# Patient Record
Sex: FEMALE | Race: OTHER | HISPANIC OR LATINO | URBAN - METROPOLITAN AREA
[De-identification: names, ages, dates, MRNs, and addresses within clinical notes are randomized per-mention and may not be internally consistent; named-entity substitution may affect disease eponyms.]

---

## 2018-09-28 ENCOUNTER — INPATIENT (INPATIENT)
Facility: HOSPITAL | Age: 83
LOS: 14 days | Discharge: HOME | DRG: 445 | End: 2018-10-13
Attending: INTERNAL MEDICINE | Admitting: INTERNAL MEDICINE
Payer: MEDICARE

## 2018-09-28 VITALS
DIASTOLIC BLOOD PRESSURE: 78 MMHG | OXYGEN SATURATION: 94 % | HEART RATE: 78 BPM | TEMPERATURE: 98 F | SYSTOLIC BLOOD PRESSURE: 129 MMHG | RESPIRATION RATE: 16 BRPM

## 2018-09-28 DIAGNOSIS — K22.70 BARRETT'S ESOPHAGUS WITHOUT DYSPLASIA: ICD-10-CM

## 2018-09-28 LAB
-  STREPTOCOCCUS SP. (NOT GRP A, B OR S PNEUMONIAE): SIGNIFICANT CHANGE UP
ALBUMIN SERPL ELPH-MCNC: 3.6 G/DL — SIGNIFICANT CHANGE UP (ref 3.3–5)
ALBUMIN SERPL ELPH-MCNC: 3.7 G/DL — SIGNIFICANT CHANGE UP (ref 3.3–5)
ALP SERPL-CCNC: 106 U/L — SIGNIFICANT CHANGE UP (ref 40–120)
ALP SERPL-CCNC: 89 U/L — SIGNIFICANT CHANGE UP (ref 40–120)
ALT FLD-CCNC: 306 U/L — HIGH (ref 10–45)
ALT FLD-CCNC: 84 U/L — HIGH (ref 10–45)
ANION GAP SERPL CALC-SCNC: 11 MMOL/L — SIGNIFICANT CHANGE UP (ref 5–17)
ANION GAP SERPL CALC-SCNC: 12 MMOL/L — SIGNIFICANT CHANGE UP (ref 5–17)
APPEARANCE UR: CLEAR — SIGNIFICANT CHANGE UP
APTT BLD: 31.4 SEC — SIGNIFICANT CHANGE UP (ref 27.5–37.4)
AST SERPL-CCNC: 268 U/L — HIGH (ref 10–40)
AST SERPL-CCNC: 568 U/L — HIGH (ref 10–40)
BASOPHILS # BLD AUTO: 0 K/UL — SIGNIFICANT CHANGE UP (ref 0–0.2)
BASOPHILS NFR BLD AUTO: 0.3 % — SIGNIFICANT CHANGE UP (ref 0–2)
BILIRUB SERPL-MCNC: 0.9 MG/DL — SIGNIFICANT CHANGE UP (ref 0.2–1.2)
BILIRUB SERPL-MCNC: 2.2 MG/DL — HIGH (ref 0.2–1.2)
BILIRUB UR-MCNC: NEGATIVE — SIGNIFICANT CHANGE UP
BUN SERPL-MCNC: 16 MG/DL — SIGNIFICANT CHANGE UP (ref 7–23)
BUN SERPL-MCNC: 18 MG/DL — SIGNIFICANT CHANGE UP (ref 7–23)
CALCIUM SERPL-MCNC: 9.1 MG/DL — SIGNIFICANT CHANGE UP (ref 8.4–10.5)
CALCIUM SERPL-MCNC: 9.2 MG/DL — SIGNIFICANT CHANGE UP (ref 8.4–10.5)
CHLORIDE SERPL-SCNC: 101 MMOL/L — SIGNIFICANT CHANGE UP (ref 96–108)
CHLORIDE SERPL-SCNC: 102 MMOL/L — SIGNIFICANT CHANGE UP (ref 96–108)
CK MB CFR SERPL CALC: 2 NG/ML — SIGNIFICANT CHANGE UP (ref 0–3.8)
CO2 SERPL-SCNC: 22 MMOL/L — SIGNIFICANT CHANGE UP (ref 22–31)
CO2 SERPL-SCNC: 23 MMOL/L — SIGNIFICANT CHANGE UP (ref 22–31)
COLOR SPEC: YELLOW — SIGNIFICANT CHANGE UP
CREAT SERPL-MCNC: 0.85 MG/DL — SIGNIFICANT CHANGE UP (ref 0.5–1.3)
CREAT SERPL-MCNC: 0.89 MG/DL — SIGNIFICANT CHANGE UP (ref 0.5–1.3)
DIFF PNL FLD: ABNORMAL
E COLI DNA BLD POS QL NAA+NON-PROBE: SIGNIFICANT CHANGE UP
EOSINOPHIL # BLD AUTO: 0.1 K/UL — SIGNIFICANT CHANGE UP (ref 0–0.5)
EOSINOPHIL NFR BLD AUTO: 0.5 % — SIGNIFICANT CHANGE UP (ref 0–6)
GAS PNL BLDV: SIGNIFICANT CHANGE UP
GLUCOSE SERPL-MCNC: 131 MG/DL — HIGH (ref 70–99)
GLUCOSE SERPL-MCNC: 178 MG/DL — HIGH (ref 70–99)
GLUCOSE UR QL: NEGATIVE — SIGNIFICANT CHANGE UP
GRAM STN FLD: SIGNIFICANT CHANGE UP
HCT VFR BLD CALC: 35.1 % — SIGNIFICANT CHANGE UP (ref 34.5–45)
HCT VFR BLD CALC: 40.1 % — SIGNIFICANT CHANGE UP (ref 34.5–45)
HGB BLD-MCNC: 11.7 G/DL — SIGNIFICANT CHANGE UP (ref 11.5–15.5)
HGB BLD-MCNC: 12.8 G/DL — SIGNIFICANT CHANGE UP (ref 11.5–15.5)
INR BLD: 1.27 RATIO — HIGH (ref 0.88–1.16)
KETONES UR-MCNC: NEGATIVE — SIGNIFICANT CHANGE UP
LEUKOCYTE ESTERASE UR-ACNC: NEGATIVE — SIGNIFICANT CHANGE UP
LIDOCAIN IGE QN: 21 U/L — SIGNIFICANT CHANGE UP (ref 7–60)
LYMPHOCYTES # BLD AUTO: 0.4 K/UL — LOW (ref 1–3.3)
LYMPHOCYTES # BLD AUTO: 3.7 % — LOW (ref 13–44)
MCHC RBC-ENTMCNC: 31.9 GM/DL — LOW (ref 32–36)
MCHC RBC-ENTMCNC: 32.2 PG — SIGNIFICANT CHANGE UP (ref 27–34)
MCHC RBC-ENTMCNC: 32.9 PG — SIGNIFICANT CHANGE UP (ref 27–34)
MCHC RBC-ENTMCNC: 33.2 GM/DL — SIGNIFICANT CHANGE UP (ref 32–36)
MCV RBC AUTO: 101 FL — HIGH (ref 80–100)
MCV RBC AUTO: 99.3 FL — SIGNIFICANT CHANGE UP (ref 80–100)
METHOD TYPE: SIGNIFICANT CHANGE UP
MONOCYTES # BLD AUTO: 0.5 K/UL — SIGNIFICANT CHANGE UP (ref 0–0.9)
MONOCYTES NFR BLD AUTO: 4.8 % — SIGNIFICANT CHANGE UP (ref 2–14)
NEUTROPHILS # BLD AUTO: 10.4 K/UL — HIGH (ref 1.8–7.4)
NEUTROPHILS NFR BLD AUTO: 90.7 % — HIGH (ref 43–77)
NITRITE UR-MCNC: NEGATIVE — SIGNIFICANT CHANGE UP
PH UR: 6.5 — SIGNIFICANT CHANGE UP (ref 5–8)
PLATELET # BLD AUTO: 170 K/UL — SIGNIFICANT CHANGE UP (ref 150–400)
PLATELET # BLD AUTO: 211 K/UL — SIGNIFICANT CHANGE UP (ref 150–400)
POTASSIUM SERPL-MCNC: 3.7 MMOL/L — SIGNIFICANT CHANGE UP (ref 3.5–5.3)
POTASSIUM SERPL-MCNC: 5.3 MMOL/L — SIGNIFICANT CHANGE UP (ref 3.5–5.3)
POTASSIUM SERPL-SCNC: 3.7 MMOL/L — SIGNIFICANT CHANGE UP (ref 3.5–5.3)
POTASSIUM SERPL-SCNC: 5.3 MMOL/L — SIGNIFICANT CHANGE UP (ref 3.5–5.3)
PROT SERPL-MCNC: 7.6 G/DL — SIGNIFICANT CHANGE UP (ref 6–8.3)
PROT SERPL-MCNC: 8.5 G/DL — HIGH (ref 6–8.3)
PROT UR-MCNC: ABNORMAL
PROTHROM AB SERPL-ACNC: 13.9 SEC — HIGH (ref 9.8–12.7)
RBC # BLD: 3.54 M/UL — LOW (ref 3.8–5.2)
RBC # BLD: 3.98 M/UL — SIGNIFICANT CHANGE UP (ref 3.8–5.2)
RBC # FLD: 13.2 % — SIGNIFICANT CHANGE UP (ref 10.3–14.5)
RBC # FLD: 13.5 % — SIGNIFICANT CHANGE UP (ref 10.3–14.5)
SODIUM SERPL-SCNC: 135 MMOL/L — SIGNIFICANT CHANGE UP (ref 135–145)
SODIUM SERPL-SCNC: 136 MMOL/L — SIGNIFICANT CHANGE UP (ref 135–145)
SP GR SPEC: 1.05 — HIGH (ref 1.01–1.02)
SPECIMEN SOURCE: SIGNIFICANT CHANGE UP
SPECIMEN SOURCE: SIGNIFICANT CHANGE UP
TROPONIN T, HIGH SENSITIVITY RESULT: 10 NG/L — SIGNIFICANT CHANGE UP (ref 0–51)
TROPONIN T, HIGH SENSITIVITY RESULT: 9 NG/L — SIGNIFICANT CHANGE UP (ref 0–51)
UROBILINOGEN FLD QL: NEGATIVE — SIGNIFICANT CHANGE UP
WBC # BLD: 11.4 K/UL — HIGH (ref 3.8–10.5)
WBC # BLD: 8.8 K/UL — SIGNIFICANT CHANGE UP (ref 3.8–10.5)
WBC # FLD AUTO: 11.4 K/UL — HIGH (ref 3.8–10.5)
WBC # FLD AUTO: 8.8 K/UL — SIGNIFICANT CHANGE UP (ref 3.8–10.5)

## 2018-09-28 PROCEDURE — 99233 SBSQ HOSP IP/OBS HIGH 50: CPT

## 2018-09-28 PROCEDURE — 71045 X-RAY EXAM CHEST 1 VIEW: CPT | Mod: 26

## 2018-09-28 PROCEDURE — 71260 CT THORAX DX C+: CPT | Mod: 26

## 2018-09-28 PROCEDURE — 78226 HEPATOBILIARY SYSTEM IMAGING: CPT | Mod: 26

## 2018-09-28 PROCEDURE — 76705 ECHO EXAM OF ABDOMEN: CPT | Mod: 26,RT

## 2018-09-28 PROCEDURE — 93010 ELECTROCARDIOGRAM REPORT: CPT

## 2018-09-28 PROCEDURE — 47490 INCISION OF GALLBLADDER: CPT

## 2018-09-28 PROCEDURE — 99223 1ST HOSP IP/OBS HIGH 75: CPT | Mod: GC

## 2018-09-28 PROCEDURE — 74177 CT ABD & PELVIS W/CONTRAST: CPT | Mod: 26

## 2018-09-28 PROCEDURE — 99285 EMERGENCY DEPT VISIT HI MDM: CPT | Mod: GC,25

## 2018-09-28 RX ORDER — SODIUM CHLORIDE 9 MG/ML
1000 INJECTION, SOLUTION INTRAVENOUS
Qty: 0 | Refills: 0 | Status: DISCONTINUED | OUTPATIENT
Start: 2018-09-28 | End: 2018-10-01

## 2018-09-28 RX ORDER — AZTREONAM 2 G
1000 VIAL (EA) INJECTION ONCE
Qty: 0 | Refills: 0 | Status: COMPLETED | OUTPATIENT
Start: 2018-09-28 | End: 2018-09-28

## 2018-09-28 RX ORDER — FAMOTIDINE 10 MG/ML
20 INJECTION INTRAVENOUS ONCE
Qty: 0 | Refills: 0 | Status: COMPLETED | OUTPATIENT
Start: 2018-09-28 | End: 2018-09-28

## 2018-09-28 RX ORDER — AZTREONAM 2 G
1000 VIAL (EA) INJECTION EVERY 8 HOURS
Qty: 0 | Refills: 0 | Status: DISCONTINUED | OUTPATIENT
Start: 2018-09-28 | End: 2018-10-13

## 2018-09-28 RX ORDER — FAMOTIDINE 10 MG/ML
20 INJECTION INTRAVENOUS DAILY
Qty: 0 | Refills: 0 | Status: DISCONTINUED | OUTPATIENT
Start: 2018-09-28 | End: 2018-10-04

## 2018-09-28 RX ORDER — ACETAMINOPHEN 500 MG
1000 TABLET ORAL ONCE
Qty: 0 | Refills: 0 | Status: COMPLETED | OUTPATIENT
Start: 2018-09-28 | End: 2018-09-28

## 2018-09-28 RX ORDER — SODIUM CHLORIDE 9 MG/ML
250 INJECTION, SOLUTION INTRAVENOUS ONCE
Qty: 0 | Refills: 0 | Status: DISCONTINUED | OUTPATIENT
Start: 2018-09-28 | End: 2018-09-28

## 2018-09-28 RX ORDER — METOPROLOL TARTRATE 50 MG
25 TABLET ORAL DAILY
Qty: 0 | Refills: 0 | Status: DISCONTINUED | OUTPATIENT
Start: 2018-09-28 | End: 2018-09-28

## 2018-09-28 RX ORDER — ACETAMINOPHEN 500 MG
750 TABLET ORAL ONCE
Qty: 0 | Refills: 0 | Status: COMPLETED | OUTPATIENT
Start: 2018-09-28 | End: 2018-09-28

## 2018-09-28 RX ORDER — SENNA PLUS 8.6 MG/1
2 TABLET ORAL AT BEDTIME
Qty: 0 | Refills: 0 | Status: DISCONTINUED | OUTPATIENT
Start: 2018-09-28 | End: 2018-10-13

## 2018-09-28 RX ORDER — ACETAMINOPHEN 500 MG
650 TABLET ORAL ONCE
Qty: 0 | Refills: 0 | Status: COMPLETED | OUTPATIENT
Start: 2018-09-28 | End: 2018-09-28

## 2018-09-28 RX ORDER — ENOXAPARIN SODIUM 100 MG/ML
40 INJECTION SUBCUTANEOUS ONCE
Qty: 0 | Refills: 0 | Status: DISCONTINUED | OUTPATIENT
Start: 2018-09-28 | End: 2018-09-30

## 2018-09-28 RX ORDER — METRONIDAZOLE 500 MG
500 TABLET ORAL EVERY 8 HOURS
Qty: 0 | Refills: 0 | Status: DISCONTINUED | OUTPATIENT
Start: 2018-09-28 | End: 2018-09-30

## 2018-09-28 RX ORDER — SODIUM CHLORIDE 9 MG/ML
500 INJECTION, SOLUTION INTRAVENOUS ONCE
Qty: 0 | Refills: 0 | Status: DISCONTINUED | OUTPATIENT
Start: 2018-09-28 | End: 2018-09-28

## 2018-09-28 RX ORDER — SODIUM CHLORIDE 9 MG/ML
1000 INJECTION, SOLUTION INTRAVENOUS
Qty: 0 | Refills: 0 | Status: DISCONTINUED | OUTPATIENT
Start: 2018-09-28 | End: 2018-09-28

## 2018-09-28 RX ORDER — FAMOTIDINE 10 MG/ML
20 INJECTION INTRAVENOUS ONCE
Qty: 0 | Refills: 0 | Status: DISCONTINUED | OUTPATIENT
Start: 2018-09-28 | End: 2018-09-28

## 2018-09-28 RX ORDER — HYDROMORPHONE HYDROCHLORIDE 2 MG/ML
0.25 INJECTION INTRAMUSCULAR; INTRAVENOUS; SUBCUTANEOUS ONCE
Qty: 0 | Refills: 0 | Status: DISCONTINUED | OUTPATIENT
Start: 2018-09-28 | End: 2018-09-28

## 2018-09-28 RX ORDER — METRONIDAZOLE 500 MG
500 TABLET ORAL ONCE
Qty: 0 | Refills: 0 | Status: COMPLETED | OUTPATIENT
Start: 2018-09-28 | End: 2018-09-28

## 2018-09-28 RX ORDER — SODIUM CHLORIDE 9 MG/ML
500 INJECTION INTRAMUSCULAR; INTRAVENOUS; SUBCUTANEOUS ONCE
Qty: 0 | Refills: 0 | Status: COMPLETED | OUTPATIENT
Start: 2018-09-28 | End: 2018-09-28

## 2018-09-28 RX ORDER — ASPIRIN/CALCIUM CARB/MAGNESIUM 324 MG
81 TABLET ORAL DAILY
Qty: 0 | Refills: 0 | Status: DISCONTINUED | OUTPATIENT
Start: 2018-09-28 | End: 2018-10-13

## 2018-09-28 RX ORDER — FLUTICASONE PROPIONATE 50 MCG
1 SPRAY, SUSPENSION NASAL
Qty: 0 | Refills: 0 | Status: DISCONTINUED | OUTPATIENT
Start: 2018-09-28 | End: 2018-10-13

## 2018-09-28 RX ORDER — BUDESONIDE, MICRONIZED 100 %
0.5 POWDER (GRAM) MISCELLANEOUS
Qty: 0 | Refills: 0 | Status: DISCONTINUED | OUTPATIENT
Start: 2018-09-28 | End: 2018-10-13

## 2018-09-28 RX ADMIN — Medication 50 MILLIGRAM(S): at 16:53

## 2018-09-28 RX ADMIN — Medication 750 MILLIGRAM(S): at 23:30

## 2018-09-28 RX ADMIN — Medication 650 MILLIGRAM(S): at 16:54

## 2018-09-28 RX ADMIN — FAMOTIDINE 20 MILLIGRAM(S): 10 INJECTION INTRAVENOUS at 07:20

## 2018-09-28 RX ADMIN — HYDROMORPHONE HYDROCHLORIDE 0.25 MILLIGRAM(S): 2 INJECTION INTRAMUSCULAR; INTRAVENOUS; SUBCUTANEOUS at 23:00

## 2018-09-28 RX ADMIN — Medication 400 MILLIGRAM(S): at 04:48

## 2018-09-28 RX ADMIN — Medication 50 MILLIGRAM(S): at 22:56

## 2018-09-28 RX ADMIN — Medication 650 MILLIGRAM(S): at 10:49

## 2018-09-28 RX ADMIN — Medication 1000 MILLIGRAM(S): at 07:15

## 2018-09-28 RX ADMIN — HYDROMORPHONE HYDROCHLORIDE 0.25 MILLIGRAM(S): 2 INJECTION INTRAMUSCULAR; INTRAVENOUS; SUBCUTANEOUS at 22:45

## 2018-09-28 RX ADMIN — Medication 100 MILLIGRAM(S): at 13:04

## 2018-09-28 RX ADMIN — SODIUM CHLORIDE 500 MILLILITER(S): 9 INJECTION INTRAMUSCULAR; INTRAVENOUS; SUBCUTANEOUS at 05:39

## 2018-09-28 RX ADMIN — Medication 650 MILLIGRAM(S): at 19:01

## 2018-09-28 RX ADMIN — Medication 650 MILLIGRAM(S): at 07:40

## 2018-09-28 RX ADMIN — Medication 1000 MILLIGRAM(S): at 05:15

## 2018-09-28 RX ADMIN — Medication 100 MILLIGRAM(S): at 23:30

## 2018-09-28 RX ADMIN — Medication 300 MILLIGRAM(S): at 23:00

## 2018-09-28 NOTE — PROGRESS NOTE ADULT - SUBJECTIVE AND OBJECTIVE BOX
Interventional Radiology Brief- Operative Note    Procedure: perc melanie tube placement    Operators: Jarocho Hough M.D.    Anesthesia (type): sedation, local    Contrast:  5 cc    EBL: minimal    Findings/Follow up Plan of Care: 8.5 fr perc melanie tube placement. leave to gravity bag drainage. record output.    Specimens Removed: bile for culture    Implants: 8.5 fr perc melanie    Complications: none    Condition/Disposition: SICU    Please call Interventional Radiology with any questions, concerns, or issues.

## 2018-09-28 NOTE — ED PROVIDER NOTE - ATTENDING CONTRIBUTION TO CARE
Joanna Georges MD - Attending Physician: I have personally seen and examined this patient with the resident/fellow.  I have fully participated in the care of this patient. I have reviewed all pertinent clinical information, including history, physical exam, plan and the Resident/Fellow’s note and agree except as noted. See MDM

## 2018-09-28 NOTE — ED PROVIDER NOTE - PLAN OF CARE
chronic management You came in with upper abdominal pain, which can have many causes. We performed testing, including EKG, labwork, and CT scan which were negative for acute causes such as heart attack and infection of the gall bladder. Your symptoms can also be explained by acid reflex and/or peptic ulcer disease. Your pain improved with Tylenol. If you have similar pain again, you can try to take Tylenol and antacids. Return to the emergency room if you have worsening pain or new symptoms that are concerning to you. Please follow up with your primary care doctor and gastroenterologist within 2-3 weeks to assess whether further testing such as endoscopy is required.

## 2018-09-28 NOTE — ED PROVIDER NOTE - CARE PLAN
Principal Discharge DX:	Li's esophagus  Goal:	chronic management  Assessment and plan of treatment:	You came in with upper abdominal pain, which can have many causes. We performed testing, including EKG, labwork, and CT scan which were negative for acute causes such as heart attack and infection of the gall bladder. Your symptoms can also be explained by acid reflex and/or peptic ulcer disease. Your pain improved with Tylenol. If you have similar pain again, you can try to take Tylenol and antacids. Return to the emergency room if you have worsening pain or new symptoms that are concerning to you. Please follow up with your primary care doctor and gastroenterologist within 2-3 weeks to assess whether further testing such as endoscopy is required. Principal Discharge DX:	Li's esophagus  Goal:	chronic management  Assessment and plan of treatment:	You came in with upper abdominal pain, which can have many causes. We performed testing, including EKG, labwork, and CT scan which were negative for acute causes such as heart attack and infection of the gall bladder. Your symptoms can also be explained by acid reflex and/or peptic ulcer disease. Your pain improved with Tylenol. If you have similar pain again, you can try to take Tylenol and antacids. Return to the emergency room if you have worsening pain or new symptoms that are concerning to you. Please follow up with your primary care doctor and gastroenterologist within 2-3 weeks to assess whether further testing such as endoscopy is required.  Secondary Diagnosis:	Acute cholecystitis

## 2018-09-28 NOTE — ED PROVIDER NOTE - MEDICAL DECISION MAKING DETAILS
89F pmhx polanco's esophagus, CAD s/p stent (5-6 yrs ago) on aspirin, HTN, pulm fibrosis presents with 2h of band-like epigastric pain and nausea. Ddx includes GERD, PUD, cholecystitis, pancreatitis, cardiac (ekg rbbb). Also has 1 wk of cough more productive than usual (chronic cough 2/2 pulm fibrosis)  -CT chest, CT a/p with iv cont. gentle ivf hydration  -iv ofirmev for pain. will reassess  -high sensitivity trop, cbc, cmp, vbg, ua 89F pmhx polanco's esophagus, CAD s/p stent (5-6 yrs ago) on aspirin, HTN, pulm fibrosis presents with 2h of band-like epigastric pain and nausea. Ddx includes GERD, PUD, cholecystitis, pancreatitis, cardiac (ekg rbbb). Also has 1 wk of cough more productive than usual (chronic cough 2/2 pulm fibrosis)  -CT chest, CT a/p with iv cont. gentle ivf hydration, labs    Joanna Georges MD - Attending Physician: Pt here with cough, epigastric/lower chest pain since tonight. ?cardiac vs pna vs abd. Labs, imaging as ordered 89F pmhx polanco's esophagus, CAD s/p stent (5-6 yrs ago) on aspirin, HTN, pulm fibrosis presents with 2h of band-like epigastric pain and nausea. Ddx includes GERD, PUD, cholecystitis, pancreatitis, cardiac (ekg rbbb). Also has 1 wk of cough more productive than usual (chronic cough 2/2 pulm fibrosis)  -CT chest, CT a/p with iv cont. gentle ivf hydration, labs    Joanna Georges MD - Attending Physician: Pt here with cough, epigastric/lower chest pain since tonight. ?cardiac vs pna vs abd. Likely gerd. Labs, imaging as ordered

## 2018-09-28 NOTE — H&P ADULT - NSHPREVIEWOFSYSTEMS_GEN_ALL_CORE
REVIEW OF SYSTEMS:    CONSTITUTIONAL: No weakness, chills; + fever  EYES/ENT: No visual changes;  No vertigo or throat pain   NECK: No pain or stiffness  RESPIRATORY: Chronic cough  CARDIOVASCULAR: No chest pain or palpitations  GASTROINTESTINAL: See HPI  GENITOURINARY: No dysuria, frequency or hematuria  NEUROLOGICAL: No numbness or weakness  SKIN: No itching, burning, rashes, or lesions   All other review of systems is negative unless indicated above.

## 2018-09-28 NOTE — H&P ADULT - NSHPLABSRESULTS_GEN_ALL_CORE
Labs  CBC ( 11:03)                          11.7                     11.4<H>  )--------------(  170        90.7<H>% Neuts, 3.7<L>% Lymphs, ANC: 10.4<H>                          35.1    CBC ( 03:51)                          12.8                     8.8     )--------------(  211        --    % Neuts, --    % Lymphs, ANC: --                              40.1      BMP ( 11:03)       135     |  101     |  16    			Ca++ --      Ca 9.1          ---------------------------------( 131<H>		Mg --           3.7     |  22      |  0.85  			Ph --      BMP ( @ 03:51)       136     |  102     |  18    			Ca++ --      Ca 9.2          ---------------------------------( 178<H>		Mg --           5.3     |  23      |  0.89  			Ph --        LFTs ( @ 11:03)      TPro 7.6 / Alb 3.7 / TBili 2.2<H> / DBili -- / <H> / <H> / AlkPhos 106  LFTs ( @ 03:51)      TPro 8.5<H> / Alb 3.6 / TBili 0.9 / DBili -- / <H> / ALT 84<H> / AlkPhos 89    VBG ( @ 11:03)     7.42 / 40 / 62<H> / 25 / 1.4 / 92<H>%      Lactate: 2.7<H>  VBG ( @ 08:11)     7.38 / 45 / 38 / 26 / 1.6 / 69%      Lactate: 2.7<H>    Urinalysis ( @ 07:22):     Color: Yellow / Appearance: Clear / S.047<H> / pH: 6.5 / Gluc: Negative / Ketones: Negative / Bili: Negative / Urobili: Negative / Protein :100 mg/dL<!> / Nitrites: Negative / Leuk.Est: Negative / RBC: 2 / WBC: 2 / Sq Epi:  / Non Sq Epi: 2 / Bacteria Negative       Imaging    CT chest/abdomen/pelvis  1.  End-stage pulmonary fibrosis with honeycombing, more severe in the   left lung, has progressed since 2011.  No gross CT evidence of   pneumonia.    2.  Fluid-filled esophagus to the level of thoracic inlet, compatible   with gastroesophageal reflux disease.  The patient may be at increased   risk for aspiration.  Esophagus is mildly patulous.  In the setting of   pulmonary fibrosis this may represent scleroderma.    3.  Approximately 2 cm paraesophageal type hiatal hernia.    Abdomen/pelvis:   1.  No evidence of bowel obstruction, active inflammatory process or   intra-abdominal source for infection.    2.  Distended gallbladder.  Small sludge ball versus gallstone in the   gallbladder lumen.  No CT evidence of acute cholecystitis.  Right upper   quadrant ultrasound can be performed for further characterization.    3.  The examination was not performed using mesenteric ischemia protocol.    There is no bowel wall thickening, pneumatosis, mesenteric edema or   obvious vascular occlusion to indicate bowel ischemia.    RUQ ultrasound    Intrahepatic and extrahepatic biliary dilatation. The distal common bile   duct at the level of the pancreatic head was not visualized.    Distended gallbladder containing gallstones and sludge, with borderline   wall thickness.

## 2018-09-28 NOTE — CONSULT NOTE ADULT - ASSESSMENT
89 year old female with HTN, Li's esophagus, CAD s/p stent (5-6 yrs ago on aspirin), end stage pulmonary fibrosis, presented to the emergency room with chief complain of epigastric abdominal pain starting around 1am last night.    IMPRESSION  - Abdominal pain, concerning for choledocholithiasis and cholangitis  Temp 101, WBC 8.8-->11, TBili 0.9-->2.2, Alk phos 106, , , lipase 21, US with CBD dilation to 10mm and intra and extrahepatic biliary dilation    RECOMMENDATION    - trend CBC, CMP, INR  - pending MRCP to evaluate the biliary tree  - patient is very high risk for an ERCP, she has end stage pulmonary fibrosis and there is risk that we maybe unable to extubate her after the procedure, family is aware of the risk and is currently unsure if they want to proceed with an ERCP  - continue antibiotics, currently on aztreonam   - recommend IR consult for possible percutaneous cholecystostomy drain placement   - supportive care as per primary team        Jovi Antony, PGY-5  GI fellow  B- [phone]/ [phone]  Please call GI fellow on call after 5pm and on weekends 89 year old female with HTN, Li's esophagus, CAD s/p stent (5-6 yrs ago on aspirin), end stage pulmonary fibrosis, presented to the emergency room with chief complain of epigastric abdominal pain starting around 1am last night.    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

## 2018-09-28 NOTE — H&P ADULT - ATTENDING COMMENTS
I saw and examined the pt and discussed the tx plan with the House Staff. I agree with the exam and plan as documented in the above H&P.  I discussed with Dr Mathew Lockett (advanced GI), Dr Larios and with 3 family members.   89 yo female with abd pain, fever, elevated LFTs and biliary dilatation. HIDA with no excretion from the liver for 2 hours.   Picture concerning for cholangitis with choledocholithiasis.  Pt would be best treated with ERCP - d/w Dr Lockett who feels ERCP is not the best route for this pt and will document on the chart. The pt is certainly not a candidate for a CBD exploration. A lap melanie would not address her acute issues. As per Dr Lockett, he d/w Dr Thakkar who is willing to perform a perc melanie tube to attempt to decompress the biliary tree.   Will need to closely observe the pt in the SICU after the perc melanie to ensure this tx will be sufficient to treat her disease (likely cholangitis).    Ade Monte MD I saw and examined the pt and discussed the tx plan with the House Staff. I agree with the exam and plan as documented in the above H&P.  I discussed with Dr Mathew Lockett (advanced GI), Dr Larios and with 3 family members.   89 yo female with abd pain, fever, elevated LFTs and biliary dilatation. HIDA with no excretion from the liver for 2 hours.   I saw the pt right after she completed the HIDA - she c/o abd pain. Uncomfortable.   Picture concerning for cholangitis with choledocholithiasis.  Pt would be best treated with ERCP - d/w Dr Lockett who feels ERCP is not the best route for this pt and will document on the chart. The pt is certainly not a candidate for a CBD exploration. A lap melanie would not address her acute issues. As per Dr Lockett, he d/w Dr Thakkar who is willing to perform a perc melanie tube to attempt to decompress the biliary tree.   Will need to closely observe the pt in the SICU after the perc melanie to ensure this tx will be sufficient to treat her disease (likely cholangitis).    Ade Monte MD

## 2018-09-28 NOTE — ED ADULT NURSE NOTE - NSIMPLEMENTINTERV_GEN_ALL_ED
Implemented All Fall with Harm Risk Interventions:  [place] to call system. Call bell, personal items and telephone within reach. Instruct patient to call for assistance. Room bathroom lighting operational. Non-slip footwear when patient is off stretcher. Physically safe environment: no spills, clutter or unnecessary equipment. Stretcher in lowest position, wheels locked, appropriate side rails in place. Provide visual cue, wrist band, yellow gown, etc. Monitor gait and stability. Monitor for mental status changes and reorient to person, place, and time. Review medications for side effects contributing to fall risk. Reinforce activity limits and safety measures with patient and family. Provide visual clues: red socks.

## 2018-09-28 NOTE — ED PROVIDER NOTE - OBJECTIVE STATEMENT
Pt is Malaysian-speaking. Son and daughter are at bedside. Pt and family refusing , history obtained from pt via son and daughter.   89F PMHx Li's esophagus, CAD s/p stent (5-6 yrs ago) on aspirin, HTN, pulmonary fibrosis. Presents with epigastric pain starting around 1am (2 hours ago). Last ate at 7pm. Pt was sitting in bed when pain began, has been continuous and severe. Localized throughout epigastrium bilaterally. Denies dysuria. Associated with mild nausea, no emesis yet. Pt has SOB and cough at baseline 2/2 pulm fibrosis, but for the past week has been more productive. No fever/chills at home, though currently is shivering in ED. Pt is Citizen of [place]-speaking. Son and daughter are at bedside. Pt and family refusing , history obtained from pt via son and daughter.   89F PMHx Li's esophagus, CAD s/p stent (5-6 yrs ago) on aspirin, HTN, pulmonary fibrosis. Presents with epigastric pain starting around 1am (2 hours ago). Last ate at 7pm. Pt was sitting in bed when pain began, has been continuous and severe. Localized throughout epigastrium bilaterally in a line under breast bilateral. Denies dysuria. Associated with mild nausea, no emesis yet. Pt has SOB and cough at baseline 2/2 pulm fibrosis, but for the past week has been more productive. No fever/chills at home, though currently is shivering in ED.

## 2018-09-28 NOTE — CONSULT NOTE ADULT - ATTENDING COMMENTS
As above.  Pt evaluated around 8 PM on 9/28/18 with family present translating British as necessary.    Impression:    #1.  Acute epigastric pain  #2.  Abnormal LFTs  #3.  Mildly dilated common bile duct to 10 mm, minimal intrahepatic ductal dilation.  #4.  Cholelithiasis, mildly prominent and mild wall thickening without definitive signs of cholecystitis.  HIDA scan consistent with cholestasis or high grade biliary obstruction.  #5.  Low grade temperature, leukocytosis to 11, hemodynamically stable.  #6.  Elevated risk for anesthesia due to pulmonary fibrosis / limited activity     Recommendations:    #1.  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  Patient has elevated pulmonary risk (severe pulmonary fibrosis / deconditioning) and elevated cardiac risk (coronary artery disease) for the general anesthesia necessary for ERCP  #3.  Risks of surgical intervention (cholecystectomy or common bile duct exploration) outweigh the benefits.  #4.  Percutaneous biliary drainage by interventional radiology would be technically difficult given minimal dilation of intrahepatic ducts.  Also, pt may need general anesthesia for adequate/safe sedation for percutaneous biliary drainage.  #5.  Percutaneous cholecystostomy tube by interventional radiology is technically feasible, does not require deep sedation/general anesthesia, and would treat cholecystitis if present and has a significant chance of improving cholangitis provided that the cystic duct is open.  #6.  If percutaneous cholecystostomy tube does not improve the patient's condition and patient is felt to have cholangitis, would reconsider ERCP.  If patient improves after cholecystostomy tube and patient has choledocholithasis, would reconsider ERCP for definitive therapy and for eventual removal of tube.    #7.  Anesthesia consult has been requested for evaluation of anesthesia risk, in case of need for ERCP as discussed above.  #8.  Have discussed all of the considerations above with surgical attending Dr. Monte and interventional radiology Dr. Meza.  We agree to proceed with plan for percutaneous cholecystostomy tube.  #9.  Follow clinically and by serial CBC/LFTs.  #10.  IV antibiotics.

## 2018-09-28 NOTE — ED ADULT NURSE NOTE - PMH
Li's Esophagus    Barretts esophagus    CAD (coronary artery disease)    CAD (Coronary Artery Disease)    Essential hypertension    HTN (Hypertension)    Idiopathic pulmonary fibrosis    Pulmonary Fibrosis

## 2018-09-28 NOTE — ED PROVIDER NOTE - PROGRESS NOTE DETAILS
Pt reassessed after CT scan and IV ofirmev. Pain has resolved since last exam. Pt currently feels well. CT negative for pneumonia and acute abdominal processes. High sensitivity trop 9, will rpt at 3hrs. Discussed other possible causes incl PUD, GERD with pt. Will likely discharge home after rpt trop results, recommended pt f/u with GI as outpt. Isadora Pelayo, DO: Patient at 7AM to myself, Dr. Uribe & Dr. Modi pending troponin pending delta troponin & UA. Delta troponin 1. Patient spiked fever 100.5. Reassessed at bedside & reviewed CT results with patient. Patient with 8/10 pain & epigastric TTP with guarding a/w transaminitis - denies ETOH. Will obtain US & repeat VBG. Patient aware of plan. Dr Uribe: US Concerning for Acute cholecystitis so surgery has been consulted.

## 2018-09-28 NOTE — ED ADULT NURSE NOTE - OBJECTIVE STATEMENT
89 yr old female arrived to the ED from home c.o rib/ abdominal pain x1 hour. PMH pulmonary fibrosis, HTN, and 1 stent placement.  per pt she awoke with the pain. upon assessment pt is a&ox3, pt is moving all extremities, following commands. upper quadrants & ribcage tender to palpation. pt denies any trauma or falls. skin WDL.

## 2018-09-28 NOTE — CONSULT NOTE ADULT - ASSESSMENT
89 yo woman with a hx of pulmonary fibrosis, Li's esophagus, CAD s/p stents (approx 5 years ago per daughters, on ASA), presents with a 1-day history of acute-onset epigastric abdominal pain. Imaging consistent with acute cholecystitis, however, with elevated LFTs and fevers, there is concern for cholangitis. In light of her abdominal HIDA scan, persistent abdominal pain, and poor functional status as far as possible future surgical intervention, the patient would benefit from decompression of her biliary tree via percutaneous cholecystostomy tube.    NEURO: alert, oriented.    PULM: hx pulmonary fibrosis.  -cont home pulmicort inhaler, fluticasone nasal spray    CV: hx HTN. Stable, no indication for pressors.  -cont home meds: ASA, metoprolol    GI: Acute cholecystitis, possible cholangitis.  -Interventional Radiology for percutaneous cholecystostomy tube tonight  -NPO, IVF  -cont home protonix, famotidine    : BUN/Cr stable, making adequate urine.  -strict I/O  -cont spontaneous void    HEME: H/H stable, no indication for transfusion.  -lovenox for DVT ppx    ID: febrile to 38.1, leukocytosis of 11.  -cont aztreonam, metronidazole for acute cholecystitis/cholangitis    ENDO: BG wnl, no issues.    DISPO: SICU for close hemodynamic monitoring

## 2018-09-28 NOTE — CONSULT NOTE ADULT - ATTENDING COMMENTS
Patient seen and examined and agree with above on 9/28.   89 year old female with  PMH of hypertension, CAD and pulmonary fibrosis who presents with acute cholecystits. The patient underwent a percutaneous cholecystostomy. LFTs were elevated. Will continue to monitor with serial abdominal exams and monitor LFTs.   I have reviewed PMH, PSH, labs, meds and imaging.  Will monitor closely for pulmonary fibrosis and use supplemental oxygen as tolerated.   Patient is hemodynamically appropriate at this time.   Will continue to monitor patient closely. Patient seen and examined and agree with above on 9/28.   89 year old female with  PMH of hypertension, CAD and pulmonary fibrosis who presents with acute cholecystits. The patient underwent a percutaneous cholecystostomy. LFTs were elevated. Will continue to monitor with serial abdominal exams and monitor LFTs.   GNR bacteremia and will continue zosyn until final culture sensitivities present.   I have reviewed PMH, PSH, labs, meds and imaging.  Will monitor closely for pulmonary fibrosis and use supplemental oxygen as tolerated.   Patient is hemodynamically appropriate at this time.   Will continue to monitor patient closely.

## 2018-09-28 NOTE — H&P ADULT - HISTORY OF PRESENT ILLNESS
89 year old woman with medical history significant for HTN, Li's esophagus, CAD s/p stent (5-6 yrs ago, on aspirin 81mg), pulmonary fibrosis who presented with epigastric abdominal pain that started around 1am this morning.  The pain was mainly located in the epigastrium and radiated to bilateral upper abdomen, moderate in severity.  It had been persistent and was relieved with tylenol in the ED.  She has had a fever in the ED, no chills.  Some nausea, no emesis.  No diarrhea.  She last ate at dinner, 7pm last evening.  She has never experienced pain like this before.

## 2018-09-28 NOTE — H&P ADULT - NSHPPHYSICALEXAM_GEN_ALL_CORE
Vital Signs Last 24 Hrs  T(C): 36.8 (28 Sep 2018 13:05), Max: 38.1 (28 Sep 2018 07:27)  T(F): 98.3 (28 Sep 2018 13:05), Max: 100.5 (28 Sep 2018 07:27)  HR: 74 (28 Sep 2018 13:05) (74 - 97)  BP: 116/65 (28 Sep 2018 13:05) (116/65 - 143/62)  RR: 18 (28 Sep 2018 13:05) (16 - 18)  SpO2: 96% (28 Sep 2018 13:05) (94% - 100%)    General: No acute distress, appears nontoxic  Neurologic: No focal deficits  Psychiatric: Appropriate affect  Cardiovascular: Regular rate and rhythm  Pulmonary: Unlabored breathing  Abdominal: Soft, nondistended, mild epigastric/RUQ tenderness, negative Edwards's sign  Extremities: No edema, moves all without limitations  Skin: Warm, no rashes

## 2018-09-28 NOTE — CONSULT NOTE ADULT - SUBJECTIVE AND OBJECTIVE BOX
BILIARY GI  Chief Complaint:  Patient is a 89y old  Female who presents with a chief complaint of elevated liver enzymes and abdominal pain.     HPI:  89 year old female with HTN, Li's esophagus, CAD s/p stent (5-6 yrs ago on aspirin), pulmonary fibrosis, presented to the emergency room with chief complain of epigastric abdominal pain starting around 1am last night.    As per daughter, patient was sitting in bed when pain began, has been continuous and severe, located in the epigastrium. She also complains of mild nausea, but denies vomiting, fever, chills. She has SOB and cough at baseline 2/2 pulm fibrosis, but for the past week has been more productive.     In the ER: febrile to 101  WBC 8.8-->11, TBili 0.9-->2.2, Alk phos 106, , , lipase 21    Allergies:  penicillin (Angioedema)  penicillins (Swelling)      Home Medications:  * Incomplete Medication History as of 2016 18:44 documented in Structured Notes  · 	famotidine:  orally   · 	Pulmicort Nebuamp:       Hospital Medications:  aztreonam  IVPB 1000 milliGRAM(s) IV Intermittent once      PMHX/PSHX:  Barretts esophagus  Essential hypertension  CAD (coronary artery disease)  Idiopathic pulmonary fibrosis  Li's Esophagus  CAD (Coronary Artery Disease)  HTN (Hypertension)  Pulmonary Fibrosis  S/P Coronary Artery Stent Placement  S/P Shoulder Surgery      Family history:    Denies family history of colon cancer, liver cancer, uterine cancer, ovarian cancer, breast cancer, gastric ulcers, colon polyps, gallstones    Social History:   denies smoking, alcohol, drug use      ROS:     General:  No wt loss, fevers, chills, night sweats, fatigue,   Eyes:  Good vision, no reported pain  ENT:  No sore throat, pain, runny nose, dysphagia  CV:  No pain, palpitations, hypo/hypertension  Resp:  No dyspnea, cough, tachypnea, wheezing  GI:  See HPI  :  No pain, bleeding, incontinence, nocturia  Muscle:  No pain, weakness  Neuro:  No weakness, tingling, memory problems  Psych:  No fatigue, insomnia, mood problems, depression  Endocrine:  No polyuria, polydipsia, cold/heat intolerance  Heme:  No petechiae, ecchymosis, easy bruisability  Skin:  No rash, edema      PHYSICAL EXAM:     GENERAL:  Appears stated age, well-groomed, well-nourished, no distress  HEENT:  NC/AT,  conjunctivae clear and pink,  no JVD  CHEST:  Full & symmetric excursion, no increased effort, breath sounds clear  HEART:  Regular rhythm, S1, S2, no murmur/rub/S3/S4, no abdominal bruit, no edema  ABDOMEN:  Soft, non-tender, non-distended, normoactive bowel sounds,  no masses ,  EXTREMITIES:  no cyanosis,clubbing or edema  SKIN:  No rash/erythema/ecchymoses/petechiae/wounds/abscess/warm/dry  NEURO:  Alert, oriented    Vital Signs:  Vital Signs Last 24 Hrs  T(C): 36.8 (28 Sep 2018 13:05), Max: 38.1 (28 Sep 2018 07:27)  T(F): 98.3 (28 Sep 2018 13:05), Max: 100.5 (28 Sep 2018 07:27)  HR: 74 (28 Sep 2018 13:05) (74 - 97)  BP: 116/65 (28 Sep 2018 13:05) (116/65 - 143/62)  BP(mean): --  RR: 18 (28 Sep 2018 13:05) (16 - 18)  SpO2: 96% (28 Sep 2018 13:05) (94% - 100%)  Daily     Daily     LABS:                        11.7   11.4  )-----------( 170      ( 28 Sep 2018 11:03 )             35.1         135  |  101  |  16  ----------------------------<  131<H>  3.7   |  22  |  0.85    Ca    9.1      28 Sep 2018 11:03    TPro  7.6  /  Alb  3.7  /  TBili  2.2<H>  /  DBili  x   /  AST  568<H>  /  ALT  306<H>  /  AlkPhos  106      LIVER FUNCTIONS - ( 28 Sep 2018 11:03 )  Alb: 3.7 g/dL / Pro: 7.6 g/dL / ALK PHOS: 106 U/L / ALT: 306 U/L / AST: 568 U/L / GGT: x             Urinalysis Basic - ( 28 Sep 2018 07:22 )    Color: Yellow / Appearance: Clear / S.047 / pH: x  Gluc: x / Ketone: Negative  / Bili: Negative / Urobili: Negative   Blood: x / Protein: 100 mg/dL / Nitrite: Negative   Leuk Esterase: Negative / RBC: 2 /hpf / WBC 2 /hpf   Sq Epi: x / Non Sq Epi: 2 /hpf / Bacteria: Negative      Amylase Serum--      Lipase serum21       Ammonia--  Imaging:    < from: US Abdomen Upper Quadrant Right (18 @ 09:40) >  FINDINGS:  Liver: Within normal limits.  Bile ducts: Intra-and extrahepatic biliary dilatation Common bile duct measures 10 mm.   Gallbladder: Distended gallbladder containing sludge and stones. Borderline gallbladder wall thickness. Sonographic Edwards's sign was unreliable as the patient received pain medication.  Pancreas: Limited evaluation.  Right kidney: 9.6 cm. Nohydronephrosis.   Ascites: None.  IVC: Visualized portions are within normal limits.  IMPRESSION:   Intrahepatic and extrahepatic biliary dilatation. The distal common bile duct at the level of the pancreatic head was not visualized.  Distended gallbladder containing gallstones and sludge, with borderline wall thickness. Findings are concerning for acute cholecystitis. Further evaluation with hepatobiliary scan is recommended for confirmation.  < end of copied text >    < from: CT Abdomen and Pelvis w/ IV Cont (18 @ 05:34) >  IMPRESSION:    Chest:   1.  End-stage pulmonary fibrosis with honeycombing, more severe in the left lung, has progressed since 2011.  No gross CT evidence of pneumonia.  2.  Fluid-filled esophagus to the level of thoracic inlet, compatible with gastroesophageal reflux disease.  The patient may be at increased risk for aspiration.  Esophagus is mildly patulous.  In the setting of pulmonary fibrosis this may represent scleroderma.  3.  Approximately 2 cm paraesophageal type hiatal hernia.    Abdomen/pelvis:   1.  No evidence of bowel obstruction, active inflammatory process or intra-abdominal source for infection.  2.  Distended gallbladder.  Small sludge ball versus gallstone in the gallbladder lumen.  No CT evidence of acute cholecystitis.  Right upper quadrant ultrasound can be performed for further characterization.  3.  The examination was not performed using mesenteric ischemia protocol.  There is no bowel wall thickening, pneumatosis, mesenteric edema or obvious vascular occlusion to indicate bowel ischemia.  < end of copied text > BILIARY GI  Chief Complaint:  Patient is a 89y old  Female who presents with a chief complaint of elevated liver enzymes and abdominal pain.     HPI:  89 year old female with HTN, Li's esophagus, CAD s/p stent (5-6 yrs ago on aspirin), pulmonary fibrosis, presented to the emergency room with chief complain of epigastric abdominal pain starting around 1am last night.    As per daughter, patient was sitting in bed when pain began, has been continuous and severe, located in the epigastrium. She also complains of mild nausea, but denies vomiting, fever, chills. She has SOB and cough at baseline 2/2 pulm fibrosis, but for the past week has been more productive.     In the ER: febrile to 101  WBC 8.8-->11, TBili 0.9-->2.2, Alk phos 106, , , lipase 21    Allergies:  penicillin (Angioedema)  penicillins (Swelling)      Home Medications:  * Incomplete Medication History as of 2016 18:44 documented in Structured Notes  · 	famotidine:  orally   · 	Pulmicort Nebuamp:       Hospital Medications:  aztreonam  IVPB 1000 milliGRAM(s) IV Intermittent once      PMHX/PSHX:  Barretts esophagus  Essential hypertension  CAD (coronary artery disease)  Idiopathic pulmonary fibrosis  Li's Esophagus  S/P Coronary Artery Stent Placement  S/P Shoulder Surgery      Family history:    Denies family history of colon cancer, liver cancer, uterine cancer, ovarian cancer, breast cancer, gastric ulcers, colon polyps, gallstones    Social History:   denies smoking, alcohol, drug use      ROS:     General:  No wt loss, fevers, chills, night sweats, fatigue,   Eyes:  Good vision, no reported pain  ENT:  No sore throat, pain, runny nose, dysphagia  CV:  No pain, palpitations, hypo/hypertension  Resp:  No dyspnea, cough, tachypnea, wheezing  GI:  See HPI  :  No pain, bleeding, incontinence, nocturia  Muscle:  No pain, weakness  Neuro:  No weakness, tingling, memory problems  Psych:  No fatigue, insomnia, mood problems, depression  Endocrine:  No polyuria, polydipsia, cold/heat intolerance  Heme:  No petechiae, ecchymosis, easy bruisability  Skin:  No rash, edema      PHYSICAL EXAM:     GENERAL:  Appears stated age, well-groomed, well-nourished, no distress  HEENT:  NC/AT,  conjunctivae clear and pink,  no JVD  CHEST:  Full & symmetric excursion, no increased effort, breath sounds clear  HEART:  Regular rhythm, S1, S2, no murmur/rub/S3/S4, no abdominal bruit, no edema  ABDOMEN:  Soft, non-tender, non-distended, normoactive bowel sounds,  no masses ,  EXTREMITIES:  no cyanosis,clubbing or edema  SKIN:  No rash/erythema/ecchymoses/petechiae/wounds/abscess/warm/dry  NEURO:  Alert, oriented    Vital Signs:  Vital Signs Last 24 Hrs  T(C): 36.8 (28 Sep 2018 13:05), Max: 38.1 (28 Sep 2018 07:27)  T(F): 98.3 (28 Sep 2018 13:05), Max: 100.5 (28 Sep 2018 07:27)  HR: 74 (28 Sep 2018 13:05) (74 - 97)  BP: 116/65 (28 Sep 2018 13:05) (116/65 - 143/62)  BP(mean): --  RR: 18 (28 Sep 2018 13:05) (16 - 18)  SpO2: 96% (28 Sep 2018 13:05) (94% - 100%)  Daily     Daily     LABS:                        11.7   11.4  )-----------( 170      ( 28 Sep 2018 11:03 )             35.1         135  |  101  |  16  ----------------------------<  131<H>  3.7   |  22  |  0.85    Ca    9.1      28 Sep 2018 11:03    TPro  7.6  /  Alb  3.7  /  TBili  2.2<H>  /  DBili  x   /  AST  568<H>  /  ALT  306<H>  /  AlkPhos  106      LIVER FUNCTIONS - ( 28 Sep 2018 11:03 )  Alb: 3.7 g/dL / Pro: 7.6 g/dL / ALK PHOS: 106 U/L / ALT: 306 U/L / AST: 568 U/L / GGT: x             Urinalysis Basic - ( 28 Sep 2018 07:22 )    Color: Yellow / Appearance: Clear / S.047 / pH: x  Gluc: x / Ketone: Negative  / Bili: Negative / Urobili: Negative   Blood: x / Protein: 100 mg/dL / Nitrite: Negative   Leuk Esterase: Negative / RBC: 2 /hpf / WBC 2 /hpf   Sq Epi: x / Non Sq Epi: 2 /hpf / Bacteria: Negative      Amylase Serum--      Lipase serum21       Ammonia--  Imaging:    < from: US Abdomen Upper Quadrant Right (18 @ 09:40) >  FINDINGS:  Liver: Within normal limits.  Bile ducts: Intra-and extrahepatic biliary dilatation Common bile duct measures 10 mm.   Gallbladder: Distended gallbladder containing sludge and stones. Borderline gallbladder wall thickness. Sonographic Edwards's sign was unreliable as the patient received pain medication.  Pancreas: Limited evaluation.  Right kidney: 9.6 cm. Nohydronephrosis.   Ascites: None.  IVC: Visualized portions are within normal limits.  IMPRESSION:   Intrahepatic and extrahepatic biliary dilatation. The distal common bile duct at the level of the pancreatic head was not visualized.  Distended gallbladder containing gallstones and sludge, with borderline wall thickness. Findings are concerning for acute cholecystitis. Further evaluation with hepatobiliary scan is recommended for confirmation.  < end of copied text >    < from: CT Abdomen and Pelvis w/ IV Cont (18 @ 05:34) >  IMPRESSION:    Chest:   1.  End-stage pulmonary fibrosis with honeycombing, more severe in the left lung, has progressed since 2011.  No gross CT evidence of pneumonia.  2.  Fluid-filled esophagus to the level of thoracic inlet, compatible with gastroesophageal reflux disease.  The patient may be at increased risk for aspiration.  Esophagus is mildly patulous.  In the setting of pulmonary fibrosis this may represent scleroderma.  3.  Approximately 2 cm paraesophageal type hiatal hernia.    Abdomen/pelvis:   1.  No evidence of bowel obstruction, active inflammatory process or intra-abdominal source for infection.  2.  Distended gallbladder.  Small sludge ball versus gallstone in the gallbladder lumen.  No CT evidence of acute cholecystitis.  Right upper quadrant ultrasound can be performed for further characterization.  3.  The examination was not performed using mesenteric ischemia protocol.  There is no bowel wall thickening, pneumatosis, mesenteric edema or obvious vascular occlusion to indicate bowel ischemia.  < end of copied text >

## 2018-09-28 NOTE — ED PROVIDER NOTE - PHYSICAL EXAMINATION
General: elderly woman, moaning appears uncomfortable, lying in bed   Neurology: A&Ox3, nonfocal, QUAN x 4  Eyes: PERRLA, EOMI  ENT/Neck: Neck supple, No JVD, Gross hearing intact  Respiratory: +wheeze bilaterally  CV: RRR, S1S2, no murmurs, rubs or gallops  Abdominal: Soft, NT, ND +BS,   Extremities: No edema, + peripheral pulses  Skin: No Rashes, Hematoma, Ecchymosis

## 2018-09-28 NOTE — CONSULT NOTE ADULT - SUBJECTIVE AND OBJECTIVE BOX
CONSULT RESULT - [facility]    Consulting attending: Dr. Larios    HPI:  89 yo woman with a hx of pulmonary fibrosis, Li's esophagus, CAD s/p stents (approx 5 years ago per daughters, on ASA), presents with a 1-day history of acute-onset epigastric abdominal pain with radiation to bilateral upper quadrants. Pain improved after tylenol; she cannot identify aggravating factors. She also complained of chills but denies subjective fever, nausea, vomiting, or changes to bowel habits. Denies jaundice, dark urine, or acholic stools. No history of similar pain in the past.    Patient reports baseline SOB and productive cough due to pulmonary fibrosis.      PAST MEDICAL HISTORY:  Barretts esophagus  Essential hypertension  CAD (coronary artery disease)  Idiopathic pulmonary fibrosis  Li's Esophagus  CAD (Coronary Artery Disease)  HTN (Hypertension)  Pulmonary Fibrosis      PAST SURGICAL HISTORY:  S/P Coronary Artery Stent Placement  S/P Shoulder Surgery      MEDICATIONS:  Metoprolol 25 mg daily  Famotidine 20 mg daily  ASA 81 mg daily  Pulmicort 180 mcg BID  Fluticasone nasal spray 50 mcg BID  Omeprazole (unknown dosage)    ALLERGIES:  penicillin (Angioedema)  penicillins (Swelling)      VITALS & I/Os:  Vital Signs Last 24 Hrs  T(C): 36.8 (28 Sep 2018 13:05), Max: 38.1 (28 Sep 2018 07:27)  T(F): 98.3 (28 Sep 2018 13:05), Max: 100.5 (28 Sep 2018 07:27)  HR: 74 (28 Sep 2018 13:05) (74 - 97)  BP: 116/65 (28 Sep 2018 13:05) (116/65 - 143/62)  BP(mean): --  RR: 18 (28 Sep 2018 13:05) (16 - 18)  SpO2: 96% (28 Sep 2018 13:05) (94% - 100%)    PHYSICAL EXAM:  GEN: NAD, resting quietly  PULM: symmetric chest rise bilaterally, no increased WOB  CV: regular rate, peripheral pulses intact  ABD: soft, ND, TTP in epigastrium and bilateral upper quadrants, voluntary guarding, no peritoneal signs  EXTR: no cyanosis or edema, moving all extremities    LABS:                        11.7   11.4  )-----------( 170      ( 28 Sep 2018 11:03 )             35.1         135  |  101  |  16  ----------------------------<  131<H>  3.7   |  22  |  0.85    Ca    9.1      28 Sep 2018 11:03    TPro  7.6  /  Alb  3.7  /  TBili  2.2<H>  /  DBili  x   /  AST  568<H>  /  ALT  306<H>  /  AlkPhos  106      Lactate:   @ 11:03  2.7   @ 08:11  2.7   @ 03:51  2.6        CARDIAC MARKERS ( 28 Sep 2018 03:51 )  x     / x     / x     / x     / 2.0 ng/mL        Urinalysis Basic - ( 28 Sep 2018 07:22 )    Color: Yellow / Appearance: Clear / S.047 / pH: x  Gluc: x / Ketone: Negative  / Bili: Negative / Urobili: Negative   Blood: x / Protein: 100 mg/dL / Nitrite: Negative   Leuk Esterase: Negative / RBC: 2 /hpf / WBC 2 /hpf   Sq Epi: x / Non Sq Epi: 2 /hpf / Bacteria: Negative    IMAGIN/28 RUQ U/S  IMPRESSION:   Intrahepatic and extrahepatic biliary dilatation. The distal common bile   duct at the level of the pancreatic head was not visualized.  Distended gallbladder containing gallstones and sludge, with borderline   wall thickness. Findings are concerning for acute cholecystitis. Further   evaluation with hepatobiliary scan is recommended for confirmation.     CT A/P:  IMPRESSION:    Chest:   1.  End-stage pulmonary fibrosis with honeycombing, more severe in the   left lung, has progressed since 2011.  No gross CT evidence of   pneumonia.    2.  Fluid-filled esophagus to the level of thoracic inlet, compatible   with gastroesophageal reflux disease.  The patient may be at increased   risk for aspiration.  Esophagus is mildly patulous.  In the setting of   pulmonary fibrosis this may represent scleroderma.    3.  Approximately 2 cm paraesophageal type hiatal hernia.    Abdomen/pelvis:   1.  No evidence of bowel obstruction, active inflammatory process or   intra-abdominal source for infection.    2.  Distended gallbladder.  Small sludge ball versus gallstone in the   gallbladder lumen.  No CT evidence of acute cholecystitis.  Right upper   quadrant ultrasound can be performed for further characterization.    3.  The examination was not performed using mesenteric ischemia protocol.    There is no bowel wall thickening, pneumatosis, mesenteric edema or   obvious vascular occlusion to indicate bowel ischemia.    9/28 HIDA Scan:  IMPRESSION: Abnormal hepatobiliary scan.    Nonvisualization of the bowel and gallbladder and the entire 2 hours of   imaging. Findings may represent high-grade obstruction. Acute   cholecystitis cannot be excluded.

## 2018-09-29 LAB
ALBUMIN SERPL ELPH-MCNC: 3.2 G/DL — LOW (ref 3.3–5)
ALP SERPL-CCNC: 103 U/L — SIGNIFICANT CHANGE UP (ref 40–120)
ALT FLD-CCNC: 204 U/L — HIGH (ref 10–45)
ANION GAP SERPL CALC-SCNC: 13 MMOL/L — SIGNIFICANT CHANGE UP (ref 5–17)
APTT BLD: 38.7 SEC — HIGH (ref 27.5–37.4)
AST SERPL-CCNC: 189 U/L — HIGH (ref 10–40)
BILIRUB SERPL-MCNC: 3.3 MG/DL — HIGH (ref 0.2–1.2)
BUN SERPL-MCNC: 12 MG/DL — SIGNIFICANT CHANGE UP (ref 7–23)
CA-I BLD-SCNC: 1.15 MMOL/L — SIGNIFICANT CHANGE UP (ref 1.12–1.3)
CALCIUM SERPL-MCNC: 8.8 MG/DL — SIGNIFICANT CHANGE UP (ref 8.4–10.5)
CHLORIDE SERPL-SCNC: 103 MMOL/L — SIGNIFICANT CHANGE UP (ref 96–108)
CO2 SERPL-SCNC: 22 MMOL/L — SIGNIFICANT CHANGE UP (ref 22–31)
CREAT SERPL-MCNC: 0.76 MG/DL — SIGNIFICANT CHANGE UP (ref 0.5–1.3)
GAS PNL BLDA: SIGNIFICANT CHANGE UP
GLUCOSE SERPL-MCNC: 140 MG/DL — HIGH (ref 70–99)
GRAM STN FLD: SIGNIFICANT CHANGE UP
GRAM STN FLD: SIGNIFICANT CHANGE UP
HCT VFR BLD CALC: 32.7 % — LOW (ref 34.5–45)
HGB BLD-MCNC: 11.5 G/DL — SIGNIFICANT CHANGE UP (ref 11.5–15.5)
INR BLD: 1.55 RATIO — HIGH (ref 0.88–1.16)
MAGNESIUM SERPL-MCNC: 1.9 MG/DL — SIGNIFICANT CHANGE UP (ref 1.6–2.6)
MCHC RBC-ENTMCNC: 34.2 PG — HIGH (ref 27–34)
MCHC RBC-ENTMCNC: 35.1 GM/DL — SIGNIFICANT CHANGE UP (ref 32–36)
MCV RBC AUTO: 97.5 FL — SIGNIFICANT CHANGE UP (ref 80–100)
PHOSPHATE SERPL-MCNC: 2.9 MG/DL — SIGNIFICANT CHANGE UP (ref 2.5–4.5)
PLATELET # BLD AUTO: 197 K/UL — SIGNIFICANT CHANGE UP (ref 150–400)
POTASSIUM SERPL-MCNC: 3.7 MMOL/L — SIGNIFICANT CHANGE UP (ref 3.5–5.3)
POTASSIUM SERPL-SCNC: 3.7 MMOL/L — SIGNIFICANT CHANGE UP (ref 3.5–5.3)
PROT SERPL-MCNC: 7.2 G/DL — SIGNIFICANT CHANGE UP (ref 6–8.3)
PROTHROM AB SERPL-ACNC: 17 SEC — HIGH (ref 9.8–12.7)
RBC # BLD: 3.35 M/UL — LOW (ref 3.8–5.2)
RBC # FLD: 34.9 % — HIGH (ref 10.3–14.5)
SODIUM SERPL-SCNC: 138 MMOL/L — SIGNIFICANT CHANGE UP (ref 135–145)
SPECIMEN SOURCE: SIGNIFICANT CHANGE UP
WBC # BLD: 9.1 K/UL — SIGNIFICANT CHANGE UP (ref 3.8–10.5)
WBC # FLD AUTO: 9.1 K/UL — SIGNIFICANT CHANGE UP (ref 3.8–10.5)

## 2018-09-29 PROCEDURE — 99291 CRITICAL CARE FIRST HOUR: CPT

## 2018-09-29 RX ORDER — OXYCODONE HYDROCHLORIDE 5 MG/1
2.5 TABLET ORAL EVERY 4 HOURS
Qty: 0 | Refills: 0 | Status: DISCONTINUED | OUTPATIENT
Start: 2018-09-29 | End: 2018-09-29

## 2018-09-29 RX ORDER — VANCOMYCIN HCL 1 G
VIAL (EA) INTRAVENOUS
Qty: 0 | Refills: 0 | Status: DISCONTINUED | OUTPATIENT
Start: 2018-09-29 | End: 2018-10-01

## 2018-09-29 RX ORDER — OXYCODONE HYDROCHLORIDE 5 MG/1
2.5 TABLET ORAL ONCE
Qty: 0 | Refills: 0 | Status: DISCONTINUED | OUTPATIENT
Start: 2018-09-29 | End: 2018-09-29

## 2018-09-29 RX ORDER — ONDANSETRON 8 MG/1
4 TABLET, FILM COATED ORAL ONCE
Qty: 0 | Refills: 0 | Status: DISCONTINUED | OUTPATIENT
Start: 2018-09-29 | End: 2018-09-30

## 2018-09-29 RX ORDER — PANTOPRAZOLE SODIUM 20 MG/1
40 TABLET, DELAYED RELEASE ORAL
Qty: 0 | Refills: 0 | Status: DISCONTINUED | OUTPATIENT
Start: 2018-09-29 | End: 2018-10-13

## 2018-09-29 RX ORDER — HYDROMORPHONE HYDROCHLORIDE 2 MG/ML
0.25 INJECTION INTRAMUSCULAR; INTRAVENOUS; SUBCUTANEOUS ONCE
Qty: 0 | Refills: 0 | Status: DISCONTINUED | OUTPATIENT
Start: 2018-09-29 | End: 2018-09-29

## 2018-09-29 RX ORDER — ACETAMINOPHEN 500 MG
650 TABLET ORAL EVERY 6 HOURS
Qty: 0 | Refills: 0 | Status: DISCONTINUED | OUTPATIENT
Start: 2018-09-29 | End: 2018-10-13

## 2018-09-29 RX ORDER — INFLUENZA VIRUS VACCINE 15; 15; 15; 15 UG/.5ML; UG/.5ML; UG/.5ML; UG/.5ML
0.5 SUSPENSION INTRAMUSCULAR ONCE
Qty: 0 | Refills: 0 | Status: DISCONTINUED | OUTPATIENT
Start: 2018-09-29 | End: 2018-10-13

## 2018-09-29 RX ORDER — OXYCODONE HYDROCHLORIDE 5 MG/1
2.5 TABLET ORAL EVERY 4 HOURS
Qty: 0 | Refills: 0 | Status: DISCONTINUED | OUTPATIENT
Start: 2018-09-29 | End: 2018-10-01

## 2018-09-29 RX ORDER — VANCOMYCIN HCL 1 G
1000 VIAL (EA) INTRAVENOUS EVERY 24 HOURS
Qty: 0 | Refills: 0 | Status: DISCONTINUED | OUTPATIENT
Start: 2018-09-30 | End: 2018-10-01

## 2018-09-29 RX ORDER — VANCOMYCIN HCL 1 G
1000 VIAL (EA) INTRAVENOUS ONCE
Qty: 0 | Refills: 0 | Status: COMPLETED | OUTPATIENT
Start: 2018-09-29 | End: 2018-09-29

## 2018-09-29 RX ADMIN — Medication 100 MILLIGRAM(S): at 05:48

## 2018-09-29 RX ADMIN — Medication 50 MILLIGRAM(S): at 13:01

## 2018-09-29 RX ADMIN — OXYCODONE HYDROCHLORIDE 2.5 MILLIGRAM(S): 5 TABLET ORAL at 08:40

## 2018-09-29 RX ADMIN — Medication 100 MILLIGRAM(S): at 21:48

## 2018-09-29 RX ADMIN — OXYCODONE HYDROCHLORIDE 2.5 MILLIGRAM(S): 5 TABLET ORAL at 23:00

## 2018-09-29 RX ADMIN — Medication 650 MILLIGRAM(S): at 07:43

## 2018-09-29 RX ADMIN — OXYCODONE HYDROCHLORIDE 2.5 MILLIGRAM(S): 5 TABLET ORAL at 23:30

## 2018-09-29 RX ADMIN — SODIUM CHLORIDE 50 MILLILITER(S): 9 INJECTION, SOLUTION INTRAVENOUS at 04:44

## 2018-09-29 RX ADMIN — FAMOTIDINE 20 MILLIGRAM(S): 10 INJECTION INTRAVENOUS at 11:35

## 2018-09-29 RX ADMIN — Medication 81 MILLIGRAM(S): at 11:35

## 2018-09-29 RX ADMIN — Medication 650 MILLIGRAM(S): at 18:56

## 2018-09-29 RX ADMIN — OXYCODONE HYDROCHLORIDE 2.5 MILLIGRAM(S): 5 TABLET ORAL at 13:32

## 2018-09-29 RX ADMIN — Medication 0.5 MILLIGRAM(S): at 05:15

## 2018-09-29 RX ADMIN — Medication 0.5 MILLIGRAM(S): at 17:24

## 2018-09-29 RX ADMIN — OXYCODONE HYDROCHLORIDE 2.5 MILLIGRAM(S): 5 TABLET ORAL at 17:07

## 2018-09-29 RX ADMIN — OXYCODONE HYDROCHLORIDE 2.5 MILLIGRAM(S): 5 TABLET ORAL at 09:10

## 2018-09-29 RX ADMIN — OXYCODONE HYDROCHLORIDE 2.5 MILLIGRAM(S): 5 TABLET ORAL at 13:02

## 2018-09-29 RX ADMIN — Medication 50 MILLIGRAM(S): at 05:49

## 2018-09-29 RX ADMIN — OXYCODONE HYDROCHLORIDE 2.5 MILLIGRAM(S): 5 TABLET ORAL at 17:37

## 2018-09-29 RX ADMIN — Medication 1 SPRAY(S): at 17:08

## 2018-09-29 RX ADMIN — Medication 650 MILLIGRAM(S): at 08:13

## 2018-09-29 RX ADMIN — Medication 250 MILLIGRAM(S): at 15:11

## 2018-09-29 RX ADMIN — HYDROMORPHONE HYDROCHLORIDE 0.25 MILLIGRAM(S): 2 INJECTION INTRAMUSCULAR; INTRAVENOUS; SUBCUTANEOUS at 04:00

## 2018-09-29 RX ADMIN — Medication 100 MILLIGRAM(S): at 13:01

## 2018-09-29 RX ADMIN — Medication 50 MILLIGRAM(S): at 21:48

## 2018-09-29 RX ADMIN — Medication 650 MILLIGRAM(S): at 18:26

## 2018-09-29 RX ADMIN — HYDROMORPHONE HYDROCHLORIDE 0.25 MILLIGRAM(S): 2 INJECTION INTRAMUSCULAR; INTRAVENOUS; SUBCUTANEOUS at 04:15

## 2018-09-29 NOTE — PROGRESS NOTE ADULT - ASSESSMENT
A: 89 yo woman with a hx of pulmonary fibrosis, Li's esophagus, CAD s/p stents (approx 5 years ago per daughters, on ASA), presents with a 1-day history of acute-onset epigastric abdominal pain. Imaging consistent with acute cholecystitis, however, with elevated LFTs and fevers, there is concern for cholangitis. Patient underwent PCT placement which she tolerated well.    P:  - pain control with IV tylenol  - c/w home pulmicort and fluticasone  - cont home meds, ASA, metoprolol, protonix, famotidine  - f/u IR post PTC  - NPO/IVF    Joanna Ramirez, PGY-1  General Surgery Green Team [phone] A: 89 yo woman with a hx of pulmonary fibrosis, Li's esophagus, CAD s/p stents (approx 5 years ago per daughters, on ASA), presents with a 1-day history of acute-onset epigastric abdominal pain. Imaging consistent with acute cholecystitis, however, with elevated LFTs and fevers, there is concern for cholangitis. Patient underwent PCT placement which she tolerated well.    P:  - Bctx pos for strep and e coli  - pain control with IV tylenol  - c/w home pulmicort and fluticasone  - cont home meds, ASA, metoprolol, protonix, famotidine  - f/u IR post PTC  - NPO/IVF    Joanna Ramirez, PGY-1  General Surgery Green Team [phone]

## 2018-09-29 NOTE — PROGRESS NOTE ADULT - SUBJECTIVE AND OBJECTIVE BOX
SICU PROGRESS NOTE  ================================================  Overnight events: PCT placed. Patient tolerated well. Blood cultures positive for strep and E. coli.    HPI:  89 yo woman with a hx of pulmonary fibrosis, Li's esophagus, CAD s/p stents (approx 5 years ago per daughters, on ASA), presents with a 1-day history of acute-onset epigastric abdominal pain with radiation to bilateral upper quadrants. Pain improved after tylenol; she cannot identify aggravating factors. She also complained of chills but denies subjective fever, nausea, vomiting, or changes to bowel habits. Denies jaundice, dark urine, or acholic stools. No history of similar pain in the past. Patient does report baseline SOB and productive cough due to pulmonary fibrosis.    Objective:  Vital Signs  ICU Vital Signs Last 24 Hrs  T(C): 36.7 (28 Sep 2018 23:00), Max: 38.1 (28 Sep 2018 07:27)  T(F): 98 (28 Sep 2018 23:00), Max: 100.5 (28 Sep 2018 07:27)  HR: 82 (28 Sep 2018 23:00) (74 - 97)  BP: 159/68 (28 Sep 2018 23:00) (116/65 - 159/68)  BP(mean): 98 (28 Sep 2018 23:00) (97 - 101)  ABP: --  ABP(mean): --  RR: 43 (28 Sep 2018 23:00) (16 - 43)  SpO2: 95% (28 Sep 2018 23:00) (94% - 100%)    I&O's Detail      Diet: Diet, NPO:   Except Medications (09-28-18 @ 19:00)      MEDICATIONS  (STANDING):  acetaminophen  IVPB .. 750 milliGRAM(s) IV Intermittent once  aspirin  chewable 81 milliGRAM(s) Oral daily  aztreonam  IVPB 1000 milliGRAM(s) IV Intermittent every 8 hours  buDESOnide   0.5 milliGRAM(s) Respule 0.5 milliGRAM(s) Inhalation two times a day  dextrose 5% + sodium chloride 0.9%. 1000 milliLiter(s) (50 mL/Hr) IV Continuous <Continuous>  enoxaparin Injectable 40 milliGRAM(s) SubCutaneous Once  famotidine   Suspension 20 milliGRAM(s) Oral daily  fluticasone propionate 50 MICROgram(s)/spray Nasal Spray 1 Spray(s) Both Nostrils two times a day  metroNIDAZOLE  IVPB 500 milliGRAM(s) IV Intermittent every 8 hours    MEDICATIONS  (PRN):  bisacodyl 5 milliGRAM(s) Oral daily PRN Constipation  senna 2 Tablet(s) Oral at bedtime PRN Constipation      PHYSICAL EXAM:  GEN: NAD, resting quietly, intubated, sedated  NEURO: CN II-XII grossly intact, no focal deficits  PULM: symmetric chest rise bilaterally, no increased WOB  CV: regular rate, peripheral pulses intact  ABD: soft, ND, R cholecystostomy tube with bilious drainage, appropriately TTP  EXTR: no cyanosis or edema, moving all extremities    LABS  CBC (09-28 @ 11:03)                              11.7                           11.4<H>  )----------------(  170        90.7<H>% Neutrophils, 3.7<L>% Lymphocytes, ANC: 10.4<H>                              35.1                CBC (09-28 @ 03:51)                              12.8                           8.8     )----------------(  211        --    % Neutrophils, --    % Lymphocytes, ANC: --                                  40.1                  BMP (09-28 @ 11:03)             135     |  101     |  16    		Ca++ --      Ca 9.1                ---------------------------------( 131<H>		Mg --                 3.7     |  22      |  0.85  			Ph --      BMP (09-28 @ 03:51)             136     |  102     |  18    		Ca++ --      Ca 9.2                ---------------------------------( 178<H>		Mg --                 5.3     |  23      |  0.89  			Ph --        LFTs (09-28 @ 11:03)      TPro 7.6 / Alb 3.7 / TBili 2.2<H> / DBili -- / <H> / <H> / AlkPhos 106  LFTs (09-28 @ 03:51)      TPro 8.5<H> / Alb 3.6 / TBili 0.9 / DBili -- / <H> / ALT 84<H> / AlkPhos 89    Coags (09-28 @ 18:59)  aPTT 31.4 / INR 1.27<H> / PT 13.9<H>    ABG (09-28 @ 11:03)      /  /  /  /  / %     Lactate:   2.7<H>  ABG (09-28 @ 08:11)      /  /  /  /  / %     Lactate:   2.7<H>    VBG (09-28 @ 11:03)     7.42 / 40 / 62<H> / 25 / 1.4 / 92<H>%  VBG (09-28 @ 08:11)     7.38 / 45 / 38 / 26 / 1.6 / 69%    -> .Blood Blood Culture (09-28 @ 08:25)       Growth in aerobic bottle: Gram Negative Rods  Growth in anaerobic bottle: Gram Negative Rods and Gram Positive Cocci in  Pairs and Chains    Blood Culture PCR    Growth in aerobic bottle: Gram Negative Rods  Growth in anaerobic bottle: Gram Negative Rods and Gram Positive Cocci in  Pairs and Chains  "Due to technical problems, Proteus sp. will Not be reported as part of  the BCID panel until further notice"  ***Blood Panel PCR results on this specimen are available  approximately 3 hours after the Gram stain result.***  Gram stain, PCR, and/or culture results may not always  correspond due to difference in methodologies.  ************************************************************  This PCR assay was performed using [employer].  The following targets are tested for: Enterococcus,  vancomycin resistant enterococci, Listeria monocytogenes,  coagulase negative staphylococci, S. aureus,  methicillin resistant S. aureus, Streptococcus agalactiae  (Group B), S. pneumoniae, S. pyogenes (Group A),  Acinetobacter baumannii, Enterobacter cloacae, E. coli,  Klebsiella oxytoca, K. pneumoniae, Proteus sp.,  Serratia marcescens, Haemophilus influenzae,  Neisseria meningitidis, Pseudomonas aeruginosa, Candida  albicans, C. glabrata, C krusei, C parapsilosis,  C. tropicalis and the KPC resistance gene.        CULTURES  .Blood Blood  09-28 @ 08:25   Growth in aerobic bottle: Gram Negative Rods  Growth in anaerobic bottle: Gram Negative Rods and Gram Positive Cocci in  Pairs and Chains  "Due to technical problems, Proteus sp. will Not be reported as part of  the BCID panel until further notice"  ***Blood Panel PCR results on this specimen are available  approximately 3 hours after the Gram stain result.***  Gram stain, PCR, and/or culture results may not always  correspond due to difference in methodologies.  ************************************************************  This PCR assay was performed using [employer].  The following targets are tested for: Enterococcus,  vancomycin resistant enterococci, Listeria monocytogenes,  coagulase negative staphylococci, S. aureus,  methicillin resistant S. aureus, Streptococcus agalactiae  (Group B), S. pneumoniae, S. pyogenes (Group A),  Acinetobacter baumannii, Enterobacter cloacae, E. coli,  Klebsiella oxytoca, K. pneumoniae, Proteus sp.,  Serratia marcescens, Haemophilus influenzae,  Neisseria meningitidis, Pseudomonas aeruginosa, Candida  albicans, C. glabrata, C krusei, C parapsilosis,  C. tropicalis and the KPC resistance gene.  --  Blood Culture PCR      IMAGING:   no new imaging

## 2018-09-29 NOTE — PROGRESS NOTE ADULT - SUBJECTIVE AND OBJECTIVE BOX
General Surgery Progress Note    SUBJECTIVE:  The patient was seen and examined. No acute events overnight. Transferred to SICU yesterday evening. IR PTC placed, pt tolerated procedure well. Pain improved. Denies N/V.     OBJECTIVE:     ** VITAL SIGNS / I&O's **    Vital Signs Last 24 Hrs  T(C): 36.2 (29 Sep 2018 03:00), Max: 38.1 (28 Sep 2018 07:27)  T(F): 97.2 (29 Sep 2018 03:00), Max: 100.5 (28 Sep 2018 07:27)  HR: 84 (29 Sep 2018 05:16) (70 - 97)  BP: 140/64 (29 Sep 2018 05:00) (116/65 - 159/68)  BP(mean): 92 (29 Sep 2018 05:00) (88 - 101)  RR: 19 (29 Sep 2018 05:00) (8 - 43)  SpO2: 94% (29 Sep 2018 05:16) (94% - 100%)      28 Sep 2018 07:01  -  29 Sep 2018 06:49  --------------------------------------------------------  IN:    dextrose 5% + sodium chloride 0.9%.: 300 mL    IV PiggyBack: 150 mL  Total IN: 450 mL    OUT:  Total OUT: 0 mL    Total NET: 450 mL          ** PHYSICAL EXAM **    -- CONSTITUTIONAL: Alert, NAD  -- PULMONARY: non-labored respirations  -- ABDOMEN: soft, non-distended, ATTP, R cholecystostomy tube with bilious drainage  -- NEURO: A&Ox3    ** LABS **                          11.7   11.4  )-----------( 170      ( 28 Sep 2018 11:03 )             35.1     28 Sep 2018 11:03    135    |  101    |  16     ----------------------------<  131    3.7     |  22     |  0.85     Ca    9.1        28 Sep 2018 11:03    TPro  7.6    /  Alb  3.7    /  TBili  2.2    /  DBili  x      /  AST  568    /  ALT  306    /  AlkPhos  106    28 Sep 2018 11:03    PT/INR - ( 29 Sep 2018 06:18 )   PT: 17.0 sec;   INR: 1.55 ratio         PTT - ( 29 Sep 2018 06:18 )  PTT:38.7 sec  CAPILLARY BLOOD GLUCOSE      POCT Blood Glucose.: 103 mg/dL (28 Sep 2018 17:31)    CARDIAC MARKERS ( 28 Sep 2018 03:51 )  x     / x     / x     / x     / 2.0 ng/mL      LIVER FUNCTIONS - ( 28 Sep 2018 11:03 )  Alb: 3.7 g/dL / Pro: 7.6 g/dL / ALK PHOS: 106 U/L / ALT: 306 U/L / AST: 568 U/L / GGT: x             Culture - Body Fluid with Gram Stain (collected 29 Sep 2018 00:36)  Source: .Body Fluid Bile Fluid  Gram Stain (29 Sep 2018 03:09):    No polymorphonuclear leukocytes seen per low power field    Gram positive cocci in pairs seen per oil power field    Gram Negative Rods seen per oil power field    by cytocentrifuge    Culture - Blood (collected 28 Sep 2018 08:25)  Source: .Blood Blood  Gram Stain (29 Sep 2018 01:20):    Growth in aerobic bottle: Gram Positive Cocci in Pairs and Chains    Growth in aerobic bottle: Gram Negative Rods    Growth in anaerobic bottle: Gram Positive Cocci in Pairs and Chains and    Gram Negative Rods  Preliminary Report (29 Sep 2018 01:21):    Growth in aerobic bottle: Gram Negative Rods    Growth in aerobic bottle: Gram Positive Cocci in Pairs and Chains    Growth in anaerobic bottle: Gram Positive Cocci in Pairs and Chains and    Gram Negative Rods    Culture - Blood (collected 28 Sep 2018 08:25)  Source: .Blood Blood  Gram Stain (28 Sep 2018 21:50):    Growth in aerobic bottle: Gram Negative Rods    Growth in anaerobic bottle: Gram Negative Rods and Gram Positive Cocci in    Pairs and Chains  Preliminary Report (28 Sep 2018 21:51):    Growth in aerobic bottle: Gram Negative Rods    Growth in anaerobic bottle: Gram Negative Rods and Gram Positive Cocci in    Pairs and Chains    "Due to technical problems, Proteus sp. will Not be reported as part of    the BCID panel until further notice"    ***Blood Panel PCR results on this specimen are available    approximately 3 hours after the Gram stain result.***    Gram stain, PCR, and/or culture results may not always    correspond due to difference in methodologies.    ************************************************************    This PCR assay was performed using [employer].    The following targets are tested for: Enterococcus,    vancomycin resistant enterococci, Listeria monocytogenes,    coagulase negative staphylococci, S. aureus,    methicillin resistant S. aureus, Streptococcus agalactiae    (Group B), S. pneumoniae, S. pyogenes (Group A),    Acinetobacter baumannii, Enterobacter cloacae, E. coli,    Klebsiella oxytoca, K. pneumoniae, Proteus sp.,    Serratia marcescens, Haemophilus influenzae,    Neisseria meningitidis, Pseudomonas aeruginosa, Candida    albicans, C. glabrata, C krusei, C parapsilosis,    C. tropicalis and the KPC resistance gene.  Organism: Blood Culture PCR (28 Sep 2018 23:25)  Organism: Blood Culture PCR (28 Sep 2018 23:25)      Urinalysis Basic - ( 28 Sep 2018 07:22 )    Color: Yellow / Appearance: Clear / S.047 / pH: x  Gluc: x / Ketone: Negative  / Bili: Negative / Urobili: Negative   Blood: x / Protein: 100 mg/dL / Nitrite: Negative   Leuk Esterase: Negative / RBC: 2 /hpf / WBC 2 /hpf   Sq Epi: x / Non Sq Epi: 2 /hpf / Bacteria: Negative        MEDICATIONS  (STANDING):  aspirin  chewable 81 milliGRAM(s) Oral daily  aztreonam  IVPB 1000 milliGRAM(s) IV Intermittent every 8 hours  buDESOnide   0.5 milliGRAM(s) Respule 0.5 milliGRAM(s) Inhalation two times a day  dextrose 5% + sodium chloride 0.9%. 1000 milliLiter(s) (50 mL/Hr) IV Continuous <Continuous>  enoxaparin Injectable 40 milliGRAM(s) SubCutaneous Once  famotidine   Suspension 20 milliGRAM(s) Oral daily  fluticasone propionate 50 MICROgram(s)/spray Nasal Spray 1 Spray(s) Both Nostrils two times a day  influenza   Vaccine 0.5 milliLiter(s) IntraMuscular once  metroNIDAZOLE  IVPB 500 milliGRAM(s) IV Intermittent every 8 hours    MEDICATIONS  (PRN):  acetaminophen   Tablet .. 650 milliGRAM(s) Oral every 6 hours PRN Mild Pain (1 - 3)  bisacodyl 5 milliGRAM(s) Oral daily PRN Constipation  senna 2 Tablet(s) Oral at bedtime PRN Constipation

## 2018-09-29 NOTE — PROGRESS NOTE ADULT - ASSESSMENT
89 yo woman with a hx of pulmonary fibrosis, Li's esophagus, CAD s/p stents (approx 5 years ago per daughters, on ASA), presents with a 1-day history of acute-onset epigastric abdominal pain. Imaging consistent with acute cholecystitis, however, with elevated LFTs and fevers, there is concern for cholangitis. Patient underwent PCT placement which she tolerated well.    NEURO: alert, oriented. Post-procedure pain well controlled.  -IV tylenol    PULM: hx pulmonary fibrosis.  -cont home pulmicort inhaler, fluticasone nasal spray    CV: hx HTN. Stable, no indication for pressors.  -cont home meds: ASA, metoprolol    GI: Acute cholecystitis, possible cholangitis.  -Interventional Radiology for percutaneous cholecystostomy tube tonight  -NPO, IVF  -cont home protonix, famotidine    : BUN/Cr stable, making adequate urine.  -strict I/O  -cont spontaneous void    HEME: H/H stable, no indication for transfusion.  -lovenox for DVT ppx    ID: febrile to 38.1, leukocytosis of 11. Blood cultures 9/28 positive for strep and E. coli.  -cont aztreonam, metronidazole    ENDO: BG wnl, no issues.    DISPO: SICU for close hemodynamic monitoring 89 yo woman with a hx of pulmonary fibrosis, Li's esophagus, CAD s/p stents (approx 5 years ago per daughters, on ASA), presents with a 1-day history of acute-onset epigastric abdominal pain. Imaging consistent with acute cholecystitis, however, with elevated LFTs and fevers, there is concern for cholangitis. Patient underwent PCT placement which she tolerated well.    NEURO: alert, oriented. Post-procedure pain well controlled.  -IV tylenol    PULM: hx pulmonary fibrosis.  -cont home pulmicort inhaler, fluticasone nasal spray    CV: hx HTN, CAD w/ stents. Stable, no indication for pressors.  -cont home meds: ASA, metoprolol    GI: Acute cholecystitis, possible cholangitis.  -Interventional Radiology for percutaneous cholecystostomy tube tonight  -NPO, IVF  -cont home protonix, famotidine    : BUN/Cr stable, making adequate urine.  -strict I/O  -cont spontaneous void    HEME: H/H stable, no indication for transfusion.  -lovenox for DVT ppx    ID: febrile to 38.1, leukocytosis of 11. Blood cultures 9/28 positive for strep and E. coli.  -cont aztreonam, metronidazole    ENDO: BG wnl, no issues.    DISPO: SICU for close hemodynamic monitoring

## 2018-09-29 NOTE — PROGRESS NOTE ADULT - ATTENDING COMMENTS
Pt seen and examined.  Chart reviewed.  Resident note confirmed.  Pt is a 89 year old female with a medical history significant for CAD/HTN/pulmonary fibrosis/Li's esophagus, who presented to [facility] with a 1-day history of epigastric abdominal pain. Work up revealed acute cholecystitis, with a concern for cholangitis. The patient underwent cholecystostomy tube placement. LFT's are trending down.  No acute events overnight.    PMH/PSH/MEDS/ALL/SH/FH/ROS:  Unchanged from H&P  Vitals/PE/Labs/Radiographs:  Reviewed    A/p  Neuro:	Abdominal pain, improved.  	Continue pain control    CVS:	CAD/HTN  	ASA/plavix on hold  	Continue Cardiac monitoring    Pulm:	Pulmonary fibrosis  	Atelectasis  	ISP  	Continue Home meds    GI:	Acute melanie  	R/o cholangitis  	Continue melanie tube  	Adv diet  	Continue home meds    :	CKD 3  	Monitor I's and O's    Heme:	No active issues  	Monitor H/h    ID:	E. coli/Strep bacteremia  	Acute melanie  	R/o cholangitis  	Cont IV abx    Endo:	Continue glycemic control

## 2018-09-30 LAB
-  AMIKACIN: SIGNIFICANT CHANGE UP
-  AMIKACIN: SIGNIFICANT CHANGE UP
-  AMOXICILLIN/CLAVULANIC ACID: SIGNIFICANT CHANGE UP
-  AMPICILLIN/SULBACTAM: SIGNIFICANT CHANGE UP
-  AMPICILLIN/SULBACTAM: SIGNIFICANT CHANGE UP
-  AMPICILLIN: SIGNIFICANT CHANGE UP
-  AMPICILLIN: SIGNIFICANT CHANGE UP
-  AZTREONAM: SIGNIFICANT CHANGE UP
-  AZTREONAM: SIGNIFICANT CHANGE UP
-  CEFAZOLIN: SIGNIFICANT CHANGE UP
-  CEFAZOLIN: SIGNIFICANT CHANGE UP
-  CEFEPIME: SIGNIFICANT CHANGE UP
-  CEFEPIME: SIGNIFICANT CHANGE UP
-  CEFOXITIN: SIGNIFICANT CHANGE UP
-  CEFOXITIN: SIGNIFICANT CHANGE UP
-  CEFTRIAXONE: SIGNIFICANT CHANGE UP
-  CIPROFLOXACIN: SIGNIFICANT CHANGE UP
-  CIPROFLOXACIN: SIGNIFICANT CHANGE UP
-  CLINDAMYCIN: SIGNIFICANT CHANGE UP
-  ERTAPENEM: SIGNIFICANT CHANGE UP
-  ERTAPENEM: SIGNIFICANT CHANGE UP
-  ERYTHROMYCIN: SIGNIFICANT CHANGE UP
-  GENTAMICIN: SIGNIFICANT CHANGE UP
-  GENTAMICIN: SIGNIFICANT CHANGE UP
-  IMIPENEM: SIGNIFICANT CHANGE UP
-  IMIPENEM: SIGNIFICANT CHANGE UP
-  LEVOFLOXACIN: SIGNIFICANT CHANGE UP
-  MEROPENEM: SIGNIFICANT CHANGE UP
-  MEROPENEM: SIGNIFICANT CHANGE UP
-  PENICILLIN: SIGNIFICANT CHANGE UP
-  PIPERACILLIN/TAZOBACTAM: SIGNIFICANT CHANGE UP
-  PIPERACILLIN/TAZOBACTAM: SIGNIFICANT CHANGE UP
-  TOBRAMYCIN: SIGNIFICANT CHANGE UP
-  TOBRAMYCIN: SIGNIFICANT CHANGE UP
-  TRIMETHOPRIM/SULFAMETHOXAZOLE: SIGNIFICANT CHANGE UP
-  TRIMETHOPRIM/SULFAMETHOXAZOLE: SIGNIFICANT CHANGE UP
-  VANCOMYCIN: SIGNIFICANT CHANGE UP
ALBUMIN SERPL ELPH-MCNC: 3.3 G/DL — SIGNIFICANT CHANGE UP (ref 3.3–5)
ALP SERPL-CCNC: 99 U/L — SIGNIFICANT CHANGE UP (ref 40–120)
ALT FLD-CCNC: 126 U/L — HIGH (ref 10–45)
ANION GAP SERPL CALC-SCNC: 7 MMOL/L — SIGNIFICANT CHANGE UP (ref 5–17)
APTT BLD: 35.9 SEC — SIGNIFICANT CHANGE UP (ref 27.5–37.4)
AST SERPL-CCNC: 68 U/L — HIGH (ref 10–40)
BILIRUB DIRECT SERPL-MCNC: 1.5 MG/DL — HIGH (ref 0–0.2)
BILIRUB INDIRECT FLD-MCNC: 0.5 MG/DL — SIGNIFICANT CHANGE UP (ref 0.2–1)
BILIRUB SERPL-MCNC: 2 MG/DL — HIGH (ref 0.2–1.2)
BUN SERPL-MCNC: 9 MG/DL — SIGNIFICANT CHANGE UP (ref 7–23)
CA-I BLD-SCNC: 1.26 MMOL/L — SIGNIFICANT CHANGE UP (ref 1.12–1.3)
CALCIUM SERPL-MCNC: 9.2 MG/DL — SIGNIFICANT CHANGE UP (ref 8.4–10.5)
CHLORIDE SERPL-SCNC: 104 MMOL/L — SIGNIFICANT CHANGE UP (ref 96–108)
CO2 SERPL-SCNC: 27 MMOL/L — SIGNIFICANT CHANGE UP (ref 22–31)
CREAT SERPL-MCNC: 0.83 MG/DL — SIGNIFICANT CHANGE UP (ref 0.5–1.3)
CULTURE RESULTS: SIGNIFICANT CHANGE UP
GLUCOSE SERPL-MCNC: 119 MG/DL — HIGH (ref 70–99)
GRAM STN FLD: SIGNIFICANT CHANGE UP
HCT VFR BLD CALC: 37.2 % — SIGNIFICANT CHANGE UP (ref 34.5–45)
HGB BLD-MCNC: 11.9 G/DL — SIGNIFICANT CHANGE UP (ref 11.5–15.5)
INR BLD: 1.42 RATIO — HIGH (ref 0.88–1.16)
MAGNESIUM SERPL-MCNC: 1.9 MG/DL — SIGNIFICANT CHANGE UP (ref 1.6–2.6)
MCHC RBC-ENTMCNC: 32 GM/DL — SIGNIFICANT CHANGE UP (ref 32–36)
MCHC RBC-ENTMCNC: 32.6 PG — SIGNIFICANT CHANGE UP (ref 27–34)
MCV RBC AUTO: 102 FL — HIGH (ref 80–100)
METHOD TYPE: SIGNIFICANT CHANGE UP
ORGANISM # SPEC MICROSCOPIC CNT: SIGNIFICANT CHANGE UP
PHOSPHATE SERPL-MCNC: 2.5 MG/DL — SIGNIFICANT CHANGE UP (ref 2.5–4.5)
PLATELET # BLD AUTO: 155 K/UL — SIGNIFICANT CHANGE UP (ref 150–400)
POTASSIUM SERPL-MCNC: 4.2 MMOL/L — SIGNIFICANT CHANGE UP (ref 3.5–5.3)
POTASSIUM SERPL-SCNC: 4.2 MMOL/L — SIGNIFICANT CHANGE UP (ref 3.5–5.3)
PROT SERPL-MCNC: 7.5 G/DL — SIGNIFICANT CHANGE UP (ref 6–8.3)
PROTHROM AB SERPL-ACNC: 15.6 SEC — HIGH (ref 9.8–12.7)
RBC # BLD: 3.64 M/UL — LOW (ref 3.8–5.2)
RBC # FLD: 13.5 % — SIGNIFICANT CHANGE UP (ref 10.3–14.5)
SODIUM SERPL-SCNC: 138 MMOL/L — SIGNIFICANT CHANGE UP (ref 135–145)
SPECIMEN SOURCE: SIGNIFICANT CHANGE UP
WBC # BLD: 5.8 K/UL — SIGNIFICANT CHANGE UP (ref 3.8–10.5)
WBC # FLD AUTO: 5.8 K/UL — SIGNIFICANT CHANGE UP (ref 3.8–10.5)

## 2018-09-30 PROCEDURE — 99222 1ST HOSP IP/OBS MODERATE 55: CPT | Mod: GC

## 2018-09-30 PROCEDURE — 71045 X-RAY EXAM CHEST 1 VIEW: CPT | Mod: 26

## 2018-09-30 PROCEDURE — 99232 SBSQ HOSP IP/OBS MODERATE 35: CPT | Mod: GC

## 2018-09-30 PROCEDURE — 99232 SBSQ HOSP IP/OBS MODERATE 35: CPT

## 2018-09-30 RX ORDER — ENOXAPARIN SODIUM 100 MG/ML
40 INJECTION SUBCUTANEOUS DAILY
Qty: 0 | Refills: 0 | Status: DISCONTINUED | OUTPATIENT
Start: 2018-09-30 | End: 2018-10-05

## 2018-09-30 RX ORDER — CHLORHEXIDINE GLUCONATE 213 G/1000ML
1 SOLUTION TOPICAL
Qty: 0 | Refills: 0 | Status: DISCONTINUED | OUTPATIENT
Start: 2018-09-30 | End: 2018-10-13

## 2018-09-30 RX ORDER — MAGNESIUM SULFATE 500 MG/ML
2 VIAL (ML) INJECTION ONCE
Qty: 0 | Refills: 0 | Status: COMPLETED | OUTPATIENT
Start: 2018-09-30 | End: 2018-09-30

## 2018-09-30 RX ADMIN — Medication 0.5 MILLIGRAM(S): at 17:22

## 2018-09-30 RX ADMIN — Medication 650 MILLIGRAM(S): at 09:43

## 2018-09-30 RX ADMIN — Medication 650 MILLIGRAM(S): at 22:30

## 2018-09-30 RX ADMIN — Medication 50 MILLIGRAM(S): at 14:02

## 2018-09-30 RX ADMIN — Medication 50 MILLIGRAM(S): at 22:02

## 2018-09-30 RX ADMIN — Medication 50 GRAM(S): at 09:45

## 2018-09-30 RX ADMIN — Medication 650 MILLIGRAM(S): at 15:40

## 2018-09-30 RX ADMIN — Medication 650 MILLIGRAM(S): at 03:30

## 2018-09-30 RX ADMIN — Medication 100 MILLIGRAM(S): at 07:08

## 2018-09-30 RX ADMIN — Medication 1 SPRAY(S): at 07:07

## 2018-09-30 RX ADMIN — Medication 0.5 MILLIGRAM(S): at 06:00

## 2018-09-30 RX ADMIN — PANTOPRAZOLE SODIUM 40 MILLIGRAM(S): 20 TABLET, DELAYED RELEASE ORAL at 09:13

## 2018-09-30 RX ADMIN — Medication 50 MILLIGRAM(S): at 07:07

## 2018-09-30 RX ADMIN — Medication 650 MILLIGRAM(S): at 15:10

## 2018-09-30 RX ADMIN — Medication 650 MILLIGRAM(S): at 09:13

## 2018-09-30 RX ADMIN — Medication 81 MILLIGRAM(S): at 11:15

## 2018-09-30 RX ADMIN — Medication 650 MILLIGRAM(S): at 03:00

## 2018-09-30 RX ADMIN — ENOXAPARIN SODIUM 40 MILLIGRAM(S): 100 INJECTION SUBCUTANEOUS at 11:15

## 2018-09-30 RX ADMIN — FAMOTIDINE 20 MILLIGRAM(S): 10 INJECTION INTRAVENOUS at 11:15

## 2018-09-30 RX ADMIN — CHLORHEXIDINE GLUCONATE 1 APPLICATION(S): 213 SOLUTION TOPICAL at 17:07

## 2018-09-30 RX ADMIN — OXYCODONE HYDROCHLORIDE 2.5 MILLIGRAM(S): 5 TABLET ORAL at 11:15

## 2018-09-30 RX ADMIN — Medication 1 SPRAY(S): at 17:04

## 2018-09-30 RX ADMIN — OXYCODONE HYDROCHLORIDE 2.5 MILLIGRAM(S): 5 TABLET ORAL at 07:35

## 2018-09-30 RX ADMIN — OXYCODONE HYDROCHLORIDE 2.5 MILLIGRAM(S): 5 TABLET ORAL at 17:07

## 2018-09-30 RX ADMIN — OXYCODONE HYDROCHLORIDE 2.5 MILLIGRAM(S): 5 TABLET ORAL at 16:37

## 2018-09-30 RX ADMIN — OXYCODONE HYDROCHLORIDE 2.5 MILLIGRAM(S): 5 TABLET ORAL at 07:05

## 2018-09-30 RX ADMIN — Medication 250 MILLIGRAM(S): at 15:09

## 2018-09-30 RX ADMIN — Medication 100 MILLIGRAM(S): at 13:10

## 2018-09-30 RX ADMIN — Medication 650 MILLIGRAM(S): at 22:00

## 2018-09-30 RX ADMIN — OXYCODONE HYDROCHLORIDE 2.5 MILLIGRAM(S): 5 TABLET ORAL at 11:45

## 2018-09-30 NOTE — PROGRESS NOTE ADULT - SUBJECTIVE AND OBJECTIVE BOX
General Surgery Progress Note    SUBJECTIVE:  The patient was seen and examined. No acute events overnight. The patient was seen and examined. No acute events overnight. Advanced to CLD. Vanc added for gram pos cocci in Bctx. Oxycodone 2.5mg PRN added.  Pain improved. Per family, pt has not c/o N/V, -flatus.     OBJECTIVE:     ** VITAL SIGNS / I&O's **    Vital Signs Last 24 Hrs  T(C): 36.1 (30 Sep 2018 03:00), Max: 37 (29 Sep 2018 07:00)  T(F): 97 (30 Sep 2018 03:00), Max: 98.6 (29 Sep 2018 07:00)  HR: 79 (30 Sep 2018 04:00) (63 - 102)  BP: 143/66 (30 Sep 2018 04:00) (89/48 - 154/67)  BP(mean): 95 (30 Sep 2018 04:00) (67 - 96)  RR: 15 (30 Sep 2018 04:) (14 - 48)  SpO2: 95% (30 Sep 2018 04:00) (93% - 100%)      28 Sep 2018 07:01  -  29 Sep 2018 07:00  --------------------------------------------------------  IN:    dextrose 5% + sodium chloride 0.9%.: 450 mL    IV PiggyBack: 300 mL  Total IN: 750 mL    OUT:    T-Tube: 50 mL  Total OUT: 50 mL    Total NET: 700 mL      29 Sep 2018 07:01  -  30 Sep 2018 06:56  --------------------------------------------------------  IN:    dextrose 5% + sodium chloride 0.9%.: 1050 mL    IV PiggyBack: 550 mL    Oral Fluid: 390 mL  Total IN: 1990 mL    OUT:    T-Tube: 40 mL    Voided: 1550 mL  Total OUT: 1590 mL    Total NET: 400 mL          ** PHYSICAL EXAM **    GEN: NAD, resting quietly  NEURO: CN II-XII grossly intact, no focal deficits  PULM: symmetric chest rise bilaterally, no increased WOB  CV: regular rate, peripheral pulses intact  ABD: soft, ND, R cholecystostomy tube with bilious drainage, appropriately TTP  EXTR: no cyanosis or edema, moving all extremities    ** LABS **                          11.5   9.1   )-----------( 197      ( 29 Sep 2018 06:18 )             32.7     29 Sep 2018 06:18    138    |  103    |  12     ----------------------------<  140    3.7     |  22     |  0.76     Ca    8.8        29 Sep 2018 06:18  Phos  2.9       29 Sep 2018 06:18  Mg     1.9       29 Sep 2018 06:18    TPro  7.2    /  Alb  3.2    /  TBili  3.3    /  DBili  x      /  AST  189    /  ALT  204    /  AlkPhos  103    29 Sep 2018 06:18    PT/INR - ( 29 Sep 2018 06:18 )   PT: 17.0 sec;   INR: 1.55 ratio         PTT - ( 29 Sep 2018 06:18 )  PTT:38.7 sec  CAPILLARY BLOOD GLUCOSE            LIVER FUNCTIONS - ( 29 Sep 2018 06:18 )  Alb: 3.2 g/dL / Pro: 7.2 g/dL / ALK PHOS: 103 U/L / ALT: 204 U/L / AST: 189 U/L / GGT: x             Culture - Blood (collected 29 Sep 2018 12:36)  Source: .Blood Blood-Venous  Gram Stain (30 Sep 2018 05:36):    Growth in aerobic bottle: Gram Positive Cocci in Pairs and Chains  Preliminary Report (30 Sep 2018 05:36):    Growth in aerobic bottle: Gram Positive Cocci in Pairs and Chains    Culture - Body Fluid with Gram Stain (collected 29 Sep 2018 00:36)  Source: .Body Fluid Bile Fluid  Gram Stain (29 Sep 2018 03:09):    No polymorphonuclear leukocytes seen per low power field    Gram positive cocci in pairs seen per oil power field    Gram Negative Rods seen per oil power field    by cytocentrifuge  Preliminary Report (29 Sep 2018 23:20):    Numerous Escherichia coli    Moderate Alpha hemolytic strep "Susceptibilities not performed"    Culture - Blood (collected 28 Sep 2018 08:25)  Source: .Blood Blood  Gram Stain (29 Sep 2018 01:20):    Growth in aerobic bottle: Gram Positive Cocci in Pairs and Chains    Growth in aerobic bottle: Gram Negative Rods    Growth in anaerobic bottle: Gram Positive Cocci in Pairs and Chains and    Gram Negative Rods  Preliminary Report (29 Sep 2018 23:38):    Growth in aerobic and anaerobic bottles: Escherichia coli    Growth in aerobic and anaerobic bottles: Streptococcus gallolyticus    See previous culture [id number]    Culture - Blood (collected 28 Sep 2018 08:25)  Source: .Blood Blood  Gram Stain (28 Sep 2018 21:50):    Growth in aerobic bottle: Gram Negative Rods    Growth in anaerobic bottle: Gram Negative Rods and Gram Positive Cocci in    Pairs and Chains  Preliminary Report (29 Sep 2018 23:23):    Growth in aerobic and anaerobic bottles: Escherichia coli    Growth in anaerobic bottle: Streptococcus gallolyticus    "Due to technical problems, Proteus sp. will Not be reported as part of    the BCID panel until further notice"    ***Blood Panel PCR results on this specimen are available    approximately 3 hours after the Gram stain result.***    Gram stain, PCR, and/or culture results may not always    correspond due to difference in methodologies.    ************************************************************    This PCR assay was performed using [employer].    The following targets are tested for: Enterococcus,    vancomycin resistant enterococci, Listeria monocytogenes,    coagulase negative staphylococci, S. aureus,    methicillin resistant S. aureus, Streptococcus agalactiae    (Group B), S. pneumoniae, S. pyogenes (Group A),    Acinetobacter baumannii, Enterobacter cloacae, E. coli,    Klebsiella oxytoca, K. pneumoniae, Proteus sp.,    Serratia marcescens, Haemophilus influenzae,    Neisseria meningitidis, Pseudomonas aeruginosa, Candida    albicans, C. glabrata, C krusei, C parapsilosis,    C. tropicalis and the KPC resistance gene.  Organism: Blood Culture PCR (28 Sep 2018 23:25)  Organism: Blood Culture PCR (28 Sep 2018 23:25)      Urinalysis Basic - ( 28 Sep 2018 07:22 )    Color: Yellow / Appearance: Clear / S.047 / pH: x  Gluc: x / Ketone: Negative  / Bili: Negative / Urobili: Negative   Blood: x / Protein: 100 mg/dL / Nitrite: Negative   Leuk Esterase: Negative / RBC: 2 /hpf / WBC 2 /hpf   Sq Epi: x / Non Sq Epi: 2 /hpf / Bacteria: Negative        MEDICATIONS  (STANDING):  aspirin  chewable 81 milliGRAM(s) Oral daily  aztreonam  IVPB 1000 milliGRAM(s) IV Intermittent every 8 hours  buDESOnide   0.5 milliGRAM(s) Respule 0.5 milliGRAM(s) Inhalation two times a day  dextrose 5% + sodium chloride 0.9%. 1000 milliLiter(s) (50 mL/Hr) IV Continuous <Continuous>  enoxaparin Injectable 40 milliGRAM(s) SubCutaneous daily  famotidine   Suspension 20 milliGRAM(s) Oral daily  fluticasone propionate 50 MICROgram(s)/spray Nasal Spray 1 Spray(s) Both Nostrils two times a day  influenza   Vaccine 0.5 milliLiter(s) IntraMuscular once  metroNIDAZOLE  IVPB 500 milliGRAM(s) IV Intermittent every 8 hours  pantoprazole    Tablet 40 milliGRAM(s) Oral before breakfast  vancomycin  IVPB      vancomycin  IVPB 1000 milliGRAM(s) IV Intermittent every 24 hours    MEDICATIONS  (PRN):  acetaminophen   Tablet .. 650 milliGRAM(s) Oral every 6 hours PRN Mild Pain (1 - 3)  bisacodyl 5 milliGRAM(s) Oral daily PRN Constipation  ondansetron Injectable 4 milliGRAM(s) IV Push once PRN Nausea and/or Vomiting  oxyCODONE    IR 2.5 milliGRAM(s) Oral every 4 hours PRN Moderate Pain (4 - 6)  senna 2 Tablet(s) Oral at bedtime PRN Constipation General Surgery Progress Note    SUBJECTIVE:  The patient was seen and examined. No acute events overnight. The patient was seen and examined. No acute events overnight. Advanced to CLD. Vanc added for gram pos cocci in Bctx. Oxycodone 2.5mg PRN added.  Pain improved. Per family, pt has not c/o N/V, -flatus.     OBJECTIVE:     ** VITAL SIGNS / I&O's **    Vital Signs Last 24 Hrs  T(C): 36.1 (30 Sep 2018 03:00), Max: 37 (29 Sep 2018 07:00)  T(F): 97 (30 Sep 2018 03:00), Max: 98.6 (29 Sep 2018 07:00)  HR: 79 (30 Sep 2018 04:00) (63 - 102)  BP: 143/66 (30 Sep 2018 04:00) (89/48 - 154/67)  BP(mean): 95 (30 Sep 2018 04:00) (67 - 96)  RR: 15 (30 Sep 2018 04:) (14 - 48)  SpO2: 95% (30 Sep 2018 04:00) (93% - 100%)      28 Sep 2018 07:01  -  29 Sep 2018 07:00  --------------------------------------------------------  IN:    dextrose 5% + sodium chloride 0.9%.: 450 mL    IV PiggyBack: 300 mL  Total IN: 750 mL    OUT:    T-Tube: 50 mL  Total OUT: 50 mL    Total NET: 700 mL      29 Sep 2018 07:01  -  30 Sep 2018 06:56  --------------------------------------------------------  IN:    dextrose 5% + sodium chloride 0.9%.: 1050 mL    IV PiggyBack: 550 mL    Oral Fluid: 390 mL  Total IN: 1990 mL    OUT:    T-Tube: 40 mL    Voided: 1550 mL  Total OUT: 1590 mL    Total NET: 400 mL          ** PHYSICAL EXAM **    GEN: NAD, resting quietly  NEURO: CN II-XII grossly intact, no focal deficits  PULM: symmetric chest rise bilaterally, no increased WOB  CV: regular rate, peripheral pulses intact  ABD: soft, ND, R cholecystostomy tube with bilious drainage, TTP over RUQ and LUQ  EXTR: no cyanosis or edema, moving all extremities    ** LABS **                          11.5   9.1   )-----------( 197      ( 29 Sep 2018 06:18 )             32.7     29 Sep 2018 06:18    138    |  103    |  12     ----------------------------<  140    3.7     |  22     |  0.76     Ca    8.8        29 Sep 2018 06:18  Phos  2.9       29 Sep 2018 06:18  Mg     1.9       29 Sep 2018 06:18    TPro  7.2    /  Alb  3.2    /  TBili  3.3    /  DBili  x      /  AST  189    /  ALT  204    /  AlkPhos  103    29 Sep 2018 06:18    PT/INR - ( 29 Sep 2018 06:18 )   PT: 17.0 sec;   INR: 1.55 ratio         PTT - ( 29 Sep 2018 06:18 )  PTT:38.7 sec  CAPILLARY BLOOD GLUCOSE            LIVER FUNCTIONS - ( 29 Sep 2018 06:18 )  Alb: 3.2 g/dL / Pro: 7.2 g/dL / ALK PHOS: 103 U/L / ALT: 204 U/L / AST: 189 U/L / GGT: x             Culture - Blood (collected 29 Sep 2018 12:36)  Source: .Blood Blood-Venous  Gram Stain (30 Sep 2018 05:36):    Growth in aerobic bottle: Gram Positive Cocci in Pairs and Chains  Preliminary Report (30 Sep 2018 05:36):    Growth in aerobic bottle: Gram Positive Cocci in Pairs and Chains    Culture - Body Fluid with Gram Stain (collected 29 Sep 2018 00:36)  Source: .Body Fluid Bile Fluid  Gram Stain (29 Sep 2018 03:09):    No polymorphonuclear leukocytes seen per low power field    Gram positive cocci in pairs seen per oil power field    Gram Negative Rods seen per oil power field    by cytocentrifuge  Preliminary Report (29 Sep 2018 23:20):    Numerous Escherichia coli    Moderate Alpha hemolytic strep "Susceptibilities not performed"    Culture - Blood (collected 28 Sep 2018 08:25)  Source: .Blood Blood  Gram Stain (29 Sep 2018 01:20):    Growth in aerobic bottle: Gram Positive Cocci in Pairs and Chains    Growth in aerobic bottle: Gram Negative Rods    Growth in anaerobic bottle: Gram Positive Cocci in Pairs and Chains and    Gram Negative Rods  Preliminary Report (29 Sep 2018 23:38):    Growth in aerobic and anaerobic bottles: Escherichia coli    Growth in aerobic and anaerobic bottles: Streptococcus gallolyticus    See previous culture [id number]    Culture - Blood (collected 28 Sep 2018 08:25)  Source: .Blood Blood  Gram Stain (28 Sep 2018 21:50):    Growth in aerobic bottle: Gram Negative Rods    Growth in anaerobic bottle: Gram Negative Rods and Gram Positive Cocci in    Pairs and Chains  Preliminary Report (29 Sep 2018 23:23):    Growth in aerobic and anaerobic bottles: Escherichia coli    Growth in anaerobic bottle: Streptococcus gallolyticus    "Due to technical problems, Proteus sp. will Not be reported as part of    the BCID panel until further notice"    ***Blood Panel PCR results on this specimen are available    approximately 3 hours after the Gram stain result.***    Gram stain, PCR, and/or culture results may not always    correspond due to difference in methodologies.    ************************************************************    This PCR assay was performed using [employer].    The following targets are tested for: Enterococcus,    vancomycin resistant enterococci, Listeria monocytogenes,    coagulase negative staphylococci, S. aureus,    methicillin resistant S. aureus, Streptococcus agalactiae    (Group B), S. pneumoniae, S. pyogenes (Group A),    Acinetobacter baumannii, Enterobacter cloacae, E. coli,    Klebsiella oxytoca, K. pneumoniae, Proteus sp.,    Serratia marcescens, Haemophilus influenzae,    Neisseria meningitidis, Pseudomonas aeruginosa, Candida    albicans, C. glabrata, C krusei, C parapsilosis,    C. tropicalis and the KPC resistance gene.  Organism: Blood Culture PCR (28 Sep 2018 23:25)  Organism: Blood Culture PCR (28 Sep 2018 23:25)      Urinalysis Basic - ( 28 Sep 2018 07:22 )    Color: Yellow / Appearance: Clear / S.047 / pH: x  Gluc: x / Ketone: Negative  / Bili: Negative / Urobili: Negative   Blood: x / Protein: 100 mg/dL / Nitrite: Negative   Leuk Esterase: Negative / RBC: 2 /hpf / WBC 2 /hpf   Sq Epi: x / Non Sq Epi: 2 /hpf / Bacteria: Negative        MEDICATIONS  (STANDING):  aspirin  chewable 81 milliGRAM(s) Oral daily  aztreonam  IVPB 1000 milliGRAM(s) IV Intermittent every 8 hours  buDESOnide   0.5 milliGRAM(s) Respule 0.5 milliGRAM(s) Inhalation two times a day  dextrose 5% + sodium chloride 0.9%. 1000 milliLiter(s) (50 mL/Hr) IV Continuous <Continuous>  enoxaparin Injectable 40 milliGRAM(s) SubCutaneous daily  famotidine   Suspension 20 milliGRAM(s) Oral daily  fluticasone propionate 50 MICROgram(s)/spray Nasal Spray 1 Spray(s) Both Nostrils two times a day  influenza   Vaccine 0.5 milliLiter(s) IntraMuscular once  metroNIDAZOLE  IVPB 500 milliGRAM(s) IV Intermittent every 8 hours  pantoprazole    Tablet 40 milliGRAM(s) Oral before breakfast  vancomycin  IVPB      vancomycin  IVPB 1000 milliGRAM(s) IV Intermittent every 24 hours    MEDICATIONS  (PRN):  acetaminophen   Tablet .. 650 milliGRAM(s) Oral every 6 hours PRN Mild Pain (1 - 3)  bisacodyl 5 milliGRAM(s) Oral daily PRN Constipation  ondansetron Injectable 4 milliGRAM(s) IV Push once PRN Nausea and/or Vomiting  oxyCODONE    IR 2.5 milliGRAM(s) Oral every 4 hours PRN Moderate Pain (4 - 6)  senna 2 Tablet(s) Oral at bedtime PRN Constipation

## 2018-09-30 NOTE — PROGRESS NOTE ADULT - SUBJECTIVE AND OBJECTIVE BOX
24 HOUR EVENTS: Advanced to clear liquid diet. Vancomycin added for gram positive cocci in blood cultures. Oxycodone 2.5 mg PRN added for pain control.    HPI:  89 yo woman with a hx of pulmonary fibrosis, Li's esophagus, CAD s/p stents (approx 5 years ago per daughters, on ASA), presents with a 1-day history of acute-onset epigastric abdominal pain with radiation to bilateral upper quadrants. Pain improved after tylenol; she cannot identify aggravating factors. She also complained of chills but denies subjective fever, nausea, vomiting, or changes to bowel habits. Denies jaundice, dark urine, or acholic stools. No history of similar pain in the past. Patient does report baseline SOB and productive cough due to pulmonary fibrosis. Patient found to have acute cholecystitis on US with dilated CBD, elevated bilirubin, and nonvisualization of the gallbladder and bowel on HIDA. Underwent percutaneous cholecystostomy on 9/28/17.      NEURO  RASS:     GCS:     CAM ICU:  Exam:   Meds: acetaminophen   Tablet .. 650 milliGRAM(s) Oral every 6 hours PRN Mild Pain (1 - 3)  ondansetron Injectable 4 milliGRAM(s) IV Push once PRN Nausea and/or Vomiting  oxyCODONE    IR 2.5 milliGRAM(s) Oral every 4 hours PRN Moderate Pain (4 - 6)    [x] Adequacy of sedation and pain control has been assessed and adjusted      RESPIRATORY  RR: 40 (09-29-18 @ 23:00) (14 - 48)  SpO2: 99% (09-29-18 @ 23:00) (93% - 100%)  Wt(kg): --  Exam: unlabored, clear to auscultation bilaterally  Mechanical Ventilation:   ABG - ( 29 Sep 2018 06:32 )  pH: 7.37  /  pCO2: 46    /  pO2: 173   / HCO3: 26    / Base Excess: .9    /  SaO2: 100     Lactate: x                [ ] Extubation Readiness Assessed  Meds: buDESOnide   0.5 milliGRAM(s) Respule 0.5 milliGRAM(s) Inhalation two times a day        CARDIOVASCULAR  HR: 102 (09-29-18 @ 23:00) (65 - 102)  BP: 148/66 (09-29-18 @ 23:00) (89/48 - 154/67)  BP(mean): 95 (09-29-18 @ 23:00) (67 - 98)  ABP: --  ABP(mean): --  Wt(kg): --  CVP(cm H2O): --  VBG - ( 28 Sep 2018 11:03 )  pH: 7.42  /  pCO2: 40    /  pO2: 62    / HCO3: 25    / Base Excess: 1.4   /  SaO2: 92     Lactate: 2.7                Exam:  Cardiac Rhythm:  Perfusion     [ ]Adequate   [ ]Inadequate  Mentation   [ ]Normal       [ ]Reduced  Extremities  [ ]Warm         [ ]Cool  Volume Status [ ]Hypervolemic [ ]Euvolemic [ ]Hypovolemic  Meds:       GI/NUTRITION  Exam:  Diet:  Meds: bisacodyl 5 milliGRAM(s) Oral daily PRN Constipation  famotidine   Suspension 20 milliGRAM(s) Oral daily  pantoprazole    Tablet 40 milliGRAM(s) Oral before breakfast  senna 2 Tablet(s) Oral at bedtime PRN Constipation      GENITOURINARY  I&O's Detail    09-28 @ 07:01  -  09-29 @ 07:00  --------------------------------------------------------  IN:    dextrose 5% + sodium chloride 0.9%.: 450 mL    IV PiggyBack: 300 mL  Total IN: 750 mL    OUT:    T-Tube: 50 mL  Total OUT: 50 mL    Total NET: 700 mL      09-29 @ 07:01  -  09-30 @ 00:04  --------------------------------------------------------  IN:    dextrose 5% + sodium chloride 0.9%.: 750 mL    IV PiggyBack: 550 mL    Oral Fluid: 390 mL  Total IN: 1690 mL    OUT:    T-Tube: 40 mL    Voided: 1150 mL  Total OUT: 1190 mL    Total NET: 500 mL          09-29    138  |  103  |  12  ----------------------------<  140<H>  3.7   |  22  |  0.76    Ca    8.8      29 Sep 2018 06:18  Phos  2.9     09-29  Mg     1.9     09-29    TPro  7.2  /  Alb  3.2<L>  /  TBili  3.3<H>  /  DBili  x   /  AST  189<H>  /  ALT  204<H>  /  AlkPhos  103  09-29    [ ] Jimenez catheter, indication: N/A  Meds: dextrose 5% + sodium chloride 0.9%. 1000 milliLiter(s) IV Continuous <Continuous>        HEMATOLOGIC  Meds: aspirin  chewable 81 milliGRAM(s) Oral daily  enoxaparin Injectable 40 milliGRAM(s) SubCutaneous Once    [x] VTE Prophylaxis                        11.5   9.1   )-----------( 197      ( 29 Sep 2018 06:18 )             32.7     PT/INR - ( 29 Sep 2018 06:18 )   PT: 17.0 sec;   INR: 1.55 ratio         PTT - ( 29 Sep 2018 06:18 )  PTT:38.7 sec  Transfusion     [ ] PRBC   [ ] Platelets   [ ] FFP   [ ] Cryoprecipitate      INFECTIOUS DISEASES  T(C): 37 (09-29-18 @ 15:00), Max: 37 (09-29-18 @ 07:00)  Wt(kg): --  WBC Count: 9.1 K/uL (09-29 @ 06:18)    Recent Cultures:  Specimen Source: .Body Fluid Bile Fluid, 09-29 @ 00:36; Results   Numerous Escherichia coli  Moderate Alpha hemolytic strep "Susceptibilities not performed"; Gram Stain:   No polymorphonuclear leukocytes seen per low power field  Gram positive cocci in pairs seen per oil power field  Gram Negative Rods seen per oil power field  by cytocentrifuge; Organism: --  Specimen Source: .Blood Blood, 09-28 @ 08:25; Results   Growth in aerobic and anaerobic bottles: Escherichia coli  Growth in anaerobic bottle: Streptococcus gallolyticus  "Due to technical problems, Proteus sp. will Not be reported as part of  the BCID panel until further notice"  ***Blood Panel PCR results on this specimen are available  approximately 3 hours after the Gram stain result.***  Gram stain, PCR, and/or culture results may not always  correspond due to difference in methodologies.  ************************************************************  This PCR assay was performed using [employer].  The following targets are tested for: Enterococcus,  vancomycin resistant enterococci, Listeria monocytogenes,  coagulase negative staphylococci, S. aureus,  methicillin resistant S. aureus, Streptococcus agalactiae  (Group B), S. pneumoniae, S. pyogenes (Group A),  Acinetobacter baumannii, Enterobacter cloacae, E. coli,  Klebsiella oxytoca, K. pneumoniae, Proteus sp.,  Serratia marcescens, Haemophilus influenzae,  Neisseria meningitidis, Pseudomonas aeruginosa, Candida  albicans, C. glabrata, C krusei, C parapsilosis,  C. tropicalis and the KPC resistance gene.; Gram Stain:   Growth in aerobic bottle: Gram Negative Rods  Growth in anaerobic bottle: Gram Negative Rods and Gram Positive Cocci in  Pairs and Chains; Organism: Blood Culture PCR    Meds: aztreonam  IVPB 1000 milliGRAM(s) IV Intermittent every 8 hours  influenza   Vaccine 0.5 milliLiter(s) IntraMuscular once  metroNIDAZOLE  IVPB 500 milliGRAM(s) IV Intermittent every 8 hours  vancomycin  IVPB            ENDOCRINE  Capillary Blood Glucose    Meds:       ACCESS DEVICES:  [ ] Peripheral IV  [ ] Central Venous Line	[ ] R	[ ] L	[ ] IJ	[ ] Fem	[ ] SC	Placed:   [ ] Arterial Line		[ ] R	[ ] L	[ ] Fem	[ ] Rad	[ ] Ax	Placed:   [ ] PICC:					[ ] Mediport  [ ] Urinary Catheter, Date Placed:   [ ] Necessity of urinary, arterial, and venous catheters discussed    OTHER MEDICATIONS:  fluticasone propionate 50 MICROgram(s)/spray Nasal Spray 1 Spray(s) Both Nostrils two times a day      CODE STATUS:     IMAGING: 24 HOUR EVENTS: Advanced to clear liquid diet. Vancomycin added for gram positive cocci in blood cultures. Oxycodone 2.5 mg PRN added for pain control.    HPI:  89 yo woman with a hx of pulmonary fibrosis, Li's esophagus, CAD s/p stents (approx 5 years ago per daughters, on ASA), presents with a 1-day history of acute-onset epigastric abdominal pain with radiation to bilateral upper quadrants. Pain improved after tylenol; she cannot identify aggravating factors. She also complained of chills but denies subjective fever, nausea, vomiting, or changes to bowel habits. Denies jaundice, dark urine, or acholic stools. No history of similar pain in the past. Patient does report baseline SOB and productive cough due to pulmonary fibrosis. Patient found to have acute cholecystitis on US with dilated CBD, elevated bilirubin, and nonvisualization of the gallbladder and bowel on HIDA. Underwent percutaneous cholecystostomy on 9/28/17.    T(C): 37 (09-29-18 @ 15:00), Max: 37 (09-29-18 @ 07:00)  HR: 102 (09-29-18 @ 23:00) (65 - 102)  BP: 148/66 (09-29-18 @ 23:00) (89/48 - 154/67)  RR: 40 (09-29-18 @ 23:00) (14 - 48)  SpO2: 99% (09-29-18 @ 23:00) (93% - 100%)  Wt(kg): --  09-28 @ 07:01  -  09-29 @ 07:00  --------------------------------------------------------  IN:    dextrose 5% + sodium chloride 0.9%.: 450 mL    IV PiggyBack: 300 mL  Total IN: 750 mL    OUT:    T-Tube: 50 mL  Total OUT: 50 mL    Total NET: 700 mL      09-29 @ 07:01  -  09-30 @ 00:12  --------------------------------------------------------  IN:    dextrose 5% + sodium chloride 0.9%.: 750 mL    IV PiggyBack: 550 mL    Oral Fluid: 390 mL  Total IN: 1690 mL    OUT:    T-Tube: 40 mL    Voided: 1150 mL  Total OUT: 1190 mL    Total NET: 500 mL      LABS:                        11.5   9.1   )-----------( 197      ( 29 Sep 2018 06:18 )             32.7     29 Sep 2018 06:18    138    |  103    |  12     ----------------------------<  140    3.7     |  22     |  0.76     Ca    8.8        29 Sep 2018 06:18  Phos  2.9       29 Sep 2018 06:18  Mg     1.9       29 Sep 2018 06:18    TPro  7.2    /  Alb  3.2    /  TBili  3.3    /  DBili  x      /  AST  189    /  ALT  204    /  AlkPhos  103    29 Sep 2018 06:18    PT/INR - ( 29 Sep 2018 06:18 )   PT: 17.0 sec;   INR: 1.55 ratio         PTT - ( 29 Sep 2018 06:18 )  PTT:38.7 sec      PHYSICAL EXAM:  GEN: NAD, resting quietly, intubated, sedated  NEURO: CN II-XII grossly intact, no focal deficits  PULM: symmetric chest rise bilaterally, no increased WOB  CV: regular rate, peripheral pulses intact  ABD: soft, ND, R cholecystostomy tube with bilious drainage, appropriately TTP  EXTR: no cyanosis or edema, moving all extremities        Meds: aztreonam  IVPB 1000 milliGRAM(s) IV Intermittent every 8 hours  influenza   Vaccine 0.5 milliLiter(s) IntraMuscular once  metroNIDAZOLE  IVPB 500 milliGRAM(s) IV Intermittent every 8 hours  vancomycin  IVPB            CODE STATUS: Full code

## 2018-09-30 NOTE — PROGRESS NOTE ADULT - ASSESSMENT
A: 89 yo woman with a hx of pulmonary fibrosis, Li's esophagus, CAD s/p stents (approx 5 years ago per daughters, on ASA), presents with a 1-day history of acute-onset epigastric abdominal pain. Imaging consistent with acute cholecystitis, however, with elevated LFTs and fevers, there is concern for cholangitis. Patient underwent PCT placement which she tolerated well.    P:  - Recommend ID consult   - Bctx pos for strep and e coli  - Vanc added for gram +cocci in bctx  - pain control with IV tylenol and oxycodone  - c/w home pulmicort and fluticasone  - cont home meds, ASA, metoprolol, protonix, famotidine  - f/u IR post PTC  - NPO/IVF    Joanna Ramirez, PGY-1  General Surgery Green Team [phone] A: 89 yo woman with a hx of pulmonary fibrosis, Li's esophagus, CAD s/p stents (approx 5 years ago per daughters, on ASA), presents with a 1-day history of acute-onset epigastric abdominal pain. Imaging consistent with acute cholecystitis, however, with elevated LFTs and fevers, there is concern for cholangitis. Patient underwent PCT placement which she tolerated well.    P:  - Recommend ID consult   - Bctx pos for strep gallolyticus and e coli  - Vanc added for gram +cocci in bctx  - pain control with IV tylenol and oxycodone  - c/w home pulmicort and fluticasone  - cont home meds, ASA, metoprolol, protonix, famotidine  - f/u IR post PTC  - NPO/IVF    Joanna Ramirez, PGY-1  General Surgery Green Team [phone]

## 2018-09-30 NOTE — PROGRESS NOTE ADULT - ASSESSMENT
Recommendations:  - trend CBC, CMP, INR  - patient is very high risk from an anesthesia standpoint for an ERCP, given she has end stage pulmonary fibrosis, also low technical success given presence of a duodenal diverticulum, thus after discussion with IR and surgery, we will hold off on ERCP for now, family is aware of the risk and is currently unsure if they want to proceed with an ERCP  - continue antibiotics  - supportive care as per primary team  - please call GI with any worsening of patient clinical status.

## 2018-09-30 NOTE — PROGRESS NOTE ADULT - ATTENDING COMMENTS
Surgery Attending    Pt seen and examined; agree with above assessment and plan.    Remains afebrile  Pain controlled w analgesics  LFTs sl improved    Continue antibiotics; ID consult  Continue melanie drainage. Should have tube study in next several days to assess cystic duct patency.

## 2018-09-30 NOTE — PROGRESS NOTE ADULT - SUBJECTIVE AND OBJECTIVE BOX
Chief Complaint:  Patient is a 89y old  Female who presents with a chief complaint of Abdominal pain (30 Sep 2018 06:52)      Interval Events:   Yesterday patient remained afebrile, diet was advanced, leukocytosis was downtrending and patient was started on vancomycin dur to gram + result in blood culture.  Still awaiting today's labs for review.      Allergies:  penicillin (Angioedema)  penicillins (Swelling)        Hospital Medications:  acetaminophen   Tablet .. 650 milliGRAM(s) Oral every 6 hours PRN  aspirin  chewable 81 milliGRAM(s) Oral daily  aztreonam  IVPB 1000 milliGRAM(s) IV Intermittent every 8 hours  bisacodyl 5 milliGRAM(s) Oral daily PRN  buDESOnide   0.5 milliGRAM(s) Respule 0.5 milliGRAM(s) Inhalation two times a day  chlorhexidine 4% Liquid 1 Application(s) Topical <User Schedule>  dextrose 5% + sodium chloride 0.9%. 1000 milliLiter(s) IV Continuous <Continuous>  enoxaparin Injectable 40 milliGRAM(s) SubCutaneous daily  famotidine   Suspension 20 milliGRAM(s) Oral daily  fluticasone propionate 50 MICROgram(s)/spray Nasal Spray 1 Spray(s) Both Nostrils two times a day  influenza   Vaccine 0.5 milliLiter(s) IntraMuscular once  metroNIDAZOLE  IVPB 500 milliGRAM(s) IV Intermittent every 8 hours  oxyCODONE    IR 2.5 milliGRAM(s) Oral every 4 hours PRN  pantoprazole    Tablet 40 milliGRAM(s) Oral before breakfast  senna 2 Tablet(s) Oral at bedtime PRN  vancomycin  IVPB      vancomycin  IVPB 1000 milliGRAM(s) IV Intermittent every 24 hours      PMHX/PSHX:  Barretts esophagus  Essential hypertension  CAD (coronary artery disease)  Idiopathic pulmonary fibrosis  Li's Esophagus  CAD (Coronary Artery Disease)  HTN (Hypertension)  Pulmonary Fibrosis  S/P Coronary Artery Stent Placement  S/P Shoulder Surgery      Family history:  No pertinent family history in first degree relatives      PHYSICAL EXAM:   Vital Signs:  Vital Signs Last 24 Hrs  T(C): 36.7 (30 Sep 2018 07:00), Max: 37 (29 Sep 2018 15:00)  T(F): 98 (30 Sep 2018 07:00), Max: 98.6 (29 Sep 2018 15:00)  HR: 90 (30 Sep 2018 07:00) (63 - 102)  BP: 123/59 (30 Sep 2018 07:00) (89/48 - 154/67)  BP(mean): 85 (30 Sep 2018 07:00) (67 - 98)  RR: 22 (30 Sep 2018 07:00) (4 - 48)  SpO2: 99% (30 Sep 2018 07:00) (93% - 100%)  Daily     Daily Weight in k.4 (30 Sep 2018 00:31)    GENERAL:  Appears stated age, well-groomed, well-nourished, no distress  CHEST:  no increased effort  ABDOMEN:  Soft, non-tender, non-distended, normoactive bowel sounds,  no masses , no hepato-splenomegaly, no signs of chronic liver disease  EXTEREMITIES:  no cyanosis, clubbing or edema  NEURO:  Alert, oriented, no tremor, no asterixis    LABS:                        11.5   9.1   )-----------( 197      ( 29 Sep 2018 06:18 )             32.7       29    138  |  103  |  12  ----------------------------<  140<H>  3.7   |  22  |  0.76    Ca    8.8      29 Sep 2018 06:18  Phos  2.9       Mg     1.9         TPro  7.2  /  Alb  3.2<L>  /  TBili  3.3<H>  /  DBili  x   /  AST  189<H>  /  ALT  204<H>  /  AlkPhos  103  29    LIVER FUNCTIONS - ( 29 Sep 2018 06:18 )  Alb: 3.2 g/dL / Pro: 7.2 g/dL / ALK PHOS: 103 U/L / ALT: 204 U/L / AST: 189 U/L / GGT: x           PT/INR - ( 29 Sep 2018 06:18 )   PT: 17.0 sec;   INR: 1.55 ratio         PTT - ( 29 Sep 2018 06:18 )  PTT:38.7 sec                            11.5   9.1   )-----------( 197      ( 29 Sep 2018 06:18 )             32.7                         11.7   11.4  )-----------( 170      ( 28 Sep 2018 11:03 )             35.1                         12.8   8.8   )-----------( 211      ( 28 Sep 2018 03:51 )             40.1     Imaging:

## 2018-09-30 NOTE — CONSULT NOTE ADULT - SUBJECTIVE AND OBJECTIVE BOX
HPI:    89 year old female with PMH Li's esophagus, CAD s/p stent (5-6 yrs ago, on aspirin 81mg), pulmonary fibrosis who presented to [facility] on 9/28 with epigastric abdominal pain. Pain was located in the epigastric area and radiated to bilateral upper quadrants. Patient notes severe severity of pain on admission. Patient notes no chills or fevers prior to admission. She denies preceding N/V/D. She denied dysuria, urinary frequency or hesitancy.     Febrile to 100.5 on presentation, tachycardic to > 90 but normotensive. WBC on admission of 8.8. Repeat CMP with transaminitis with , , Alk Phos 106, T Bili 2.2. Lactic acid of 2.6. U/A with 2 WBC. CT Chest with End-stage pulmonary fibrosis with honeycombing and Fluid-filled esophagus to the level of thoracic inlet. CT Abdomen/Pelvis with Distended gallbladder.  Small sludge ball versus gallstone in the gallbladder lumen. NM Biliary scan with high-grade obstruction. Started on Vancomycin/Aztreonam/Flagyl. Underwent perc melanie drain on 9/28/18. Blood cultures from 9/28 with 4/4 bottles with Strep gallolyticus and E. coli. Blood culture from 9/29 with 1/4 bottles with GPC in pairs and chains.     PAST MEDICAL & SURGICAL HISTORY:  Barretts esophagus  Essential hypertension  CAD (coronary artery disease)  Idiopathic pulmonary fibrosis  Li's Esophagus  CAD (Coronary Artery Disease)  HTN (Hypertension)  Pulmonary Fibrosis  S/P Coronary Artery Stent Placement  S/P Shoulder Surgery      Allergies  penicillin (Angioedema)  penicillins (Swelling)        ANTIMICROBIALS:  aztreonam  IVPB 1000 every 8 hours  metroNIDAZOLE  IVPB 500 every 8 hours  vancomycin  IVPB    vancomycin  IVPB 1000 every 24 hours      OTHER MEDS: MEDICATIONS  (STANDING):  acetaminophen   Tablet .. 650 every 6 hours PRN  aspirin  chewable 81 daily  bisacodyl 5 daily PRN  buDESOnide   0.5 milliGRAM(s) Respule 0.5 two times a day  enoxaparin Injectable 40 daily  famotidine   Suspension 20 daily  influenza   Vaccine 0.5 once  oxyCODONE    IR 2.5 every 4 hours PRN  pantoprazole    Tablet 40 before breakfast  senna 2 at bedtime PRN      SOCIAL HISTORY:  [ ] etoh [ ] tobacco [ ] former smoker [ ] IVDU    FAMILY HISTORY:  No pertinent family history in first degree relatives      REVIEW OF SYSTEMS  [  ] ROS unobtainable because:    [  ] All other systems negative except as noted below:	    Constitutional:  [ ] fever [ ] chills  [ ] weight loss  [ ] weakness  Skin:  [ ] rash [ ] phlebitis	  Eyes: [ ] icterus [ ] pain  [ ] discharge	  ENMT: [ ] sore throat  [ ] thrush [ ] ulcers [ ] exudates  Respiratory: [ ] dyspnea [ ] hemoptysis [ ] cough [ ] sputum	  Cardiovascular:  [ ] chest pain [ ] palpitations [ ] edema	  Gastrointestinal:  [ ] nausea [ ] vomiting [ ] diarrhea [ ] constipation [ ] pain	  Genitourinary:  [ ] dysuria [ ] frequency [ ] hematuria [ ] discharge [ ] flank pain  [ ] incontinence  Musculoskeletal:  [ ] myalgias [ ] arthralgias [ ] arthritis  [ ] back pain  Neurological:  [ ] headache [ ] seizures  [ ] confusion/altered mental status  Psychiatric:  [ ] anxiety [ ] depression	  Hematology/Lymphatics:  [ ] lymphadenopathy  Endocrine:  [ ] adrenal [ ] thyroid  Allergic/Immunologic:	 [ ] transplant [ ] seasonal    Vital Signs Last 24 Hrs  T(F): 98.1 (09-30-18 @ 11:00), Max: 100.5 (09-28-18 @ 07:27)    Vital Signs Last 24 Hrs  HR: 77 (09-30-18 @ 14:00) (63 - 102)  BP: 114/54 (09-30-18 @ 14:00) (103/51 - 154/67)  RR: 27 (09-30-18 @ 14:00)  SpO2: 99% (09-30-18 @ 14:00) (93% - 100%)  Wt(kg): --    PHYSICAL EXAM:  General: non-toxic  HEAD/EYES: anicteric, PERRL  ENT:  supple  Cardiovascular:   S1, S2  Respiratory:  clear bilaterally  GI:  soft, non-tender, normal bowel sounds  :  no CVA tenderness   Musculoskeletal:  no synovitis  Neurologic:  grossly non-focal  Skin:  no rash  Lymph: no lymphadenopathy  Psychiatric:  appropriate affect  Vascular:  no phlebitis                                11.9   5.8   )-----------( 155      ( 30 Sep 2018 08:30 )             37.2       09-30    138  |  104  |  9   ----------------------------<  119<H>  4.2   |  27  |  0.83    Ca    9.2      30 Sep 2018 08:29  Phos  2.5     09-30  Mg     1.9     09-30    TPro  7.5  /  Alb  3.3  /  TBili  2.0<H>  /  DBili  1.5<H>  /  AST  68<H>  /  ALT  126<H>  /  AlkPhos  99  09-30          MICROBIOLOGY:  .Blood Blood-Venous  09-29-18   Growth in aerobic bottle: Gram Positive Cocci in Pairs and Chains  --    Growth in aerobic bottle: Gram Positive Cocci in Pairs and Chains      .Body Fluid Bile Fluid  09-29-18   Numerous Escherichia coli  Moderate Alpha hemolytic strep "Susceptibilities not performed"  --    No polymorphonuclear leukocytes seen per low power field  Gram positive cocci in pairs seen per oil power field  Gram Negative Rods seen per oil power field  by cytocentrifuge      .Blood Blood  09-28-18   Growth in aerobic and anaerobic bottles: Escherichia coli  Growth in anaerobic bottle: Streptococcus gallolyticus  "Due to technical problems, Proteus sp. will Not be reported as part of  the BCID panel until further notice"  ***Blood Panel PCR results on this specimen are available  approximately 3 hours after the Gram stain result.***  Gram stain, PCR, and/or culture results may not always  correspond due to difference in methodologies.  ************************************************************  This PCR assay was performed using [employer].  The following targets are tested for: Enterococcus,  vancomycin resistant enterococci, Listeria monocytogenes,  coagulase negative staphylococci, S. aureus,  methicillin resistant S. aureus, Streptococcus agalactiae  (Group B), S. pneumoniae, S. pyogenes (Group A),  Acinetobacter baumannii, Enterobacter cloacae, E. coli,  Klebsiella oxytoca, K. pneumoniae, Proteus sp.,  Serratia marcescens, Haemophilus influenzae,  Neisseria meningitidis, Pseudomonas aeruginosa, Candida  albicans, C. glabrata, C krusei, C parapsilosis,  C. tropicalis and the KPC resistance gene.  --  Blood Culture PCR    RADIOLOGY:    EXAM:  CT ABDOMEN AND PELVIS IC                        EXAM:  CT CHEST IC                        PROCEDURE DATE:  09/28/2018    IMPRESSION:    Chest:   1.  End-stage pulmonary fibrosis with honeycombing, more severe in the left lung, has progressed since 03/07/2011.  No gross CT evidence of pneumonia.  2.  Fluid-filled esophagus to the level of thoracic inlet, compatible with gastroesophageal reflux disease.  The patient may be at increased risk for aspiration.  Esophagus is mildly patulous.  In the setting of pulmonary fibrosis this may represent scleroderma.  3.  Approximately 2 cm paraesophageal type hiatal hernia.    Abdomen/pelvis:   1.  No evidence of bowel obstruction, active inflammatory process or intra-abdominal source for infection.  2.  Distended gallbladder.  Small sludge ball versus gallstone in the gallbladder lumen.  No CT evidence of acute cholecystitis.  Right upper quadrant ultrasound can be performed for further characterization.  3.  The examination was not performed using mesenteric ischemia protocol.  There is no bowel wall thickening, pneumatosis, mesenteric edema or obvious vascular occlusion to indicate bowel ischemia.    EXAM:  NM HEPATOBILIARY IMG                        PROCEDURE DATE:  09/28/2018    Abnormal hepatobiliary scan.  Nonvisualization of the bowel and gallbladder and the entire 2 hours of imaging. Findings may represent high-grade obstruction. Acute cholecystitis cannot be excluded. HPI:    89 year old female with PMH Li's esophagus, CAD s/p stent (5-6 yrs ago, on aspirin 81mg), pulmonary fibrosis who presented to [facility] on 9/28 with epigastric abdominal pain. Pain was located in the epigastric area and radiated to bilateral upper quadrants. Patient notes severe severity of pain on admission. Patient notes no chills or fevers prior to admission. She denies preceding N/V/D. She denied dysuria, urinary frequency or hesitancy.     Febrile to 100.5 on presentation, tachycardic to > 90 but normotensive. WBC on admission of 8.8. Repeat CMP with transaminitis with , , Alk Phos 106, T Bili 2.2. Lactic acid of 2.6. U/A with 2 WBC. CT Chest with End-stage pulmonary fibrosis with honeycombing and Fluid-filled esophagus to the level of thoracic inlet. CT Abdomen/Pelvis with Distended gallbladder.  Small sludge ball versus gallstone in the gallbladder lumen. NM Biliary scan with high-grade obstruction. Started on Vancomycin/Aztreonam/Flagyl. Underwent perc melanie drain on 9/28/18. Blood cultures from 9/28 with 4/4 bottles with Strep gallolyticus and E. coli. Blood culture from 9/29 with 1/4 bottles with GPC in pairs and chains.     PAST MEDICAL & SURGICAL HISTORY:  Barretts esophagus  Essential hypertension  CAD (coronary artery disease)  Idiopathic pulmonary fibrosis  Li's Esophagus  CAD (Coronary Artery Disease)  HTN (Hypertension)  Pulmonary Fibrosis  S/P Coronary Artery Stent Placement  S/P Shoulder Surgery      Allergies  penicillin (Angioedema)  penicillins (Swelling)        ANTIMICROBIALS:  aztreonam  IVPB 1000 every 8 hours  metroNIDAZOLE  IVPB 500 every 8 hours  vancomycin  IVPB    vancomycin  IVPB 1000 every 24 hours      OTHER MEDS: MEDICATIONS  (STANDING):  acetaminophen   Tablet .. 650 every 6 hours PRN  aspirin  chewable 81 daily  bisacodyl 5 daily PRN  buDESOnide   0.5 milliGRAM(s) Respule 0.5 two times a day  enoxaparin Injectable 40 daily  famotidine   Suspension 20 daily  influenza   Vaccine 0.5 once  oxyCODONE    IR 2.5 every 4 hours PRN  pantoprazole    Tablet 40 before breakfast  senna 2 at bedtime PRN      SOCIAL HISTORY:  no smoking or etoh use. no recent travel    FAMILY HISTORY:  No pertinent family history in first degree relatives      REVIEW OF SYSTEMS  [  ] ROS unobtainable because:    [  x] All other systems negative except as noted below:	    Constitutional:  [x ] fever [ ] chills  [ ] weight loss  [ ] weakness  Skin:  [ ] rash [ ] phlebitis	  Eyes: [ ] icterus [ ] pain  [ ] discharge	  ENMT: [ ] sore throat  [ ] thrush [ ] ulcers [ ] exudates  Respiratory: [ ] dyspnea [ ] hemoptysis [ ] cough [ ] sputum	  Cardiovascular:  [ ] chest pain [ ] palpitations [ ] edema	  Gastrointestinal:  [x ] nausea [ ] vomiting [ ] diarrhea [ ] constipation [ x] pain	  Genitourinary:  [ ] dysuria [ ] frequency [ ] hematuria [ ] discharge [ ] flank pain  [ ] incontinence  Musculoskeletal:  [ ] myalgias [ ] arthralgias [ ] arthritis  [ ] back pain  Neurological:  [ ] headache [ ] seizures  [ ] confusion/altered mental status  Psychiatric:  [ ] anxiety [ ] depression	  Hematology/Lymphatics:  [ ] lymphadenopathy  Endocrine:  [ ] adrenal [ ] thyroid  Allergic/Immunologic:	 [ ] transplant [ ] seasonal    Vital Signs Last 24 Hrs  T(F): 98.1 (09-30-18 @ 11:00), Max: 100.5 (09-28-18 @ 07:27)    Vital Signs Last 24 Hrs  HR: 77 (09-30-18 @ 14:00) (63 - 102)  BP: 114/54 (09-30-18 @ 14:00) (103/51 - 154/67)  RR: 27 (09-30-18 @ 14:00)  SpO2: 99% (09-30-18 @ 14:00) (93% - 100%)  Wt(kg): --    PHYSICAL EXAMINATION:  General: Alert and Awake, NAD  HEENT: PERRL, EOMI  Cardiac: RRR, No M/R/G  Resp: CTAB, No Wh/Rh/Ra  Abdomen: +Perc Melanie drain at RUQ draining billous fluid. NBS, ND. Tender to palpation in RUQ and epigastric area, No HSM, No rigidity or guarding  MSK: No LE edema. No stigmata of IE. No evidence of phlebitis. No evidence of synovitis.  : No box  Skin: No rashes or lesions. Skin is warm and dry to the touch.   Neuro: Alert and Awake. CN 2-12 Grossly intact. Moves all four extremities spontaneously.  Psych: Calm, Pleasant, Cooperative                        11.9   5.8   )-----------( 155      ( 30 Sep 2018 08:30 )             37.2       09-30    138  |  104  |  9   ----------------------------<  119<H>  4.2   |  27  |  0.83    Ca    9.2      30 Sep 2018 08:29  Phos  2.5     09-30  Mg     1.9     09-30    TPro  7.5  /  Alb  3.3  /  TBili  2.0<H>  /  DBili  1.5<H>  /  AST  68<H>  /  ALT  126<H>  /  AlkPhos  99  09-30          MICROBIOLOGY:  .Blood Blood-Venous  09-29-18   Growth in aerobic bottle: Gram Positive Cocci in Pairs and Chains  --    Growth in aerobic bottle: Gram Positive Cocci in Pairs and Chains      .Body Fluid Bile Fluid  09-29-18   Numerous Escherichia coli  Moderate Alpha hemolytic strep "Susceptibilities not performed"  --    No polymorphonuclear leukocytes seen per low power field  Gram positive cocci in pairs seen per oil power field  Gram Negative Rods seen per oil power field  by cytocentrifuge      .Blood Blood  09-28-18   Growth in aerobic and anaerobic bottles: Escherichia coli  Growth in anaerobic bottle: Streptococcus gallolyticus  "Due to technical problems, Proteus sp. will Not be reported as part of  the BCID panel until further notice"  ***Blood Panel PCR results on this specimen are available  approximately 3 hours after the Gram stain result.***  Gram stain, PCR, and/or culture results may not always  correspond due to difference in methodologies.  ************************************************************  This PCR assay was performed using [employer].  The following targets are tested for: Enterococcus,  vancomycin resistant enterococci, Listeria monocytogenes,  coagulase negative staphylococci, S. aureus,  methicillin resistant S. aureus, Streptococcus agalactiae  (Group B), S. pneumoniae, S. pyogenes (Group A),  Acinetobacter baumannii, Enterobacter cloacae, E. coli,  Klebsiella oxytoca, K. pneumoniae, Proteus sp.,  Serratia marcescens, Haemophilus influenzae,  Neisseria meningitidis, Pseudomonas aeruginosa, Candida  albicans, C. glabrata, C krusei, C parapsilosis,  C. tropicalis and the KPC resistance gene.  --  Blood Culture PCR    RADIOLOGY:    EXAM:  CT ABDOMEN AND PELVIS IC                        EXAM:  CT CHEST IC                        PROCEDURE DATE:  09/28/2018    IMPRESSION:    Chest:   1.  End-stage pulmonary fibrosis with honeycombing, more severe in the left lung, has progressed since 03/07/2011.  No gross CT evidence of pneumonia.  2.  Fluid-filled esophagus to the level of thoracic inlet, compatible with gastroesophageal reflux disease.  The patient may be at increased risk for aspiration.  Esophagus is mildly patulous.  In the setting of pulmonary fibrosis this may represent scleroderma.  3.  Approximately 2 cm paraesophageal type hiatal hernia.    Abdomen/pelvis:   1.  No evidence of bowel obstruction, active inflammatory process or intra-abdominal source for infection.  2.  Distended gallbladder.  Small sludge ball versus gallstone in the gallbladder lumen.  No CT evidence of acute cholecystitis.  Right upper quadrant ultrasound can be performed for further characterization.  3.  The examination was not performed using mesenteric ischemia protocol.  There is no bowel wall thickening, pneumatosis, mesenteric edema or obvious vascular occlusion to indicate bowel ischemia.    EXAM:  NM HEPATOBILIARY IMG                        PROCEDURE DATE:  09/28/2018    Abnormal hepatobiliary scan.  Nonvisualization of the bowel and gallbladder and the entire 2 hours of imaging. Findings may represent high-grade obstruction. Acute cholecystitis cannot be excluded.

## 2018-09-30 NOTE — CONSULT NOTE ADULT - ASSESSMENT
89 year old female with PMH Li's esophagus, CAD s/p stent (5-6 yrs ago, on aspirin 81mg), pulmonary fibrosis who presented to [facility] on 9/28 with epigastric abdominal pain. Febrile to 100.5 on presentation, tachycardic to > 90 but normotensive. NM Biliary scan with high-grade obstruction. Underwent perc melanie drain on 9/28/18. Blood cultures from 9/28 with 4/4 bottles with Strep gallolyticus and E. coli. Blood culture from 9/29 with 1/4 bottles with GPC in pairs and chains.     Overall, cholecystitis with associated Strep gallolyticus and E. coli bacteremia. Now s/p perc melanie drain. Improving on Vanc / Aztreonam / Flagyl. Can discontinue Flagyl given absence of emphysematous cholecystitis and no prior history of biliary instrumentation. Plan for at least 2 weeks of antibiotics from date of perc cholecystotomy.     --Continue Vancomycin 1g IV Q24H. Trough prior to third dose (Goal trough of 15-20)  --Continue Aztreonam 1g IV Q8H  --Stop Flagyl  --F/U Prelim Blood Cultures  --F/U ID of organism from 9/29 (likely Strep gallolyticus as well)

## 2018-09-30 NOTE — PROGRESS NOTE ADULT - ATTENDING COMMENTS
Patient with acute cholecystitis s/p percutaneous cholecystostomy.   The patient does not have acute events.  LFTs improving.  Hemodynamically appropriate.  On clear liquid liquid diet and doing well.

## 2018-09-30 NOTE — CONSULT NOTE ADULT - ATTENDING COMMENTS
89 year old female with PMH Li's esophagus, CAD s/p stent (5-6 yrs ago, on aspirin 81mg), pulmonary fibrosis who presented to [facility] on 9/28 with epigastric abdominal pain that awoke her from sleep. Pain was located in the epigastric area and radiated to bilateral upper quadrants. Febrile to 100.5 on presentation, tachycardic to > 90 but normotensive. WBC on admission of 8.8. Repeat CMP with transaminitis with , , Alk Phos 106, T Bili 2.2. Lactic acid of 2.6. U/A with 2 WBC. CT Chest with End-stage pulmonary fibrosis with honeycombing and Fluid-filled esophagus to the level of thoracic inlet. CT Abdomen/Pelvis with Distended gallbladder.  Small sludge ball versus gallstone in the gallbladder lumen. NM Biliary scan with high-grade obstruction. Started on Vancomycin/Aztreonam/Flagyl. Underwent perc melanie drain on 9/28/18. Blood cultures from 9/28 with 4/4 bottles with Strep gallolyticus and E. coli. Blood culture from 9/29 with 1/4 bottles with GPC in pairs and chains.   Patient treated with IV vancomycin, aztreonam, ad flagyl  She has persistent abdominal pain, mostly in RUQ, but is tender to palpation throughout the abdomen. Percutaneous cholecystomy tube draining bilious material.  Monitor LFTs and trend bilirubin, monitor temperature and WBC with drainage and antibiotics  F/U final susceptibilities of blood culture isolates, adjust antibiotics as indicated  Repeat BCs until bacteremia cleared  Will need 2 weeks of IV antibiotics from date of drainage  Due to confirmed history of angioedema to penicillin would try to avoid cephalosporins and carbepenems  Can DC flagyl as anaerobes not major cause of cholecystitis, non emphysematous  Advance diet as tolerates.  Monitor renal function, follow vancomycin trough, keep trough in 15-20 range.    ID is available at [phone] for questions  Aaron Mistry MD  pager [phone]  office [phone]

## 2018-09-30 NOTE — PROGRESS NOTE ADULT - ASSESSMENT
89 yo woman with a hx of pulmonary fibrosis, Li's esophagus, CAD s/p stents (approx 5 years ago per daughters, on ASA), presented with a 1-day history of acute-onset epigastric abdominal pain. Imaging consistent with acute cholecystitis, however, with elevated bilirubin, fevers, and dilated CBD there was concern for cholangitis. Patient underwent percutaneous cholecystostomy placement on 9/28/18.    NEURO: alert, oriented. Post-procedure pain well controlled.  -IV tylenol  -oxycodone 2.5 mg PRN    PULM: hx pulmonary fibrosis.  -cont home pulmicort inhaler, fluticasone nasal spray  -AM chest x-ray    CV: hx HTN, CAD w/ stents. Stable, no indication for pressors.  -continue ASA  -BP stable off home metoprolol    GI: Acute cholecystitis, possible cholangitis.  -s/p percutaneous cholecystostomy  -Clear liquid diet  -D5 + NS @ 50 ml/hr  -Continue home protonix, famotidine    : BUN/Cr stable, making adequate urine.  -strict I/O  -voiding without box catheter    HEME: H/H stable  -lovenox for DVT ppx    ID: febrile to 38.1 in ED - no fevers since admission, leukocytosis of 11. Blood cultures 9/28 positive cocci in pairs and chains and gram negative rods. Bile culture from 9/29 growing gram negative rods and gram positive cocci.  -cont aztreonam, metronidazole, vancomycin  -repeat daily blood cultures    ENDO: no active issues  -blood glucose within normal limits    DISPO: SICU

## 2018-10-01 LAB
ALBUMIN SERPL ELPH-MCNC: 3.1 G/DL — LOW (ref 3.3–5)
ALP SERPL-CCNC: 99 U/L — SIGNIFICANT CHANGE UP (ref 40–120)
ALT FLD-CCNC: 98 U/L — HIGH (ref 10–45)
ANION GAP SERPL CALC-SCNC: 9 MMOL/L — SIGNIFICANT CHANGE UP (ref 5–17)
APTT BLD: 38.1 SEC — HIGH (ref 27.5–37.4)
AST SERPL-CCNC: 67 U/L — HIGH (ref 10–40)
BILIRUB DIRECT SERPL-MCNC: 0.4 MG/DL — HIGH (ref 0–0.2)
BILIRUB INDIRECT FLD-MCNC: 1.2 MG/DL — HIGH (ref 0.2–1)
BILIRUB SERPL-MCNC: 1.6 MG/DL — HIGH (ref 0.2–1.2)
BUN SERPL-MCNC: 7 MG/DL — SIGNIFICANT CHANGE UP (ref 7–23)
CALCIUM SERPL-MCNC: 8.7 MG/DL — SIGNIFICANT CHANGE UP (ref 8.4–10.5)
CHLORIDE SERPL-SCNC: 103 MMOL/L — SIGNIFICANT CHANGE UP (ref 96–108)
CO2 SERPL-SCNC: 24 MMOL/L — SIGNIFICANT CHANGE UP (ref 22–31)
CREAT SERPL-MCNC: 0.71 MG/DL — SIGNIFICANT CHANGE UP (ref 0.5–1.3)
GLUCOSE SERPL-MCNC: 127 MG/DL — HIGH (ref 70–99)
HCT VFR BLD CALC: 37.2 % — SIGNIFICANT CHANGE UP (ref 34.5–45)
HGB BLD-MCNC: 12 G/DL — SIGNIFICANT CHANGE UP (ref 11.5–15.5)
INR BLD: 1.3 RATIO — HIGH (ref 0.88–1.16)
MAGNESIUM SERPL-MCNC: 2.3 MG/DL — SIGNIFICANT CHANGE UP (ref 1.6–2.6)
MCHC RBC-ENTMCNC: 32.3 GM/DL — SIGNIFICANT CHANGE UP (ref 32–36)
MCHC RBC-ENTMCNC: 32.9 PG — SIGNIFICANT CHANGE UP (ref 27–34)
MCV RBC AUTO: 102 FL — HIGH (ref 80–100)
PHOSPHATE SERPL-MCNC: 2.6 MG/DL — SIGNIFICANT CHANGE UP (ref 2.5–4.5)
PLATELET # BLD AUTO: 157 K/UL — SIGNIFICANT CHANGE UP (ref 150–400)
POTASSIUM SERPL-MCNC: 5.1 MMOL/L — SIGNIFICANT CHANGE UP (ref 3.5–5.3)
POTASSIUM SERPL-SCNC: 5.1 MMOL/L — SIGNIFICANT CHANGE UP (ref 3.5–5.3)
PROCALCITONIN SERPL-MCNC: 1.78 NG/ML — HIGH (ref 0.02–0.1)
PROT SERPL-MCNC: 7.6 G/DL — SIGNIFICANT CHANGE UP (ref 6–8.3)
PROTHROM AB SERPL-ACNC: 14.3 SEC — HIGH (ref 9.8–12.7)
RBC # BLD: 3.65 M/UL — LOW (ref 3.8–5.2)
RBC # FLD: 13.6 % — SIGNIFICANT CHANGE UP (ref 10.3–14.5)
SODIUM SERPL-SCNC: 136 MMOL/L — SIGNIFICANT CHANGE UP (ref 135–145)
VANCOMYCIN TROUGH SERPL-MCNC: 6.8 UG/ML — LOW (ref 10–20)
WBC # BLD: 5.7 K/UL — SIGNIFICANT CHANGE UP (ref 3.8–10.5)
WBC # FLD AUTO: 5.7 K/UL — SIGNIFICANT CHANGE UP (ref 3.8–10.5)

## 2018-10-01 PROCEDURE — 99233 SBSQ HOSP IP/OBS HIGH 50: CPT

## 2018-10-01 PROCEDURE — 99232 SBSQ HOSP IP/OBS MODERATE 35: CPT | Mod: GC

## 2018-10-01 PROCEDURE — 71045 X-RAY EXAM CHEST 1 VIEW: CPT | Mod: 26

## 2018-10-01 PROCEDURE — 99232 SBSQ HOSP IP/OBS MODERATE 35: CPT

## 2018-10-01 PROCEDURE — 93306 TTE W/DOPPLER COMPLETE: CPT | Mod: 26

## 2018-10-01 PROCEDURE — 99231 SBSQ HOSP IP/OBS SF/LOW 25: CPT

## 2018-10-01 RX ORDER — OXYCODONE HYDROCHLORIDE 5 MG/1
2.5 TABLET ORAL ONCE
Qty: 0 | Refills: 0 | Status: DISCONTINUED | OUTPATIENT
Start: 2018-10-01 | End: 2018-10-01

## 2018-10-01 RX ORDER — ALBUTEROL 90 UG/1
2 AEROSOL, METERED ORAL EVERY 6 HOURS
Qty: 0 | Refills: 0 | Status: DISCONTINUED | OUTPATIENT
Start: 2018-10-01 | End: 2018-10-13

## 2018-10-01 RX ORDER — METOPROLOL TARTRATE 50 MG
25 TABLET ORAL DAILY
Qty: 0 | Refills: 0 | Status: DISCONTINUED | OUTPATIENT
Start: 2018-10-01 | End: 2018-10-13

## 2018-10-01 RX ORDER — OXYCODONE HYDROCHLORIDE 5 MG/1
5 TABLET ORAL EVERY 4 HOURS
Qty: 0 | Refills: 0 | Status: DISCONTINUED | OUTPATIENT
Start: 2018-10-01 | End: 2018-10-06

## 2018-10-01 RX ORDER — VANCOMYCIN HCL 1 G
750 VIAL (EA) INTRAVENOUS EVERY 12 HOURS
Qty: 0 | Refills: 0 | Status: DISCONTINUED | OUTPATIENT
Start: 2018-10-02 | End: 2018-10-07

## 2018-10-01 RX ADMIN — OXYCODONE HYDROCHLORIDE 5 MILLIGRAM(S): 5 TABLET ORAL at 18:00

## 2018-10-01 RX ADMIN — OXYCODONE HYDROCHLORIDE 2.5 MILLIGRAM(S): 5 TABLET ORAL at 13:00

## 2018-10-01 RX ADMIN — Medication 62.5 MILLIMOLE(S): at 04:09

## 2018-10-01 RX ADMIN — OXYCODONE HYDROCHLORIDE 2.5 MILLIGRAM(S): 5 TABLET ORAL at 07:47

## 2018-10-01 RX ADMIN — Medication 81 MILLIGRAM(S): at 12:40

## 2018-10-01 RX ADMIN — OXYCODONE HYDROCHLORIDE 2.5 MILLIGRAM(S): 5 TABLET ORAL at 12:30

## 2018-10-01 RX ADMIN — Medication 650 MILLIGRAM(S): at 09:30

## 2018-10-01 RX ADMIN — Medication 650 MILLIGRAM(S): at 10:00

## 2018-10-01 RX ADMIN — OXYCODONE HYDROCHLORIDE 2.5 MILLIGRAM(S): 5 TABLET ORAL at 14:03

## 2018-10-01 RX ADMIN — Medication 50 MILLIGRAM(S): at 06:12

## 2018-10-01 RX ADMIN — OXYCODONE HYDROCHLORIDE 5 MILLIGRAM(S): 5 TABLET ORAL at 18:30

## 2018-10-01 RX ADMIN — SODIUM CHLORIDE 50 MILLILITER(S): 9 INJECTION, SOLUTION INTRAVENOUS at 06:41

## 2018-10-01 RX ADMIN — PANTOPRAZOLE SODIUM 40 MILLIGRAM(S): 20 TABLET, DELAYED RELEASE ORAL at 06:31

## 2018-10-01 RX ADMIN — Medication 1 SPRAY(S): at 06:08

## 2018-10-01 RX ADMIN — Medication 250 MILLIGRAM(S): at 15:46

## 2018-10-01 RX ADMIN — Medication 0.5 MILLIGRAM(S): at 18:06

## 2018-10-01 RX ADMIN — OXYCODONE HYDROCHLORIDE 2.5 MILLIGRAM(S): 5 TABLET ORAL at 08:15

## 2018-10-01 RX ADMIN — Medication 50 MILLIGRAM(S): at 13:35

## 2018-10-01 RX ADMIN — CHLORHEXIDINE GLUCONATE 1 APPLICATION(S): 213 SOLUTION TOPICAL at 06:13

## 2018-10-01 RX ADMIN — Medication 1 SPRAY(S): at 12:38

## 2018-10-01 RX ADMIN — ENOXAPARIN SODIUM 40 MILLIGRAM(S): 100 INJECTION SUBCUTANEOUS at 12:37

## 2018-10-01 RX ADMIN — Medication 650 MILLIGRAM(S): at 17:27

## 2018-10-01 RX ADMIN — OXYCODONE HYDROCHLORIDE 2.5 MILLIGRAM(S): 5 TABLET ORAL at 13:33

## 2018-10-01 RX ADMIN — Medication 650 MILLIGRAM(S): at 18:07

## 2018-10-01 RX ADMIN — Medication 50 MILLIGRAM(S): at 21:44

## 2018-10-01 RX ADMIN — FAMOTIDINE 20 MILLIGRAM(S): 10 INJECTION INTRAVENOUS at 12:38

## 2018-10-01 RX ADMIN — Medication 0.5 MILLIGRAM(S): at 05:58

## 2018-10-01 NOTE — DIETITIAN INITIAL EVALUATION ADULT. - NS AS NUTRI INTERV MEALS SNACK
Continue Regular diet; encourage intake of high protein, nutrient dense foods/General/healthful diet

## 2018-10-01 NOTE — PROGRESS NOTE ADULT - SUBJECTIVE AND OBJECTIVE BOX
Interventional Radiology    S/P Percutaneous cholecystostomy catheter placement, POD # 3.  pt seen with daughter present at bedside.    Vital Signs Last 24 Hrs  T(C): 36.6 (01 Oct 2018 07:00), Max: 36.8 (01 Oct 2018 03:00)  T(F): 97.9 (01 Oct 2018 07:00), Max: 98.3 (01 Oct 2018 03:00)  HR: 66 (01 Oct 2018 09:00) (63 - 99)  BP: 120/57 (01 Oct 2018 09:00) (95/50 - 151/68)  BP(mean): 82 (01 Oct 2018 09:00) (66 - 98)  RR: 17 (01 Oct 2018 09:00) (15 - 39)  SpO2: 100% (01 Oct 2018 09:00) (92% - 100%)    General Appearance: NAD    Procedure Site (RUQ abdomen): Dressing c/d/i, catheter connected to Leg bag and draining bilious output    LABS:                        12.0   5.7   )-----------( 157      ( 01 Oct 2018 03:04 )             37.2     10-01    136  |  103  |  7   ----------------------------<  127<H>  5.1   |  24  |  0.71    Ca    8.7      01 Oct 2018 03:04  Phos  2.6     10-01  Mg     2.3     10-01    TPro  7.6  /  Alb  3.1<L>  /  TBili  1.6<H>  /  DBili  0.4<H>  /  AST  67<H>  /  ALT  98<H>  /  AlkPhos  99  10-01    Bilirubin Total, Serum: 2.0 mg/dL (09.30.18 @ 08:29)    Comprehensive Metabolic Panel (09.29.18 @ 06:18)    Bilirubin Total, Serum: 3.3 mg/dL    Comprehensive Metabolic Panel (09.28.18 @ 11:03)    Bilirubin Total, Serum: 2.2 mg/dL      A/P   89y Female with cholangitis possibly cholecystitis, per GI high risk for anesthesia for ERCP and not a candidtaed for surgery referred to IR for percutaneous cholecystostomy now S/P perc melanie, POD # 3    Continue care as per primary team  Maintain dressing, monitor output from cholecystostomy catheter, forward flush drainage catheter once daily with 5 ml sterile NS.    Robert St RPA-ALEXANDRA  [facility] [id number]  Ext [phone] Interventional Radiology    S/P Percutaneous cholecystostomy catheter placement, POD # 3.  pt seen with daughter present at bedside.    Vital Signs Last 24 Hrs  T(C): 36.6 (01 Oct 2018 07:00), Max: 36.8 (01 Oct 2018 03:00)  T(F): 97.9 (01 Oct 2018 07:00), Max: 98.3 (01 Oct 2018 03:00)  HR: 66 (01 Oct 2018 09:00) (63 - 99)  BP: 120/57 (01 Oct 2018 09:00) (95/50 - 151/68)  BP(mean): 82 (01 Oct 2018 09:00) (66 - 98)  RR: 17 (01 Oct 2018 09:00) (15 - 39)  SpO2: 100% (01 Oct 2018 09:00) (92% - 100%)    General Appearance: NAD    Procedure Site (RUQ abdomen): Dressing c/d/i, catheter connected to Leg bag and draining bilious output, catheter flushed by SICU RN while present without difficulty     LABS:                        12.0   5.7   )-----------( 157      ( 01 Oct 2018 03:04 )             37.2     10-01    136  |  103  |  7   ----------------------------<  127<H>  5.1   |  24  |  0.71    Ca    8.7      01 Oct 2018 03:04  Phos  2.6     10-01  Mg     2.3     10-01    TPro  7.6  /  Alb  3.1<L>  /  TBili  1.6<H>  /  DBili  0.4<H>  /  AST  67<H>  /  ALT  98<H>  /  AlkPhos  99  10-01    Bilirubin Total, Serum: 2.0 mg/dL (09.30.18 @ 08:29)    Comprehensive Metabolic Panel (09.29.18 @ 06:18)    Bilirubin Total, Serum: 3.3 mg/dL    Comprehensive Metabolic Panel (09.28.18 @ 11:03)    Bilirubin Total, Serum: 2.2 mg/dL      A/P   89y Female with cholangitis possibly cholecystitis, per GI high risk for anesthesia for ERCP and not a candidtaed for surgery referred to IR for percutaneous cholecystostomy now S/P perc melanie, POD # 3    Continue care as per primary team  Maintain dressing, monitor output from cholecystostomy catheter, forward flush drainage catheter once daily with 5 ml sterile NS.    Robert St RPA-C  [facility] [id number]  Ext [phone]

## 2018-10-01 NOTE — DIETITIAN INITIAL EVALUATION ADULT. - DIET TYPE
3 servings Ensure Enlive (provides 350cal, 20Gm protein per 8oz serving)/supplement (specify)/regular

## 2018-10-01 NOTE — PROGRESS NOTE ADULT - ASSESSMENT
Impression:  1. Cholangitis s/p percutenous cholecystostomy tube: clinically improved. Will need definitive therapy to decompress biliary tree    2. Pulm fibrosis    Recommendation:  -obtain pulm preop eval for eventual ERCP  -obtain an MRCP if no contraindication    Stevan Abdi M.D.   [facility]  Contact: [phone]

## 2018-10-01 NOTE — PROGRESS NOTE ADULT - SUBJECTIVE AND OBJECTIVE BOX
SICU PROGRESS NOTE  ================================================  Overnight events: None. Infectious disease saw the patient and recommended discontinuing flagyl, as well as a two-week course of vancomycin and aztreonam.    HPI:  89 yo woman with a hx of pulmonary fibrosis, Li's esophagus, CAD s/p stents (approx 5 years ago per daughters, on ASA), presents with a 1-day history of acute-onset epigastric abdominal pain with radiation to bilateral upper quadrants. Pain improved after tylenol; she cannot identify aggravating factors. She also complained of chills but denies subjective fever, nausea, vomiting, or changes to bowel habits. Denies jaundice, dark urine, or acholic stools. No history of similar pain in the past. Patient does report baseline SOB and productive cough due to pulmonary fibrosis. Patient found to have acute cholecystitis on US with dilated CBD, elevated bilirubin, and nonvisualization of the gallbladder and bowel on HIDA. Underwent percutaneous cholecystostomy on 9/28/17.    Objective:  Vital Signs  ICU Vital Signs Last 24 Hrs  T(C): 36.7 (30 Sep 2018 23:00), Max: 36.7 (30 Sep 2018 07:00)  T(F): 98 (30 Sep 2018 23:00), Max: 98.1 (30 Sep 2018 11:00)  HR: 69 (30 Sep 2018 23:00) (63 - 99)  BP: 140/60 (30 Sep 2018 23:00) (95/50 - 147/68)  BP(mean): 87 (30 Sep 2018 23:00) (66 - 98)  ABP: --  ABP(mean): --  RR: 16 (30 Sep 2018 23:00) (4 - 39)  SpO2: 100% (30 Sep 2018 23:00) (92% - 100%)    I&O's Detail    29 Sep 2018 07:01  -  30 Sep 2018 07:00  --------------------------------------------------------  IN:    dextrose 5% + sodium chloride 0.9%.: 1150 mL    IV PiggyBack: 550 mL    Oral Fluid: 390 mL  Total IN: 2090 mL    OUT:    T-Tube: 90 mL    Voided: 1550 mL  Total OUT: 1640 mL    Total NET: 450 mL      30 Sep 2018 07:01  -  01 Oct 2018 00:08  --------------------------------------------------------  IN:    dextrose 5% + sodium chloride 0.9%.: 800 mL    IV PiggyBack: 500 mL    Oral Fluid: 240 mL  Total IN: 1540 mL    OUT:    T-Tube: 105 mL    Voided: 1100 mL  Total OUT: 1205 mL    Total NET: 335 mL          Diet: Diet, Clear Liquid:   Supplement Feeding Modality:  Oral  Ensure Clear Cans or Servings Per Day:  1       Frequency:  Three Times a day (09-30-18 @ 10:07)      MEDICATIONS  (STANDING):  aspirin  chewable 81 milliGRAM(s) Oral daily  aztreonam  IVPB 1000 milliGRAM(s) IV Intermittent every 8 hours  buDESOnide   0.5 milliGRAM(s) Respule 0.5 milliGRAM(s) Inhalation two times a day  chlorhexidine 4% Liquid 1 Application(s) Topical <User Schedule>  dextrose 5% + sodium chloride 0.9%. 1000 milliLiter(s) (50 mL/Hr) IV Continuous <Continuous>  enoxaparin Injectable 40 milliGRAM(s) SubCutaneous daily  famotidine   Suspension 20 milliGRAM(s) Oral daily  fluticasone propionate 50 MICROgram(s)/spray Nasal Spray 1 Spray(s) Both Nostrils two times a day  influenza   Vaccine 0.5 milliLiter(s) IntraMuscular once  pantoprazole    Tablet 40 milliGRAM(s) Oral before breakfast  vancomycin  IVPB      vancomycin  IVPB 1000 milliGRAM(s) IV Intermittent every 24 hours    MEDICATIONS  (PRN):  acetaminophen   Tablet .. 650 milliGRAM(s) Oral every 6 hours PRN Mild Pain (1 - 3)  bisacodyl 5 milliGRAM(s) Oral daily PRN Constipation  oxyCODONE    IR 2.5 milliGRAM(s) Oral every 4 hours PRN Moderate Pain (4 - 6)  senna 2 Tablet(s) Oral at bedtime PRN Constipation      PHYSICAL EXAM:  GEN: NAD, resting quietly, intubated, sedated  NEURO: CN II-XII grossly intact, no focal deficits  PULM: symmetric chest rise bilaterally, no increased WOB  CV: regular rate, peripheral pulses intact  ABD: soft, ND, R cholecystostomy tube with bilious drainage, appropriately TTP  EXTR: no cyanosis or edema, moving all extremities    LABS  CBC (09-30 @ 08:30)                              11.9                           5.8     )----------------(  155        --    % Neutrophils, --    % Lymphocytes, ANC: --                                  37.2                  BMP (09-30 @ 08:36)             --      |  --      |  --    		Ca++ 1.26    Ca --                 ---------------------------------( --    		Mg --                 --      |  --      |  --    			Ph --      BMP (09-30 @ 08:29)             138     |  104     |  9     		Ca++ --      Ca 9.2                ---------------------------------( 119<H>		Mg 1.9                4.2     |  27      |  0.83  			Ph 2.5       LFTs (09-30 @ 08:29)      TPro 7.5 / Alb 3.3 / TBili 2.0<H> / DBili 1.5<H> / AST 68<H> / <H> / AlkPhos 99    Coags (09-30 @ 08:30)  aPTT 35.9 / INR 1.42<H> / PT 15.6<H>        -> .Blood Blood-Venous Culture (09-29 @ 12:36)       Growth in aerobic bottle: Gram Positive Cocci in Pairs and Chains    NG    Growth in aerobic bottle: Gram Positive Cocci in Pairs and Chains    -> .Body Fluid Bile Fluid Culture (09-29 @ 00:36)       No polymorphonuclear leukocytes seen per low power field  Gram positive cocci in pairs seen per oil power field  Gram Negative Rods seen per oil power field  by cytocentrifuge    Escherichia coli    Numerous Escherichia coli  Moderate Alpha hemolytic strep "Susceptibilities not performed"    -> .Blood Blood Culture (09-28 @ 08:25)       Growth in aerobic bottle: Gram Negative Rods  Growth in anaerobic bottle: Gram Negative Rods and Gram Positive Cocci in  Pairs and Chains    Blood Culture PCR  Streptococcus gallolyticus  Escherichia coli    Growth in aerobic and anaerobic bottles: Escherichia coli Multiple  Morphological Strains  Growth in anaerobic bottle: Streptococcus gallolyticus  "Due to technical problems, Proteus sp. will Not be reported as part of  the BCID panel until furthernotice"  ***Blood Panel PCR results on this specimen are available  approximately 3 hours after the Gram stain result.***  Gram stain, PCR, and/or culture results may not always  correspond due to difference in methodologies.  ************************************************************  This PCR assay was performed using [employer].  The following targets are tested for: Enterococcus,  vancomycin resistant enterococci, Listeria monocytogenes,  coagulase negative staphylococci, S. aureus,  methicillin resistant S. aureus, Streptococcus agalactiae  (Group B), S. pneumoniae, S. pyogenes (Group A),  Acinetobacter baumannii, Enterobacter cloacae, E. coli,  Klebsiella oxytoca, K. pneumoniae, Proteus sp.,  Serratia marcescens, Haemophilus influenzae,  Neisseria meningitidis, Pseudomonas aeruginosa, Candida  albicans, C. glabrata, C krusei, C parapsilosis,  C. tropicalis and the KPC resistance gene.        CULTURES  .Blood Blood-Venous  09-29 @ 12:36   Growth in aerobic bottle: Gram Positive Cocci in Pairs and Chains  --    Growth in aerobic bottle: Gram Positive Cocci in Pairs and Chains      .Body Fluid Bile Fluid  09-29 @ 00:36   Numerous Escherichia coli  Moderate Alpha hemolytic strep "Susceptibilities not performed"  --  Escherichia coli      .Blood Blood  09-28 @ 08:25   Growth in aerobic and anaerobic bottles: Escherichia coli Multiple  Morphological Strains  Growth in anaerobic bottle: Streptococcus gallolyticus  "Due to technical problems, Proteus sp. will Not be reported as part of  the BCID panel until furthernotice"  ***Blood Panel PCR results on this specimen are available  approximately 3 hours after the Gram stain result.***  Gram stain, PCR, and/or culture results may not always  correspond due to difference in methodologies.  ************************************************************  This PCR assay was performed using [employer].  The following targets are tested for: Enterococcus,  vancomycin resistant enterococci, Listeria monocytogenes,  coagulase negative staphylococci, S. aureus,  methicillin resistant S. aureus, Streptococcus agalactiae  (Group B), S. pneumoniae, S. pyogenes (Group A),  Acinetobacter baumannii, Enterobacter cloacae, E. coli,  Klebsiella oxytoca, K. pneumoniae, Proteus sp.,  Serratia marcescens, Haemophilus influenzae,  Neisseria meningitidis, Pseudomonas aeruginosa, Candida  albicans, C. glabrata, C krusei, C parapsilosis,  C. tropicalis and the KPC resistance gene.  --  Blood Culture PCR  Streptococcus gallolyticus  Escherichia coli      IMAGING:   CXR pending

## 2018-10-01 NOTE — DIETITIAN INITIAL EVALUATION ADULT. - FACTORS AFF FOOD INTAKE
Pt noted with lower dentures at bedside. Pt observed finishing 1 Ensure and 1 egg at breakfast. Family denies chewing/swallowing difficulty, encorses pt with decreased po intake in past 4 months, with early satiety. Food preferences taken, chicken and carbonated beverages removed from menu per pt request; Magic Cup dyspahgia supplement (290 calories, 9 Gm protein) added to meal trays. Pt noted with lower dentures at bedside. Pt observed finishing 1 Ensure and 1 egg at breakfast. Family denies chewing/swallowing difficulty. Food preferences taken, chicken and carbonated beverages removed from menu per pt request; Magic Cup dyspahgia supplement (290 calories, 9 Gm protein) added to meal trays.

## 2018-10-01 NOTE — PROGRESS NOTE ADULT - ATTENDING COMMENTS
Patient seen and examined on SICU AM rounds  Awake, alert, following commands  Hemodynamically stable   Oxygenating well  bilirubin improving    - Acute cholecystitis versus ascending cholangitis ?  - Now improving after decompression of biliary tree  - Continue antibiotic for bacteriemia  - TTE to r/o endocarditis and preoperative risk categorization

## 2018-10-01 NOTE — PROGRESS NOTE ADULT - ASSESSMENT
89 year old female with PMH Li's esophagus, CAD s/p stent (5-6 yrs ago, on aspirin 81mg), pulmonary fibrosis who presented to [facility] on 9/28 with epigastric abdominal pain that awoke her from sleep. Pain was located in the epigastric area and radiated to bilateral upper quadrants. Febrile to 100.5 on presentation, tachycardic to > 90 but normotensive. WBC on admission of 8.8. Repeat CMP with transaminitis with , , Alk Phos 106, T Bili 2.2. Lactic acid of 2.6. U/A with 2 WBC. CT Chest with End-stage pulmonary fibrosis with honeycombing and Fluid-filled esophagus to the level of thoracic inlet. CT Abdomen/Pelvis with Distended gallbladder.  Small sludge ball versus gallstone in the gallbladder lumen. NM Biliary scan with high-grade obstruction. Started on Vancomycin/Aztreonam/Flagyl. Underwent perc melanie drain on 9/28/18. Blood cultures from 9/28 with 4/4 bottles with Strep gallolyticus and E. coli. Blood culture from 9/29 with 1/4 bottles with GPC in pairs and chains.   Patient treated with IV vancomycin, aztreonam, ad flagyl  She has persistent abdominal pain, mostly in RUQ, but is tender to palpation throughout the abdomen. Percutaneous cholecystomy tube draining bilious material.  Monitor LFTs and trend bilirubin, monitor temperature and WBC with drainage and antibiotics  F/U final susceptibilities of blood culture isolates, adjust antibiotics as indicated  Repeat BCs until bacteremia cleared  Will need 2 weeks of IV antibiotics from date of drainage  Due to confirmed history of angioedema to penicillin would try to avoid cephalosporins and carbepenems  Can DC flagyl as anaerobes not major cause of cholecystitis, non emphysematous  Advance diet as tolerates.    Recommend:  - Repeat BCs until cleared  - Strep Gallolyticus (known also as Strep Bovis) often associated with endovascular infection and GI malignancy  - Obtain TTE as Strep Gallolyticus highly associated with endocarditis and would require proloned course of antibiotics  - Based on Goals of Care consider W/U for GI malignancy in face of S bovis bacteremia  - Monitor renal function, follow vancomycin trough, keep trough in 15-20 range.    ID is available at [phone] for questions  Aaron Mistry MD  pager [phone]  office [phone] 89 year old female with PMH Li's esophagus, CAD s/p stent (5-6 yrs ago, on aspirin 81mg), pulmonary fibrosis who presented to [facility] on 9/28 with epigastric abdominal pain that awoke her from sleep. Pain was located in the epigastric area and radiated to bilateral upper quadrants. Febrile to 100.5 on presentation, tachycardic to > 90 but normotensive. WBC on admission of 8.8. Repeat CMP with transaminitis with , , Alk Phos 106, T Bili 2.2. Lactic acid of 2.6. U/A with 2 WBC. CT Chest with End-stage pulmonary fibrosis with honeycombing and Fluid-filled esophagus to the level of thoracic inlet. CT Abdomen/Pelvis with Distended gallbladder.  Small sludge ball versus gallstone in the gallbladder lumen. NM Biliary scan with high-grade obstruction. Started on Vancomycin/Aztreonam/Flagyl. Underwent perc melanie drain on 9/28/18. Blood cultures from 9/28 with 4/4 bottles with Strep gallolyticus and E. coli. Blood culture from 9/29 with 1/4 bottles with GPC in pairs and chains.   Patient treated with IV vancomycin, aztreonam, ad flagyl  She has persistent abdominal pain, mostly in RUQ, but is tender to palpation throughout the abdomen. Percutaneous cholecystomy tube draining bilious material.  Monitor LFTs and trend bilirubin, monitor temperature and WBC with drainage and antibiotics  blood culture and bile E coli isolates are susceptible to aztreonam, Strep is susceptible to vancomycin  Repeat BCs until bacteremia cleared  Will need 2 weeks of IV antibiotics from date of drainage, this may need to be extendedbased on echo findings and time to clearance of strep gallolyticus bacteremia  Due to confirmed history of angioedema to penicillin would try to avoid cephalosporins and carbepenems  Can DC flagyl as anaerobes not major cause of cholecystitis, non emphysematous  Advance diet as tolerates.    Recommend:  - Repeat BCs until cleared  - Strep Gallolyticus (known also as Strep Bovis) often associated with endovascular infection and GI malignancy  - Obtain TTE as Strep Gallolyticus highly associated with endocarditis and would require proloned course of antibiotics  - Based on Goals of Care consider W/U for GI malignancy in face of S bovis bacteremia (discussed with patient's daughter)  - Monitor renal function, follow vancomycin trough, keep trough in 15-20 range.    Above discussed with covering team.    ID is available at [phone] for questions  Aaron Mistry MD  pager [phone]  office [phone]

## 2018-10-01 NOTE — DIETITIAN INITIAL EVALUATION ADULT. - NS FNS WEIGHT CHANGE REASON
Family endorses likely age-related weight loss of ~20 pounds in the past 4 months, due to changes in sleeping and eating habits./unintentional

## 2018-10-01 NOTE — DIETITIAN INITIAL EVALUATION ADULT. - ORAL INTAKE PTA
fair/Per family, pt sleep from 3am - 2pm, typically eating breakfast in late afternoon and thus skipping a meal. Pt drinks 2 Ensure daily with family's encouragement. Pt enjoys sweets and red meat. Per chart review, pt takes no nutrition supplements, reports NKFA. fair/Per family, pt sleep from 3am - 2pm, typically eating breakfast in late afternoon and thus skipping a meal. Pt drinks 2 Ensure daily with family's encouragement. Pt enjoys sweets and red meat. Family endorses pt with decreased po intake in past 4 months, with early satiety. Per chart review, pt takes no nutrition supplements, reports NKFA.

## 2018-10-01 NOTE — PROGRESS NOTE ADULT - SUBJECTIVE AND OBJECTIVE BOX
Patient is a 89y old  Female who presents with a chief complaint of Abdominal pain (01 Oct 2018 03:44)    Being followed by ID for Cholecystitis, bacteremia    Interval history:  BC remain + for stretococcus  Afebrile  No acute events      ROS:  Abdominal pain in RUQ  No cough, SOB, CP  No N/V/D.  No other complaints      Antimicrobials:    aztreonam  IVPB 1000 milliGRAM(s) IV Intermittent every 8 hours  vancomycin  IVPB      vancomycin  IVPB 1000 milliGRAM(s) IV Intermittent every 24 hours      Vital Signs Last 24 Hrs  T(C): 36.8 (10-01-18 @ 03:00), Max: 36.8 (10-01-18 @ 03:00)  T(F): 98.3 (10-01-18 @ 03:00), Max: 98.3 (10-01-18 @ 03:00)  HR: 64 (10-01-18 @ 06:00) (64 - 99)  BP: 126/61 (10-01-18 @ 06:00) (95/50 - 146/67)  BP(mean): 88 (10-01-18 @ 06:00) (66 - 97)  RR: 16 (10-01-18 @ 06:00) (15 - 39)  SpO2: 100% (10-01-18 @ 06:00) (92% - 100%)    Physical Exam:    Constitutional well developed, NAD    HEENT EOMI, No pallor    Chest Good AE, CTA    CVS S1 S2 WNl     Abd soft, cholecystostomy tube in RUQ, bilious drainage    Ext No cyanosis clubbing or edema    IV site no erythema tenderness or discharge    Joints no swelling or LOM    CNS AAO  non focal    Lab Data:                          12.0   5.7   )-----------( 157      ( 01 Oct 2018 03:04 )             37.2       10-01    136  |  103  |  7   ----------------------------<  127<H>  5.1   |  24  |  0.71    Ca    8.7      01 Oct 2018 03:04  Phos  2.6     10-01  Mg     2.3     10-01    TPro  7.6  /  Alb  3.1<L>  /  TBili  1.6<H>  /  DBili  0.4<H>  /  AST  67<H>  /  ALT  98<H>  /  AlkPhos  99  10-01        .Blood Blood-Venous  09-29-18   Growth in aerobic bottle: Gram Positive Cocci in Pairs and Chains  --    Growth in aerobic bottle: Gram Positive Cocci in Pairs and Chains      .Body Fluid Bile Fluid  09-29-18   Numerous Escherichia coli  Moderate Alpha hemolytic strep "Susceptibilities not performed"  --  Escherichia coli      .Blood Blood  09-28-18   Growth in aerobic and anaerobic bottles: Escherichia coli Multiple  Morphological Strains  Growth in anaerobic bottle: Streptococcus gallolyticus  "Due to technical problems, Proteus sp. will Not be reported as part of  the BCID panel until furthernotice"  ***Blood Panel PCR results on this specimen are available  approximately 3 hours after the Gram stain result.***  Gram stain, PCR, and/or culture results may not always  correspond due to difference in methodologies.  ************************************************************  This PCR assay was performed using [employer].  The following targets are tested for: Enterococcus,  vancomycin resistant enterococci, Listeria monocytogenes,  coagulase negative staphylococci, S. aureus,  methicillin resistant S. aureus, Streptococcus agalactiae  (Group B), S. pneumoniae, S. pyogenes (Group A),  Acinetobacter baumannii, Enterobacter cloacae, E. coli,  Klebsiella oxytoca, K. pneumoniae, Proteus sp.,  Serratia marcescens, Haemophilus influenzae,  Neisseria meningitidis, Pseudomonas aeruginosa, Candida  albicans, C. glabrata, C krusei, C parapsilosis,  C. tropicalis and the KPC resistance gene.  --  Blood Culture PCR  Streptococcus gallolyticus  Escherichia coli    < from: Xray Chest 1 View- PORTABLE-Routine (09.30.18 @ 07:00) >  EXAM:  XR CHEST PORTABLE ROUTINE 1V                            PROCEDURE DATE:  09/30/2018            INTERPRETATION:  CLINICAL INFORMATION: Evaluate atelectasis.    EXAM: Frontal radiograph of the chest.    COMPARISON: Chest radiograph from 9/20/2018    FINDINGS:    Bilateral reticular opacities. While increased in severity, these changes   can be seen from 3/6/2011. No pleural effusion or pneumothorax.     The cardiac silhouette size cannot be accurately assessed on this   radiograph.     No acute osseous findings.    IMPRESSION:     No interval change.        < end of copied text >

## 2018-10-01 NOTE — PROGRESS NOTE ADULT - ASSESSMENT
89 yo woman with a hx of pulmonary fibrosis, Li's esophagus, CAD s/p stents (approx 5 years ago per daughters, on ASA), presented with a 1-day history of acute-onset epigastric abdominal pain. Imaging consistent with acute cholecystitis, however, with elevated bilirubin, fevers, and dilated CBD there was concern for cholangitis. Patient underwent percutaneous cholecystostomy placement on 9/28/18.    NEURO: alert, oriented. Still complaining of post-procedure pain.  -IV tylenol  -oxycodone 2.5 mg PRN    PULM: hx pulmonary fibrosis.  -cont home pulmicort inhaler, fluticasone nasal spray  -AM chest x-ray    CV: hx HTN, CAD w/ stents. Stable, no indication for pressors.  -continue ASA 81 mg daily  -BP stable off home metoprolol    GI: Acute cholecystitis, possible cholangitis s/p percutaneous cholecystectomy. Abdomen persistently tender. LFTs down-trending.  -cont clear liquid diet. Will advance when abdominal pain improves  -daily LFTs  -D5 + NS @ 50 ml/hr  -Continue home protonix, famotidine    : BUN/Cr stable, making adequate urine.  -strict I/O  -voiding without box catheter    HEME: H/H stable, no indication for transfusion.  -lovenox for DVT ppx    ID: afebrile, resolved leukocytosis. Blood cultures 9/28 positive cocci in pairs and chains and gram negative rods. Bile culture from 9/29 growing E. coli.  -appreciate ID recs: d/c flagyl. Cont vanc, aztreonam x 2 weeks  -cont aztreonam, vancomycin  -repeat daily blood cultures    ENDO: no active issues.  -blood glucose within normal limits    LINES:  PIV  PCT    DISPO: SICU. Full code.

## 2018-10-01 NOTE — PROGRESS NOTE ADULT - SUBJECTIVE AND OBJECTIVE BOX
Patient is a 89y old  Female who presents with a chief complaint of Abdominal pain (01 Oct 2018 09:25)      SUBJECTIVE / OVERNIGHT EVENTS:  no events  MEDICATIONS  (STANDING):  aspirin  chewable 81 milliGRAM(s) Oral daily  aztreonam  IVPB 1000 milliGRAM(s) IV Intermittent every 8 hours  buDESOnide   0.5 milliGRAM(s) Respule 0.5 milliGRAM(s) Inhalation two times a day  chlorhexidine 4% Liquid 1 Application(s) Topical <User Schedule>  enoxaparin Injectable 40 milliGRAM(s) SubCutaneous daily  famotidine   Suspension 20 milliGRAM(s) Oral daily  fluticasone propionate 50 MICROgram(s)/spray Nasal Spray 1 Spray(s) Both Nostrils two times a day  influenza   Vaccine 0.5 milliLiter(s) IntraMuscular once  pantoprazole    Tablet 40 milliGRAM(s) Oral before breakfast  vancomycin  IVPB      vancomycin  IVPB 1000 milliGRAM(s) IV Intermittent every 24 hours    MEDICATIONS  (PRN):  acetaminophen   Tablet .. 650 milliGRAM(s) Oral every 6 hours PRN Mild Pain (1 - 3)  ALBUTerol    90 MICROgram(s) HFA Inhaler 2 Puff(s) Inhalation every 6 hours PRN Shortness of Breath and/or Wheezing  bisacodyl 5 milliGRAM(s) Oral daily PRN Constipation  oxyCODONE    IR 2.5 milliGRAM(s) Oral every 4 hours PRN Moderate Pain (4 - 6)  senna 2 Tablet(s) Oral at bedtime PRN Constipation              PHYSICAL EXAM:  GENERAL: NAD, well-developed  HEAD:  Atraumatic, Normocephalic  EYES: EOMI, PERRLA, conjunctiva and sclera anicteric  NECK: Supple, No JVD  CHEST/LUNG: Clear to auscultation bilaterally; No wheeze  HEART: Regular rate and rhythm; No murmurs, rubs, or gallops  ABDOMEN: Soft, Nontender, Nondistended; Bowel sounds present, no hepatosplenomegaly, no rebound or guarding  EXTREMITIES:  2+ Peripheral Pulses, No clubbing, cyanosis, or edema  PSYCH: AAOx3  NEUROLOGY: non-focal, no asterixis  SKIN: No rashes or lesion    LABS:                        12.0   5.7   )-----------( 157      ( 01 Oct 2018 03:04 )             37.2     10-01    136  |  103  |  7   ----------------------------<  127<H>  5.1   |  24  |  0.71    Ca    8.7      01 Oct 2018 03:04  Phos  2.6     10-01  Mg     2.3     10-01    TPro  7.6  /  Alb  3.1<L>  /  TBili  1.6<H>  /  DBili  0.4<H>  /  AST  67<H>  /  ALT  98<H>  /  AlkPhos  99  10-01    LIVER FUNCTIONS - ( 01 Oct 2018 03:04 )  Alb: 3.1 g/dL / Pro: 7.6 g/dL / ALK PHOS: 99 U/L / ALT: 98 U/L / AST: 67 U/L / GGT: x           PT/INR - ( 01 Oct 2018 03:04 )   PT: 14.3 sec;   INR: 1.30 ratio         PTT - ( 01 Oct 2018 03:04 )  PTT:38.1 sec          RADIOLOGY & ADDITIONAL TESTS:

## 2018-10-01 NOTE — PROGRESS NOTE ADULT - SUBJECTIVE AND OBJECTIVE BOX
General Surgery Progress Note    SUBJECTIVE:  The patient was seen and examined. No acute events overnight. C/o abd pain and RUE pain 2/2 h/o arthritis.     OBJECTIVE:     ** VITAL SIGNS / I&O's **    Vital Signs Last 24 Hrs  T(C): 36.8 (01 Oct 2018 03:00), Max: 36.8 (01 Oct 2018 03:00)  T(F): 98.3 (01 Oct 2018 03:00), Max: 98.3 (01 Oct 2018 03:00)  HR: 90 (01 Oct 2018 03:00) (65 - 99)  BP: 145/61 (01 Oct 2018 03:00) (95/50 - 147/68)  BP(mean): 88 (01 Oct 2018 03:00) (66 - 98)  RR: 34 (01 Oct 2018 03:00) (4 - 39)  SpO2: 97% (01 Oct 2018 03:00) (92% - 100%)      29 Sep 2018 07:01  -  30 Sep 2018 07:00  --------------------------------------------------------  IN:    dextrose 5% + sodium chloride 0.9%.: 1150 mL    IV PiggyBack: 550 mL    Oral Fluid: 390 mL  Total IN: 2090 mL    OUT:    T-Tube: 90 mL    Voided: 1550 mL  Total OUT: 1640 mL    Total NET: 450 mL      30 Sep 2018 07:01  -  01 Oct 2018 03:44  --------------------------------------------------------  IN:    dextrose 5% + sodium chloride 0.9%.: 850 mL    IV PiggyBack: 500 mL    Oral Fluid: 240 mL  Total IN: 1590 mL    OUT:    T-Tube: 105 mL    Voided: 1100 mL  Total OUT: 1205 mL    Total NET: 385 mL          ** PHYSICAL EXAM **    -- CONSTITUTIONAL: Alert, NAD  -- PULMONARY: non-labored respirations  -- ABDOMEN: soft, non-distended, RUQ and LUQ TTP, R cholecystostomy tube with bilious drainage  -- NEURO: A&Ox3    ** LABS **                          11.9   5.8   )-----------( 155      ( 30 Sep 2018 08:30 )             37.2     01 Oct 2018 03:04    136    |  103    |  7      ----------------------------<  127    5.1     |  24     |  0.71     Ca    8.7        01 Oct 2018 03:04  Phos  2.6       01 Oct 2018 03:04  Mg     2.3       01 Oct 2018 03:04    TPro  7.6    /  Alb  3.1    /  TBili  1.6    /  DBili  0.4    /  AST  67     /  ALT  98     /  AlkPhos  99     01 Oct 2018 03:04    PT/INR - ( 01 Oct 2018 03:04 )   PT: 14.3 sec;   INR: 1.30 ratio         PTT - ( 01 Oct 2018 03:04 )  PTT:38.1 sec  CAPILLARY BLOOD GLUCOSE            LIVER FUNCTIONS - ( 01 Oct 2018 03:04 )  Alb: 3.1 g/dL / Pro: 7.6 g/dL / ALK PHOS: 99 U/L / ALT: 98 U/L / AST: 67 U/L / GGT: x             Culture - Blood (collected 29 Sep 2018 12:36)  Source: .Blood Blood-Venous  Preliminary Report (30 Sep 2018 13:01):    No growth to date.    Culture - Blood (collected 29 Sep 2018 12:36)  Source: .Blood Blood-Venous  Gram Stain (30 Sep 2018 05:36):    Growth in aerobic bottle: Gram Positive Cocci in Pairs and Chains  Preliminary Report (30 Sep 2018 05:36):    Growth in aerobic bottle: Gram Positive Cocci in Pairs and Chains    Culture - Body Fluid with Gram Stain (collected 29 Sep 2018 00:36)  Source: .Body Fluid Bile Fluid  Gram Stain (29 Sep 2018 03:09):    No polymorphonuclear leukocytes seen per low power field    Gram positive cocci in pairs seen per oil power field    Gram Negative Rods seen per oil power field    by cytocentrifuge  Preliminary Report (29 Sep 2018 23:20):    Numerous Escherichia coli    Moderate Alpha hemolytic strep "Susceptibilities not performed"  Organism: Escherichia coli (30 Sep 2018 19:03)  Organism: Escherichia coli (30 Sep 2018 19:03)    Culture - Blood (collected 28 Sep 2018 08:25)  Source: .Blood Blood  Gram Stain (29 Sep 2018 01:20):    Growth in aerobic bottle: Gram Positive Cocci in Pairs and Chains    Growth in aerobic bottle: Gram Negative Rods    Growth in anaerobic bottle: Gram Positive Cocci in Pairs and Chains and    Gram Negative Rods  Final Report (30 Sep 2018 19:46):    Growth in aerobic and anaerobic bottles: Escherichia coli    Growth in aerobic and anaerobic bottles: Streptococcus gallolyticus    See previous culture [id number]    Culture - Blood (collected 28 Sep 2018 08:25)  Source: .Blood Blood  Gram Stain (28 Sep 2018 21:50):    Growth in aerobic bottle: Gram Negative Rods    Growth in anaerobic bottle: Gram Negative Rods and Gram Positive Cocci in    Pairs and Chains  Final Report (30 Sep 2018 19:53):    Growth in aerobic and anaerobic bottles: Escherichia coli Multiple    Morphological Strains    Growth in anaerobic bottle: Streptococcus gallolyticus    "Due to technical problems, Proteus sp. will Not be reported as part of    the BCID panel until furthernotice"    ***Blood Panel PCR results on this specimen are available    approximately 3 hours after the Gram stain result.***    Gram stain, PCR, and/or culture results may not always    correspond due to difference in methodologies.    ************************************************************    This PCR assay was performed using [employer].    The following targets are tested for: Enterococcus,    vancomycin resistant enterococci, Listeria monocytogenes,    coagulase negative staphylococci, S. aureus,    methicillin resistant S. aureus, Streptococcus agalactiae    (Group B), S. pneumoniae, S. pyogenes (Group A),    Acinetobacter baumannii, Enterobacter cloacae, E. coli,    Klebsiella oxytoca, K. pneumoniae, Proteus sp.,    Serratia marcescens, Haemophilus influenzae,    Neisseria meningitidis, Pseudomonas aeruginosa, Candida    albicans, C. glabrata, C krusei, C parapsilosis,    C. tropicalis and the KPC resistance gene.  Organism: Escherichia coli (30 Sep 2018 19:52)  Organism: Streptococcus gallolyticus (30 Sep 2018 19:52)  Organism: Streptococcus gallolyticus (30 Sep 2018 19:52)  Organism: Blood Culture PCR (30 Sep 2018 19:52)  Organism: Blood Culture PCR  Streptococcus gallolyticus  Escherichia coli (30 Sep 2018 19:34)          MEDICATIONS  (STANDING):  aspirin  chewable 81 milliGRAM(s) Oral daily  aztreonam  IVPB 1000 milliGRAM(s) IV Intermittent every 8 hours  buDESOnide   0.5 milliGRAM(s) Respule 0.5 milliGRAM(s) Inhalation two times a day  chlorhexidine 4% Liquid 1 Application(s) Topical <User Schedule>  dextrose 5% + sodium chloride 0.9%. 1000 milliLiter(s) (50 mL/Hr) IV Continuous <Continuous>  enoxaparin Injectable 40 milliGRAM(s) SubCutaneous daily  famotidine   Suspension 20 milliGRAM(s) Oral daily  fluticasone propionate 50 MICROgram(s)/spray Nasal Spray 1 Spray(s) Both Nostrils two times a day  influenza   Vaccine 0.5 milliLiter(s) IntraMuscular once  pantoprazole    Tablet 40 milliGRAM(s) Oral before breakfast  vancomycin  IVPB      vancomycin  IVPB 1000 milliGRAM(s) IV Intermittent every 24 hours    MEDICATIONS  (PRN):  acetaminophen   Tablet .. 650 milliGRAM(s) Oral every 6 hours PRN Mild Pain (1 - 3)  bisacodyl 5 milliGRAM(s) Oral daily PRN Constipation  oxyCODONE    IR 2.5 milliGRAM(s) Oral every 4 hours PRN Moderate Pain (4 - 6)  senna 2 Tablet(s) Oral at bedtime PRN Constipation General Surgery Progress Note    SUBJECTIVE:  The patient was seen and examined. No acute events overnight. C/o abd pain and RUE pain.     OBJECTIVE:     ** VITAL SIGNS / I&O's **    Vital Signs Last 24 Hrs  T(C): 36.8 (01 Oct 2018 03:00), Max: 36.8 (01 Oct 2018 03:00)  T(F): 98.3 (01 Oct 2018 03:00), Max: 98.3 (01 Oct 2018 03:00)  HR: 90 (01 Oct 2018 03:00) (65 - 99)  BP: 145/61 (01 Oct 2018 03:00) (95/50 - 147/68)  BP(mean): 88 (01 Oct 2018 03:00) (66 - 98)  RR: 34 (01 Oct 2018 03:00) (4 - 39)  SpO2: 97% (01 Oct 2018 03:00) (92% - 100%)      29 Sep 2018 07:01  -  30 Sep 2018 07:00  --------------------------------------------------------  IN:    dextrose 5% + sodium chloride 0.9%.: 1150 mL    IV PiggyBack: 550 mL    Oral Fluid: 390 mL  Total IN: 2090 mL    OUT:    T-Tube: 90 mL    Voided: 1550 mL  Total OUT: 1640 mL    Total NET: 450 mL      30 Sep 2018 07:01  -  01 Oct 2018 03:44  --------------------------------------------------------  IN:    dextrose 5% + sodium chloride 0.9%.: 850 mL    IV PiggyBack: 500 mL    Oral Fluid: 240 mL  Total IN: 1590 mL    OUT:    T-Tube: 105 mL    Voided: 1100 mL  Total OUT: 1205 mL    Total NET: 385 mL          ** PHYSICAL EXAM **    -- CONSTITUTIONAL: Alert, NAD  -- PULMONARY: non-labored respirations  -- ABDOMEN: soft, non-distended, RUQ and LUQ TTP, R cholecystostomy tube with bilious drainage  -- NEURO: A&Ox3    ** LABS **                          11.9   5.8   )-----------( 155      ( 30 Sep 2018 08:30 )             37.2     01 Oct 2018 03:04    136    |  103    |  7      ----------------------------<  127    5.1     |  24     |  0.71     Ca    8.7        01 Oct 2018 03:04  Phos  2.6       01 Oct 2018 03:04  Mg     2.3       01 Oct 2018 03:04    TPro  7.6    /  Alb  3.1    /  TBili  1.6    /  DBili  0.4    /  AST  67     /  ALT  98     /  AlkPhos  99     01 Oct 2018 03:04    PT/INR - ( 01 Oct 2018 03:04 )   PT: 14.3 sec;   INR: 1.30 ratio         PTT - ( 01 Oct 2018 03:04 )  PTT:38.1 sec  CAPILLARY BLOOD GLUCOSE            LIVER FUNCTIONS - ( 01 Oct 2018 03:04 )  Alb: 3.1 g/dL / Pro: 7.6 g/dL / ALK PHOS: 99 U/L / ALT: 98 U/L / AST: 67 U/L / GGT: x             Culture - Blood (collected 29 Sep 2018 12:36)  Source: .Blood Blood-Venous  Preliminary Report (30 Sep 2018 13:01):    No growth to date.    Culture - Blood (collected 29 Sep 2018 12:36)  Source: .Blood Blood-Venous  Gram Stain (30 Sep 2018 05:36):    Growth in aerobic bottle: Gram Positive Cocci in Pairs and Chains  Preliminary Report (30 Sep 2018 05:36):    Growth in aerobic bottle: Gram Positive Cocci in Pairs and Chains    Culture - Body Fluid with Gram Stain (collected 29 Sep 2018 00:36)  Source: .Body Fluid Bile Fluid  Gram Stain (29 Sep 2018 03:09):    No polymorphonuclear leukocytes seen per low power field    Gram positive cocci in pairs seen per oil power field    Gram Negative Rods seen per oil power field    by cytocentrifuge  Preliminary Report (29 Sep 2018 23:20):    Numerous Escherichia coli    Moderate Alpha hemolytic strep "Susceptibilities not performed"  Organism: Escherichia coli (30 Sep 2018 19:03)  Organism: Escherichia coli (30 Sep 2018 19:03)    Culture - Blood (collected 28 Sep 2018 08:25)  Source: .Blood Blood  Gram Stain (29 Sep 2018 01:20):    Growth in aerobic bottle: Gram Positive Cocci in Pairs and Chains    Growth in aerobic bottle: Gram Negative Rods    Growth in anaerobic bottle: Gram Positive Cocci in Pairs and Chains and    Gram Negative Rods  Final Report (30 Sep 2018 19:46):    Growth in aerobic and anaerobic bottles: Escherichia coli    Growth in aerobic and anaerobic bottles: Streptococcus gallolyticus    See previous culture [id number]    Culture - Blood (collected 28 Sep 2018 08:25)  Source: .Blood Blood  Gram Stain (28 Sep 2018 21:50):    Growth in aerobic bottle: Gram Negative Rods    Growth in anaerobic bottle: Gram Negative Rods and Gram Positive Cocci in    Pairs and Chains  Final Report (30 Sep 2018 19:53):    Growth in aerobic and anaerobic bottles: Escherichia coli Multiple    Morphological Strains    Growth in anaerobic bottle: Streptococcus gallolyticus    "Due to technical problems, Proteus sp. will Not be reported as part of    the BCID panel until furthernotice"    ***Blood Panel PCR results on this specimen are available    approximately 3 hours after the Gram stain result.***    Gram stain, PCR, and/or culture results may not always    correspond due to difference in methodologies.    ************************************************************    This PCR assay was performed using [employer].    The following targets are tested for: Enterococcus,    vancomycin resistant enterococci, Listeria monocytogenes,    coagulase negative staphylococci, S. aureus,    methicillin resistant S. aureus, Streptococcus agalactiae    (Group B), S. pneumoniae, S. pyogenes (Group A),    Acinetobacter baumannii, Enterobacter cloacae, E. coli,    Klebsiella oxytoca, K. pneumoniae, Proteus sp.,    Serratia marcescens, Haemophilus influenzae,    Neisseria meningitidis, Pseudomonas aeruginosa, Candida    albicans, C. glabrata, C krusei, C parapsilosis,    C. tropicalis and the KPC resistance gene.  Organism: Escherichia coli (30 Sep 2018 19:52)  Organism: Streptococcus gallolyticus (30 Sep 2018 19:52)  Organism: Streptococcus gallolyticus (30 Sep 2018 19:52)  Organism: Blood Culture PCR (30 Sep 2018 19:52)  Organism: Blood Culture PCR  Streptococcus gallolyticus  Escherichia coli (30 Sep 2018 19:34)          MEDICATIONS  (STANDING):  aspirin  chewable 81 milliGRAM(s) Oral daily  aztreonam  IVPB 1000 milliGRAM(s) IV Intermittent every 8 hours  buDESOnide   0.5 milliGRAM(s) Respule 0.5 milliGRAM(s) Inhalation two times a day  chlorhexidine 4% Liquid 1 Application(s) Topical <User Schedule>  dextrose 5% + sodium chloride 0.9%. 1000 milliLiter(s) (50 mL/Hr) IV Continuous <Continuous>  enoxaparin Injectable 40 milliGRAM(s) SubCutaneous daily  famotidine   Suspension 20 milliGRAM(s) Oral daily  fluticasone propionate 50 MICROgram(s)/spray Nasal Spray 1 Spray(s) Both Nostrils two times a day  influenza   Vaccine 0.5 milliLiter(s) IntraMuscular once  pantoprazole    Tablet 40 milliGRAM(s) Oral before breakfast  vancomycin  IVPB      vancomycin  IVPB 1000 milliGRAM(s) IV Intermittent every 24 hours    MEDICATIONS  (PRN):  acetaminophen   Tablet .. 650 milliGRAM(s) Oral every 6 hours PRN Mild Pain (1 - 3)  bisacodyl 5 milliGRAM(s) Oral daily PRN Constipation  oxyCODONE    IR 2.5 milliGRAM(s) Oral every 4 hours PRN Moderate Pain (4 - 6)  senna 2 Tablet(s) Oral at bedtime PRN Constipation

## 2018-10-01 NOTE — DIETITIAN INITIAL EVALUATION ADULT. - NS AS NUTRI INTERV MEDICAL AND FOOD SUPPLEMENTS
Commercial beverage/Modified food/Continue 3 servings Ensure Enlive (provides 350cal, 20Gm protein per 8oz serving). Magic Cup dyspahgia supplement (290 calories, 9 Gm protein) added to meal trays to encourage protein-energy intake.

## 2018-10-01 NOTE — DIETITIAN INITIAL EVALUATION ADULT. - OTHER INFO
Nutrition Assessment warranted for length of stay in SICU. Per chart, pt with history of "pulmonary fibrosis, Li's esophagus, CAD s/p stents (approx 5 years ago per daughters, on ASA), presented with a 1-day history of acute-onset epigastric abdominal pain. Imaging consistent with acute cholecystitis, however, with elevated bilirubin, fevers, and dilated CBD there was concern for cholangitis. Patient underwent percutaneous cholecystostomy placement on 9/28/18."

## 2018-10-01 NOTE — DIETITIAN INITIAL EVALUATION ADULT. - ENERGY NEEDS
ht: 5 feet 0 inches, wt: 131 pounds, BMI: 25.7 Kg/m2, IBW: 100x pounds (+/- 10%), 131% IBW. Edema: 1+ dependent; Skin: no pressure injuries noted per nursing flowsheet.

## 2018-10-02 LAB
ALBUMIN SERPL ELPH-MCNC: 2.6 G/DL — LOW (ref 3.3–5)
ALP SERPL-CCNC: 101 U/L — SIGNIFICANT CHANGE UP (ref 40–120)
ALT FLD-CCNC: 74 U/L — HIGH (ref 10–45)
ANION GAP SERPL CALC-SCNC: 5 MMOL/L — SIGNIFICANT CHANGE UP (ref 5–17)
APTT BLD: 36.8 SEC — SIGNIFICANT CHANGE UP (ref 27.5–37.4)
AST SERPL-CCNC: 70 U/L — HIGH (ref 10–40)
BILIRUB DIRECT SERPL-MCNC: 0.5 MG/DL — HIGH (ref 0–0.2)
BILIRUB INDIRECT FLD-MCNC: 0.3 MG/DL — SIGNIFICANT CHANGE UP (ref 0.2–1)
BILIRUB SERPL-MCNC: 0.8 MG/DL — SIGNIFICANT CHANGE UP (ref 0.2–1.2)
BUN SERPL-MCNC: 11 MG/DL — SIGNIFICANT CHANGE UP (ref 7–23)
CALCIUM SERPL-MCNC: 8.5 MG/DL — SIGNIFICANT CHANGE UP (ref 8.4–10.5)
CHLORIDE SERPL-SCNC: 104 MMOL/L — SIGNIFICANT CHANGE UP (ref 96–108)
CO2 SERPL-SCNC: 28 MMOL/L — SIGNIFICANT CHANGE UP (ref 22–31)
CREAT SERPL-MCNC: 0.78 MG/DL — SIGNIFICANT CHANGE UP (ref 0.5–1.3)
CULTURE RESULTS: SIGNIFICANT CHANGE UP
GLUCOSE SERPL-MCNC: 162 MG/DL — HIGH (ref 70–99)
HCT VFR BLD CALC: 32.2 % — LOW (ref 34.5–45)
HGB BLD-MCNC: 10.2 G/DL — LOW (ref 11.5–15.5)
INR BLD: 1.35 RATIO — HIGH (ref 0.88–1.16)
MAGNESIUM SERPL-MCNC: 2.1 MG/DL — SIGNIFICANT CHANGE UP (ref 1.6–2.6)
MCHC RBC-ENTMCNC: 31.9 GM/DL — LOW (ref 32–36)
MCHC RBC-ENTMCNC: 32.2 PG — SIGNIFICANT CHANGE UP (ref 27–34)
MCV RBC AUTO: 101 FL — HIGH (ref 80–100)
PHOSPHATE SERPL-MCNC: 2.9 MG/DL — SIGNIFICANT CHANGE UP (ref 2.5–4.5)
PLATELET # BLD AUTO: 155 K/UL — SIGNIFICANT CHANGE UP (ref 150–400)
POTASSIUM SERPL-MCNC: 3.5 MMOL/L — SIGNIFICANT CHANGE UP (ref 3.5–5.3)
POTASSIUM SERPL-SCNC: 3.5 MMOL/L — SIGNIFICANT CHANGE UP (ref 3.5–5.3)
PROCALCITONIN SERPL-MCNC: 1.25 NG/ML — HIGH (ref 0.02–0.1)
PROT SERPL-MCNC: 6.4 G/DL — SIGNIFICANT CHANGE UP (ref 6–8.3)
PROTHROM AB SERPL-ACNC: 14.7 SEC — HIGH (ref 9.8–12.7)
RBC # BLD: 3.19 M/UL — LOW (ref 3.8–5.2)
RBC # FLD: 13 % — SIGNIFICANT CHANGE UP (ref 10.3–14.5)
SODIUM SERPL-SCNC: 137 MMOL/L — SIGNIFICANT CHANGE UP (ref 135–145)
SPECIMEN SOURCE: SIGNIFICANT CHANGE UP
WBC # BLD: 4.3 K/UL — SIGNIFICANT CHANGE UP (ref 3.8–10.5)
WBC # FLD AUTO: 4.3 K/UL — SIGNIFICANT CHANGE UP (ref 3.8–10.5)

## 2018-10-02 PROCEDURE — 71045 X-RAY EXAM CHEST 1 VIEW: CPT | Mod: 26

## 2018-10-02 PROCEDURE — 99232 SBSQ HOSP IP/OBS MODERATE 35: CPT

## 2018-10-02 PROCEDURE — 99231 SBSQ HOSP IP/OBS SF/LOW 25: CPT

## 2018-10-02 RX ORDER — POTASSIUM PHOSPHATE, MONOBASIC POTASSIUM PHOSPHATE, DIBASIC 236; 224 MG/ML; MG/ML
15 INJECTION, SOLUTION INTRAVENOUS ONCE
Qty: 0 | Refills: 0 | Status: COMPLETED | OUTPATIENT
Start: 2018-10-02 | End: 2018-10-02

## 2018-10-02 RX ORDER — POTASSIUM CHLORIDE 20 MEQ
20 PACKET (EA) ORAL
Qty: 0 | Refills: 0 | Status: COMPLETED | OUTPATIENT
Start: 2018-10-02 | End: 2018-10-02

## 2018-10-02 RX ADMIN — Medication 650 MILLIGRAM(S): at 21:34

## 2018-10-02 RX ADMIN — CHLORHEXIDINE GLUCONATE 1 APPLICATION(S): 213 SOLUTION TOPICAL at 05:26

## 2018-10-02 RX ADMIN — Medication 650 MILLIGRAM(S): at 08:00

## 2018-10-02 RX ADMIN — Medication 25 MILLIGRAM(S): at 05:24

## 2018-10-02 RX ADMIN — Medication 20 MILLIEQUIVALENT(S): at 04:30

## 2018-10-02 RX ADMIN — OXYCODONE HYDROCHLORIDE 5 MILLIGRAM(S): 5 TABLET ORAL at 08:46

## 2018-10-02 RX ADMIN — Medication 1 SPRAY(S): at 18:15

## 2018-10-02 RX ADMIN — PANTOPRAZOLE SODIUM 40 MILLIGRAM(S): 20 TABLET, DELAYED RELEASE ORAL at 06:07

## 2018-10-02 RX ADMIN — Medication 0.5 MILLIGRAM(S): at 18:15

## 2018-10-02 RX ADMIN — OXYCODONE HYDROCHLORIDE 5 MILLIGRAM(S): 5 TABLET ORAL at 03:37

## 2018-10-02 RX ADMIN — Medication 1 SPRAY(S): at 05:26

## 2018-10-02 RX ADMIN — OXYCODONE HYDROCHLORIDE 5 MILLIGRAM(S): 5 TABLET ORAL at 08:16

## 2018-10-02 RX ADMIN — POTASSIUM PHOSPHATE, MONOBASIC POTASSIUM PHOSPHATE, DIBASIC 62.5 MILLIMOLE(S): 236; 224 INJECTION, SOLUTION INTRAVENOUS at 04:20

## 2018-10-02 RX ADMIN — FAMOTIDINE 20 MILLIGRAM(S): 10 INJECTION INTRAVENOUS at 18:15

## 2018-10-02 RX ADMIN — Medication 650 MILLIGRAM(S): at 22:00

## 2018-10-02 RX ADMIN — OXYCODONE HYDROCHLORIDE 5 MILLIGRAM(S): 5 TABLET ORAL at 03:07

## 2018-10-02 RX ADMIN — Medication 81 MILLIGRAM(S): at 13:43

## 2018-10-02 RX ADMIN — Medication 250 MILLIGRAM(S): at 15:55

## 2018-10-02 RX ADMIN — Medication 250 MILLIGRAM(S): at 01:45

## 2018-10-02 RX ADMIN — Medication 650 MILLIGRAM(S): at 14:30

## 2018-10-02 RX ADMIN — Medication 650 MILLIGRAM(S): at 13:50

## 2018-10-02 RX ADMIN — Medication 20 MILLIEQUIVALENT(S): at 07:34

## 2018-10-02 RX ADMIN — Medication 50 MILLIGRAM(S): at 05:25

## 2018-10-02 RX ADMIN — Medication 50 MILLIGRAM(S): at 13:44

## 2018-10-02 RX ADMIN — Medication 650 MILLIGRAM(S): at 07:30

## 2018-10-02 RX ADMIN — Medication 50 MILLIGRAM(S): at 21:34

## 2018-10-02 RX ADMIN — ENOXAPARIN SODIUM 40 MILLIGRAM(S): 100 INJECTION SUBCUTANEOUS at 13:43

## 2018-10-02 RX ADMIN — Medication 20 MILLIEQUIVALENT(S): at 06:08

## 2018-10-02 NOTE — PHYSICAL THERAPY INITIAL EVALUATION ADULT - PERTINENT HX OF CURRENT PROBLEM, REHAB EVAL
89 yo woman with a hx of pulmonary fibrosis, Li's esophagus, CAD s/p stents (approx 5 years ago per daughters, on ASA), presented with a 1-day history of acute-onset epigastric abdominal pain. Imaging consistent with acute cholecystitis, however, with elevated bilirubin, fevers, and dilated CBD there was concern for cholangitis. Patient underwent percutaneous cholecystostomy placement on 9/28/18.

## 2018-10-02 NOTE — PROGRESS NOTE ADULT - ASSESSMENT
A: 89 yo woman with a hx of pulmonary fibrosis, Li's esophagus, CAD s/p stents (approx 5 years ago per daughters, on ASA), presents with a 1-day history of acute-onset epigastric abdominal pain, elevated LFTs and fevers. Picture c/w cholangitis - not a candidate for ERCP as per GI. Patient underwent PCT placement which she tolerated well.    P:  - GI rec MRCP  - pulm consult for ERCP  - ID recommended d/c flagyl and two week course of vanc and aztreonam.   - 9/29 Bctx pos gram + cocci:   - 9/29 body fluid ctx showed e coli and alpha hemolytic strep  - d/c flagyl, cont vanc, AZT x2 wks  - pain control with IV tylenol and oxycodone  - c/w home pulmicort and fluticasone  - cont home meds, ASA, protonix, famotidine  - Transfer to floor    Joanna Ramirez, PGY-1  General Surgery Green Team [phone]

## 2018-10-02 NOTE — PHYSICAL THERAPY INITIAL EVALUATION ADULT - ADDITIONAL COMMENTS
9/28 US ABDOMEN: Intrahepatic and extrahepatic biliary dilatation. The distal common bile duct at the level of the pancreatic head was not visualized. Distended gallbladder containing gallstones and sludge, with borderline wall thickness. Findings are concerning for acute cholecystitis. Further evaluation with hepatobiliary scan is recommended for confirmation. 9/28 NM Hepatobiliary Scan: Abnormal hepatobiliary scan. Nonvisualization of the bowel and gallbladder and the entire 2 hours of imaging. Findings may represent high-grade obstruction. Acute cholecystitis cannot be excluded.

## 2018-10-02 NOTE — PROGRESS NOTE ADULT - SUBJECTIVE AND OBJECTIVE BOX
General Surgery Progress Note    SUBJECTIVE:  The patient was seen and examined. No acute events overnight. Diet advanced from CLD to reg.     OBJECTIVE:     ** VITAL SIGNS / I&O's **    Vital Signs Last 24 Hrs  T(C): 36.5 (02 Oct 2018 03:00), Max: 37.1 (01 Oct 2018 11:00)  T(F): 97.7 (02 Oct 2018 03:00), Max: 98.7 (01 Oct 2018 11:00)  HR: 68 (02 Oct 2018 04:00) (63 - 107)  BP: 122/57 (02 Oct 2018 04:00) (97/53 - 158/68)  BP(mean): 82 (02 Oct 2018 04:00) (71 - 99)  RR: 13 (02 Oct 2018 04:00) (12 - 37)  SpO2: 100% (02 Oct 2018 04:00) (88% - 100%)      30 Sep 2018 07:01  -  01 Oct 2018 07:00  --------------------------------------------------------  IN:    dextrose 5% + sodium chloride 0.9%: 1000 mL    Oral Fluid: 240 mL    Solution: 187.5 mL    Solution: 550 mL  Total IN: 1977.5 mL    OUT:    T-Tube: 105 mL    Voided: 1500 mL  Total OUT: 1605 mL    Total NET: 372.5 mL      01 Oct 2018 07:01  -  02 Oct 2018 04:59  --------------------------------------------------------  IN:    dextrose 5% + sodium chloride 0.9%: 150 mL    Oral Fluid: 350 mL    Solution: 100 mL    Solution: 562.5 mL  Total IN: 1162.5 mL    OUT:    T-Tube: 150 mL    Voided: 700 mL  Total OUT: 850 mL    Total NET: 312.5 mL          ** PHYSICAL EXAM **    GEN: NAD, resting quietly  NEURO: CN II-XII grossly intact, no focal deficits  PULM: symmetric chest rise bilaterally, no increased WOB  CV: regular rate, peripheral pulses intact  ABD: soft, ND, R cholecystostomy tube with bilious drainage, TTP over RUQ and LUQ  EXTR: no cyanosis or edema, moving all extremities    ** LABS **                          10.2   4.3   )-----------( 155      ( 02 Oct 2018 02:56 )             32.2     02 Oct 2018 02:56    137    |  104    |  11     ----------------------------<  162    3.5     |  28     |  0.78     Ca    8.5        02 Oct 2018 02:56  Phos  2.9       02 Oct 2018 02:56  Mg     2.1       02 Oct 2018 02:56    TPro  6.4    /  Alb  2.6    /  TBili  0.8    /  DBili  0.5    /  AST  70     /  ALT  74     /  AlkPhos  101    02 Oct 2018 02:56    PT/INR - ( 02 Oct 2018 02:56 )   PT: 14.7 sec;   INR: 1.35 ratio         PTT - ( 02 Oct 2018 02:56 )  PTT:36.8 sec  CAPILLARY BLOOD GLUCOSE            LIVER FUNCTIONS - ( 02 Oct 2018 02:56 )  Alb: 2.6 g/dL / Pro: 6.4 g/dL / ALK PHOS: 101 U/L / ALT: 74 U/L / AST: 70 U/L / GGT: x             Culture - Blood (collected 30 Sep 2018 12:13)  Source: .Blood Blood  Preliminary Report (01 Oct 2018 13:01):    No growth to date.    Culture - Blood (collected 30 Sep 2018 11:04)  Source: .Blood Blood  Preliminary Report (01 Oct 2018 12:01):    No growth to date.    Culture - Blood (collected 29 Sep 2018 12:36)  Source: .Blood Blood-Venous  Preliminary Report (30 Sep 2018 13:01):    No growth to date.    Culture - Blood (collected 29 Sep 2018 12:36)  Source: .Blood Blood-Venous  Gram Stain (30 Sep 2018 05:36):    Growth in aerobic bottle: Gram Positive Cocci in Pairs and Chains  Preliminary Report (01 Oct 2018 11:42):    Growth in aerobic bottle: Streptococcus gallolyticus    See previous culture [id number]          MEDICATIONS  (STANDING):  aspirin  chewable 81 milliGRAM(s) Oral daily  aztreonam  IVPB 1000 milliGRAM(s) IV Intermittent every 8 hours  buDESOnide   0.5 milliGRAM(s) Respule 0.5 milliGRAM(s) Inhalation two times a day  chlorhexidine 4% Liquid 1 Application(s) Topical <User Schedule>  enoxaparin Injectable 40 milliGRAM(s) SubCutaneous daily  famotidine   Suspension 20 milliGRAM(s) Oral daily  fluticasone propionate 50 MICROgram(s)/spray Nasal Spray 1 Spray(s) Both Nostrils two times a day  influenza   Vaccine 0.5 milliLiter(s) IntraMuscular once  metoprolol succinate ER 25 milliGRAM(s) Oral daily  pantoprazole    Tablet 40 milliGRAM(s) Oral before breakfast  potassium chloride    Tablet ER 20 milliEquivalent(s) Oral every 2 hours  vancomycin  IVPB 750 milliGRAM(s) IV Intermittent every 12 hours    MEDICATIONS  (PRN):  acetaminophen   Tablet .. 650 milliGRAM(s) Oral every 6 hours PRN Mild Pain (1 - 3)  ALBUTerol    90 MICROgram(s) HFA Inhaler 2 Puff(s) Inhalation every 6 hours PRN Shortness of Breath and/or Wheezing  bisacodyl 5 milliGRAM(s) Oral daily PRN Constipation  oxyCODONE    IR 5 milliGRAM(s) Oral every 4 hours PRN Moderate to severe pain  senna 2 Tablet(s) Oral at bedtime PRN Constipation

## 2018-10-02 NOTE — PROGRESS NOTE ADULT - SUBJECTIVE AND OBJECTIVE BOX
Patient is a 89y old  Female who presents with a chief complaint of Abdominal pain (02 Oct 2018 04:59)    Being followed by ID for Acute cholecystitis, bacteremia    Interval history:  Afebrile  Decrease in bilirubin, LFTs  No acute events      ROS:  Has RUQ pain  No cough, SOB, CP  No N/V/D  No other complaints      Antimicrobials:    aztreonam  IVPB 1000 milliGRAM(s) IV Intermittent every 8 hours  vancomycin  IVPB 750 milliGRAM(s) IV Intermittent every 12 hours      Vital Signs Last 24 Hrs  T(C): 36.7 (10-02-18 @ 07:00), Max: 37.1 (10-01-18 @ 11:00)  T(F): 98 (10-02-18 @ 07:00), Max: 98.7 (10-01-18 @ 11:00)  HR: 77 (10-02-18 @ 08:00) (64 - 107)  BP: 147/66 (10-02-18 @ 08:00) (97/53 - 158/68)  BP(mean): 95 (10-02-18 @ 08:00) (71 - 99)  RR: 29 (10-02-18 @ 07:00) (12 - 37)  SpO2: 79% (10-02-18 @ 08:00) (79% - 100%)    Physical Exam:    Constitutional well nourished, NAD    HEENT EOMI, No icterus    Chest Clear anteriorly    CVS S1 S2    Abd RUQ cholecystostomy drain with green bile, tender over RUQ    Ext No cyanosis clubbing or edema    IV site no erythema tenderness or discharge    Joints no swelling or LOM    CNS non focal    Lab Data:                          10.2   4.3   )-----------( 155      ( 02 Oct 2018 02:56 )             32.2       10-02    137  |  104  |  11  ----------------------------<  162<H>  3.5   |  28  |  0.78    Ca    8.5      02 Oct 2018 02:56  Phos  2.9     10-02  Mg     2.1     10-02    TPro  6.4  /  Alb  2.6<L>  /  TBili  0.8  /  DBili  0.5<H>  /  AST  70<H>  /  ALT  74<H>  /  AlkPhos  101  10-02        .Blood Blood  09-30-18   No growth to date.  --  --      .Blood Blood  09-30-18   No growth to date.  --  --      .Blood Blood-Venous  09-29-18   Growth in aerobic bottle: Streptococcus gallolyticus  See previous culture [id number]  --    Growth in aerobic bottle: Gram Positive Cocci in Pairs and Chains      .Body Fluid Bile Fluid  09-29-18   Numerous Escherichia coli  Moderate Alpha hemolytic strep "Susceptibilities not performed"  --  Escherichia coli      .Blood Blood  09-28-18   Growth in aerobic and anaerobic bottles: Escherichia coli Multiple  Morphological Strains  Growth in anaerobic bottle: Streptococcus gallolyticus  "Due to technical problems, Proteus sp. will Not be reported as part of  the BCID panel until furthernotice"  ***Blood Panel PCR results on this specimen are available  approximately 3 hours after the Gram stain result.***  Gram stain, PCR, and/or culture results may not always  correspond due to difference in methodologies.  ************************************************************  This PCR assay was performed using [employer].  The following targets are tested for: Enterococcus,  vancomycin resistant enterococci, Listeria monocytogenes,  coagulase negative staphylococci, S. aureus,  methicillin resistant S. aureus, Streptococcus agalactiae  (Group B), S. pneumoniae, S. pyogenes (Group A),  Acinetobacter baumannii, Enterobacter cloacae, E. coli,  Klebsiella oxytoca, K. pneumoniae, Proteus sp.,  Serratia marcescens, Haemophilus influenzae,  Neisseria meningitidis, Pseudomonas aeruginosa, Candida  albicans, C. glabrata, C krusei, C parapsilosis,  C. tropicalis and the KPC resistance gene.  --  Blood Culture PCR  Streptococcus gallolyticus  Escherichia coli    Vancomycin Level, Trough: 6.8 ug/mL (10-01-18 @ 14:34)    < from: Xray Chest 1 View- PORTABLE-Routine (10.01.18 @ 07:07) >  XAM:  XR CHEST PORTABLE ROUTINE 1V                            PROCEDURE DATE:  10/01/2018            INTERPRETATION:  A single chest x-ray was obtained on October 1, 2018.    Indication: Follow-up increased interstitial marking.    Impression:    The heart is normal in size. Increased interstitial marking which was   seen in previous studies and unchanged. No acute consolidation could be   identified. Changes compatible with chronic lung changes.      < end of copied text > Patient is a 89y old  Female who presents with a chief complaint of Abdominal pain (02 Oct 2018 04:59)    Being followed by ID for Acute cholecystitis, bacteremia    Interval history:  Afebrile  Decrease in bilirubin, LFTs  Had TTE with no visible vegetations  No acute events      ROS:  Has RUQ pain  No cough, SOB, CP  No N/V/D  No other complaints      Antimicrobials:    aztreonam  IVPB 1000 milliGRAM(s) IV Intermittent every 8 hours  vancomycin  IVPB 750 milliGRAM(s) IV Intermittent every 12 hours      Vital Signs Last 24 Hrs  T(C): 36.7 (10-02-18 @ 07:00), Max: 37.1 (10-01-18 @ 11:00)  T(F): 98 (10-02-18 @ 07:00), Max: 98.7 (10-01-18 @ 11:00)  HR: 77 (10-02-18 @ 08:00) (64 - 107)  BP: 147/66 (10-02-18 @ 08:00) (97/53 - 158/68)  BP(mean): 95 (10-02-18 @ 08:00) (71 - 99)  RR: 29 (10-02-18 @ 07:00) (12 - 37)  SpO2: 79% (10-02-18 @ 08:00) (79% - 100%)    Physical Exam:    Constitutional well nourished, NAD    HEENT EOMI, No icterus    Chest Clear anteriorly    CVS S1 S2    Abd RUQ cholecystostomy drain with green bile, tender over RUQ    Ext No cyanosis clubbing or edema    IV site no erythema tenderness or discharge    Joints no swelling or LOM    CNS non focal    Lab Data:                          10.2   4.3   )-----------( 155      ( 02 Oct 2018 02:56 )             32.2       10-02    137  |  104  |  11  ----------------------------<  162<H>  3.5   |  28  |  0.78    Ca    8.5      02 Oct 2018 02:56  Phos  2.9     10-02  Mg     2.1     10-02    TPro  6.4  /  Alb  2.6<L>  /  TBili  0.8  /  DBili  0.5<H>  /  AST  70<H>  /  ALT  74<H>  /  AlkPhos  101  10-02        .Blood Blood  09-30-18   No growth to date.  --  --      .Blood Blood  09-30-18   No growth to date.  --  --      .Blood Blood-Venous  09-29-18   Growth in aerobic bottle: Streptococcus gallolyticus  See previous culture [id number]  --    Growth in aerobic bottle: Gram Positive Cocci in Pairs and Chains      .Body Fluid Bile Fluid  09-29-18   Numerous Escherichia coli  Moderate Alpha hemolytic strep "Susceptibilities not performed"  --  Escherichia coli      .Blood Blood  09-28-18   Growth in aerobic and anaerobic bottles: Escherichia coli Multiple  Morphological Strains  Growth in anaerobic bottle: Streptococcus gallolyticus  "Due to technical problems, Proteus sp. will Not be reported as part of  the BCID panel until furthernotice"  ***Blood Panel PCR results on this specimen are available  approximately 3 hours after the Gram stain result.***  Gram stain, PCR, and/or culture results may not always  correspond due to difference in methodologies.  ************************************************************  This PCR assay was performed using [employer].  The following targets are tested for: Enterococcus,  vancomycin resistant enterococci, Listeria monocytogenes,  coagulase negative staphylococci, S. aureus,  methicillin resistant S. aureus, Streptococcus agalactiae  (Group B), S. pneumoniae, S. pyogenes (Group A),  Acinetobacter baumannii, Enterobacter cloacae, E. coli,  Klebsiella oxytoca, K. pneumoniae, Proteus sp.,  Serratia marcescens, Haemophilus influenzae,  Neisseria meningitidis, Pseudomonas aeruginosa, Candida  albicans, C. glabrata, C krusei, C parapsilosis,  C. tropicalis and the KPC resistance gene.  --  Blood Culture PCR  Streptococcus gallolyticus  Escherichia coli    Vancomycin Level, Trough: 6.8 ug/mL (10-01-18 @ 14:34)    < from: Xray Chest 1 View- PORTABLE-Routine (10.01.18 @ 07:07) >  XAM:  XR CHEST PORTABLE ROUTINE 1V                            PROCEDURE DATE:  10/01/2018            INTERPRETATION:  A single chest x-ray was obtained on October 1, 2018.    Indication: Follow-up increased interstitial marking.    Impression:    The heart is normal in size. Increased interstitial marking which was   seen in previous studies and unchanged. No acute consolidation could be   identified. Changes compatible with chronic lung changes.      < end of copied text >

## 2018-10-02 NOTE — PROGRESS NOTE ADULT - SUBJECTIVE AND OBJECTIVE BOX
SICU PROGRESS NOTE  ================================================  Overnight events: None. GI recommended MRCP and Pulm consultation for ERCP. Will discuss with primary team attending of possibility of MRCP.    HPI:  89 yo woman with a hx of pulmonary fibrosis, Li's esophagus, CAD s/p stents (approx 5 years ago per daughters, on ASA), presents with a 1-day history of acute-onset epigastric abdominal pain with radiation to bilateral upper quadrants. Pain improved after tylenol; she cannot identify aggravating factors. She also complained of chills but denies subjective fever, nausea, vomiting, or changes to bowel habits. Denies jaundice, dark urine, or acholic stools. No history of similar pain in the past. Patient does report baseline SOB and productive cough due to pulmonary fibrosis. Patient found to have acute cholecystitis on US with dilated CBD, elevated bilirubin, and nonvisualization of the gallbladder and bowel on HIDA. Underwent percutaneous cholecystostomy on 9/28/17.    Vital Signs Last 24 Hrs  T(C): 36.9 (01 Oct 2018 23:00), Max: 37.1 (01 Oct 2018 11:00)  T(F): 98.4 (01 Oct 2018 23:00), Max: 98.7 (01 Oct 2018 11:00)  HR: 65 (02 Oct 2018 01:00) (63 - 107)  BP: 133/57 (02 Oct 2018 01:00) (97/53 - 156/67)  BP(mean): 82 (02 Oct 2018 01:00) (71 - 99)  RR: 13 (02 Oct 2018 01:00) (13 - 37)  SpO2: 100% (02 Oct 2018 01:00) (93% - 100%)    Physical Exam:  General Appearance: Appears well, NAD  Respiratory: No labored breathing  CV: Pulse regularly present  ABD: soft, ND, R cholecystostomy tube with bilious drainage, appropriately TTP      LABS:                        12.0   5.7   )-----------( 157      ( 01 Oct 2018 03:04 )             37.2     10-01    136  |  103  |  7   ----------------------------<  127<H>  5.1   |  24  |  0.71    Ca    8.7      01 Oct 2018 03:04  Phos  2.6     10-01  Mg     2.3     10-01    TPro  7.6  /  Alb  3.1<L>  /  TBili  1.6<H>  /  DBili  0.4<H>  /  AST  67<H>  /  ALT  98<H>  /  AlkPhos  99  10-01    PT/INR - ( 01 Oct 2018 03:04 )   PT: 14.3 sec;   INR: 1.30 ratio         PTT - ( 01 Oct 2018 03:04 )  PTT:38.1 sec      INs and OUTs:    09-30-18 @ 07:01  -  10-01-18 @ 07:00  --------------------------------------------------------  IN: 1977.5 mL / OUT: 1605 mL / NET: 372.5 mL    10-01-18 @ 07:01  -  10-02-18 @ 01:45  --------------------------------------------------------  IN: 862.5 mL / OUT: 800 mL / NET: 62.5 mL SICU PROGRESS NOTE  ================================================  Overnight events: Patient's diet advanced. GI recommended MRCP and Pulm consultation for ERCP. Will discuss with primary team attending of possibility of MRCP.    HPI:  89 yo woman with a hx of pulmonary fibrosis, Li's esophagus, CAD s/p stents (approx 5 years ago per daughters, on ASA), presents with a 1-day history of acute-onset epigastric abdominal pain with radiation to bilateral upper quadrants. Pain improved after tylenol; she cannot identify aggravating factors. She also complained of chills but denies subjective fever, nausea, vomiting, or changes to bowel habits. Denies jaundice, dark urine, or acholic stools. No history of similar pain in the past. Patient does report baseline SOB and productive cough due to pulmonary fibrosis. Patient found to have acute cholecystitis on US with dilated CBD, elevated bilirubin, and nonvisualization of the gallbladder and bowel on HIDA. Underwent percutaneous cholecystostomy on 9/28/17.    Vital Signs Last 24 Hrs  T(C): 36.9 (01 Oct 2018 23:00), Max: 37.1 (01 Oct 2018 11:00)  T(F): 98.4 (01 Oct 2018 23:00), Max: 98.7 (01 Oct 2018 11:00)  HR: 65 (02 Oct 2018 01:00) (63 - 107)  BP: 133/57 (02 Oct 2018 01:00) (97/53 - 156/67)  BP(mean): 82 (02 Oct 2018 01:00) (71 - 99)  RR: 13 (02 Oct 2018 01:00) (13 - 37)  SpO2: 100% (02 Oct 2018 01:00) (93% - 100%)    Physical Exam:  General Appearance: Appears well, NAD  Respiratory: No labored breathing  CV: Pulse regularly present  ABD: soft, ND, R cholecystostomy tube with bilious drainage, appropriately TTP      LABS:                        12.0   5.7   )-----------( 157      ( 01 Oct 2018 03:04 )             37.2     10-01    136  |  103  |  7   ----------------------------<  127<H>  5.1   |  24  |  0.71    Ca    8.7      01 Oct 2018 03:04  Phos  2.6     10-01  Mg     2.3     10-01    TPro  7.6  /  Alb  3.1<L>  /  TBili  1.6<H>  /  DBili  0.4<H>  /  AST  67<H>  /  ALT  98<H>  /  AlkPhos  99  10-01    PT/INR - ( 01 Oct 2018 03:04 )   PT: 14.3 sec;   INR: 1.30 ratio         PTT - ( 01 Oct 2018 03:04 )  PTT:38.1 sec      INs and OUTs:    09-30-18 @ 07:01  -  10-01-18 @ 07:00  --------------------------------------------------------  IN: 1977.5 mL / OUT: 1605 mL / NET: 372.5 mL    10-01-18 @ 07:01  -  10-02-18 @ 01:45  --------------------------------------------------------  IN: 862.5 mL / OUT: 800 mL / NET: 62.5 mL

## 2018-10-02 NOTE — PROGRESS NOTE ADULT - ASSESSMENT
89 year old female with PMH Li's esophagus, CAD s/p stent (5-6 yrs ago, on aspirin 81mg), pulmonary fibrosis who presented to [facility] on 9/28 with epigastric abdominal pain that awoke her from sleep. Pain was located in the epigastric area and radiated to bilateral upper quadrants. Febrile to 100.5 on presentation, tachycardic to > 90 but normotensive. WBC on admission of 8.8. Repeat CMP with transaminitis with , , Alk Phos 106, T Bili 2.2. Lactic acid of 2.6. U/A with 2 WBC. CT Chest with End-stage pulmonary fibrosis with honeycombing and Fluid-filled esophagus to the level of thoracic inlet. CT Abdomen/Pelvis with Distended gallbladder.  Small sludge ball versus gallstone in the gallbladder lumen. NM Biliary scan with high-grade obstruction. Started on Vancomycin/Aztreonam/Flagyl. Underwent perc melanie drain on 9/28/18. Blood cultures from 9/28 with 4/4 bottles with Strep gallolyticus and E. coli. Blood culture from 9/29 with 1/4 bottles with GPC in pairs and chains.   Patient treated with IV vancomycin, aztreonam, ad flagyl  She has persistent abdominal pain, mostly in RUQ, but is tender to palpation throughout the abdomen. Percutaneous cholecystomy tube draining bilious material.  Monitor LFTs and trend bilirubin, monitor temperature and WBC with drainage and antibiotics  blood culture and bile E coli isolates are susceptible to aztreonam, Strep is susceptible to vancomycin  Repeat BCs until bacteremia cleared  Will need 2 weeks of IV antibiotics from date of drainage, this may need to be extendedbased on echo findings and time to clearance of strep gallolyticus bacteremia  Due to confirmed history of angioedema to penicillin would try to avoid cephalosporins and carbepenems  S/P flagyl as anaerobes not major cause of cholecystitis, non emphysematous  Advance diet as tolerates.    Recommend:  - Repeat BCs until cleared, BC from 9/28 and 9/29 with S gallolyticus, 9/30- NGTD  - Strep Gallolyticus (known also as Strep Bovis) often associated with endovascular infection and GI malignancy  - Obtain TTE as Strep Gallolyticus highly associated with endocarditis and would require prolonged course of antibiotics  - Based on Goals of Care consider W/U for GI malignancy in face of S bovis bacteremia (discussed with patient's daughter)  - Monitor renal function, follow vancomycin trough, keep trough in 15-20 range. Dose was adjusted today to 750 mg every 12 hours.    I will be away on 10/3, my colleagues are available at [phone] for questions  Aaron Mistry MD  pager [phone]  office [phone] 89 year old female with PMH Li's esophagus, CAD s/p stent (5-6 yrs ago, on aspirin 81mg), pulmonary fibrosis who presented to [facility] on 9/28 with epigastric abdominal pain that awoke her from sleep. Pain was located in the epigastric area and radiated to bilateral upper quadrants. Febrile to 100.5 on presentation, tachycardic to > 90 but normotensive. WBC on admission of 8.8. Repeat CMP with transaminitis with , , Alk Phos 106, T Bili 2.2. Lactic acid of 2.6. U/A with 2 WBC. CT Chest with End-stage pulmonary fibrosis with honeycombing and Fluid-filled esophagus to the level of thoracic inlet. CT Abdomen/Pelvis with Distended gallbladder.  Small sludge ball versus gallstone in the gallbladder lumen. NM Biliary scan with high-grade obstruction. Started on Vancomycin/Aztreonam/Flagyl. Underwent perc melanie drain on 9/28/18. Blood cultures from 9/28 with 4/4 bottles with Strep gallolyticus and E. coli. Blood culture from 9/29 with 1/4 bottles with GPC in pairs and chains.   Patient treated with IV vancomycin, aztreonam, ad flagyl  She has persistent abdominal pain, mostly in RUQ, but is tender to palpation throughout the abdomen. Percutaneous cholecystomy tube draining bilious material.  Monitor LFTs and trend bilirubin, monitor temperature and WBC with drainage and antibiotics  blood culture and bile E coli isolates are susceptible to aztreonam, Strep is susceptible to vancomycin  Repeat BCs until bacteremia cleared  Will need 2 weeks of IV antibiotics from date of drainage, this may need to be extendedbased on echo findings and time to clearance of strep gallolyticus bacteremia  Due to confirmed history of angioedema to penicillin would try to avoid cephalosporins and carbepenems  S/P flagyl as anaerobes not major cause of cholecystitis, non emphysematous  Advance diet as tolerates.    Recommend:  - Repeat BCs until cleared, BC from 9/28 and 9/29 with S gallolyticus, 9/30- NGTD  - Strep Gallolyticus (known also as Strep Bovis) often associated with endovascular infection and GI malignancy  - TTE done as Strep Gallolyticus highly associated with endocarditis - demonstrated no vegetations  - Based on Goals of Care consider W/U for GI malignancy in face of S bovis bacteremia (discussed with patient's daughter)  - Monitor renal function, follow vancomycin trough, keep trough in 15-20 range. Dose was adjusted today to 750 mg every 12 hours.    I will be away on 10/3, my colleagues are available at [phone] for questions  Aaron Mistry MD  pager [phone]  office [phone]

## 2018-10-02 NOTE — PROGRESS NOTE ADULT - ASSESSMENT
Impression:  1. Cholangitis s/p percutenous cholecystostomy tube: clinically improved. Will need definitive therapy to decompress biliary tree    2. Pulm fibrosis    Recommendation:  -obtain pulm preop eval for eventual ERCP  -obtain an MRCP if no contraindication    Stevan Abdi M.D.   [facility]  Contact: [phone] Impression:  1. Cholangitis s/p percutenous cholecystostomy tube: clinically improved. Will need definitive therapy to decompress biliary tree    2. Pulm fibrosis    3. Drop in hgb without overt GIB    Recommendation:  -obtain pulm preop eval for eventual ERCP  -obtain an MRCP if no contraindication  -repeat CBC today    Stevan Abdi M.D.   [facility]  Contact: [phone]

## 2018-10-02 NOTE — PROGRESS NOTE ADULT - SUBJECTIVE AND OBJECTIVE BOX
Patient is a 89y old  Female who presents with a chief complaint of Abdominal pain (02 Oct 2018 08:24)      SUBJECTIVE / OVERNIGHT EVENTS:  no events  MEDICATIONS  (STANDING):  aspirin  chewable 81 milliGRAM(s) Oral daily  aztreonam  IVPB 1000 milliGRAM(s) IV Intermittent every 8 hours  buDESOnide   0.5 milliGRAM(s) Respule 0.5 milliGRAM(s) Inhalation two times a day  chlorhexidine 4% Liquid 1 Application(s) Topical <User Schedule>  enoxaparin Injectable 40 milliGRAM(s) SubCutaneous daily  famotidine   Suspension 20 milliGRAM(s) Oral daily  fluticasone propionate 50 MICROgram(s)/spray Nasal Spray 1 Spray(s) Both Nostrils two times a day  influenza   Vaccine 0.5 milliLiter(s) IntraMuscular once  metoprolol succinate ER 25 milliGRAM(s) Oral daily  pantoprazole    Tablet 40 milliGRAM(s) Oral before breakfast  vancomycin  IVPB 750 milliGRAM(s) IV Intermittent every 12 hours    MEDICATIONS  (PRN):  acetaminophen   Tablet .. 650 milliGRAM(s) Oral every 6 hours PRN Mild Pain (1 - 3)  ALBUTerol    90 MICROgram(s) HFA Inhaler 2 Puff(s) Inhalation every 6 hours PRN Shortness of Breath and/or Wheezing  bisacodyl 5 milliGRAM(s) Oral daily PRN Constipation  oxyCODONE    IR 5 milliGRAM(s) Oral every 4 hours PRN Moderate to severe pain  senna 2 Tablet(s) Oral at bedtime PRN Constipation              PHYSICAL EXAM:  GENERAL: NAD, well-developed  HEAD:  Atraumatic, Normocephalic  EYES: EOMI, PERRLA, conjunctiva and sclera anicteric  NECK: Supple, No JVD  CHEST/LUNG: Clear to auscultation bilaterally; No wheeze  HEART: Regular rate and rhythm; No murmurs, rubs, or gallops  ABDOMEN: Soft, Nontender, Nondistended; Bowel sounds present, no hepatosplenomegaly, no rebound or guarding, biliary drain w normal bile  EXTREMITIES:  2+ Peripheral Pulses, No clubbing, cyanosis, or edema  PSYCH: AAOx3  NEUROLOGY: non-focal, no asterixis  SKIN: No rashes or lesion    LABS:                        10.2   4.3   )-----------( 155      ( 02 Oct 2018 02:56 )             32.2     10-02    137  |  104  |  11  ----------------------------<  162<H>  3.5   |  28  |  0.78    Ca    8.5      02 Oct 2018 02:56  Phos  2.9     10-02  Mg     2.1     10-02    TPro  6.4  /  Alb  2.6<L>  /  TBili  0.8  /  DBili  0.5<H>  /  AST  70<H>  /  ALT  74<H>  /  AlkPhos  101  10-02    LIVER FUNCTIONS - ( 02 Oct 2018 02:56 )  Alb: 2.6 g/dL / Pro: 6.4 g/dL / ALK PHOS: 101 U/L / ALT: 74 U/L / AST: 70 U/L / GGT: x           PT/INR - ( 02 Oct 2018 02:56 )   PT: 14.7 sec;   INR: 1.35 ratio         PTT - ( 02 Oct 2018 02:56 )  PTT:36.8 sec          RADIOLOGY & ADDITIONAL TESTS:

## 2018-10-02 NOTE — PROGRESS NOTE ADULT - ASSESSMENT
89 yo woman with a hx of pulmonary fibrosis, Li's esophagus, CAD s/p stents (approx 5 years ago per daughters, on ASA), presented with a 1-day history of acute-onset epigastric abdominal pain. Imaging consistent with acute cholecystitis, however, with elevated bilirubin, fevers, and dilated CBD there was concern for cholangitis. Patient underwent percutaneous cholecystostomy placement on 9/28/18.    NEURO: alert, oriented. Still complaining of post-procedure pain.  -IV tylenol  -oxycodone 5 mg PRN    PULM: hx pulmonary fibrosis.  -cont home pulmicort inhaler, fluticasone nasal spray  -AM chest x-ray    CV: hx HTN, CAD w/ stents. Stable, no indication for pressors.  -continue ASA 81 mg daily  -BP stable off home metoprolol    GI: Acute cholecystitis, possible cholangitis s/p percutaneous cholecystectomy. Abdomen persistently tender. LFTs down-trending.  -On regular diet  -daily LFTs  -Continue home protonix, famotidine    : BUN/Cr stable, making adequate urine.  -strict I/O  -voiding without box catheter    HEME: H/H stable, no indication for transfusion.  -lovenox for DVT ppx    ID: afebrile, resolved leukocytosis. Blood cultures 9/28 positive cocci in pairs and chains and gram negative rods. Bile culture from 9/29 growing E. coli.  -appreciate ID recs: d/c flagyl. Cont vanc, aztreonam x 2 weeks  -cont aztreonam, vancomycin  -repeat daily blood cultures    ENDO: no active issues.  -blood glucose within normal limits    LINES:  PIV  PCT    DISPO: SICU. Full code.

## 2018-10-02 NOTE — PHYSICAL THERAPY INITIAL EVALUATION ADULT - LIVES WITH, PROFILE
Pt resides with son on main level of a 2 family home, with 2 steps to enter.  Pt's daughter Kathia resides upstairs on second floor. PTA, pt independent with ADLs and ambulation, occasionally uses RW./children

## 2018-10-02 NOTE — PROGRESS NOTE ADULT - ATTENDING COMMENTS
I saw and examined the pt and discussed the tx plan with the House Staff. I agree with the exam and plan as documented in the surgery resident's note from today.  Asked IR to do tube check in the am to obtain a cholangiogram. Will hold off on MRCP.   Ade Monte MD

## 2018-10-02 NOTE — PROGRESS NOTE ADULT - SUBJECTIVE AND OBJECTIVE BOX
Interventional Radiology Follow- Up Note      This is a 89y Female with PMH of HTN, Li's esophagus, CAD s/p stent (5-6 yrs ago, on aspirin 81mg), pulmonary fibrosis who presented with epigastric pain found to have cholecystitis/ cholangitis s/p percutaneous cholecystostomy on 9/28/18 with Dr. Hough. Pt currently being evaluated for possible ERCP/ MRCP.     Pt seen and examined with family at bedside. She reports discomfort at tube site. Other wise offers no complaints at this time.     PAST MEDICAL & SURGICAL HISTORY:  Barretts esophagus  Essential hypertension  CAD (coronary artery disease)  Idiopathic pulmonary fibrosis  Li's Esophagus  CAD (Coronary Artery Disease)  HTN (Hypertension)  Pulmonary Fibrosis  S/P Coronary Artery Stent Placement  S/P Shoulder Surgery      Allergies: penicillin (Angioedema)  penicillins (Swelling)      LABS:                        10.2   4.3   )-----------( 155      ( 02 Oct 2018 02:56 )             32.2     10-02    137  |  104  |  11  ----------------------------<  162<H>  3.5   |  28  |  0.78    Ca    8.5      02 Oct 2018 02:56  Phos  2.9     10-02  Mg     2.1     10-02    TPro  6.4  /  Alb  2.6<L>  /  TBili  0.8  /  DBili  0.5<H>  /  AST  70<H>  /  ALT  74<H>  /  AlkPhos  101  10-02    PT/INR - ( 02 Oct 2018 02:56 )   PT: 14.7 sec;   INR: 1.35 ratio         PTT - ( 02 Oct 2018 02:56 )  PTT:36.8 sec  I&O's Detail    01 Oct 2018 07:01  -  02 Oct 2018 07:00  --------------------------------------------------------  IN:    dextrose 5% + sodium chloride 0.9%: 150 mL    Oral Fluid: 350 mL    Solution: 750 mL    Solution: 150 mL  Total IN: 1400 mL    OUT:    T-Tube: 150 mL    Voided: 1000 mL  Total OUT: 1150 mL    Total NET: 250 mL      02 Oct 2018 07:01  -  02 Oct 2018 12:52  --------------------------------------------------------  IN:    Solution: 62.5 mL  Total IN: 62.5 mL    OUT:    T-Tube: 80 mL  Total OUT: 80 mL    Total NET: -17.5 mL    Bile Cultures 9/28: E.coli    Vitals: T(F): 97.6 (10-02-18 @ 11:13), Max: 98.7 (10-01-18 @ 15:00)  HR: 76 (10-02-18 @ 11:13) (64 - 107)  BP: 109/70 (10-02-18 @ 11:13) (97/53 - 158/68)  RR: 17 (10-02-18 @ 11:13) (10 - 37)  SpO2: 99% (10-02-18 @ 11:13) (79% - 100%)    PHYSICAL EXAM:  General: Nontoxic, in NAD, A&O x3  Abd: ND, soft, mild TTP over drain site. Drain intact to drainage bag with dark green output, 130cc/24hr per chart record.      A/P: 89y Female with cholangitis/ cholecystitis s/p perc melanie on 9/28 with Dr. Hough.   -Pt clinically improving now transferred off SICU to [address] [place] [address]  -Pt being evaluated for ERCP/ MRCP, spoke with Dr. Monte would like melanie tube check to evaluate cystic duct and CBD.   -Case d/w Dr. Rivera will plan for tube check tomorrow.    -Continue abx therapy as per ID recs  -continue to monitor output    -flush drain per doctor orders  -trend vitals, labs  -Please call IR at extension [phone] with any questions, concerns, or issues regarding above.

## 2018-10-02 NOTE — PHYSICAL THERAPY INITIAL EVALUATION ADULT - PLANNED THERAPY INTERVENTIONS, PT EVAL
stair traing; GOAL: Pt will negotiate up & down 2 stairs independently with unilateral HR & least restrictive AD, in 2 weeks./transfer training/balance training/bed mobility training/gait training

## 2018-10-02 NOTE — PHYSICAL THERAPY INITIAL EVALUATION ADULT - CRITERIA FOR SKILLED THERAPEUTIC INTERVENTIONS
risk reduction/prevention/impairments found/predicted duration of therapy intervention/anticipated equipment needs at discharge/functional limitations in following categories/rehab potential/therapy frequency/anticipated discharge recommendation

## 2018-10-03 LAB
ALBUMIN SERPL ELPH-MCNC: 3.3 G/DL — SIGNIFICANT CHANGE UP (ref 3.3–5)
ALP SERPL-CCNC: 167 U/L — HIGH (ref 40–120)
ALT FLD-CCNC: 87 U/L — HIGH (ref 10–45)
ANION GAP SERPL CALC-SCNC: 8 MMOL/L — SIGNIFICANT CHANGE UP (ref 5–17)
APTT BLD: 39.4 SEC — HIGH (ref 27.5–37.4)
AST SERPL-CCNC: 91 U/L — HIGH (ref 10–40)
BILIRUB DIRECT SERPL-MCNC: 0.3 MG/DL — HIGH (ref 0–0.2)
BILIRUB INDIRECT FLD-MCNC: 0.4 MG/DL — SIGNIFICANT CHANGE UP (ref 0.2–1)
BILIRUB SERPL-MCNC: 0.7 MG/DL — SIGNIFICANT CHANGE UP (ref 0.2–1.2)
BUN SERPL-MCNC: 12 MG/DL — SIGNIFICANT CHANGE UP (ref 7–23)
CALCIUM SERPL-MCNC: 9 MG/DL — SIGNIFICANT CHANGE UP (ref 8.4–10.5)
CHLORIDE SERPL-SCNC: 101 MMOL/L — SIGNIFICANT CHANGE UP (ref 96–108)
CO2 SERPL-SCNC: 28 MMOL/L — SIGNIFICANT CHANGE UP (ref 22–31)
CREAT SERPL-MCNC: 0.92 MG/DL — SIGNIFICANT CHANGE UP (ref 0.5–1.3)
CULTURE RESULTS: SIGNIFICANT CHANGE UP
GLUCOSE SERPL-MCNC: 79 MG/DL — SIGNIFICANT CHANGE UP (ref 70–99)
HCT VFR BLD CALC: 35.5 % — SIGNIFICANT CHANGE UP (ref 34.5–45)
HGB BLD-MCNC: 11.4 G/DL — LOW (ref 11.5–15.5)
INR BLD: 1.04 RATIO — SIGNIFICANT CHANGE UP (ref 0.88–1.16)
MAGNESIUM SERPL-MCNC: 2 MG/DL — SIGNIFICANT CHANGE UP (ref 1.6–2.6)
MCHC RBC-ENTMCNC: 32.1 GM/DL — SIGNIFICANT CHANGE UP (ref 32–36)
MCHC RBC-ENTMCNC: 32.6 PG — SIGNIFICANT CHANGE UP (ref 27–34)
MCV RBC AUTO: 101.4 FL — HIGH (ref 80–100)
ORGANISM # SPEC MICROSCOPIC CNT: SIGNIFICANT CHANGE UP
ORGANISM # SPEC MICROSCOPIC CNT: SIGNIFICANT CHANGE UP
PHOSPHATE SERPL-MCNC: 2.7 MG/DL — SIGNIFICANT CHANGE UP (ref 2.5–4.5)
PLATELET # BLD AUTO: 188 K/UL — SIGNIFICANT CHANGE UP (ref 150–400)
POTASSIUM SERPL-MCNC: 4.1 MMOL/L — SIGNIFICANT CHANGE UP (ref 3.5–5.3)
POTASSIUM SERPL-SCNC: 4.1 MMOL/L — SIGNIFICANT CHANGE UP (ref 3.5–5.3)
PROT SERPL-MCNC: 8 G/DL — SIGNIFICANT CHANGE UP (ref 6–8.3)
PROTHROM AB SERPL-ACNC: 11.8 SEC — SIGNIFICANT CHANGE UP (ref 10–13.1)
RBC # BLD: 3.5 M/UL — LOW (ref 3.8–5.2)
RBC # FLD: 14.6 % — HIGH (ref 10.3–14.5)
SODIUM SERPL-SCNC: 137 MMOL/L — SIGNIFICANT CHANGE UP (ref 135–145)
SPECIMEN SOURCE: SIGNIFICANT CHANGE UP
VANCOMYCIN TROUGH SERPL-MCNC: 17 UG/ML — SIGNIFICANT CHANGE UP (ref 10–20)
WBC # BLD: 6.08 K/UL — SIGNIFICANT CHANGE UP (ref 3.8–10.5)
WBC # FLD AUTO: 6.08 K/UL — SIGNIFICANT CHANGE UP (ref 3.8–10.5)

## 2018-10-03 PROCEDURE — 71045 X-RAY EXAM CHEST 1 VIEW: CPT | Mod: 26

## 2018-10-03 PROCEDURE — 47531 INJECTION FOR CHOLANGIOGRAM: CPT | Mod: 58

## 2018-10-03 RX ORDER — LIDOCAINE 4 G/100G
1 CREAM TOPICAL DAILY
Qty: 0 | Refills: 0 | Status: DISCONTINUED | OUTPATIENT
Start: 2018-10-03 | End: 2018-10-13

## 2018-10-03 RX ADMIN — LIDOCAINE 1 PATCH: 4 CREAM TOPICAL at 23:41

## 2018-10-03 RX ADMIN — Medication 25 MILLIGRAM(S): at 06:37

## 2018-10-03 RX ADMIN — Medication 650 MILLIGRAM(S): at 07:00

## 2018-10-03 RX ADMIN — Medication 650 MILLIGRAM(S): at 06:37

## 2018-10-03 RX ADMIN — Medication 250 MILLIGRAM(S): at 16:25

## 2018-10-03 RX ADMIN — Medication 50 MILLIGRAM(S): at 05:56

## 2018-10-03 RX ADMIN — OXYCODONE HYDROCHLORIDE 5 MILLIGRAM(S): 5 TABLET ORAL at 02:45

## 2018-10-03 RX ADMIN — Medication 0.5 MILLIGRAM(S): at 05:57

## 2018-10-03 RX ADMIN — OXYCODONE HYDROCHLORIDE 5 MILLIGRAM(S): 5 TABLET ORAL at 02:08

## 2018-10-03 RX ADMIN — LIDOCAINE 1 PATCH: 4 CREAM TOPICAL at 12:32

## 2018-10-03 RX ADMIN — PANTOPRAZOLE SODIUM 40 MILLIGRAM(S): 20 TABLET, DELAYED RELEASE ORAL at 05:57

## 2018-10-03 RX ADMIN — ENOXAPARIN SODIUM 40 MILLIGRAM(S): 100 INJECTION SUBCUTANEOUS at 12:33

## 2018-10-03 RX ADMIN — OXYCODONE HYDROCHLORIDE 5 MILLIGRAM(S): 5 TABLET ORAL at 15:15

## 2018-10-03 RX ADMIN — FAMOTIDINE 20 MILLIGRAM(S): 10 INJECTION INTRAVENOUS at 15:16

## 2018-10-03 RX ADMIN — OXYCODONE HYDROCHLORIDE 5 MILLIGRAM(S): 5 TABLET ORAL at 15:45

## 2018-10-03 RX ADMIN — Medication 250 MILLIGRAM(S): at 02:08

## 2018-10-03 RX ADMIN — Medication 1 SPRAY(S): at 05:55

## 2018-10-03 RX ADMIN — Medication 81 MILLIGRAM(S): at 12:32

## 2018-10-03 RX ADMIN — Medication 650 MILLIGRAM(S): at 13:06

## 2018-10-03 RX ADMIN — Medication 1 SPRAY(S): at 17:32

## 2018-10-03 RX ADMIN — Medication 650 MILLIGRAM(S): at 12:36

## 2018-10-03 RX ADMIN — Medication 50 MILLIGRAM(S): at 21:45

## 2018-10-03 RX ADMIN — Medication 50 MILLIGRAM(S): at 15:16

## 2018-10-03 NOTE — CHART NOTE - NSCHARTNOTEFT_GEN_A_CORE
POST-OPERATIVE NOTE    Subjective:  Patient is s/p cholecystostomy tue check. Patient denies any n/v, chest pain, or SOB. Patient denies pain. Recovering appropriately.    Objective:  Vital Signs Last 24 Hrs  T(C): 36.7 (03 Oct 2018 21:26), Max: 36.7 (03 Oct 2018 06:35)  T(F): 98 (03 Oct 2018 21:26), Max: 98.1 (03 Oct 2018 06:35)  HR: 60 (03 Oct 2018 21:26) (60 - 70)  BP: 117/76 (03 Oct 2018 21:26) (117/76 - 136/72)  BP(mean): --  RR: 18 (03 Oct 2018 21:26) (18 - 18)  SpO2: 93% (03 Oct 2018 21:26) (93% - 100%)  I&O's Detail    02 Oct 2018 07:01  -  03 Oct 2018 07:00  --------------------------------------------------------  IN:    Oral Fluid: 520 mL    Solution: 150 mL    Solution: 562.5 mL  Total IN: 1232.5 mL    OUT:    T-Tube: 255 mL    Voided: 1750 mL  Total OUT: 2005 mL    Total NET: -772.5 mL      03 Oct 2018 07:01  -  03 Oct 2018 23:03  --------------------------------------------------------  IN:    Oral Fluid: 1200 mL    Solution: 100 mL  Total IN: 1300 mL    OUT:    T-Tube: 145 mL    Voided: 700 mL  Total OUT: 845 mL    Total NET: 455 mL        aztreonam  IVPB 1000  vancomycin  IVPB 750  aspirin  chewable 81  aztreonam  IVPB 1000  enoxaparin Injectable 40  metoprolol succinate ER 25  vancomycin  IVPB 750    PAST MEDICAL & SURGICAL HISTORY:  Barretts esophagus  Essential hypertension  CAD (coronary artery disease)  Idiopathic pulmonary fibrosis  Li's Esophagus  CAD (Coronary Artery Disease)  HTN (Hypertension)  Pulmonary Fibrosis  S/P Coronary Artery Stent Placement  S/P Shoulder Surgery        Physical Exam:  General: NAD, resting comfortably in bed  Pulmonary: Nonlabored breathing, no respiratory distress  Cardiovascular: RRR  Abdominal: soft, NTND, RUQ drain with bilious output  Extremities: WWP      LABS:                        11.4   6.08  )-----------( 188      ( 03 Oct 2018 08:43 )             35.5     10-03    137  |  101  |  12  ----------------------------<  79  4.1   |  28  |  0.92    Ca    9.0      03 Oct 2018 07:08  Phos  2.7     10-03  Mg     2.0     10-03    TPro  8.0  /  Alb  3.3  /  TBili  0.7  /  DBili  0.3<H>  /  AST  91<H>  /  ALT  87<H>  /  AlkPhos  167<H>  10-03    PT/INR - ( 03 Oct 2018 08:47 )   PT: 11.8 sec;   INR: 1.04 ratio         PTT - ( 03 Oct 2018 08:47 )  PTT:39.4 sec  CAPILLARY BLOOD GLUCOSE      Assessment:  The patient is a 89y Female who is now several hours post-op from a cholecystostomy tube check.     Plan:  - Pain control as needed  - lovenox for DVT ppx  - OOB and ambulating as tolerated  - c/w abx  - F/u AM labs

## 2018-10-03 NOTE — PROGRESS NOTE ADULT - SUBJECTIVE AND OBJECTIVE BOX
Interventional Radiology Pre-Procedure Note    This is a 89y Female with a hx of acute cholecystitis s/p cholecystostomy tube drain placement who presents for evaluation of the drain.    PAST MEDICAL & SURGICAL HISTORY:  Barretts esophagus  Essential hypertension  CAD (coronary artery disease)  Idiopathic pulmonary fibrosis  Li's Esophagus  CAD (Coronary Artery Disease)  HTN (Hypertension)  Pulmonary Fibrosis  S/P Coronary Artery Stent Placement  S/P Shoulder Surgery       Allergies: penicillin (Angioedema)  penicillins (Swelling)      LABS:                        11.4   6.08  )-----------( 188      ( 03 Oct 2018 08:43 )             35.5     10-03    137  |  101  |  12  ----------------------------<  79  4.1   |  28  |  0.92    Ca    9.0      03 Oct 2018 07:08  Phos  2.7     10-03  Mg     2.0     10-03    TPro  8.0  /  Alb  3.3  /  TBili  0.7  /  DBili  0.3<H>  /  AST  91<H>  /  ALT  87<H>  /  AlkPhos  167<H>  10-03    PT/INR - ( 03 Oct 2018 08:47 )   PT: 11.8 sec;   INR: 1.04 ratio         PTT - ( 03 Oct 2018 08:47 )  PTT:39.4 sec    Procedure/ risks/ benefits were explained, informed consent obtained from patient, verbalizes understanding.

## 2018-10-03 NOTE — PROCEDURE NOTE - GENERAL PROCEDURE DETAILS
cholecystosotmy tube evaluation demonstrated patent cystic and common bile ducts. non obstructing stone in the distal cbd. small bowel opacified with contrast.

## 2018-10-03 NOTE — PROGRESS NOTE ADULT - ASSESSMENT
Impression:  1. Cholangitis s/p percutenous cholecystostomy tube: clinically improved. Will need definitive therapy to decompress biliary tree. persistent pain at tube site, mild increase in AST/ALT today but bili/ALP remain normal.    2. Pulm fibrosis    3. Drop in hgb without overt GIB    4. E. coli bacteremia now clearing apparently.    Recommendation:  -obtain pulm preop eval for eventual ERCP  -obtain an MRCP if no contraindication  -repeat CBC today  -abx per ID  -planned for tube check today.    Stevan Abdi M.D.   [facility]  Contact: [phone]

## 2018-10-03 NOTE — PROGRESS NOTE ADULT - ATTENDING COMMENTS
I saw and examined the pt and discussed the tx plan with the House Staff. I agree with the exam and plan as documented in the surgery resident's note from today which I edited as indicated.  For tube study today to evaluate the biliary tree.   Ade Monte MD

## 2018-10-03 NOTE — PROGRESS NOTE ADULT - ASSESSMENT
A: 89 yo woman with a hx of pulmonary fibrosis, Li's esophagus, CAD s/p stents (approx 5 years ago per daughters, on ASA), presents with a 1-day history of acute-onset epigastric abdominal pain, elevated LFTs and fevers. Picture c/w cholangitis - not a candidate for ERCP as per GI. Patient underwent PCT placement which she tolerated well. transitioned from sicu to floor.    P:  - regular diet  - c/w abx  - pain control  - cont home meds  - oob, ambulate A: 89 yo woman with a hx of pulmonary fibrosis, Li's esophagus, CAD s/p stents (approx 5 years ago per daughters, on ASA), presents with a 1-day history of acute-onset epigastric abdominal pain, elevated LFTs and fevers. Picture c/w cholangitis - not a candidate for ERCP at the time as per GI. Patient underwent PCT placement which she tolerated well. transitioned from sicu to floor.    P:  - regular diet  - c/w abx  - pain control  - cont home meds  - oob, ambulate

## 2018-10-03 NOTE — PROGRESS NOTE ADULT - SUBJECTIVE AND OBJECTIVE BOX
Patient is a 89y old  Female who presents with a chief complaint of Abdominal pain (03 Oct 2018 03:09)      SUBJECTIVE / OVERNIGHT EVENTS:  pain at tube site  MEDICATIONS  (STANDING):  aspirin  chewable 81 milliGRAM(s) Oral daily  aztreonam  IVPB 1000 milliGRAM(s) IV Intermittent every 8 hours  buDESOnide   0.5 milliGRAM(s) Respule 0.5 milliGRAM(s) Inhalation two times a day  chlorhexidine 4% Liquid 1 Application(s) Topical <User Schedule>  enoxaparin Injectable 40 milliGRAM(s) SubCutaneous daily  famotidine   Suspension 20 milliGRAM(s) Oral daily  fluticasone propionate 50 MICROgram(s)/spray Nasal Spray 1 Spray(s) Both Nostrils two times a day  influenza   Vaccine 0.5 milliLiter(s) IntraMuscular once  metoprolol succinate ER 25 milliGRAM(s) Oral daily  pantoprazole    Tablet 40 milliGRAM(s) Oral before breakfast  vancomycin  IVPB 750 milliGRAM(s) IV Intermittent every 12 hours    MEDICATIONS  (PRN):  acetaminophen   Tablet .. 650 milliGRAM(s) Oral every 6 hours PRN Mild Pain (1 - 3)  ALBUTerol    90 MICROgram(s) HFA Inhaler 2 Puff(s) Inhalation every 6 hours PRN Shortness of Breath and/or Wheezing  bisacodyl 5 milliGRAM(s) Oral daily PRN Constipation  oxyCODONE    IR 5 milliGRAM(s) Oral every 4 hours PRN Moderate to severe pain  senna 2 Tablet(s) Oral at bedtime PRN Constipation              PHYSICAL EXAM:  GENERAL: NAD, well-developed  HEAD:  Atraumatic, Normocephalic  EYES: EOMI, PERRLA, conjunctiva and sclera anicteric  NECK: Supple, No JVD  CHEST/LUNG: Clear to auscultation bilaterally; No wheeze  HEART: Regular rate and rhythm; No murmurs, rubs, or gallops  ABDOMEN: Soft, Nontender, Nondistended; Bowel sounds present, no hepatosplenomegaly, no rebound or guarding, melanie tube w bile  EXTREMITIES:  2+ Peripheral Pulses, No clubbing, cyanosis, or edema  PSYCH: AAOx3  NEUROLOGY: non-focal, no asterixis  SKIN: No rashes or lesion    LABS:                        10.2   4.3   )-----------( 155      ( 02 Oct 2018 02:56 )             32.2     10-03    137  |  101  |  12  ----------------------------<  79  4.1   |  28  |  0.92    Ca    9.0      03 Oct 2018 07:08  Phos  2.7     10-03  Mg     2.0     10-03    TPro  8.0  /  Alb  3.3  /  TBili  0.7  /  DBili  0.3<H>  /  AST  91<H>  /  ALT  87<H>  /  AlkPhos  167<H>  10-03    LIVER FUNCTIONS - ( 03 Oct 2018 07:08 )  Alb: 3.3 g/dL / Pro: 8.0 g/dL / ALK PHOS: 167 U/L / ALT: 87 U/L / AST: 91 U/L / GGT: x           PT/INR - ( 02 Oct 2018 02:56 )   PT: 14.7 sec;   INR: 1.35 ratio         PTT - ( 02 Oct 2018 02:56 )  PTT:36.8 sec          RADIOLOGY & ADDITIONAL TESTS:

## 2018-10-03 NOTE — PROGRESS NOTE ADULT - SUBJECTIVE AND OBJECTIVE BOX
General Surgery Progress Note    SUBJECTIVE:  The patient was seen and examined. No acute events overnight.   transitioned to floor from sicu    OBJECTIVE:     ** VITAL SIGNS / I&O's **    Vital Signs Last 24 Hrs  T(C): 36.4 (03 Oct 2018 01:19), Max: 36.9 (02 Oct 2018 22:27)  T(F): 97.6 (03 Oct 2018 01:19), Max: 98.4 (02 Oct 2018 22:27)  HR: 69 (03 Oct 2018 01:19) (64 - 82)  BP: 120/75 (03 Oct 2018 01:19) (98/64 - 147/66)  BP(mean): 77 (02 Oct 2018 10:00) (74 - 95)  RR: 18 (03 Oct 2018 01:19) (10 - 29)  SpO2: 100% (03 Oct 2018 01:19) (79% - 100%)      01 Oct 2018 07:01  -  02 Oct 2018 07:00  --------------------------------------------------------  IN:    dextrose 5% + sodium chloride 0.9%: 150 mL    Oral Fluid: 350 mL    Solution: 150 mL    Solution: 750 mL  Total IN: 1400 mL    OUT:    T-Tube: 150 mL    Voided: 1000 mL  Total OUT: 1150 mL    Total NET: 250 mL      02 Oct 2018 07:01  -  03 Oct 2018 03:09  --------------------------------------------------------  IN:    Oral Fluid: 520 mL    Solution: 312.5 mL    Solution: 50 mL  Total IN: 882.5 mL    OUT:    T-Tube: 205 mL    Voided: 1350 mL  Total OUT: 1555 mL    Total NET: -672.5 mL        ** PHYSICAL EXAM **    GEN: NAD, resting quietly  NEURO: CN II-XII grossly intact, no focal deficits  PULM: symmetric chest rise bilaterally, no increased WOB  CV: regular rate, peripheral pulses intact  ABD: soft, ND, R cholecystostomy tube with bilious drainage, TTP over RUQ and LUQ  EXTR: no cyanosis or edema, moving all extremities    ** LABS **                          10.2   4.3   )-----------( 155      ( 02 Oct 2018 02:56 )             32.2     02 Oct 2018 02:56    137    |  104    |  11     ----------------------------<  162    3.5     |  28     |  0.78     Ca    8.5        02 Oct 2018 02:56  Phos  2.9       02 Oct 2018 02:56  Mg     2.1       02 Oct 2018 02:56    TPro  6.4    /  Alb  2.6    /  TBili  0.8    /  DBili  0.5    /  AST  70     /  ALT  74     /  AlkPhos  101    02 Oct 2018 02:56    PT/INR - ( 02 Oct 2018 02:56 )   PT: 14.7 sec;   INR: 1.35 ratio         PTT - ( 02 Oct 2018 02:56 )  PTT:36.8 sec  CAPILLARY BLOOD GLUCOSE            LIVER FUNCTIONS - ( 02 Oct 2018 02:56 )  Alb: 2.6 g/dL / Pro: 6.4 g/dL / ALK PHOS: 101 U/L / ALT: 74 U/L / AST: 70 U/L / GGT: x             Culture - Blood (collected 01 Oct 2018 13:03)  Source: .Blood Blood  Preliminary Report (02 Oct 2018 14:01):    No growth to date.    Culture - Blood (collected 01 Oct 2018 13:03)  Source: .Blood Blood-Peripheral  Preliminary Report (02 Oct 2018 14:01):    No growth to date.    Culture - Blood (collected 30 Sep 2018 12:13)  Source: .Blood Blood  Preliminary Report (01 Oct 2018 13:01):    No growth to date.    Culture - Blood (collected 30 Sep 2018 11:04)  Source: .Blood Blood  Preliminary Report (01 Oct 2018 12:01):    No growth to date.          MEDICATIONS  (STANDING):  aspirin  chewable 81 milliGRAM(s) Oral daily  aztreonam  IVPB 1000 milliGRAM(s) IV Intermittent every 8 hours  buDESOnide   0.5 milliGRAM(s) Respule 0.5 milliGRAM(s) Inhalation two times a day  chlorhexidine 4% Liquid 1 Application(s) Topical <User Schedule>  enoxaparin Injectable 40 milliGRAM(s) SubCutaneous daily  famotidine   Suspension 20 milliGRAM(s) Oral daily  fluticasone propionate 50 MICROgram(s)/spray Nasal Spray 1 Spray(s) Both Nostrils two times a day  influenza   Vaccine 0.5 milliLiter(s) IntraMuscular once  metoprolol succinate ER 25 milliGRAM(s) Oral daily  pantoprazole    Tablet 40 milliGRAM(s) Oral before breakfast  vancomycin  IVPB 750 milliGRAM(s) IV Intermittent every 12 hours    MEDICATIONS  (PRN):  acetaminophen   Tablet .. 650 milliGRAM(s) Oral every 6 hours PRN Mild Pain (1 - 3)  ALBUTerol    90 MICROgram(s) HFA Inhaler 2 Puff(s) Inhalation every 6 hours PRN Shortness of Breath and/or Wheezing  bisacodyl 5 milliGRAM(s) Oral daily PRN Constipation  oxyCODONE    IR 5 milliGRAM(s) Oral every 4 hours PRN Moderate to severe pain  senna 2 Tablet(s) Oral at bedtime PRN Constipation General Surgery Progress Note    SUBJECTIVE:  The patient was seen and examined. No acute events overnight.   transitioned to floor from sicu  bruce diet    OBJECTIVE:     ** VITAL SIGNS / I&O's **    Vital Signs Last 24 Hrs  T(C): 36.4 (03 Oct 2018 01:19), Max: 36.9 (02 Oct 2018 22:27)  T(F): 97.6 (03 Oct 2018 01:19), Max: 98.4 (02 Oct 2018 22:27)  HR: 69 (03 Oct 2018 01:19) (64 - 82)  BP: 120/75 (03 Oct 2018 01:19) (98/64 - 147/66)  BP(mean): 77 (02 Oct 2018 10:00) (74 - 95)  RR: 18 (03 Oct 2018 01:19) (10 - 29)  SpO2: 100% (03 Oct 2018 01:19) (79% - 100%)      01 Oct 2018 07:01  -  02 Oct 2018 07:00  --------------------------------------------------------  IN:    dextrose 5% + sodium chloride 0.9%: 150 mL    Oral Fluid: 350 mL    Solution: 150 mL    Solution: 750 mL  Total IN: 1400 mL    OUT:    T-Tube: 150 mL    Voided: 1000 mL  Total OUT: 1150 mL    Total NET: 250 mL      02 Oct 2018 07:01  -  03 Oct 2018 03:09  --------------------------------------------------------  IN:    Oral Fluid: 520 mL    Solution: 312.5 mL    Solution: 50 mL  Total IN: 882.5 mL    OUT:    T-Tube: 205 mL    Voided: 1350 mL  Total OUT: 1555 mL    Total NET: -672.5 mL        ** PHYSICAL EXAM **    GEN: NAD, resting quietly  NEURO: CN II-XII grossly intact, no focal deficits  PULM: symmetric chest rise bilaterally, no increased WOB  CV: regular rate, peripheral pulses intact  ABD: soft, ND, R cholecystostomy tube with bilious drainage, TTP over RUQ and LUQ  EXTR: no cyanosis or edema, moving all extremities    ** LABS **                          10.2   4.3   )-----------( 155      ( 02 Oct 2018 02:56 )             32.2     02 Oct 2018 02:56    137    |  104    |  11     ----------------------------<  162    3.5     |  28     |  0.78     Ca    8.5        02 Oct 2018 02:56  Phos  2.9       02 Oct 2018 02:56  Mg     2.1       02 Oct 2018 02:56    TPro  6.4    /  Alb  2.6    /  TBili  0.8    /  DBili  0.5    /  AST  70     /  ALT  74     /  AlkPhos  101    02 Oct 2018 02:56    PT/INR - ( 02 Oct 2018 02:56 )   PT: 14.7 sec;   INR: 1.35 ratio         PTT - ( 02 Oct 2018 02:56 )  PTT:36.8 sec  CAPILLARY BLOOD GLUCOSE            LIVER FUNCTIONS - ( 02 Oct 2018 02:56 )  Alb: 2.6 g/dL / Pro: 6.4 g/dL / ALK PHOS: 101 U/L / ALT: 74 U/L / AST: 70 U/L / GGT: x             Culture - Blood (collected 01 Oct 2018 13:03)  Source: .Blood Blood  Preliminary Report (02 Oct 2018 14:01):    No growth to date.    Culture - Blood (collected 01 Oct 2018 13:03)  Source: .Blood Blood-Peripheral  Preliminary Report (02 Oct 2018 14:01):    No growth to date.    Culture - Blood (collected 30 Sep 2018 12:13)  Source: .Blood Blood  Preliminary Report (01 Oct 2018 13:01):    No growth to date.    Culture - Blood (collected 30 Sep 2018 11:04)  Source: .Blood Blood  Preliminary Report (01 Oct 2018 12:01):    No growth to date.          MEDICATIONS  (STANDING):  aspirin  chewable 81 milliGRAM(s) Oral daily  aztreonam  IVPB 1000 milliGRAM(s) IV Intermittent every 8 hours  buDESOnide   0.5 milliGRAM(s) Respule 0.5 milliGRAM(s) Inhalation two times a day  chlorhexidine 4% Liquid 1 Application(s) Topical <User Schedule>  enoxaparin Injectable 40 milliGRAM(s) SubCutaneous daily  famotidine   Suspension 20 milliGRAM(s) Oral daily  fluticasone propionate 50 MICROgram(s)/spray Nasal Spray 1 Spray(s) Both Nostrils two times a day  influenza   Vaccine 0.5 milliLiter(s) IntraMuscular once  metoprolol succinate ER 25 milliGRAM(s) Oral daily  pantoprazole    Tablet 40 milliGRAM(s) Oral before breakfast  vancomycin  IVPB 750 milliGRAM(s) IV Intermittent every 12 hours    MEDICATIONS  (PRN):  acetaminophen   Tablet .. 650 milliGRAM(s) Oral every 6 hours PRN Mild Pain (1 - 3)  ALBUTerol    90 MICROgram(s) HFA Inhaler 2 Puff(s) Inhalation every 6 hours PRN Shortness of Breath and/or Wheezing  bisacodyl 5 milliGRAM(s) Oral daily PRN Constipation  oxyCODONE    IR 5 milliGRAM(s) Oral every 4 hours PRN Moderate to severe pain  senna 2 Tablet(s) Oral at bedtime PRN Constipation

## 2018-10-04 LAB
ALBUMIN SERPL ELPH-MCNC: 3.2 G/DL — LOW (ref 3.3–5)
ALP SERPL-CCNC: 156 U/L — HIGH (ref 40–120)
ALT FLD-CCNC: 58 U/L — HIGH (ref 10–45)
ANION GAP SERPL CALC-SCNC: 7 MMOL/L — SIGNIFICANT CHANGE UP (ref 5–17)
AST SERPL-CCNC: 42 U/L — HIGH (ref 10–40)
BILIRUB SERPL-MCNC: 0.5 MG/DL — SIGNIFICANT CHANGE UP (ref 0.2–1.2)
BUN SERPL-MCNC: 15 MG/DL — SIGNIFICANT CHANGE UP (ref 7–23)
CALCIUM SERPL-MCNC: 8.9 MG/DL — SIGNIFICANT CHANGE UP (ref 8.4–10.5)
CHLORIDE SERPL-SCNC: 97 MMOL/L — SIGNIFICANT CHANGE UP (ref 96–108)
CO2 SERPL-SCNC: 30 MMOL/L — SIGNIFICANT CHANGE UP (ref 22–31)
CREAT SERPL-MCNC: 0.88 MG/DL — SIGNIFICANT CHANGE UP (ref 0.5–1.3)
CULTURE RESULTS: SIGNIFICANT CHANGE UP
GLUCOSE SERPL-MCNC: 145 MG/DL — HIGH (ref 70–99)
HCT VFR BLD CALC: 32.7 % — LOW (ref 34.5–45)
HGB BLD-MCNC: 10.7 G/DL — LOW (ref 11.5–15.5)
MAGNESIUM SERPL-MCNC: 1.9 MG/DL — SIGNIFICANT CHANGE UP (ref 1.6–2.6)
MCHC RBC-ENTMCNC: 32.4 PG — SIGNIFICANT CHANGE UP (ref 27–34)
MCHC RBC-ENTMCNC: 32.5 GM/DL — SIGNIFICANT CHANGE UP (ref 32–36)
MCV RBC AUTO: 99.6 FL — SIGNIFICANT CHANGE UP (ref 80–100)
PHOSPHATE SERPL-MCNC: 3.1 MG/DL — SIGNIFICANT CHANGE UP (ref 2.5–4.5)
PLATELET # BLD AUTO: 215 K/UL — SIGNIFICANT CHANGE UP (ref 150–400)
POTASSIUM SERPL-MCNC: 4.2 MMOL/L — SIGNIFICANT CHANGE UP (ref 3.5–5.3)
POTASSIUM SERPL-SCNC: 4.2 MMOL/L — SIGNIFICANT CHANGE UP (ref 3.5–5.3)
PROT SERPL-MCNC: 7.4 G/DL — SIGNIFICANT CHANGE UP (ref 6–8.3)
RBC # BLD: 3.29 M/UL — LOW (ref 3.8–5.2)
RBC # FLD: 12.9 % — SIGNIFICANT CHANGE UP (ref 10.3–14.5)
SODIUM SERPL-SCNC: 134 MMOL/L — LOW (ref 135–145)
SPECIMEN SOURCE: SIGNIFICANT CHANGE UP
WBC # BLD: 8 K/UL — SIGNIFICANT CHANGE UP (ref 3.8–10.5)
WBC # FLD AUTO: 8 K/UL — SIGNIFICANT CHANGE UP (ref 3.8–10.5)

## 2018-10-04 PROCEDURE — 99223 1ST HOSP IP/OBS HIGH 75: CPT

## 2018-10-04 PROCEDURE — 99232 SBSQ HOSP IP/OBS MODERATE 35: CPT

## 2018-10-04 PROCEDURE — 74181 MRI ABDOMEN W/O CONTRAST: CPT | Mod: 26

## 2018-10-04 RX ORDER — FAMOTIDINE 10 MG/ML
20 INJECTION INTRAVENOUS DAILY
Qty: 0 | Refills: 0 | Status: DISCONTINUED | OUTPATIENT
Start: 2018-10-04 | End: 2018-10-13

## 2018-10-04 RX ORDER — HYDROMORPHONE HYDROCHLORIDE 2 MG/ML
0.5 INJECTION INTRAMUSCULAR; INTRAVENOUS; SUBCUTANEOUS ONCE
Qty: 0 | Refills: 0 | Status: DISCONTINUED | OUTPATIENT
Start: 2018-10-04 | End: 2018-10-04

## 2018-10-04 RX ADMIN — LIDOCAINE 1 PATCH: 4 CREAM TOPICAL at 11:42

## 2018-10-04 RX ADMIN — Medication 250 MILLIGRAM(S): at 17:09

## 2018-10-04 RX ADMIN — OXYCODONE HYDROCHLORIDE 5 MILLIGRAM(S): 5 TABLET ORAL at 14:23

## 2018-10-04 RX ADMIN — OXYCODONE HYDROCHLORIDE 5 MILLIGRAM(S): 5 TABLET ORAL at 05:07

## 2018-10-04 RX ADMIN — Medication 250 MILLIGRAM(S): at 01:53

## 2018-10-04 RX ADMIN — Medication 0.5 MILLIGRAM(S): at 05:09

## 2018-10-04 RX ADMIN — OXYCODONE HYDROCHLORIDE 5 MILLIGRAM(S): 5 TABLET ORAL at 05:37

## 2018-10-04 RX ADMIN — Medication 1 SPRAY(S): at 05:07

## 2018-10-04 RX ADMIN — HYDROMORPHONE HYDROCHLORIDE 0.5 MILLIGRAM(S): 2 INJECTION INTRAMUSCULAR; INTRAVENOUS; SUBCUTANEOUS at 22:00

## 2018-10-04 RX ADMIN — PANTOPRAZOLE SODIUM 40 MILLIGRAM(S): 20 TABLET, DELAYED RELEASE ORAL at 05:07

## 2018-10-04 RX ADMIN — Medication 0.5 MILLIGRAM(S): at 17:09

## 2018-10-04 RX ADMIN — Medication 25 MILLIGRAM(S): at 05:07

## 2018-10-04 RX ADMIN — ENOXAPARIN SODIUM 40 MILLIGRAM(S): 100 INJECTION SUBCUTANEOUS at 11:43

## 2018-10-04 RX ADMIN — Medication 1 SPRAY(S): at 17:09

## 2018-10-04 RX ADMIN — OXYCODONE HYDROCHLORIDE 5 MILLIGRAM(S): 5 TABLET ORAL at 14:53

## 2018-10-04 RX ADMIN — Medication 50 MILLIGRAM(S): at 14:23

## 2018-10-04 RX ADMIN — Medication 50 MILLIGRAM(S): at 05:07

## 2018-10-04 RX ADMIN — Medication 81 MILLIGRAM(S): at 11:43

## 2018-10-04 RX ADMIN — LIDOCAINE 1 PATCH: 4 CREAM TOPICAL at 23:53

## 2018-10-04 RX ADMIN — HYDROMORPHONE HYDROCHLORIDE 0.5 MILLIGRAM(S): 2 INJECTION INTRAMUSCULAR; INTRAVENOUS; SUBCUTANEOUS at 20:32

## 2018-10-04 RX ADMIN — Medication 50 MILLIGRAM(S): at 22:36

## 2018-10-04 NOTE — PROGRESS NOTE ADULT - SUBJECTIVE AND OBJECTIVE BOX
General Surgery Progress Note    SUBJECTIVE:  The patient was seen and examined. No acute events overnight. S/p cholecystotomy tube check o/n. Demonstrated patent cystic and CBD, non-obstructing stone in distal CBD. Small bowel opacified with contrast. Doing well overall, denies N/V. +flatus/+BM. Pain well controlled.     OBJECTIVE:     ** VITAL SIGNS / I&O's **    Vital Signs Last 24 Hrs  T(C): 36.9 (04 Oct 2018 00:48), Max: 36.9 (04 Oct 2018 00:48)  T(F): 98.4 (04 Oct 2018 00:48), Max: 98.4 (04 Oct 2018 00:48)  HR: 75 (04 Oct 2018 00:48) (60 - 75)  BP: 127/74 (04 Oct 2018 00:48) (117/76 - 136/72)  BP(mean): --  RR: 18 (04 Oct 2018 00:48) (18 - 18)  SpO2: 93% (04 Oct 2018 00:48) (93% - 100%)      02 Oct 2018 07:01  -  03 Oct 2018 07:00  --------------------------------------------------------  IN:    Oral Fluid: 520 mL    Solution: 150 mL    Solution: 562.5 mL  Total IN: 1232.5 mL    OUT:    T-Tube: 255 mL    Voided: 1750 mL  Total OUT: 2005 mL    Total NET: -772.5 mL      03 Oct 2018 07:01  -  04 Oct 2018 05:04  --------------------------------------------------------  IN:    Oral Fluid: 1200 mL    Solution: 100 mL  Total IN: 1300 mL    OUT:    T-Tube: 220 mL    Voided: 1400 mL  Total OUT: 1620 mL    Total NET: -320 mL          ** PHYSICAL EXAM **    -- CONSTITUTIONAL: Alert, NAD  -- PULMONARY: non-labored respirations  -- ABDOMEN: soft, non-distended, non-tender, IR drain in place with bilious output  -- NEURO: A&Ox3    ** LABS **                          11.4   6.08  )-----------( 188      ( 03 Oct 2018 08:43 )             35.5     03 Oct 2018 07:08    137    |  101    |  12     ----------------------------<  79     4.1     |  28     |  0.92     Ca    9.0        03 Oct 2018 07:08  Phos  2.7       03 Oct 2018 07:08  Mg     2.0       03 Oct 2018 07:08    TPro  8.0    /  Alb  3.3    /  TBili  0.7    /  DBili  0.3    /  AST  91     /  ALT  87     /  AlkPhos  167    03 Oct 2018 07:08    PT/INR - ( 03 Oct 2018 08:47 )   PT: 11.8 sec;   INR: 1.04 ratio         PTT - ( 03 Oct 2018 08:47 )  PTT:39.4 sec  CAPILLARY BLOOD GLUCOSE            LIVER FUNCTIONS - ( 03 Oct 2018 07:08 )  Alb: 3.3 g/dL / Pro: 8.0 g/dL / ALK PHOS: 167 U/L / ALT: 87 U/L / AST: 91 U/L / GGT: x             Culture - Blood (collected 01 Oct 2018 13:03)  Source: .Blood Blood  Preliminary Report (02 Oct 2018 14:01):    No growth to date.    Culture - Blood (collected 01 Oct 2018 13:03)  Source: .Blood Blood-Peripheral  Preliminary Report (02 Oct 2018 14:01):    No growth to date.          MEDICATIONS  (STANDING):  aspirin  chewable 81 milliGRAM(s) Oral daily  aztreonam  IVPB 1000 milliGRAM(s) IV Intermittent every 8 hours  buDESOnide   0.5 milliGRAM(s) Respule 0.5 milliGRAM(s) Inhalation two times a day  chlorhexidine 4% Liquid 1 Application(s) Topical <User Schedule>  enoxaparin Injectable 40 milliGRAM(s) SubCutaneous daily  famotidine   Suspension 20 milliGRAM(s) Oral daily  fluticasone propionate 50 MICROgram(s)/spray Nasal Spray 1 Spray(s) Both Nostrils two times a day  influenza   Vaccine 0.5 milliLiter(s) IntraMuscular once  lidocaine   Patch 1 Patch Transdermal daily  metoprolol succinate ER 25 milliGRAM(s) Oral daily  pantoprazole    Tablet 40 milliGRAM(s) Oral before breakfast  vancomycin  IVPB 750 milliGRAM(s) IV Intermittent every 12 hours    MEDICATIONS  (PRN):  acetaminophen   Tablet .. 650 milliGRAM(s) Oral every 6 hours PRN Mild Pain (1 - 3)  ALBUTerol    90 MICROgram(s) HFA Inhaler 2 Puff(s) Inhalation every 6 hours PRN Shortness of Breath and/or Wheezing  bisacodyl 5 milliGRAM(s) Oral daily PRN Constipation  oxyCODONE    IR 5 milliGRAM(s) Oral every 4 hours PRN Moderate to severe pain  senna 2 Tablet(s) Oral at bedtime PRN Constipation

## 2018-10-04 NOTE — CONSULT NOTE ADULT - ASSESSMENT
89 year old female with PMH Li's esophagus, CAD s/p stent (5-6 yrs ago, on aspirin 81mg), pulmonary fibrosis, p/w cholecystitis and biliary obstruction, now s/p perc cholecystostomy:  1. Cholecystitis, biliary obstruction s/p perc melanie, care and diet per Surg, abx per ID, pain control PRN, incentive spirometry, OOB, eventual ERCP per GI  2. Pulmonary fibrosis - c/w inhalers per Pulm  3. CAD - stable, c/w ASA, BB  4. DVT prophylaxis

## 2018-10-04 NOTE — PROGRESS NOTE ADULT - SUBJECTIVE AND OBJECTIVE BOX
PULMONARY PROGRESS NOTE    FELISA YANG  MRN-[id number]    Patient is a 89y old  Female who presents with a chief complaint of Abdominal pain (04 Oct 2018 08:33)      HPI:  -patient with a history of pulmonary fibrosis, not on home O2.  Reports breathing is comfortable, denies cp/dyspnea/cough.  on nasal canula.    ROS:   -pain controlled    ACTIVE MEDICATION LIST:  MEDICATIONS  (STANDING):  aspirin  chewable 81 milliGRAM(s) Oral daily  aztreonam  IVPB 1000 milliGRAM(s) IV Intermittent every 8 hours  buDESOnide   0.5 milliGRAM(s) Respule 0.5 milliGRAM(s) Inhalation two times a day  chlorhexidine 4% Liquid 1 Application(s) Topical <User Schedule>  enoxaparin Injectable 40 milliGRAM(s) SubCutaneous daily  famotidine    Tablet 20 milliGRAM(s) Oral daily  fluticasone propionate 50 MICROgram(s)/spray Nasal Spray 1 Spray(s) Both Nostrils two times a day  influenza   Vaccine 0.5 milliLiter(s) IntraMuscular once  lidocaine   Patch 1 Patch Transdermal daily  metoprolol succinate ER 25 milliGRAM(s) Oral daily  pantoprazole    Tablet 40 milliGRAM(s) Oral before breakfast  vancomycin  IVPB 750 milliGRAM(s) IV Intermittent every 12 hours    MEDICATIONS  (PRN):  acetaminophen   Tablet .. 650 milliGRAM(s) Oral every 6 hours PRN Mild Pain (1 - 3)  ALBUTerol    90 MICROgram(s) HFA Inhaler 2 Puff(s) Inhalation every 6 hours PRN Shortness of Breath and/or Wheezing  bisacodyl 5 milliGRAM(s) Oral daily PRN Constipation  oxyCODONE    IR 5 milliGRAM(s) Oral every 4 hours PRN Moderate to severe pain  senna 2 Tablet(s) Oral at bedtime PRN Constipation      EXAM:  Vital Signs Last 24 Hrs  T(C): 36.3 (04 Oct 2018 12:47), Max: 37 (04 Oct 2018 05:09)  T(F): 97.4 (04 Oct 2018 12:47), Max: 98.6 (04 Oct 2018 05:09)  HR: 98 (04 Oct 2018 12:47) (60 - 98)  BP: 114/72 (04 Oct 2018 12:47) (114/72 - 134/75)  BP(mean): --  RR: 18 (04 Oct 2018 12:47) (18 - 18)  SpO2: 96% (04 Oct 2018 12:47) (93% - 99%)    GENERAL: The patient is awake and alert in no apparent distress.     LUNGS: diffuse dry crackles    HEART: S1/S2    LABS/IMAGING: reviewed                          10.7   8.0   )-----------( 215      ( 04 Oct 2018 08:50 )             32.7   10-04    134<L>  |  97  |  15  ----------------------------<  145<H>  4.2   |  30  |  0.88    Ca    8.9      04 Oct 2018 08:45  Phos  3.1     10-04  Mg     1.9     10-04    TPro  7.4  /  Alb  3.2<L>  /  TBili  0.5  /  DBili  x   /  AST  42<H>  /  ALT  58<H>  /  AlkPhos  156<H>  10-04      < from: Xray Chest 1 View- PORTABLE-Routine (10.03.18 @ 09:07) >    IMPRESSION:    1.  Interstitial lung disease. No pneumothorax or new opacity.     < end of copied text >  89y Female with HEALTH ISSUES - PROBLEM Dx:  Pulmonary Fibrosis      RECS:  -Last PFT in our office april 2018 demonstrated a restrictive dysfunction with decreased DLCO.    -patient is currently stable from a pulmonary standpoint, no change on CXR.  Pulmonary fibrosis puts her at an increased risk for anesthesia however risk of sepsis/infection is high without intervention.  -she is currently as optimized as possible from a pulmonary standpoint.    -continue duonebs and PRN albuterol.      Jenny Antonio MD   [phone]

## 2018-10-04 NOTE — PROGRESS NOTE ADULT - SUBJECTIVE AND OBJECTIVE BOX
Patient is a 89y old  Female who presents with a chief complaint of Abdominal pain (04 Oct 2018 05:03)    Being followed by ID for Acute cholecystitis, bacteremia    Interval history:  Afebrile  BC negative after 9/29  No acute events      ROS:  Improved pain in RUQ  No cough, SOB, CP  No N/V/D./abd pain  No other complaints      Antimicrobials:    aztreonam  IVPB 1000 milliGRAM(s) IV Intermittent every 8 hours  vancomycin  IVPB 750 milliGRAM(s) IV Intermittent every 12 hours      Vital Signs Last 24 Hrs  T(C): 37 (10-04-18 @ 05:09), Max: 37 (10-04-18 @ 05:09)  T(F): 98.6 (10-04-18 @ 05:09), Max: 98.6 (10-04-18 @ 05:09)  HR: 87 (10-04-18 @ 05:09) (60 - 87)  BP: 134/75 (10-04-18 @ 05:09) (117/76 - 134/75)  BP(mean): --  RR: 18 (10-04-18 @ 05:09) (18 - 18)  SpO2: 95% (10-04-18 @ 05:09) (93% - 100%)    Physical Exam:    Constitutional well nourished, NAD    HEENT EOMI, No pallor or icterus    No oral exudate or erythema    Neck supple no JVD or LN    Chest Good AE,CTA    CVS RRR S1 S2 WNl No murmur or rub or gallop    Abd soft BS normal No tenderness no masses    Ext No cyanosis clubbing or edema    IV site no erythema tenderness or discharge    Joints no swelling or LOM    CNS AAO X 3 no focal    Lab Data:                          11.4   6.08  )-----------( 188      ( 03 Oct 2018 08:43 )             35.5       10-03    137  |  101  |  12  ----------------------------<  79  4.1   |  28  |  0.92    Ca    9.0      03 Oct 2018 07:08  Phos  2.7     10-03  Mg     2.0     10-03    TPro  8.0  /  Alb  3.3  /  TBili  0.7  /  DBili  0.3<H>  /  AST  91<H>  /  ALT  87<H>  /  AlkPhos  167<H>  10-03        .Blood Blood-Peripheral  10-01-18   No growth to date.  --  --      .Blood Blood  09-30-18   No growth to date.  --  --      .Blood Blood  09-30-18   No growth to date.  --  --      .Blood Blood-Venous  09-29-18   Growth in aerobic bottle: Streptococcus gallolyticus  See previous culture [id number]  --    Growth in aerobic bottle: Gram Positive Cocci in Pairs and Chains      .Body Fluid Bile Fluid  09-29-18   Numerous Escherichia coli  Moderate Alpha hemolytic strep "Susceptibilities not performed"  --  Escherichia coli      .Blood Blood  09-28-18   Growth in aerobic and anaerobic bottles: Escherichia coli Multiple  Morphological Strains  Growth in anaerobic bottle: Streptococcus gallolyticus  "Due to technical problems, Proteus sp. will Not be reported as part of  the BCID panel until furthernotice"  ***Blood Panel PCR results on this specimen are available  approximately 3 hours after the Gram stain result.***  Gram stain, PCR, and/or culture results may not always  correspond due to difference in methodologies.  ************************************************************  This PCR assay was performed using [employer].  The following targets are tested for: Enterococcus,  vancomycin resistant enterococci, Listeria monocytogenes,  coagulase negative staphylococci, S. aureus,  methicillin resistant S. aureus, Streptococcus agalactiae  (Group B), S. pneumoniae, S. pyogenes (Group A),  Acinetobacter baumannii, Enterobacter cloacae, E. coli,  Klebsiella oxytoca, K. pneumoniae, Proteus sp.,  Serratia marcescens, Haemophilus influenzae,  Neisseria meningitidis, Pseudomonas aeruginosa, Candida  albicans, C. glabrata, C krusei, C parapsilosis,  C. tropicalis and the KPC resistance gene.  --  Blood Culture PCR  Streptococcus gallolyticus  Escherichia coli      Vancomycin Level, Trough: 17.0 ug/mL (10-03-18 @ 15:12) Patient is a 89y old  Female who presents with a chief complaint of Abdominal pain (04 Oct 2018 05:03)    Being followed by ID for Acute cholangitis, bacteremia    Interval history:  Afebrile  BC negative after 9/29  No acute events      ROS:  Improved pain in RUQ  No cough, SOB, CP  No N/V/D./abd pain  No other complaints      Antimicrobials:    aztreonam  IVPB 1000 milliGRAM(s) IV Intermittent every 8 hours  vancomycin  IVPB 750 milliGRAM(s) IV Intermittent every 12 hours      Vital Signs Last 24 Hrs  T(C): 37 (10-04-18 @ 05:09), Max: 37 (10-04-18 @ 05:09)  T(F): 98.6 (10-04-18 @ 05:09), Max: 98.6 (10-04-18 @ 05:09)  HR: 87 (10-04-18 @ 05:09) (60 - 87)  BP: 134/75 (10-04-18 @ 05:09) (117/76 - 134/75)  BP(mean): --  RR: 18 (10-04-18 @ 05:09) (18 - 18)  SpO2: 95% (10-04-18 @ 05:09) (93% - 100%)    Physical Exam:    Constitutional well nourished, NAD    HEENT EOMI, No pallor or icterus    No oral exudate or erythema    Neck supple no JVD or LN    Chest Good AE,CTA    CVS RRR S1 S2 WNl No murmur or rub or gallop    Abd soft BS normal No tenderness no masses    Ext No cyanosis clubbing or edema    IV site no erythema tenderness or discharge    Joints no swelling or LOM    CNS AAO X 3 no focal    Lab Data:                          11.4   6.08  )-----------( 188      ( 03 Oct 2018 08:43 )             35.5       10-03    137  |  101  |  12  ----------------------------<  79  4.1   |  28  |  0.92    Ca    9.0      03 Oct 2018 07:08  Phos  2.7     10-03  Mg     2.0     10-03    TPro  8.0  /  Alb  3.3  /  TBili  0.7  /  DBili  0.3<H>  /  AST  91<H>  /  ALT  87<H>  /  AlkPhos  167<H>  10-03        .Blood Blood-Peripheral  10-01-18   No growth to date.  --  --      .Blood Blood  09-30-18   No growth to date.  --  --      .Blood Blood  09-30-18   No growth to date.  --  --      .Blood Blood-Venous  09-29-18   Growth in aerobic bottle: Streptococcus gallolyticus  See previous culture [id number]  --    Growth in aerobic bottle: Gram Positive Cocci in Pairs and Chains      .Body Fluid Bile Fluid  09-29-18   Numerous Escherichia coli  Moderate Alpha hemolytic strep "Susceptibilities not performed"  --  Escherichia coli      .Blood Blood  09-28-18   Growth in aerobic and anaerobic bottles: Escherichia coli Multiple  Morphological Strains  Growth in anaerobic bottle: Streptococcus gallolyticus  "Due to technical problems, Proteus sp. will Not be reported as part of  the BCID panel until furthernotice"  ***Blood Panel PCR results on this specimen are available  approximately 3 hours after the Gram stain result.***  Gram stain, PCR, and/or culture results may not always  correspond due to difference in methodologies.  ************************************************************  This PCR assay was performed using [employer].  The following targets are tested for: Enterococcus,  vancomycin resistant enterococci, Listeria monocytogenes,  coagulase negative staphylococci, S. aureus,  methicillin resistant S. aureus, Streptococcus agalactiae  (Group B), S. pneumoniae, S. pyogenes (Group A),  Acinetobacter baumannii, Enterobacter cloacae, E. coli,  Klebsiella oxytoca, K. pneumoniae, Proteus sp.,  Serratia marcescens, Haemophilus influenzae,  Neisseria meningitidis, Pseudomonas aeruginosa, Candida  albicans, C. glabrata, C krusei, C parapsilosis,  C. tropicalis and the KPC resistance gene.  --  Blood Culture PCR  Streptococcus gallolyticus  Escherichia coli      Vancomycin Level, Trough: 17.0 ug/mL (10-03-18 @ 15:12)

## 2018-10-04 NOTE — PROGRESS NOTE ADULT - ASSESSMENT
A: 89 yo woman with a hx of pulmonary fibrosis, Li's esophagus, CAD s/p stents (approx 5 years ago per daughters, on ASA), presents with a 1-day history of acute-onset epigastric abdominal pain, elevated LFTs and fevers. Picture c/w cholangitis - not a candidate for ERCP at the time as per GI. Patient underwent PCT placement which she tolerated well. transitioned from sicu to floor. S/p cholecystotomy tube check 10/3    P:  - regular diet  - c/w abx- AZT and vanc  - pain control  - cont home meds  - oob, ambulate    Joanna Ramirez PGY-1  General Surgery Green Team [phone]

## 2018-10-04 NOTE — PROGRESS NOTE ADULT - ASSESSMENT
89 year old female with PMH Li's esophagus, CAD s/p stent (5-6 yrs ago, on aspirin 81mg), pulmonary fibrosis who presented to [facility] on 9/28 with epigastric abdominal pain that awoke her from sleep. Pain was located in the epigastric area and radiated to bilateral upper quadrants. Febrile to 100.5 on presentation, tachycardic to > 90 but normotensive. WBC on admission of 8.8. Repeat CMP with transaminitis with , , Alk Phos 106, T Bili 2.2. Lactic acid of 2.6. U/A with 2 WBC. CT Chest with End-stage pulmonary fibrosis with honeycombing and Fluid-filled esophagus to the level of thoracic inlet. CT Abdomen/Pelvis with Distended gallbladder.  Small sludge ball versus gallstone in the gallbladder lumen. NM Biliary scan with high-grade obstruction. Started on Vancomycin/Aztreonam/Flagyl. Underwent perc melanie drain on 9/28/18. Blood cultures from 9/28 with 4/4 bottles with Strep gallolyticus and E. coli. Blood culture from 9/29 with 1/4 bottles with GPC in pairs and chains.   Patient treated with IV vancomycin, aztreonam, ad flagyl  She has persistent abdominal pain, mostly in RUQ, but is tender to palpation throughout the abdomen. Percutaneous cholecystomy tube draining bilious material.  Monitor LFTs and trend bilirubin, monitor temperature and WBC with drainage and antibiotics  blood culture and bile E coli isolates are susceptible to aztreonam, Strep is susceptible to vancomycin  Repeat BCs until bacteremia cleared  Will need 2 weeks of IV antibiotics from date of drainage, this may need to be extendedbased on echo findings and time to clearance of strep gallolyticus bacteremia  Due to confirmed history of angioedema to penicillin would try to avoid cephalosporins and carbepenems  S/P flagyl as anaerobes not major cause of cholecystitis, non emphysematous  Advance diet as tolerates.    Recommend:  - BC from 9/28 and 9/29 with S gallolyticus, 9/30 and 10/1- NGTD- may get PICC line to complete course of IV antibiotics  - Strep Gallolyticus (known also as Strep Bovis) often associated with endovascular infection and GI malignancy- TTE done - demonstrated no vegetations  - Based on Goals of Care consider W/U for GI malignancy in face of S bovis bacteremia (discussed with patient's daughter)  - Monitor renal function, vancomycin trough therapeutic on 750 mg every 12 hours., keep trough in 15-20 range.   - Complete 2 weeks of IV antibiotics from date of first negative BC- through 10/13.    ID will not actively follow, please recall as needed with questions - [phone]  Aaron Mistry MD  pager [phone]  office [phone] 89 year old female with PMH Li's esophagus, CAD s/p stent (5-6 yrs ago, on aspirin 81mg), pulmonary fibrosis who presented to [facility] on 9/28 with epigastric abdominal pain that awoke her from sleep. Pain was located in the epigastric area and radiated to bilateral upper quadrants. Febrile to 100.5 on presentation, tachycardic to > 90 but normotensive. WBC on admission of 8.8. Repeat CMP with transaminitis with , , Alk Phos 106, T Bili 2.2. Lactic acid of 2.6. U/A with 2 WBC. CT Chest with End-stage pulmonary fibrosis with honeycombing and Fluid-filled esophagus to the level of thoracic inlet. CT Abdomen/Pelvis with Distended gallbladder.  Small sludge ball versus gallstone in the gallbladder lumen. NM Biliary scan with high-grade obstruction. Started on Vancomycin/Aztreonam/Flagyl. Underwent perc melanie drain on 9/28/18. Blood cultures from 9/28 with 4/4 bottles with Strep gallolyticus and E. coli. Blood culture from 9/29 with 1/4 bottles with GPC in pairs and chains.   Patient treated with IV vancomycin, aztreonam, ad flagyl  She has persistent abdominal pain, mostly in RUQ, but is tender to palpation throughout the abdomen. Percutaneous cholecystomy tube draining bilious material.  Monitor LFTs and trend bilirubin, monitor temperature and WBC with drainage and antibiotics  blood culture and bile E coli isolates are susceptible to aztreonam, Strep is susceptible to vancomycin  Repeat BCs until bacteremia cleared  Will need 2 weeks of IV antibiotics from date of drainage, this may need to be extendedbased on echo findings and time to clearance of strep gallolyticus bacteremia  Due to confirmed history of angioedema to penicillin would try to avoid cephalosporins and carbepenems  S/P flagyl as anaerobes not major cause of cholecystitis, non emphysematous  Advance diet as tolerates.  For possible ERCP to remove obstructing stone.    Recommend:  - BC from 9/28 and 9/29 with S gallolyticus, 9/30 and 10/1- NGTD- may get PICC line to complete course of IV antibiotics  - Strep Gallolyticus (known also as Strep Bovis) often associated with endovascular infection and GI malignancy- TTE done - demonstrated no vegetations  - Based on Goals of Care consider W/U for GI malignancy in face of S bovis bacteremia (discussed with patient's daughter)  - Monitor renal function, vancomycin trough therapeutic on 750 mg every 12 hours., keep trough in 15-20 range.   - Complete 2 weeks of IV antibiotics from date of first negative BC- through 10/13.    ID will not actively follow, please recall as needed with questions - [phone]  Aaron Mistry MD  pager [phone]  office [phone]

## 2018-10-04 NOTE — PROGRESS NOTE ADULT - ASSESSMENT
Impression:  1. Cholangitis s/p percutenous cholecystostomy tube: clinically improved. Will need definitive therapy to decompress biliary tree. persistent pain at tube site, mild increase in AST/ALT today but bili/ALP remain normal.    2. Pulm fibrosis    3. Drop in hgb without overt GIB    4. E. coli bacteremia now clearing apparently.    Recommendation:  -obtain pulm preop eval for eventual ERCP  -obtain an MRCP if no contraindication  -trend CBC, monitor for GI bleeding  -abx per ID      Stevan Abdi M.D.   [facility]  Contact: [phone]

## 2018-10-04 NOTE — CONSULT NOTE ADULT - SUBJECTIVE AND OBJECTIVE BOX
HPI: 89 year old female with PMH Li's esophagus, CAD s/p stent (5-6 yrs ago, on aspirin 81mg), pulmonary fibrosis who presented to [facility] on 9/28 with epigastric abdominal pain. Pain was located in the epigastric area and radiated to bilateral upper quadrants. Patient notes severe severity of pain on admission. Patient notes no chills or fevers prior to admission. She denies preceding N/V/D. She was subsequently found to have cholecystitis and high grade biliary obstruction and had perc cholecystostomy placed. Currently reports minimal pain over tube site, otherwise well.    Allergies:  penicillin (Angioedema)  penicillins (Swelling)      PAST MEDICAL & SURGICAL HISTORY:  Barretts esophagus  Essential hypertension  CAD (coronary artery disease)  Idiopathic pulmonary fibrosis  Li's Esophagus  CAD (Coronary Artery Disease)  HTN (Hypertension)  Pulmonary Fibrosis  S/P Coronary Artery Stent Placement  S/P Shoulder Surgery      FAMILY HISTORY:  No pertinent family history in first degree relatives      REVIEW OF SYSTEMS:  CONSTITUTIONAL: No fever, weight loss, or fatigue  EYES: No eye pain, visual disturbances, or discharge  NECK: No pain or stiffness  RESPIRATORY: No cough or wheezing, + baseline shortness of breath  CARDIOVASCULAR: No chest pain, palpitations, dizziness, or leg swelling  GASTROINTESTINAL: minimal pain over perc melanie tube. No nausea, vomiting, diarrhea or constipation  GENITOURINARY: No dysuria, urinary frequency or urgency, no hematuria  NEUROLOGICAL: No headaches, memory loss, loss of strength, numbness, or tremors  SKIN: No itching, burning, rashes, or lesions   MUSCULOSKELETAL: No joint pain or swelling; No muscle, back, or extremity pain    Medications:  MEDICATIONS  (STANDING):  aspirin  chewable 81 milliGRAM(s) Oral daily  aztreonam  IVPB 1000 milliGRAM(s) IV Intermittent every 8 hours  buDESOnide   0.5 milliGRAM(s) Respule 0.5 milliGRAM(s) Inhalation two times a day  chlorhexidine 4% Liquid 1 Application(s) Topical <User Schedule>  enoxaparin Injectable 40 milliGRAM(s) SubCutaneous daily  famotidine    Tablet 20 milliGRAM(s) Oral daily  fluticasone propionate 50 MICROgram(s)/spray Nasal Spray 1 Spray(s) Both Nostrils two times a day  influenza   Vaccine 0.5 milliLiter(s) IntraMuscular once  lidocaine   Patch 1 Patch Transdermal daily  metoprolol succinate ER 25 milliGRAM(s) Oral daily  pantoprazole    Tablet 40 milliGRAM(s) Oral before breakfast  vancomycin  IVPB 750 milliGRAM(s) IV Intermittent every 12 hours    MEDICATIONS  (PRN):  acetaminophen   Tablet .. 650 milliGRAM(s) Oral every 6 hours PRN Mild Pain (1 - 3)  ALBUTerol    90 MICROgram(s) HFA Inhaler 2 Puff(s) Inhalation every 6 hours PRN Shortness of Breath and/or Wheezing  bisacodyl 5 milliGRAM(s) Oral daily PRN Constipation  oxyCODONE    IR 5 milliGRAM(s) Oral every 4 hours PRN Moderate to severe pain  senna 2 Tablet(s) Oral at bedtime PRN Constipation    	    PHYSICAL EXAM:  T(C): 36.3 (10-04-18 @ 12:47), Max: 37 (10-04-18 @ 05:09)  HR: 98 (10-04-18 @ 12:47) (60 - 98)  BP: 114/72 (10-04-18 @ 12:47) (114/72 - 134/75)  RR: 18 (10-04-18 @ 12:47) (18 - 18)  SpO2: 96% (10-04-18 @ 12:47) (93% - 99%)  Wt(kg): --  I&O's Summary    03 Oct 2018 07:01  -  04 Oct 2018 07:00  --------------------------------------------------------  IN: 1300 mL / OUT: 2070 mL / NET: -770 mL    04 Oct 2018 07:01  -  04 Oct 2018 15:37  --------------------------------------------------------  IN: 520 mL / OUT: 675 mL / NET: -155 mL        Appearance: Normal	  HEENT:   NCAT, PERRL, EOMI	  Lymphatic: No lymphadenopathy  Cardiovascular: Normal S1 S2, RRR  Respiratory: Lungs clear to auscultation BL  Psychiatry: A & O x 3, Mood & affect appropriate  Gastrointestinal:  Soft, Non-tender, + BS, + cholecystostomy  Skin: No rashes, No ecchymoses, No cyanosis	  Neurologic: Non-focal  Extremities: Normal range of motion, No clubbing, cyanosis or edema    	  LABS:	 	    CARDIAC MARKERS:                                10.7   8.0   )-----------( 215      ( 04 Oct 2018 08:50 )             32.7     10-04    134<L>  |  97  |  15  ----------------------------<  145<H>  4.2   |  30  |  0.88    Ca    8.9      04 Oct 2018 08:45  Phos  3.1     10-04  Mg     1.9     10-04    TPro  7.4  /  Alb  3.2<L>  /  TBili  0.5  /  DBili  x   /  AST  42<H>  /  ALT  58<H>  /  AlkPhos  156<H>  10-04    proBNP:   Lipid Profile:   HgA1c:   TSH:

## 2018-10-04 NOTE — PROGRESS NOTE ADULT - SUBJECTIVE AND OBJECTIVE BOX
Patient is a 89y old  Female who presents with a chief complaint of Abdominal pain (04 Oct 2018 08:33)      SUBJECTIVE / OVERNIGHT EVENTS:  no events  MEDICATIONS  (STANDING):  aspirin  chewable 81 milliGRAM(s) Oral daily  aztreonam  IVPB 1000 milliGRAM(s) IV Intermittent every 8 hours  buDESOnide   0.5 milliGRAM(s) Respule 0.5 milliGRAM(s) Inhalation two times a day  chlorhexidine 4% Liquid 1 Application(s) Topical <User Schedule>  enoxaparin Injectable 40 milliGRAM(s) SubCutaneous daily  famotidine   Suspension 20 milliGRAM(s) Oral daily  fluticasone propionate 50 MICROgram(s)/spray Nasal Spray 1 Spray(s) Both Nostrils two times a day  influenza   Vaccine 0.5 milliLiter(s) IntraMuscular once  lidocaine   Patch 1 Patch Transdermal daily  metoprolol succinate ER 25 milliGRAM(s) Oral daily  pantoprazole    Tablet 40 milliGRAM(s) Oral before breakfast  vancomycin  IVPB 750 milliGRAM(s) IV Intermittent every 12 hours    MEDICATIONS  (PRN):  acetaminophen   Tablet .. 650 milliGRAM(s) Oral every 6 hours PRN Mild Pain (1 - 3)  ALBUTerol    90 MICROgram(s) HFA Inhaler 2 Puff(s) Inhalation every 6 hours PRN Shortness of Breath and/or Wheezing  bisacodyl 5 milliGRAM(s) Oral daily PRN Constipation  oxyCODONE    IR 5 milliGRAM(s) Oral every 4 hours PRN Moderate to severe pain  senna 2 Tablet(s) Oral at bedtime PRN Constipation              PHYSICAL EXAM:  GENERAL: NAD, well-developed  HEAD:  Atraumatic, Normocephalic  EYES: EOMI, PERRLA, conjunctiva and sclera anicteric  NECK: Supple, No JVD  CHEST/LUNG: Clear to auscultation bilaterally; No wheeze  HEART: Regular rate and rhythm; No murmurs, rubs, or gallops  ABDOMEN: Soft, Nontender, Nondistended; Bowel sounds present, no hepatosplenomegaly, no rebound or guarding. perc melanie tube draining.  EXTREMITIES:  2+ Peripheral Pulses, No clubbing, cyanosis, or edema  PSYCH: AAOx3  NEUROLOGY: non-focal, no asterixis  SKIN: No rashes or lesion    LABS:                        10.7   8.0   )-----------( 215      ( 04 Oct 2018 08:50 )             32.7     10-04    134<L>  |  97  |  15  ----------------------------<  145<H>  4.2   |  30  |  0.88    Ca    8.9      04 Oct 2018 08:45  Phos  3.1     10-04  Mg     1.9     10-04    TPro  7.4  /  Alb  3.2<L>  /  TBili  0.5  /  DBili  x   /  AST  42<H>  /  ALT  58<H>  /  AlkPhos  156<H>  10-04    LIVER FUNCTIONS - ( 04 Oct 2018 08:45 )  Alb: 3.2 g/dL / Pro: 7.4 g/dL / ALK PHOS: 156 U/L / ALT: 58 U/L / AST: 42 U/L / GGT: x           PT/INR - ( 03 Oct 2018 08:47 )   PT: 11.8 sec;   INR: 1.04 ratio         PTT - ( 03 Oct 2018 08:47 )  PTT:39.4 sec          RADIOLOGY & ADDITIONAL TESTS:

## 2018-10-04 NOTE — PROGRESS NOTE ADULT - ATTENDING COMMENTS
I saw and examined the pt and discussed the tx plan with the House Staff. I agree with the exam and plan as documented in the surgery resident's note from today.  Tube study with stone in the CBD. She needs an ERCP, my opinion is that this should be done in this admission. Will obtain Pulmonary and Anesthesia eval. Awaiting GI input.    Ade Monte MD I saw and examined the pt and discussed the tx plan with the House Staff. I agree with the exam and plan as documented in the surgery resident's note from today.  Tube study with stone in the CBD. She needs an ERCP, my opinion is that this should be done in this admission. Will obtain Pulmonary and Anesthesia eval. Awaiting GI input.    I have left a message with the pt's PMD, Dr Otoole for an update, at family's request.   Ade Monte MD

## 2018-10-05 LAB
ALBUMIN SERPL ELPH-MCNC: 3 G/DL — LOW (ref 3.3–5)
ALP SERPL-CCNC: 147 U/L — HIGH (ref 40–120)
ALT FLD-CCNC: 45 U/L — SIGNIFICANT CHANGE UP (ref 10–45)
ANION GAP SERPL CALC-SCNC: 7 MMOL/L — SIGNIFICANT CHANGE UP (ref 5–17)
AST SERPL-CCNC: 30 U/L — SIGNIFICANT CHANGE UP (ref 10–40)
BILIRUB DIRECT SERPL-MCNC: 0.2 MG/DL — SIGNIFICANT CHANGE UP (ref 0–0.2)
BILIRUB INDIRECT FLD-MCNC: 0.3 MG/DL — SIGNIFICANT CHANGE UP (ref 0.2–1)
BILIRUB SERPL-MCNC: 0.5 MG/DL — SIGNIFICANT CHANGE UP (ref 0.2–1.2)
BUN SERPL-MCNC: 17 MG/DL — SIGNIFICANT CHANGE UP (ref 7–23)
CALCIUM SERPL-MCNC: 8.8 MG/DL — SIGNIFICANT CHANGE UP (ref 8.4–10.5)
CHLORIDE SERPL-SCNC: 97 MMOL/L — SIGNIFICANT CHANGE UP (ref 96–108)
CO2 SERPL-SCNC: 31 MMOL/L — SIGNIFICANT CHANGE UP (ref 22–31)
CREAT SERPL-MCNC: 0.85 MG/DL — SIGNIFICANT CHANGE UP (ref 0.5–1.3)
CULTURE RESULTS: SIGNIFICANT CHANGE UP
CULTURE RESULTS: SIGNIFICANT CHANGE UP
GLUCOSE SERPL-MCNC: 104 MG/DL — HIGH (ref 70–99)
HCT VFR BLD CALC: 30.4 % — LOW (ref 34.5–45)
HGB BLD-MCNC: 9.8 G/DL — LOW (ref 11.5–15.5)
MAGNESIUM SERPL-MCNC: 2 MG/DL — SIGNIFICANT CHANGE UP (ref 1.6–2.6)
MCHC RBC-ENTMCNC: 32 PG — SIGNIFICANT CHANGE UP (ref 27–34)
MCHC RBC-ENTMCNC: 32.2 GM/DL — SIGNIFICANT CHANGE UP (ref 32–36)
MCV RBC AUTO: 99.3 FL — SIGNIFICANT CHANGE UP (ref 80–100)
PHOSPHATE SERPL-MCNC: 3.3 MG/DL — SIGNIFICANT CHANGE UP (ref 2.5–4.5)
PLATELET # BLD AUTO: 208 K/UL — SIGNIFICANT CHANGE UP (ref 150–400)
POTASSIUM SERPL-MCNC: 3.7 MMOL/L — SIGNIFICANT CHANGE UP (ref 3.5–5.3)
POTASSIUM SERPL-SCNC: 3.7 MMOL/L — SIGNIFICANT CHANGE UP (ref 3.5–5.3)
PROT SERPL-MCNC: 7.1 G/DL — SIGNIFICANT CHANGE UP (ref 6–8.3)
RBC # BLD: 3.06 M/UL — LOW (ref 3.8–5.2)
RBC # FLD: 14.6 % — HIGH (ref 10.3–14.5)
SODIUM SERPL-SCNC: 135 MMOL/L — SIGNIFICANT CHANGE UP (ref 135–145)
SPECIMEN SOURCE: SIGNIFICANT CHANGE UP
SPECIMEN SOURCE: SIGNIFICANT CHANGE UP
VANCOMYCIN TROUGH SERPL-MCNC: 16.2 UG/ML — SIGNIFICANT CHANGE UP (ref 10–20)
WBC # BLD: 6.95 K/UL — SIGNIFICANT CHANGE UP (ref 3.8–10.5)
WBC # FLD AUTO: 6.95 K/UL — SIGNIFICANT CHANGE UP (ref 3.8–10.5)

## 2018-10-05 PROCEDURE — 71045 X-RAY EXAM CHEST 1 VIEW: CPT | Mod: 26

## 2018-10-05 PROCEDURE — 99233 SBSQ HOSP IP/OBS HIGH 50: CPT

## 2018-10-05 PROCEDURE — 99233 SBSQ HOSP IP/OBS HIGH 50: CPT | Mod: GC

## 2018-10-05 RX ORDER — ENOXAPARIN SODIUM 100 MG/ML
40 INJECTION SUBCUTANEOUS DAILY
Qty: 0 | Refills: 0 | Status: DISCONTINUED | OUTPATIENT
Start: 2018-10-06 | End: 2018-10-13

## 2018-10-05 RX ORDER — POTASSIUM CHLORIDE 20 MEQ
10 PACKET (EA) ORAL
Qty: 0 | Refills: 0 | Status: COMPLETED | OUTPATIENT
Start: 2018-10-05 | End: 2018-10-05

## 2018-10-05 RX ADMIN — Medication 81 MILLIGRAM(S): at 16:17

## 2018-10-05 RX ADMIN — OXYCODONE HYDROCHLORIDE 5 MILLIGRAM(S): 5 TABLET ORAL at 06:05

## 2018-10-05 RX ADMIN — Medication 1 SPRAY(S): at 06:04

## 2018-10-05 RX ADMIN — Medication 250 MILLIGRAM(S): at 23:55

## 2018-10-05 RX ADMIN — FAMOTIDINE 20 MILLIGRAM(S): 10 INJECTION INTRAVENOUS at 16:17

## 2018-10-05 RX ADMIN — PANTOPRAZOLE SODIUM 40 MILLIGRAM(S): 20 TABLET, DELAYED RELEASE ORAL at 06:05

## 2018-10-05 RX ADMIN — Medication 81 MILLIGRAM(S): at 16:16

## 2018-10-05 RX ADMIN — Medication 50 MILLIGRAM(S): at 16:20

## 2018-10-05 RX ADMIN — OXYCODONE HYDROCHLORIDE 5 MILLIGRAM(S): 5 TABLET ORAL at 06:42

## 2018-10-05 RX ADMIN — Medication 250 MILLIGRAM(S): at 02:20

## 2018-10-05 RX ADMIN — Medication 50 MILLIGRAM(S): at 06:05

## 2018-10-05 RX ADMIN — LIDOCAINE 1 PATCH: 4 CREAM TOPICAL at 16:17

## 2018-10-05 RX ADMIN — Medication 50 MILLIGRAM(S): at 22:37

## 2018-10-05 RX ADMIN — CHLORHEXIDINE GLUCONATE 1 APPLICATION(S): 213 SOLUTION TOPICAL at 16:20

## 2018-10-05 RX ADMIN — Medication 0.5 MILLIGRAM(S): at 06:04

## 2018-10-05 NOTE — PROGRESS NOTE ADULT - ATTENDING COMMENTS
As above.    Patient with CBD stone and sepsis/cholangitis, treated with percutaneous cholecystostomy tube due to high pulmonary risk for general anesthesia due to severe pulmonary fibrosis.  Patient doing well, LFTs almost normal.    Spoke to pulmonology attending myself.  Risk of general anesthesia is high from her advanced pulmonary fibrosis due to low DLCO (25-50%), baseline sedentary lifestyle, hypoxia, and advanced age.  She has high risk of pulmonary decompensation and prolonged mechanical ventilation from general anesthesia.  She recommends against elective procedures that require general anesthesia. As above.    Patient with CBD stone and sepsis/cholangitis, treated with percutaneous cholecystostomy tube due to high pulmonary risk for general anesthesia due to severe pulmonary fibrosis.  Patient doing well, LFTs almost normal.    Spoke to pulmonology attending myself.  Risk of general anesthesia is high from her advanced pulmonary fibrosis due to low DLCO (25-50%), baseline sedentary lifestyle, hypoxia, and advanced age.  She has high risk of pulmonary decompensation and prolonged mechanical ventilation from general anesthesia.  She recommends against elective procedures that require general anesthesia.    Spoke to pt and family, recommended against nonurgent ERCP with general anesthesia or nonurgent ERCP with attempt at sedation.  Overall likelihood of cholangitis in presence of functioning cholecystostomy tube is low.  If patient developed cholangitis not responsive to antibiotics, would reconsider ERCP.  Pt and family are comfortable with this plan.

## 2018-10-05 NOTE — PROGRESS NOTE ADULT - ATTENDING COMMENTS
I saw and examined the pt and discussed the tx plan with the House Staff. I agree with the exam and plan as documented in the surgery resident's note from today which I edited as indicated.  Awaiting GI decision regarding ERCP.  Ade Monte MD

## 2018-10-05 NOTE — PROGRESS NOTE ADULT - ASSESSMENT
89 year old female with PMH Li's esophagus, CAD s/p stent (5-6 yrs ago, on aspirin 81mg), pulmonary fibrosis, p/w cholecystitis and biliary obstruction, now s/p perc cholecystostomy:  1. Cholecystitis, biliary obstruction s/p perc melanie, care and diet per Surg, abx per ID, pain control PRN, incentive spirometry, OOB, likely no ERCP as pt is a poor candidate 2/2 severe PF  2. Pulmonary fibrosis - c/w inhalers per Pulm  3. CAD - stable, c/w ASA, BB  4. DVT prophylaxis

## 2018-10-05 NOTE — CHART NOTE - NSCHARTNOTEFT_GEN_A_CORE
General Surgery Post-Op Note    SUBJECTIVE:  This is a 89y Female PPC 0 s/p PICC placement for IV abx. Doing well overall. Denies pain at site of placement.     OBJECTIVE:     ** VITAL SIGNS / I&O's **    Vital Signs Last 24 Hrs  T(C): 37 (05 Oct 2018 21:49), Max: 37 (05 Oct 2018 21:49)  T(F): 98.6 (05 Oct 2018 21:49), Max: 98.6 (05 Oct 2018 21:49)  HR: 96 (05 Oct 2018 21:49) (70 - 96)  BP: 135/77 (05 Oct 2018 21:49) (105/65 - 135/77)  BP(mean): --  RR: 19 (05 Oct 2018 21:49) (18 - 20)  SpO2: 93% (05 Oct 2018 21:49) (93% - 99%)      04 Oct 2018 07:01  -  05 Oct 2018 07:00  --------------------------------------------------------  IN:    Oral Fluid: 880 mL    Solution: 500 mL    Solution: 200 mL  Total IN: 1580 mL    OUT:    T-Tube: 350 mL    Voided: 2175 mL  Total OUT: 2525 mL    Total NET: -945 mL      05 Oct 2018 07:01  -  05 Oct 2018 22:46  --------------------------------------------------------  IN:    Oral Fluid: 520 mL  Total IN: 520 mL    OUT:    T-Tube: 175 mL    Voided: 560 mL  Total OUT: 735 mL    Total NET: -215 mL          ** PHYSICAL EXAM **    -- CONSTITUTIONAL: Alert, in NAD  -- PULMONARY: non-labored respirations  -- EXT: single   -- NEURO: A&Ox3    ** LABS **                          9.8    6.95  )-----------( 208      ( 05 Oct 2018 09:10 )             30.4     05 Oct 2018 07:15    135    |  97     |  17     ----------------------------<  104    3.7     |  31     |  0.85     Ca    8.8        05 Oct 2018 07:15  Phos  3.3       05 Oct 2018 07:15  Mg     2.0       05 Oct 2018 07:15    TPro  7.1    /  Alb  3.0    /  TBili  0.5    /  DBili  0.2    /  AST  30     /  ALT  45     /  AlkPhos  147    05 Oct 2018 07:15      CAPILLARY BLOOD GLUCOSE            LIVER FUNCTIONS - ( 05 Oct 2018 07:15 )  Alb: 3.0 g/dL / Pro: 7.1 g/dL / ALK PHOS: 147 U/L / ALT: 45 U/L / AST: 30 U/L / GGT: x                 MEDICATIONS  (STANDING):  aspirin  chewable 81 milliGRAM(s) Oral daily  aztreonam  IVPB 1000 milliGRAM(s) IV Intermittent every 8 hours  buDESOnide   0.5 milliGRAM(s) Respule 0.5 milliGRAM(s) Inhalation two times a day  chlorhexidine 4% Liquid 1 Application(s) Topical <User Schedule>  famotidine    Tablet 20 milliGRAM(s) Oral daily  fluticasone propionate 50 MICROgram(s)/spray Nasal Spray 1 Spray(s) Both Nostrils two times a day  influenza   Vaccine 0.5 milliLiter(s) IntraMuscular once  lidocaine   Patch 1 Patch Transdermal daily  metoprolol succinate ER 25 milliGRAM(s) Oral daily  pantoprazole    Tablet 40 milliGRAM(s) Oral before breakfast  potassium chloride  10 mEq/100 mL IVPB 10 milliEquivalent(s) IV Intermittent every 1 hour  vancomycin  IVPB 750 milliGRAM(s) IV Intermittent every 12 hours    MEDICATIONS  (PRN):  acetaminophen   Tablet .. 650 milliGRAM(s) Oral every 6 hours PRN Mild Pain (1 - 3)  ALBUTerol    90 MICROgram(s) HFA Inhaler 2 Puff(s) Inhalation every 6 hours PRN Shortness of Breath and/or Wheezing  bisacodyl 5 milliGRAM(s) Oral daily PRN Constipation  oxyCODONE    IR 5 milliGRAM(s) Oral every 4 hours PRN Moderate to severe pain  senna 2 Tablet(s) Oral at bedtime PRN Constipation        Assessment:  This is a 89yFemale PPC 0 s/p PICC placement     Plan:  - Notify PICC team regarding prelim PICC placement- LUE PICC with tip curving upward along the R superior   - Diet: REG  - OOB, ambulate as tolerated  - Encourage use of IS  - Monitor dressings  - DVT ppx General Surgery Post-Op Note    SUBJECTIVE:  This is a 89y Female PPC 0 s/p PICC placement for IV abx. Doing well overall. Denies pain at site of placement.     OBJECTIVE:     ** VITAL SIGNS / I&O's **    Vital Signs Last 24 Hrs  T(C): 37 (05 Oct 2018 21:49), Max: 37 (05 Oct 2018 21:49)  T(F): 98.6 (05 Oct 2018 21:49), Max: 98.6 (05 Oct 2018 21:49)  HR: 96 (05 Oct 2018 21:49) (70 - 96)  BP: 135/77 (05 Oct 2018 21:49) (105/65 - 135/77)  BP(mean): --  RR: 19 (05 Oct 2018 21:49) (18 - 20)  SpO2: 93% (05 Oct 2018 21:49) (93% - 99%)      04 Oct 2018 07:01  -  05 Oct 2018 07:00  --------------------------------------------------------  IN:    Oral Fluid: 880 mL    Solution: 500 mL    Solution: 200 mL  Total IN: 1580 mL    OUT:    T-Tube: 350 mL    Voided: 2175 mL  Total OUT: 2525 mL    Total NET: -945 mL      05 Oct 2018 07:01  -  05 Oct 2018 22:46  --------------------------------------------------------  IN:    Oral Fluid: 520 mL  Total IN: 520 mL    OUT:    T-Tube: 175 mL    Voided: 560 mL  Total OUT: 735 mL    Total NET: -215 mL          ** PHYSICAL EXAM **    -- CONSTITUTIONAL: Alert, in NAD  -- PULMONARY: non-labored respirations  -- EXT: single lumen PICC in place on LUE  -- NEURO: A&Ox3    ** LABS **                          9.8    6.95  )-----------( 208      ( 05 Oct 2018 09:10 )             30.4     05 Oct 2018 07:15    135    |  97     |  17     ----------------------------<  104    3.7     |  31     |  0.85     Ca    8.8        05 Oct 2018 07:15  Phos  3.3       05 Oct 2018 07:15  Mg     2.0       05 Oct 2018 07:15    TPro  7.1    /  Alb  3.0    /  TBili  0.5    /  DBili  0.2    /  AST  30     /  ALT  45     /  AlkPhos  147    05 Oct 2018 07:15      CAPILLARY BLOOD GLUCOSE            LIVER FUNCTIONS - ( 05 Oct 2018 07:15 )  Alb: 3.0 g/dL / Pro: 7.1 g/dL / ALK PHOS: 147 U/L / ALT: 45 U/L / AST: 30 U/L / GGT: x                 MEDICATIONS  (STANDING):  aspirin  chewable 81 milliGRAM(s) Oral daily  aztreonam  IVPB 1000 milliGRAM(s) IV Intermittent every 8 hours  buDESOnide   0.5 milliGRAM(s) Respule 0.5 milliGRAM(s) Inhalation two times a day  chlorhexidine 4% Liquid 1 Application(s) Topical <User Schedule>  famotidine    Tablet 20 milliGRAM(s) Oral daily  fluticasone propionate 50 MICROgram(s)/spray Nasal Spray 1 Spray(s) Both Nostrils two times a day  influenza   Vaccine 0.5 milliLiter(s) IntraMuscular once  lidocaine   Patch 1 Patch Transdermal daily  metoprolol succinate ER 25 milliGRAM(s) Oral daily  pantoprazole    Tablet 40 milliGRAM(s) Oral before breakfast  potassium chloride  10 mEq/100 mL IVPB 10 milliEquivalent(s) IV Intermittent every 1 hour  vancomycin  IVPB 750 milliGRAM(s) IV Intermittent every 12 hours    MEDICATIONS  (PRN):  acetaminophen   Tablet .. 650 milliGRAM(s) Oral every 6 hours PRN Mild Pain (1 - 3)  ALBUTerol    90 MICROgram(s) HFA Inhaler 2 Puff(s) Inhalation every 6 hours PRN Shortness of Breath and/or Wheezing  bisacodyl 5 milliGRAM(s) Oral daily PRN Constipation  oxyCODONE    IR 5 milliGRAM(s) Oral every 4 hours PRN Moderate to severe pain  senna 2 Tablet(s) Oral at bedtime PRN Constipation        Assessment:  This is a 89yFemale PPC 0 s/p PICC placement for IV abx    Plan:  - Notify PICC team regarding prelim PICC placement- LUE PICC with tip curving upward along the R superior heart border, likely in the azygous vein. F/u official report.   - Diet: REG  - OOB, ambulate as tolerated  - Encourage use of IS  - Monitor dressings  - DVT ppx    Joanna Ramirez, PGY-1  General Surgery Green Team [phone] General Surgery Post-Procedure Note    SUBJECTIVE:  This is a 89y Female PPC 0 s/p PICC placement for IV abx. Doing well overall. Denies pain at site of placement.     OBJECTIVE:     ** VITAL SIGNS / I&O's **    Vital Signs Last 24 Hrs  T(C): 37 (05 Oct 2018 21:49), Max: 37 (05 Oct 2018 21:49)  T(F): 98.6 (05 Oct 2018 21:49), Max: 98.6 (05 Oct 2018 21:49)  HR: 96 (05 Oct 2018 21:49) (70 - 96)  BP: 135/77 (05 Oct 2018 21:49) (105/65 - 135/77)  BP(mean): --  RR: 19 (05 Oct 2018 21:49) (18 - 20)  SpO2: 93% (05 Oct 2018 21:49) (93% - 99%)      04 Oct 2018 07:01  -  05 Oct 2018 07:00  --------------------------------------------------------  IN:    Oral Fluid: 880 mL    Solution: 500 mL    Solution: 200 mL  Total IN: 1580 mL    OUT:    T-Tube: 350 mL    Voided: 2175 mL  Total OUT: 2525 mL    Total NET: -945 mL      05 Oct 2018 07:01  -  05 Oct 2018 22:46  --------------------------------------------------------  IN:    Oral Fluid: 520 mL  Total IN: 520 mL    OUT:    T-Tube: 175 mL    Voided: 560 mL  Total OUT: 735 mL    Total NET: -215 mL          ** PHYSICAL EXAM **    -- CONSTITUTIONAL: Alert, in NAD  -- PULMONARY: non-labored respirations  -- EXT: single lumen PICC in place on LUE  -- NEURO: A&Ox3    ** LABS **                          9.8    6.95  )-----------( 208      ( 05 Oct 2018 09:10 )             30.4     05 Oct 2018 07:15    135    |  97     |  17     ----------------------------<  104    3.7     |  31     |  0.85     Ca    8.8        05 Oct 2018 07:15  Phos  3.3       05 Oct 2018 07:15  Mg     2.0       05 Oct 2018 07:15    TPro  7.1    /  Alb  3.0    /  TBili  0.5    /  DBili  0.2    /  AST  30     /  ALT  45     /  AlkPhos  147    05 Oct 2018 07:15      CAPILLARY BLOOD GLUCOSE            LIVER FUNCTIONS - ( 05 Oct 2018 07:15 )  Alb: 3.0 g/dL / Pro: 7.1 g/dL / ALK PHOS: 147 U/L / ALT: 45 U/L / AST: 30 U/L / GGT: x                 MEDICATIONS  (STANDING):  aspirin  chewable 81 milliGRAM(s) Oral daily  aztreonam  IVPB 1000 milliGRAM(s) IV Intermittent every 8 hours  buDESOnide   0.5 milliGRAM(s) Respule 0.5 milliGRAM(s) Inhalation two times a day  chlorhexidine 4% Liquid 1 Application(s) Topical <User Schedule>  famotidine    Tablet 20 milliGRAM(s) Oral daily  fluticasone propionate 50 MICROgram(s)/spray Nasal Spray 1 Spray(s) Both Nostrils two times a day  influenza   Vaccine 0.5 milliLiter(s) IntraMuscular once  lidocaine   Patch 1 Patch Transdermal daily  metoprolol succinate ER 25 milliGRAM(s) Oral daily  pantoprazole    Tablet 40 milliGRAM(s) Oral before breakfast  potassium chloride  10 mEq/100 mL IVPB 10 milliEquivalent(s) IV Intermittent every 1 hour  vancomycin  IVPB 750 milliGRAM(s) IV Intermittent every 12 hours    MEDICATIONS  (PRN):  acetaminophen   Tablet .. 650 milliGRAM(s) Oral every 6 hours PRN Mild Pain (1 - 3)  ALBUTerol    90 MICROgram(s) HFA Inhaler 2 Puff(s) Inhalation every 6 hours PRN Shortness of Breath and/or Wheezing  bisacodyl 5 milliGRAM(s) Oral daily PRN Constipation  oxyCODONE    IR 5 milliGRAM(s) Oral every 4 hours PRN Moderate to severe pain  senna 2 Tablet(s) Oral at bedtime PRN Constipation        Assessment:  This is a 89yFemale PPC 0 s/p PICC placement for IV abx    Plan:  - Notify PICC team regarding prelim PICC placement- LUE PICC with tip curving upward along the R superior heart border, likely in the azygous vein. F/u official report.   - Per IV nurse- need to have PICC re-positioned under guide wire by IR.   - F/U PICC team in the AM regarding this IR procedure  - Diet: REG  - OOB, ambulate as tolerated  - Encourage use of IS  - Monitor dressings  - DVT ppx    Joanna Ramirez, PGY-1  General Surgery Green Team [phone]

## 2018-10-05 NOTE — PROGRESS NOTE ADULT - ASSESSMENT
A: 89 yo woman with a hx of pulmonary fibrosis, Li's esophagus, CAD s/p stents (approx 5 years ago per daughters, on ASA), presents with a 1-day history of acute-onset epigastric abdominal pain, elevated LFTs and fevers. Picture c/w cholangitis - not a candidate for ERCP at the time as per GI. Patient underwent PCT placement which she tolerated well. transitioned from sicu to floor. S/p cholecystotomy tube check 10/3. S/p MRCP 10/4    P:  - F/u MRCP read  - PICC placement today  - regular diet  - c/w abx- AZT and vanc  - pain control  - cont home meds  - oob, ambulate  - PER GI: ERCP dep on MRCP results    Joanna Ramirez, PGY-1  General Surgery Green Team [phone] A: 89 yo woman with a hx of pulmonary fibrosis, Li's esophagus, CAD s/p stents (approx 5 years ago per daughters, on ASA), presents with a 1-day history of acute-onset epigastric abdominal pain, elevated LFTs and fevers. Picture c/w cholangitis - not a candidate for ERCP at the time as per GI. Patient underwent PCT placement which she tolerated well. transitioned from sicu to floor. S/p cholecystotomy tube check 10/3. GI requested MRCP, pt got it 10/4    P:  - F/u MRCP read  - PICC placement today  - regular diet  - c/w abx- AZT and vanc  - pain control  - cont home meds  - oob, ambulate  - PER GI: awaiting input for ERCP    Joanna Ramirez, PGY-1  General Surgery Green Team [phone]

## 2018-10-05 NOTE — PROGRESS NOTE ADULT - SUBJECTIVE AND OBJECTIVE BOX
PULMONARY PROGRESS NOTE    FELISA YANG  MRN-[id number]    Patient is a 89y old  Female who presents with a chief complaint of Abdominal pain (04 Oct 2018 08:33)      HPI:  breathing comfortably, on 2L nc.  no resp complaints, daughter says she is coughing less than she normally does at home.    ROS:   -pain controlled    MEDICATIONS  (STANDING):  aspirin  chewable 81 milliGRAM(s) Oral daily  aztreonam  IVPB 1000 milliGRAM(s) IV Intermittent every 8 hours  buDESOnide   0.5 milliGRAM(s) Respule 0.5 milliGRAM(s) Inhalation two times a day  chlorhexidine 4% Liquid 1 Application(s) Topical <User Schedule>  famotidine    Tablet 20 milliGRAM(s) Oral daily  fluticasone propionate 50 MICROgram(s)/spray Nasal Spray 1 Spray(s) Both Nostrils two times a day  influenza   Vaccine 0.5 milliLiter(s) IntraMuscular once  lidocaine   Patch 1 Patch Transdermal daily  metoprolol succinate ER 25 milliGRAM(s) Oral daily  pantoprazole    Tablet 40 milliGRAM(s) Oral before breakfast  potassium chloride  10 mEq/100 mL IVPB 10 milliEquivalent(s) IV Intermittent every 1 hour  vancomycin  IVPB 750 milliGRAM(s) IV Intermittent every 12 hours    MEDICATIONS  (PRN):  acetaminophen   Tablet .. 650 milliGRAM(s) Oral every 6 hours PRN Mild Pain (1 - 3)  ALBUTerol    90 MICROgram(s) HFA Inhaler 2 Puff(s) Inhalation every 6 hours PRN Shortness of Breath and/or Wheezing  bisacodyl 5 milliGRAM(s) Oral daily PRN Constipation  oxyCODONE    IR 5 milliGRAM(s) Oral every 4 hours PRN Moderate to severe pain  senna 2 Tablet(s) Oral at bedtime PRN Constipation        EXAM:  Vital Signs Last 24 Hrs  T(C): 36.6 (05 Oct 2018 09:34), Max: 36.9 (04 Oct 2018 22:18)  T(F): 97.8 (05 Oct 2018 09:34), Max: 98.4 (04 Oct 2018 22:18)  HR: 70 (05 Oct 2018 09:34) (68 - 101)  BP: 126/67 (05 Oct 2018 09:34) (114/66 - 131/82)  BP(mean): --  RR: 20 (05 Oct 2018 09:34) (18 - 20)  SpO2: 99% (05 Oct 2018 09:34) (82% - 99%)    GENERAL: The patient is awake and alert in no apparent distress.     LUNGS: diffuse dry crackles    HEART: S1/S2    LABS/IMAGING: reviewed                          9.8    6.95  )-----------( 208      ( 05 Oct 2018 09:10 )             30.4   10-05    135  |  97  |  17  ----------------------------<  104<H>  3.7   |  31  |  0.85    Ca    8.8      05 Oct 2018 07:15  Phos  3.3     10-05  Mg     2.0     10-05    TPro  7.1  /  Alb  3.0<L>  /  TBili  0.5  /  DBili  0.2  /  AST  30  /  ALT  45  /  AlkPhos  147<H>  10-05        < from: Xray Chest 1 View- PORTABLE-Routine (10.03.18 @ 09:07) >    IMPRESSION:    1.  Interstitial lung disease. No pneumothorax or new opacity.     < end of copied text >  89y Female with HEALTH ISSUES - PROBLEM Dx:  Pulmonary Fibrosis      RECS:  -Discussed case with GI and surgery today.    -patient is at increased risk for anesthesia given pulmonary fibrosis, however fibrosis is stable, cxr unchanged and she is on minimal O2.  She is currently as optimized as possible from a pulmonary standpoint.   -Risks/benefits of surgery/procedures need to be discussed with pt and family so they can make an informed decision regarding surgery vs. conservative management with abx/cholecystostomy tube.    -The pulmonary risks were discussed with patients family members at bedside including fibrosis flare secondary to intubation.  They understand.   -continue duonebs and PRN albuterol, supplemental O2.      Jenny Antonio MD   [phone]

## 2018-10-05 NOTE — PROGRESS NOTE ADULT - SUBJECTIVE AND OBJECTIVE BOX
General Surgery Progress Note    SUBJECTIVE:  The patient was seen and examined. No acute events overnight. MRCP was performed o/n. Pt tolerated procedure well. Denies N/V, +GIF. Pain well tolerated. Getting PICC today.     OBJECTIVE:     ** VITAL SIGNS / I&O's **    Vital Signs Last 24 Hrs  T(C): 36.5 (05 Oct 2018 02:09), Max: 37 (04 Oct 2018 05:09)  T(F): 97.7 (05 Oct 2018 02:09), Max: 98.6 (04 Oct 2018 05:09)  HR: 76 (05 Oct 2018 02:09) (68 - 101)  BP: 117/72 (05 Oct 2018 02:09) (114/72 - 134/75)  BP(mean): --  RR: 18 (05 Oct 2018 02:09) (18 - 18)  SpO2: 96% (05 Oct 2018 02:09) (95% - 99%)      03 Oct 2018 07:01  -  04 Oct 2018 07:00  --------------------------------------------------------  IN:    Oral Fluid: 1200 mL    Solution: 100 mL  Total IN: 1300 mL    OUT:    T-Tube: 270 mL    Voided: 1800 mL  Total OUT: 2070 mL    Total NET: -770 mL      04 Oct 2018 07:01  -  05 Oct 2018 04:59  --------------------------------------------------------  IN:    Oral Fluid: 880 mL    Solution: 150 mL    Solution: 500 mL  Total IN: 1530 mL    OUT:    T-Tube: 300 mL    Voided: 1725 mL  Total OUT: 2025 mL    Total NET: -495 mL          ** PHYSICAL EXAM **    -- CONSTITUTIONAL: Alert, NAD  -- PULMONARY: non-labored respirations  -- ABDOMEN: soft, non-distended, non-tender, IR drain in place with bilious output  -- NEURO: A&Ox3    ** LABS **                          10.7   8.0   )-----------( 215      ( 04 Oct 2018 08:50 )             32.7     04 Oct 2018 08:45    134    |  97     |  15     ----------------------------<  145    4.2     |  30     |  0.88     Ca    8.9        04 Oct 2018 08:45  Phos  3.1       04 Oct 2018 08:45  Mg     1.9       04 Oct 2018 08:45    TPro  7.4    /  Alb  3.2    /  TBili  0.5    /  DBili  x      /  AST  42     /  ALT  58     /  AlkPhos  156    04 Oct 2018 08:45    PT/INR - ( 03 Oct 2018 08:47 )   PT: 11.8 sec;   INR: 1.04 ratio         PTT - ( 03 Oct 2018 08:47 )  PTT:39.4 sec  CAPILLARY BLOOD GLUCOSE            LIVER FUNCTIONS - ( 04 Oct 2018 08:45 )  Alb: 3.2 g/dL / Pro: 7.4 g/dL / ALK PHOS: 156 U/L / ALT: 58 U/L / AST: 42 U/L / GGT: x                 MEDICATIONS  (STANDING):  aspirin  chewable 81 milliGRAM(s) Oral daily  aztreonam  IVPB 1000 milliGRAM(s) IV Intermittent every 8 hours  buDESOnide   0.5 milliGRAM(s) Respule 0.5 milliGRAM(s) Inhalation two times a day  chlorhexidine 4% Liquid 1 Application(s) Topical <User Schedule>  enoxaparin Injectable 40 milliGRAM(s) SubCutaneous daily  famotidine    Tablet 20 milliGRAM(s) Oral daily  fluticasone propionate 50 MICROgram(s)/spray Nasal Spray 1 Spray(s) Both Nostrils two times a day  influenza   Vaccine 0.5 milliLiter(s) IntraMuscular once  lidocaine   Patch 1 Patch Transdermal daily  metoprolol succinate ER 25 milliGRAM(s) Oral daily  pantoprazole    Tablet 40 milliGRAM(s) Oral before breakfast  vancomycin  IVPB 750 milliGRAM(s) IV Intermittent every 12 hours    MEDICATIONS  (PRN):  acetaminophen   Tablet .. 650 milliGRAM(s) Oral every 6 hours PRN Mild Pain (1 - 3)  ALBUTerol    90 MICROgram(s) HFA Inhaler 2 Puff(s) Inhalation every 6 hours PRN Shortness of Breath and/or Wheezing  bisacodyl 5 milliGRAM(s) Oral daily PRN Constipation  oxyCODONE    IR 5 milliGRAM(s) Oral every 4 hours PRN Moderate to severe pain  senna 2 Tablet(s) Oral at bedtime PRN Constipation

## 2018-10-05 NOTE — PROGRESS NOTE ADULT - SUBJECTIVE AND OBJECTIVE BOX
Patient is a 89y old  Female who presents with a chief complaint of Abdominal pain (05 Oct 2018 04:58)      SUBJECTIVE / OVERNIGHT EVENTS:  none  MEDICATIONS  (STANDING):  aspirin  chewable 81 milliGRAM(s) Oral daily  aztreonam  IVPB 1000 milliGRAM(s) IV Intermittent every 8 hours  buDESOnide   0.5 milliGRAM(s) Respule 0.5 milliGRAM(s) Inhalation two times a day  chlorhexidine 4% Liquid 1 Application(s) Topical <User Schedule>  famotidine    Tablet 20 milliGRAM(s) Oral daily  fluticasone propionate 50 MICROgram(s)/spray Nasal Spray 1 Spray(s) Both Nostrils two times a day  influenza   Vaccine 0.5 milliLiter(s) IntraMuscular once  lidocaine   Patch 1 Patch Transdermal daily  metoprolol succinate ER 25 milliGRAM(s) Oral daily  pantoprazole    Tablet 40 milliGRAM(s) Oral before breakfast  vancomycin  IVPB 750 milliGRAM(s) IV Intermittent every 12 hours    MEDICATIONS  (PRN):  acetaminophen   Tablet .. 650 milliGRAM(s) Oral every 6 hours PRN Mild Pain (1 - 3)  ALBUTerol    90 MICROgram(s) HFA Inhaler 2 Puff(s) Inhalation every 6 hours PRN Shortness of Breath and/or Wheezing  bisacodyl 5 milliGRAM(s) Oral daily PRN Constipation  oxyCODONE    IR 5 milliGRAM(s) Oral every 4 hours PRN Moderate to severe pain  senna 2 Tablet(s) Oral at bedtime PRN Constipation              PHYSICAL EXAM:  GENERAL: NAD, well-developed  HEAD:  Atraumatic, Normocephalic  EYES: EOMI, PERRLA, conjunctiva and sclera anicteric  NECK: Supple, No JVD  CHEST/LUNG: Crackles; No wheeze  HEART: Regular rate and rhythm; No murmurs, rubs, or gallops  ABDOMEN: Soft, Nontender, Nondistended; Bowel sounds present, no hepatosplenomegaly, no rebound or guarding, cholecystostomy tube  EXTREMITIES:  2+ Peripheral Pulses, No clubbing, cyanosis, or edema  PSYCH: AAOx3  NEUROLOGY: non-focal, no asterixis  SKIN: No rashes or lesion    LABS:                        10.7   8.0   )-----------( 215      ( 04 Oct 2018 08:50 )             32.7     10-05    135  |  97  |  17  ----------------------------<  104<H>  3.7   |  31  |  0.85    Ca    8.8      05 Oct 2018 07:15  Phos  3.3     10-05  Mg     2.0     10-05    TPro  7.1  /  Alb  3.0<L>  /  TBili  0.5  /  DBili  0.2  /  AST  30  /  ALT  45  /  AlkPhos  147<H>  10-05    LIVER FUNCTIONS - ( 05 Oct 2018 07:15 )  Alb: 3.0 g/dL / Pro: 7.1 g/dL / ALK PHOS: 147 U/L / ALT: 45 U/L / AST: 30 U/L / GGT: x                     RADIOLOGY & ADDITIONAL TESTS:

## 2018-10-05 NOTE — PROGRESS NOTE ADULT - SUBJECTIVE AND OBJECTIVE BOX
Chief complaint: cholecystitis  Interval hx: no acute events    Allergies:  penicillin (Angioedema)  penicillins (Swelling)      PAST MEDICAL & SURGICAL HISTORY:  Barretts esophagus  Essential hypertension  CAD (coronary artery disease)  Idiopathic pulmonary fibrosis  Li's Esophagus  CAD (Coronary Artery Disease)  HTN (Hypertension)  Pulmonary Fibrosis  S/P Coronary Artery Stent Placement  S/P Shoulder Surgery      FAMILY HISTORY:  No pertinent family history in first degree relatives      REVIEW OF SYSTEMS:  CONSTITUTIONAL: No fever, weight loss, or fatigue  EYES: No eye pain, visual disturbances, or discharge  NECK: No pain or stiffness  RESPIRATORY: No cough or wheezing, + baseline shortness of breath  CARDIOVASCULAR: No chest pain, palpitations, dizziness, or leg swelling  GASTROINTESTINAL: minimal pain over perc melanie tube. No nausea, vomiting, diarrhea or constipation  GENITOURINARY: No dysuria, urinary frequency or urgency, no hematuria  NEUROLOGICAL: No headaches, memory loss, loss of strength, numbness, or tremors  SKIN: No itching, burning, rashes, or lesions   MUSCULOSKELETAL: No joint pain or swelling; No muscle, back, or extremity pain      Medications:  MEDICATIONS  (STANDING):  aspirin  chewable 81 milliGRAM(s) Oral daily  aztreonam  IVPB 1000 milliGRAM(s) IV Intermittent every 8 hours  buDESOnide   0.5 milliGRAM(s) Respule 0.5 milliGRAM(s) Inhalation two times a day  chlorhexidine 4% Liquid 1 Application(s) Topical <User Schedule>  famotidine    Tablet 20 milliGRAM(s) Oral daily  fluticasone propionate 50 MICROgram(s)/spray Nasal Spray 1 Spray(s) Both Nostrils two times a day  influenza   Vaccine 0.5 milliLiter(s) IntraMuscular once  lidocaine   Patch 1 Patch Transdermal daily  metoprolol succinate ER 25 milliGRAM(s) Oral daily  pantoprazole    Tablet 40 milliGRAM(s) Oral before breakfast  vancomycin  IVPB 750 milliGRAM(s) IV Intermittent every 12 hours    MEDICATIONS  (PRN):  acetaminophen   Tablet .. 650 milliGRAM(s) Oral every 6 hours PRN Mild Pain (1 - 3)  ALBUTerol    90 MICROgram(s) HFA Inhaler 2 Puff(s) Inhalation every 6 hours PRN Shortness of Breath and/or Wheezing  bisacodyl 5 milliGRAM(s) Oral daily PRN Constipation  oxyCODONE    IR 5 milliGRAM(s) Oral every 4 hours PRN Moderate to severe pain  senna 2 Tablet(s) Oral at bedtime PRN Constipation    	    PHYSICAL EXAM:  T(C): 36.6 (10-05-18 @ 09:34), Max: 36.9 (10-04-18 @ 22:18)  HR: 70 (10-05-18 @ 09:34) (68 - 101)  BP: 126/67 (10-05-18 @ 09:34) (114/66 - 131/82)  RR: 20 (10-05-18 @ 09:34) (18 - 20)  SpO2: 99% (10-05-18 @ 09:34) (82% - 99%)  Wt(kg): --  I&O's Summary    04 Oct 2018 07:01  -  05 Oct 2018 07:00  --------------------------------------------------------  IN: 1580 mL / OUT: 2525 mL / NET: -945 mL    05 Oct 2018 07:01  -  05 Oct 2018 10:35  --------------------------------------------------------  IN: 280 mL / OUT: 125 mL / NET: 155 mL      Appearance: Normal	  HEENT:   NCAT, PERRL, EOMI	  Lymphatic: No lymphadenopathy  Cardiovascular: Normal S1 S2, RRR  Respiratory: Lungs clear to auscultation BL  Psychiatry: A & O x 3, Mood & affect appropriate  Gastrointestinal:  Soft, Non-tender, + BS, + cholecystostomy  Skin: No rashes, No ecchymoses, No cyanosis	  Neurologic: Non-focal  Extremities: Normal range of motion, No clubbing, cyanosis or edema    LABS:	 	    CARDIAC MARKERS:                                10.7   8.0   )-----------( 215      ( 04 Oct 2018 08:50 )             32.7     10-05    135  |  97  |  17  ----------------------------<  104<H>  3.7   |  31  |  0.85    Ca    8.8      05 Oct 2018 07:15  Phos  3.3     10-05  Mg     2.0     10-05    TPro  7.1  /  Alb  3.0<L>  /  TBili  0.5  /  DBili  0.2  /  AST  30  /  ALT  45  /  AlkPhos  147<H>  10-05    proBNP:   Lipid Profile:   HgA1c:   TSH:

## 2018-10-05 NOTE — PROGRESS NOTE ADULT - ASSESSMENT
Impression:  1. Cholangitis s/p percutenous cholecystostomy tube: clinically improved. IR study showing distal CBD stone but with no evidence of cholangitis at this time. Bilirubin still normal. ALP and aminotransferases mildly elevated.    2. Pulm fibrosis    3. Drop in hgb without overt GIB    4. E. coli bacteremia now clearing apparently.    Recommendation:  -case discussed with Dr. Antonio of pulmonary. She describes that the patient has severe pulmonary fibrosis and her anesthesia risk and risk of prolonged intubation is high. Therefore given the presence of the percutaneous tube which mitigates the risk of developing infection in the biliary system, it appears that the risks of ERCP outweigh the benefits at this time.    Stevan Abdi M.D.   [facility]  Contact: [phone]

## 2018-10-06 LAB
ALBUMIN SERPL ELPH-MCNC: 3.4 G/DL — SIGNIFICANT CHANGE UP (ref 3.3–5)
ALP SERPL-CCNC: 142 U/L — HIGH (ref 40–120)
ALT FLD-CCNC: 39 U/L — SIGNIFICANT CHANGE UP (ref 10–45)
ANION GAP SERPL CALC-SCNC: 9 MMOL/L — SIGNIFICANT CHANGE UP (ref 5–17)
AST SERPL-CCNC: 27 U/L — SIGNIFICANT CHANGE UP (ref 10–40)
BILIRUB DIRECT SERPL-MCNC: 0.3 MG/DL — HIGH (ref 0–0.2)
BILIRUB INDIRECT FLD-MCNC: 0.2 MG/DL — SIGNIFICANT CHANGE UP (ref 0.2–1)
BILIRUB SERPL-MCNC: 0.5 MG/DL — SIGNIFICANT CHANGE UP (ref 0.2–1.2)
BUN SERPL-MCNC: 18 MG/DL — SIGNIFICANT CHANGE UP (ref 7–23)
CALCIUM SERPL-MCNC: 9.2 MG/DL — SIGNIFICANT CHANGE UP (ref 8.4–10.5)
CHLORIDE SERPL-SCNC: 101 MMOL/L — SIGNIFICANT CHANGE UP (ref 96–108)
CO2 SERPL-SCNC: 29 MMOL/L — SIGNIFICANT CHANGE UP (ref 22–31)
CREAT SERPL-MCNC: 0.8 MG/DL — SIGNIFICANT CHANGE UP (ref 0.5–1.3)
CULTURE RESULTS: SIGNIFICANT CHANGE UP
CULTURE RESULTS: SIGNIFICANT CHANGE UP
GLUCOSE SERPL-MCNC: 88 MG/DL — SIGNIFICANT CHANGE UP (ref 70–99)
HCT VFR BLD CALC: 31.8 % — LOW (ref 34.5–45)
HGB BLD-MCNC: 10.2 G/DL — LOW (ref 11.5–15.5)
MAGNESIUM SERPL-MCNC: 2 MG/DL — SIGNIFICANT CHANGE UP (ref 1.6–2.6)
MCHC RBC-ENTMCNC: 31.9 PG — SIGNIFICANT CHANGE UP (ref 27–34)
MCHC RBC-ENTMCNC: 32.1 GM/DL — SIGNIFICANT CHANGE UP (ref 32–36)
MCV RBC AUTO: 99.4 FL — SIGNIFICANT CHANGE UP (ref 80–100)
PHOSPHATE SERPL-MCNC: 3.1 MG/DL — SIGNIFICANT CHANGE UP (ref 2.5–4.5)
PLATELET # BLD AUTO: 244 K/UL — SIGNIFICANT CHANGE UP (ref 150–400)
POTASSIUM SERPL-MCNC: 4.9 MMOL/L — SIGNIFICANT CHANGE UP (ref 3.5–5.3)
POTASSIUM SERPL-SCNC: 4.9 MMOL/L — SIGNIFICANT CHANGE UP (ref 3.5–5.3)
PROT SERPL-MCNC: 7.5 G/DL — SIGNIFICANT CHANGE UP (ref 6–8.3)
RBC # BLD: 3.2 M/UL — LOW (ref 3.8–5.2)
RBC # FLD: 14.5 % — SIGNIFICANT CHANGE UP (ref 10.3–14.5)
SODIUM SERPL-SCNC: 139 MMOL/L — SIGNIFICANT CHANGE UP (ref 135–145)
SPECIMEN SOURCE: SIGNIFICANT CHANGE UP
SPECIMEN SOURCE: SIGNIFICANT CHANGE UP
WBC # BLD: 7.82 K/UL — SIGNIFICANT CHANGE UP (ref 3.8–10.5)
WBC # FLD AUTO: 7.82 K/UL — SIGNIFICANT CHANGE UP (ref 3.8–10.5)

## 2018-10-06 PROCEDURE — 71045 X-RAY EXAM CHEST 1 VIEW: CPT | Mod: 26

## 2018-10-06 RX ADMIN — Medication 650 MILLIGRAM(S): at 00:58

## 2018-10-06 RX ADMIN — Medication 100 MILLIEQUIVALENT(S): at 02:34

## 2018-10-06 RX ADMIN — Medication 650 MILLIGRAM(S): at 21:50

## 2018-10-06 RX ADMIN — Medication 650 MILLIGRAM(S): at 01:28

## 2018-10-06 RX ADMIN — Medication 50 MILLIGRAM(S): at 21:15

## 2018-10-06 RX ADMIN — Medication 50 MILLIGRAM(S): at 14:06

## 2018-10-06 RX ADMIN — Medication 1 SPRAY(S): at 17:27

## 2018-10-06 RX ADMIN — LIDOCAINE 1 PATCH: 4 CREAM TOPICAL at 04:20

## 2018-10-06 RX ADMIN — Medication 650 MILLIGRAM(S): at 22:20

## 2018-10-06 RX ADMIN — CHLORHEXIDINE GLUCONATE 1 APPLICATION(S): 213 SOLUTION TOPICAL at 08:59

## 2018-10-06 RX ADMIN — Medication 250 MILLIGRAM(S): at 21:50

## 2018-10-06 RX ADMIN — Medication 25 MILLIGRAM(S): at 06:01

## 2018-10-06 RX ADMIN — OXYCODONE HYDROCHLORIDE 5 MILLIGRAM(S): 5 TABLET ORAL at 02:34

## 2018-10-06 RX ADMIN — ENOXAPARIN SODIUM 40 MILLIGRAM(S): 100 INJECTION SUBCUTANEOUS at 11:22

## 2018-10-06 RX ADMIN — Medication 50 MILLIGRAM(S): at 06:01

## 2018-10-06 RX ADMIN — Medication 100 MILLIEQUIVALENT(S): at 06:00

## 2018-10-06 RX ADMIN — LIDOCAINE 1 PATCH: 4 CREAM TOPICAL at 11:24

## 2018-10-06 RX ADMIN — Medication 250 MILLIGRAM(S): at 11:35

## 2018-10-06 RX ADMIN — OXYCODONE HYDROCHLORIDE 5 MILLIGRAM(S): 5 TABLET ORAL at 03:04

## 2018-10-06 RX ADMIN — Medication 1 SPRAY(S): at 06:01

## 2018-10-06 RX ADMIN — PANTOPRAZOLE SODIUM 40 MILLIGRAM(S): 20 TABLET, DELAYED RELEASE ORAL at 06:01

## 2018-10-06 RX ADMIN — Medication 0.5 MILLIGRAM(S): at 17:29

## 2018-10-06 RX ADMIN — Medication 100 MILLIEQUIVALENT(S): at 00:51

## 2018-10-06 RX ADMIN — FAMOTIDINE 20 MILLIGRAM(S): 10 INJECTION INTRAVENOUS at 11:22

## 2018-10-06 RX ADMIN — LIDOCAINE 1 PATCH: 4 CREAM TOPICAL at 23:00

## 2018-10-06 RX ADMIN — Medication 81 MILLIGRAM(S): at 11:22

## 2018-10-06 RX ADMIN — Medication 0.5 MILLIGRAM(S): at 06:02

## 2018-10-06 NOTE — DOWNTIME INTERRUPTION NOTE - WHICH MANUAL FORMS INITIATED?
documentation hold for POC and A&I flowsheets and Adult Patient Profile. See paper record for additional documentation recorded during downtime.

## 2018-10-06 NOTE — PROVIDER CONTACT NOTE (OTHER) - SITUATION
Per Central line nurse, CXR prelim report indicates patient's Left SL PICC placed today must be adjusted in IR under guidewire

## 2018-10-06 NOTE — PROGRESS NOTE ADULT - ASSESSMENT
89 year old female with PMH Li's esophagus, CAD s/p stent (5-6 yrs ago, on aspirin 81mg), pulmonary fibrosis, p/w cholecystitis and biliary obstruction, now s/p perc cholecystostomy:  1. Cholecystitis, biliary obstruction s/p perc melanie, care and diet per Surg, abx per ID, pain control PRN, incentive spirometry, OOB, await GI second opinion reg ERCP  2. Pulmonary fibrosis - c/w inhalers per Pulm  3. CAD - stable, c/w ASA, BB  4. DVT prophylaxis

## 2018-10-06 NOTE — PROGRESS NOTE ADULT - SUBJECTIVE AND OBJECTIVE BOX
General Surgery Progress Note    SUBJECTIVE:  The patient was seen and examined. No acute events overnight. Received PICC o/n, however, it is misplaced and needs to be exchanged under IR guide wire. Pt tolerated procedure well. Does not c/o pain. Denies N/V.     OBJECTIVE:     ** VITAL SIGNS / I&O's **    Vital Signs Last 24 Hrs  T(C): 36.8 (06 Oct 2018 01:01), Max: 37 (05 Oct 2018 21:49)  T(F): 98.3 (06 Oct 2018 01:01), Max: 98.6 (05 Oct 2018 21:49)  HR: 78 (06 Oct 2018 01:01) (70 - 96)  BP: 136/81 (06 Oct 2018 01:01) (105/65 - 136/81)  BP(mean): --  RR: 18 (06 Oct 2018 01:01) (18 - 20)  SpO2: 99% (06 Oct 2018 01:01) (93% - 99%)      04 Oct 2018 07:01  -  05 Oct 2018 07:00  --------------------------------------------------------  IN:    Oral Fluid: 880 mL    Solution: 500 mL    Solution: 200 mL  Total IN: 1580 mL    OUT:    T-Tube: 350 mL    Voided: 2175 mL  Total OUT: 2525 mL    Total NET: -945 mL      05 Oct 2018 07:01  -  06 Oct 2018 02:05  --------------------------------------------------------  IN:    Oral Fluid: 520 mL  Total IN: 520 mL    OUT:    T-Tube: 225 mL    Voided: 860 mL  Total OUT: 1085 mL    Total NET: -565 mL          ** PHYSICAL EXAM **    -- CONSTITUTIONAL: Alert, NAD  -- PULMONARY: non-labored respirations  -- EXT: LUE PICC, surrounding area non-erythematous  -- ABDOMEN: soft, non-distended, non-tender, IR drains x2 with bilious output  -- NEURO: A&Ox3    ** LABS **                          9.8    6.95  )-----------( 208      ( 05 Oct 2018 09:10 )             30.4     05 Oct 2018 07:15    135    |  97     |  17     ----------------------------<  104    3.7     |  31     |  0.85     Ca    8.8        05 Oct 2018 07:15  Phos  3.3       05 Oct 2018 07:15  Mg     2.0       05 Oct 2018 07:15    TPro  7.1    /  Alb  3.0    /  TBili  0.5    /  DBili  0.2    /  AST  30     /  ALT  45     /  AlkPhos  147    05 Oct 2018 07:15      CAPILLARY BLOOD GLUCOSE            LIVER FUNCTIONS - ( 05 Oct 2018 07:15 )  Alb: 3.0 g/dL / Pro: 7.1 g/dL / ALK PHOS: 147 U/L / ALT: 45 U/L / AST: 30 U/L / GGT: x                 MEDICATIONS  (STANDING):  aspirin  chewable 81 milliGRAM(s) Oral daily  aztreonam  IVPB 1000 milliGRAM(s) IV Intermittent every 8 hours  buDESOnide   0.5 milliGRAM(s) Respule 0.5 milliGRAM(s) Inhalation two times a day  chlorhexidine 4% Liquid 1 Application(s) Topical <User Schedule>  enoxaparin Injectable 40 milliGRAM(s) SubCutaneous daily  famotidine    Tablet 20 milliGRAM(s) Oral daily  fluticasone propionate 50 MICROgram(s)/spray Nasal Spray 1 Spray(s) Both Nostrils two times a day  influenza   Vaccine 0.5 milliLiter(s) IntraMuscular once  lidocaine   Patch 1 Patch Transdermal daily  metoprolol succinate ER 25 milliGRAM(s) Oral daily  pantoprazole    Tablet 40 milliGRAM(s) Oral before breakfast  potassium chloride  10 mEq/100 mL IVPB 10 milliEquivalent(s) IV Intermittent every 1 hour  vancomycin  IVPB 750 milliGRAM(s) IV Intermittent every 12 hours    MEDICATIONS  (PRN):  acetaminophen   Tablet .. 650 milliGRAM(s) Oral every 6 hours PRN Mild Pain (1 - 3)  ALBUTerol    90 MICROgram(s) HFA Inhaler 2 Puff(s) Inhalation every 6 hours PRN Shortness of Breath and/or Wheezing  bisacodyl 5 milliGRAM(s) Oral daily PRN Constipation  oxyCODONE    IR 5 milliGRAM(s) Oral every 4 hours PRN Moderate to severe pain  senna 2 Tablet(s) Oral at bedtime PRN Constipation

## 2018-10-06 NOTE — PROGRESS NOTE ADULT - ASSESSMENT
A: 89 yo woman with a hx of pulmonary fibrosis, Li's esophagus, CAD s/p stents (approx 5 years ago per daughters, on ASA), presents with a 1-day history of acute-onset epigastric abdominal pain, elevated LFTs and fevers. Picture c/w cholangitis - not a candidate for ERCP at the time as per GI. Patient underwent PCT placement which she tolerated well. transitioned from sicu to floor. S/p cholecystotomy tube check 10/3. S/p MRCP 10/4. S/p PICC placement 10/5.     P:  - Notify PICC team regarding prelim PICC placement- LUE PICC with tip curving upward along the R superior heart border, likely in the azygous vein. F/u official report.   - Per IV nurse- need to have PICC re-positioned under guide wire by IR.   - F/U PICC team in the AM regarding this IR procedure  - Diet: REG  - c/w abx- AZT and vanc  - OOB, ambulate as tolerated  - Encourage use of IS  - Monitor dressings  - DVT ppx  - F/u with GI second opinion reg ERCP   - F/u pt's pulmonologist: Dr. Travis Medina ([id number])      Joanna Ramirez, PGY-1  General Surgery Green Team [phone]

## 2018-10-06 NOTE — PROGRESS NOTE ADULT - SUBJECTIVE AND OBJECTIVE BOX
Chief complaint: cholecystitis  Interval hx: no acute events    Allergies:  penicillin (Angioedema)  penicillins (Swelling)      PAST MEDICAL & SURGICAL HISTORY:  Barretts esophagus  Essential hypertension  CAD (coronary artery disease)  Idiopathic pulmonary fibrosis  Li's Esophagus  CAD (Coronary Artery Disease)  HTN (Hypertension)  Pulmonary Fibrosis  S/P Coronary Artery Stent Placement  S/P Shoulder Surgery      FAMILY HISTORY:  No pertinent family history in first degree relatives      REVIEW OF SYSTEMS:  CONSTITUTIONAL: No fever, weight loss, or fatigue  EYES: No eye pain, visual disturbances, or discharge  NECK: No pain or stiffness  RESPIRATORY: No cough or wheezing, + baseline shortness of breath  CARDIOVASCULAR: No chest pain, palpitations, dizziness, or leg swelling  GASTROINTESTINAL: minimal pain over perc melanie tube. No nausea, vomiting, diarrhea or constipation  GENITOURINARY: No dysuria, urinary frequency or urgency, no hematuria  NEUROLOGICAL: No headaches, memory loss, loss of strength, numbness, or tremors  SKIN: No itching, burning, rashes, or lesions   MUSCULOSKELETAL: No joint pain or swelling; No muscle, back, or extremity pain      Medications:  MEDICATIONS  (STANDING):  aspirin  chewable 81 milliGRAM(s) Oral daily  aztreonam  IVPB 1000 milliGRAM(s) IV Intermittent every 8 hours  buDESOnide   0.5 milliGRAM(s) Respule 0.5 milliGRAM(s) Inhalation two times a day  chlorhexidine 4% Liquid 1 Application(s) Topical <User Schedule>  enoxaparin Injectable 40 milliGRAM(s) SubCutaneous daily  famotidine    Tablet 20 milliGRAM(s) Oral daily  fluticasone propionate 50 MICROgram(s)/spray Nasal Spray 1 Spray(s) Both Nostrils two times a day  influenza   Vaccine 0.5 milliLiter(s) IntraMuscular once  lidocaine   Patch 1 Patch Transdermal daily  metoprolol succinate ER 25 milliGRAM(s) Oral daily  pantoprazole    Tablet 40 milliGRAM(s) Oral before breakfast  vancomycin  IVPB 750 milliGRAM(s) IV Intermittent every 12 hours    MEDICATIONS  (PRN):  acetaminophen   Tablet .. 650 milliGRAM(s) Oral every 6 hours PRN Mild Pain (1 - 3)  ALBUTerol    90 MICROgram(s) HFA Inhaler 2 Puff(s) Inhalation every 6 hours PRN Shortness of Breath and/or Wheezing  bisacodyl 5 milliGRAM(s) Oral daily PRN Constipation  oxyCODONE    IR 5 milliGRAM(s) Oral every 4 hours PRN Moderate to severe pain  senna 2 Tablet(s) Oral at bedtime PRN Constipation    	    PHYSICAL EXAM:  T(C): 36.7 (10-06-18 @ 09:32), Max: 37 (10-05-18 @ 21:49)  HR: 80 (10-06-18 @ 09:32) (70 - 96)  BP: 114/70 (10-06-18 @ 09:32) (105/65 - 136/81)  RR: 20 (10-06-18 @ 09:32) (18 - 20)  SpO2: 99% (10-06-18 @ 09:32) (93% - 100%)  Wt(kg): --  I&O's Summary    05 Oct 2018 07:01  -  06 Oct 2018 07:00  --------------------------------------------------------  IN: 1270 mL / OUT: 2035 mL / NET: -765 mL    06 Oct 2018 07:01  -  06 Oct 2018 10:41  --------------------------------------------------------  IN: 240 mL / OUT: 225 mL / NET: 15 mL      Appearance: Normal	  HEENT:   NCAT, PERRL, EOMI	  Lymphatic: No lymphadenopathy  Cardiovascular: Normal S1 S2, RRR  Respiratory: Lungs clear to auscultation BL  Psychiatry: A & O x 3, Mood & affect appropriate  Gastrointestinal:  Soft, Non-tender, + BS, + cholecystostomy  Skin: No rashes, No ecchymoses, No cyanosis	  Neurologic: Non-focal  Extremities: Normal range of motion, No clubbing, cyanosis or edema    LABS:	 	    CARDIAC MARKERS:                                9.8    6.95  )-----------( 208      ( 05 Oct 2018 09:10 )             30.4     10-06    139  |  101  |  18  ----------------------------<  88  4.9   |  29  |  0.80    Ca    9.2      06 Oct 2018 07:00  Phos  3.1     10-06  Mg     2.0     10-06    TPro  7.5  /  Alb  3.4  /  TBili  0.5  /  DBili  0.3<H>  /  AST  27  /  ALT  39  /  AlkPhos  142<H>  10-06    proBNP:   Lipid Profile:   HgA1c:   TSH:

## 2018-10-07 LAB — VANCOMYCIN TROUGH SERPL-MCNC: 30.8 UG/ML — CRITICAL HIGH (ref 10–20)

## 2018-10-07 RX ORDER — VANCOMYCIN HCL 1 G
750 VIAL (EA) INTRAVENOUS EVERY 24 HOURS
Qty: 0 | Refills: 0 | Status: DISCONTINUED | OUTPATIENT
Start: 2018-10-07 | End: 2018-10-08

## 2018-10-07 RX ORDER — SODIUM CHLORIDE 9 MG/ML
1000 INJECTION, SOLUTION INTRAVENOUS
Qty: 0 | Refills: 0 | Status: DISCONTINUED | OUTPATIENT
Start: 2018-10-08 | End: 2018-10-08

## 2018-10-07 RX ADMIN — Medication 50 MILLIGRAM(S): at 22:58

## 2018-10-07 RX ADMIN — Medication 650 MILLIGRAM(S): at 22:33

## 2018-10-07 RX ADMIN — Medication 50 MILLIGRAM(S): at 05:43

## 2018-10-07 RX ADMIN — Medication 250 MILLIGRAM(S): at 14:57

## 2018-10-07 RX ADMIN — CHLORHEXIDINE GLUCONATE 1 APPLICATION(S): 213 SOLUTION TOPICAL at 11:07

## 2018-10-07 RX ADMIN — FAMOTIDINE 20 MILLIGRAM(S): 10 INJECTION INTRAVENOUS at 12:09

## 2018-10-07 RX ADMIN — ENOXAPARIN SODIUM 40 MILLIGRAM(S): 100 INJECTION SUBCUTANEOUS at 12:09

## 2018-10-07 RX ADMIN — Medication 1 SPRAY(S): at 18:30

## 2018-10-07 RX ADMIN — Medication 0.5 MILLIGRAM(S): at 05:43

## 2018-10-07 RX ADMIN — Medication 650 MILLIGRAM(S): at 22:03

## 2018-10-07 RX ADMIN — Medication 50 MILLIGRAM(S): at 15:03

## 2018-10-07 RX ADMIN — Medication 0.5 MILLIGRAM(S): at 23:03

## 2018-10-07 RX ADMIN — PANTOPRAZOLE SODIUM 40 MILLIGRAM(S): 20 TABLET, DELAYED RELEASE ORAL at 05:43

## 2018-10-07 RX ADMIN — Medication 650 MILLIGRAM(S): at 06:15

## 2018-10-07 RX ADMIN — LIDOCAINE 1 PATCH: 4 CREAM TOPICAL at 12:09

## 2018-10-07 RX ADMIN — Medication 1 SPRAY(S): at 06:55

## 2018-10-07 RX ADMIN — Medication 81 MILLIGRAM(S): at 12:09

## 2018-10-07 RX ADMIN — Medication 650 MILLIGRAM(S): at 05:44

## 2018-10-07 NOTE — PROGRESS NOTE ADULT - ASSESSMENT
89 year old female with PMH Li's esophagus, CAD s/p stent (5-6 yrs ago, on aspirin 81mg), pulmonary fibrosis, p/w cholecystitis and biliary obstruction, now s/p perc cholecystostomy:  1. Cholecystitis, biliary obstruction s/p perc melanie, care and diet per Surg, abx per ID, pain control PRN,   incentive spirometry, OOB,   gi f/u for ercp  r hand pain ? carpal tunnel  can have out pt f/u and work up  2. Pulmonary fibrosis - c/w inhalers per Pulm  3. CAD - stable, c/w ASA, BB  4. DVT prophylaxis

## 2018-10-07 NOTE — PROGRESS NOTE ADULT - SUBJECTIVE AND OBJECTIVE BOX
Surgery Progress Note     Subjective/24hour Events:   Patient seen and examined.  No acute events overnight.   Pain well controlled.   1xBM.     Vital Signs:  Vital Signs Last 24 Hrs  T(C): 36.9 (07 Oct 2018 01:25), Max: 36.9 (07 Oct 2018 01:25)  T(F): 98.4 (07 Oct 2018 01:25), Max: 98.4 (07 Oct 2018 01:25)  HR: 70 (07 Oct 2018 01:25) (70 - 82)  BP: 108/69 (07 Oct 2018 01:25) (104/62 - 120/78)  BP(mean): --  RR: 19 (07 Oct 2018 01:25) (19 - 20)  SpO2: 97% (07 Oct 2018 01:25) (94% - 100%)    CAPILLARY BLOOD GLUCOSE          I&O's Detail    05 Oct 2018 07:01  -  06 Oct 2018 07:00  --------------------------------------------------------  IN:    Oral Fluid: 520 mL    Solution: 200 mL    Solution: 250 mL    Solution: 300 mL  Total IN: 1270 mL    OUT:    T-Tube: 325 mL    Voided: 1710 mL  Total OUT: 2035 mL    Total NET: -765 mL      06 Oct 2018 07:01  -  07 Oct 2018 02:11  --------------------------------------------------------  IN:    Oral Fluid: 820 mL    Solution: 300 mL  Total IN: 1120 mL    OUT:    T-Tube: 365 mL    Voided: 1225 mL  Total OUT: 1590 mL    Total NET: -470 mL          MEDICATIONS  (STANDING):  aspirin  chewable 81 milliGRAM(s) Oral daily  aztreonam  IVPB 1000 milliGRAM(s) IV Intermittent every 8 hours  buDESOnide   0.5 milliGRAM(s) Respule 0.5 milliGRAM(s) Inhalation two times a day  chlorhexidine 4% Liquid 1 Application(s) Topical <User Schedule>  enoxaparin Injectable 40 milliGRAM(s) SubCutaneous daily  famotidine    Tablet 20 milliGRAM(s) Oral daily  fluticasone propionate 50 MICROgram(s)/spray Nasal Spray 1 Spray(s) Both Nostrils two times a day  influenza   Vaccine 0.5 milliLiter(s) IntraMuscular once  lidocaine   Patch 1 Patch Transdermal daily  metoprolol succinate ER 25 milliGRAM(s) Oral daily  pantoprazole    Tablet 40 milliGRAM(s) Oral before breakfast  vancomycin  IVPB 750 milliGRAM(s) IV Intermittent every 12 hours    MEDICATIONS  (PRN):  acetaminophen   Tablet .. 650 milliGRAM(s) Oral every 6 hours PRN Mild Pain (1 - 3)  ALBUTerol    90 MICROgram(s) HFA Inhaler 2 Puff(s) Inhalation every 6 hours PRN Shortness of Breath and/or Wheezing  bisacodyl 5 milliGRAM(s) Oral daily PRN Constipation  oxyCODONE    IR 5 milliGRAM(s) Oral every 4 hours PRN Moderate to severe pain  senna 2 Tablet(s) Oral at bedtime PRN Constipation      Physical Exam:  Gen: NAD, laying comfortably in bed.  Lungs: Non labored breathing.   Ab: Soft, nontender, nondistended, drains in place.  Ext: Moves all 4 spontaneously.     Labs:    10-06    139  |  101  |  18  ----------------------------<  88  4.9   |  29  |  0.80    Ca    9.2      06 Oct 2018 07:00  Phos  3.1     10-06  Mg     2.0     10-06    TPro  7.5  /  Alb  3.4  /  TBili  0.5  /  DBili  0.3<H>  /  AST  27  /  ALT  39  /  AlkPhos  142<H>  10-06    LIVER FUNCTIONS - ( 06 Oct 2018 07:00 )  Alb: 3.4 g/dL / Pro: 7.5 g/dL / ALK PHOS: 142 U/L / ALT: 39 U/L / AST: 27 U/L / GGT: x                                 10.2   7.82  )-----------( 244      ( 06 Oct 2018 08:45 )             31.8         Imaging:

## 2018-10-07 NOTE — PROGRESS NOTE ADULT - ASSESSMENT
89F with a hx of pulmonary fibrosis, Li's esophagus, CAD s/p stents (approx 5 years ago per daughters, on ASA), presents with a 1-day history of acute-onset epigastric abdominal pain, elevated LFTs and fevers. Picture c/w cholangitis - not a candidate for ERCP at the time as per GI. Patient underwent PCT placement which she tolerated well. transitioned from sicu to floor. S/p cholecystotomy tube check 10/3. S/p MRCP 10/4. S/p PICC placement 10/5.     PLAN:   - Diet: REG  - c/w abx- AZT and vanc  - OOB, ambulate as tolerated  - Encourage use of IS  - Monitor dressings  - DVT ppx  - F/u with GI second opinion reg ERCP   - F/u pt's pulmonologist: Dr. Travis Medina ([phone])

## 2018-10-07 NOTE — PROGRESS NOTE ADULT - SUBJECTIVE AND OBJECTIVE BOX
CHIEF COMPLAINT:Patient is a 89y old  Female who presents with a chief complaint of Abdominal pain (07 Oct 2018 02:11)    	        PAST MEDICAL & SURGICAL HISTORY:  Barretts esophagus  Essential hypertension  CAD (coronary artery disease)  Idiopathic pulmonary fibrosis  Li's Esophagus  CAD (Coronary Artery Disease)  HTN (Hypertension)  Pulmonary Fibrosis  S/P Coronary Artery Stent Placement  S/P Shoulder Surgery          REVIEW OF SYSTEMS:  CONSTITUTIONAL: No fever, weight loss, or fatigue  EYES: No eye pain, visual disturbances, or discharge  NECK: No pain or stiffness  RESPIRATORY: No cough, wheezing, chills or hemoptysis; No Shortness of Breath  CARDIOVASCULAR: No chest pain, palpitations, passing out, dizziness, or leg swelling  GASTROINTESTINAL: No abdominal or epigastric pain. No nausea, vomiting, or hematemesis; No diarrhea or constipation. No melena or hematochezia.  GENITOURINARY: No dysuria, frequency, hematuria, or incontinence  NEUROLOGICAL: No headaches, memory loss, loss of strength, numbness, or tremors  some r hand pain     Medications:  MEDICATIONS  (STANDING):  aspirin  chewable 81 milliGRAM(s) Oral daily  aztreonam  IVPB 1000 milliGRAM(s) IV Intermittent every 8 hours  buDESOnide   0.5 milliGRAM(s) Respule 0.5 milliGRAM(s) Inhalation two times a day  chlorhexidine 4% Liquid 1 Application(s) Topical <User Schedule>  enoxaparin Injectable 40 milliGRAM(s) SubCutaneous daily  famotidine    Tablet 20 milliGRAM(s) Oral daily  fluticasone propionate 50 MICROgram(s)/spray Nasal Spray 1 Spray(s) Both Nostrils two times a day  influenza   Vaccine 0.5 milliLiter(s) IntraMuscular once  lidocaine   Patch 1 Patch Transdermal daily  metoprolol succinate ER 25 milliGRAM(s) Oral daily  pantoprazole    Tablet 40 milliGRAM(s) Oral before breakfast  vancomycin  IVPB 750 milliGRAM(s) IV Intermittent every 12 hours    MEDICATIONS  (PRN):  acetaminophen   Tablet .. 650 milliGRAM(s) Oral every 6 hours PRN Mild Pain (1 - 3)  ALBUTerol    90 MICROgram(s) HFA Inhaler 2 Puff(s) Inhalation every 6 hours PRN Shortness of Breath and/or Wheezing  bisacodyl 5 milliGRAM(s) Oral daily PRN Constipation  oxyCODONE    IR 5 milliGRAM(s) Oral every 4 hours PRN Moderate to severe pain  senna 2 Tablet(s) Oral at bedtime PRN Constipation    	    PHYSICAL EXAM:  T(C): 36.8 (10-07-18 @ 05:01), Max: 36.9 (10-07-18 @ 01:25)  HR: 68 (10-07-18 @ 05:01) (68 - 82)  BP: 106/57 (10-07-18 @ 05:01) (104/62 - 113/70)  RR: 18 (10-07-18 @ 05:01) (18 - 20)  SpO2: 99% (10-07-18 @ 05:01) (94% - 100%)  Wt(kg): --  I&O's Summary    06 Oct 2018 07:01  -  07 Oct 2018 07:00  --------------------------------------------------------  IN: 1120 mL / OUT: 1980 mL / NET: -860 mL    07 Oct 2018 07:01  -  07 Oct 2018 10:02  --------------------------------------------------------  IN: 0 mL / OUT: 500 mL / NET: -500 mL        Appearance: Normal	  HEENT:   Normal oral mucosa, PERRL, EOMI	  Lymphatic: No lymphadenopathy  Cardiovascular: Normal S1 S2, No JVD, No murmurs, No edema  Respiratory: Lungs clear to auscultation	  Psychiatry: A & O x 3, Mood & affect appropriate  Gastrointestinal:  Soft, Non-tender, + BS	  Skin: No rashes, No ecchymoses, No cyanosis	  Neurologic: Non-focal  Extremities: Normal range of motion, No clubbing, cyanosis or edema  Vascular: Peripheral pulses palpable 2+ bilaterally    TELEMETRY: 	    ECG:  	  RADIOLOGY:  OTHER: 	  	  LABS:	 	    CARDIAC MARKERS:                                10.2   7.82  )-----------( 244      ( 06 Oct 2018 08:45 )             31.8     10-06    139  |  101  |  18  ----------------------------<  88  4.9   |  29  |  0.80    Ca    9.2      06 Oct 2018 07:00  Phos  3.1     10-06  Mg     2.0     10-06    TPro  7.5  /  Alb  3.4  /  TBili  0.5  /  DBili  0.3<H>  /  AST  27  /  ALT  39  /  AlkPhos  142<H>  10-06    proBNP:   Lipid Profile:   HgA1c:   TSH:

## 2018-10-07 NOTE — PROGRESS NOTE ADULT - ATTENDING COMMENTS
Patient seen/examined.  Agree w above note and plan and have discussed plan w house staff.  Tube draining well.  Abdomen soft    Darvin Elise MD

## 2018-10-08 LAB
ALBUMIN SERPL ELPH-MCNC: 3.2 G/DL — LOW (ref 3.3–5)
ALP SERPL-CCNC: 119 U/L — SIGNIFICANT CHANGE UP (ref 40–120)
ALT FLD-CCNC: 27 U/L — SIGNIFICANT CHANGE UP (ref 10–45)
ANION GAP SERPL CALC-SCNC: 7 MMOL/L — SIGNIFICANT CHANGE UP (ref 5–17)
AST SERPL-CCNC: 22 U/L — SIGNIFICANT CHANGE UP (ref 10–40)
BILIRUB SERPL-MCNC: 0.4 MG/DL — SIGNIFICANT CHANGE UP (ref 0.2–1.2)
BUN SERPL-MCNC: 18 MG/DL — SIGNIFICANT CHANGE UP (ref 7–23)
CALCIUM SERPL-MCNC: 8.7 MG/DL — SIGNIFICANT CHANGE UP (ref 8.4–10.5)
CHLORIDE SERPL-SCNC: 97 MMOL/L — SIGNIFICANT CHANGE UP (ref 96–108)
CO2 SERPL-SCNC: 33 MMOL/L — HIGH (ref 22–31)
CREAT SERPL-MCNC: 0.74 MG/DL — SIGNIFICANT CHANGE UP (ref 0.5–1.3)
GLUCOSE SERPL-MCNC: 75 MG/DL — SIGNIFICANT CHANGE UP (ref 70–99)
HCT VFR BLD CALC: 29.1 % — LOW (ref 34.5–45)
HGB BLD-MCNC: 9.3 G/DL — LOW (ref 11.5–15.5)
MAGNESIUM SERPL-MCNC: 2.1 MG/DL — SIGNIFICANT CHANGE UP (ref 1.6–2.6)
MCHC RBC-ENTMCNC: 32 GM/DL — SIGNIFICANT CHANGE UP (ref 32–36)
MCHC RBC-ENTMCNC: 32.3 PG — SIGNIFICANT CHANGE UP (ref 27–34)
MCV RBC AUTO: 101 FL — HIGH (ref 80–100)
PHOSPHATE SERPL-MCNC: 3.4 MG/DL — SIGNIFICANT CHANGE UP (ref 2.5–4.5)
PLATELET # BLD AUTO: 268 K/UL — SIGNIFICANT CHANGE UP (ref 150–400)
POTASSIUM SERPL-MCNC: 4 MMOL/L — SIGNIFICANT CHANGE UP (ref 3.5–5.3)
POTASSIUM SERPL-SCNC: 4 MMOL/L — SIGNIFICANT CHANGE UP (ref 3.5–5.3)
PROT SERPL-MCNC: 7.3 G/DL — SIGNIFICANT CHANGE UP (ref 6–8.3)
RBC # BLD: 2.88 M/UL — LOW (ref 3.8–5.2)
RBC # FLD: 14.5 % — SIGNIFICANT CHANGE UP (ref 10.3–14.5)
SODIUM SERPL-SCNC: 137 MMOL/L — SIGNIFICANT CHANGE UP (ref 135–145)
VANCOMYCIN TROUGH SERPL-MCNC: 17.3 UG/ML — SIGNIFICANT CHANGE UP (ref 10–20)
WBC # BLD: 6.89 K/UL — SIGNIFICANT CHANGE UP (ref 3.8–10.5)
WBC # FLD AUTO: 6.89 K/UL — SIGNIFICANT CHANGE UP (ref 3.8–10.5)

## 2018-10-08 PROCEDURE — 99233 SBSQ HOSP IP/OBS HIGH 50: CPT

## 2018-10-08 RX ORDER — VANCOMYCIN HCL 1 G
500 VIAL (EA) INTRAVENOUS ONCE
Qty: 0 | Refills: 0 | Status: COMPLETED | OUTPATIENT
Start: 2018-10-08 | End: 2018-10-08

## 2018-10-08 RX ORDER — VANCOMYCIN HCL 1 G
500 VIAL (EA) INTRAVENOUS EVERY 12 HOURS
Qty: 0 | Refills: 0 | Status: DISCONTINUED | OUTPATIENT
Start: 2018-10-09 | End: 2018-10-13

## 2018-10-08 RX ORDER — VANCOMYCIN HCL 1 G
VIAL (EA) INTRAVENOUS
Qty: 0 | Refills: 0 | Status: DISCONTINUED | OUTPATIENT
Start: 2018-10-08 | End: 2018-10-13

## 2018-10-08 RX ADMIN — Medication 0.5 MILLIGRAM(S): at 07:20

## 2018-10-08 RX ADMIN — Medication 0.5 MILLIGRAM(S): at 17:18

## 2018-10-08 RX ADMIN — Medication 1 SPRAY(S): at 17:17

## 2018-10-08 RX ADMIN — SODIUM CHLORIDE 100 MILLILITER(S): 9 INJECTION, SOLUTION INTRAVENOUS at 00:16

## 2018-10-08 RX ADMIN — LIDOCAINE 1 PATCH: 4 CREAM TOPICAL at 23:31

## 2018-10-08 RX ADMIN — CHLORHEXIDINE GLUCONATE 1 APPLICATION(S): 213 SOLUTION TOPICAL at 11:17

## 2018-10-08 RX ADMIN — Medication 1 SPRAY(S): at 05:37

## 2018-10-08 RX ADMIN — PANTOPRAZOLE SODIUM 40 MILLIGRAM(S): 20 TABLET, DELAYED RELEASE ORAL at 05:37

## 2018-10-08 RX ADMIN — LIDOCAINE 1 PATCH: 4 CREAM TOPICAL at 00:11

## 2018-10-08 RX ADMIN — Medication 50 MILLIGRAM(S): at 05:36

## 2018-10-08 RX ADMIN — FAMOTIDINE 20 MILLIGRAM(S): 10 INJECTION INTRAVENOUS at 11:15

## 2018-10-08 RX ADMIN — Medication 50 MILLIGRAM(S): at 13:42

## 2018-10-08 RX ADMIN — Medication 81 MILLIGRAM(S): at 11:14

## 2018-10-08 RX ADMIN — Medication 650 MILLIGRAM(S): at 23:49

## 2018-10-08 RX ADMIN — LIDOCAINE 1 PATCH: 4 CREAM TOPICAL at 11:15

## 2018-10-08 RX ADMIN — ENOXAPARIN SODIUM 40 MILLIGRAM(S): 100 INJECTION SUBCUTANEOUS at 11:15

## 2018-10-08 RX ADMIN — Medication 50 MILLIGRAM(S): at 21:41

## 2018-10-08 RX ADMIN — Medication 100 MILLIGRAM(S): at 16:46

## 2018-10-08 RX ADMIN — Medication 650 MILLIGRAM(S): at 16:45

## 2018-10-08 NOTE — PROVIDER CONTACT NOTE (CRITICAL VALUE NOTIFICATION) - ACTION/TREATMENT ORDERED:
MD aware, Vancomycin Discontinue order at this given time. Pt safety maintained, call bell in reach, will continue to monitor. MD aware, Vancomycin order Discontinued at this given time. Pt safety maintained, call bell in reach, will continue to monitor.

## 2018-10-08 NOTE — PROGRESS NOTE ADULT - ASSESSMENT
89F with a hx of pulmonary fibrosis, Li's esophagus, CAD s/p stents (approx 5 years ago per daughters, on ASA), presents with a 1-day history of acute-onset epigastric abdominal pain, elevated LFTs and fevers. Picture c/w cholangitis - not a candidate for ERCP at the time as per GI. Patient underwent PCT placement which she tolerated well. transitioned from sicu to floor. S/p cholecystotomy tube check 10/3. S/p MRCP 10/4. S/p PICC placement 10/5.     PLAN:   - NPO/IVF since midnight, possible ERCP today, will discuss with GI and appreciate recs.  - c/w PICC for abx- AZT and vanc  - OOB, ambulate as tolerated  - Encourage use of IS  - Monitor dressings  - DVT ppx  - cont to F/u pt's pulmonologist (Dr. Travis Medina- [phone]) recs 89F with a hx of pulmonary fibrosis, Li's esophagus, CAD s/p stents (approx 5 years ago per daughters, on ASA), presents with a 1-day history of acute-onset epigastric abdominal pain, elevated LFTs and fevers. Picture c/w cholangitis - not a candidate for ERCP at the time of admission as per GI. Patient underwent PCT placement which she tolerated well. transitioned from sicu to floor. S/p cholecystotomy tube check 10/3 with findings of CBD stone. S/p MRCP 10/4. S/p PICC placement 10/5.     PLAN:   - NPO/IVF since midnight, possible ERCP today, will discuss with GI and appreciate recs.  - c/w PICC for abx- AZT and vanc  - OOB, ambulate as tolerated  - Encourage use of IS  - Monitor dressings  - DVT ppx  - cont to F/u pt's pulmonologist (Dr. Travis Medina- [phone]) recs

## 2018-10-08 NOTE — PROVIDER CONTACT NOTE (CRITICAL VALUE NOTIFICATION) - BACKGROUND
9/28 pt came in for abd pain, nausea, William esophagus and then went to sicu, then went to IR for T-tube shaun  [facility] pt came in for abd pain, nausea, William esophagus and then went to sicu, then went to IR for T-tube shaun  10/2 pt came to [facility]  10/3 IR for tube check , chest x-ray done

## 2018-10-08 NOTE — PROGRESS NOTE ADULT - SUBJECTIVE AND OBJECTIVE BOX
General Surgery Progress Note    SUBJECTIVE:  The patient was seen and examined. No acute events overnight.   +bowel function  no n/v  pain well controlled  oob to chair    OBJECTIVE:     ** VITAL SIGNS / I&O's **    Vital Signs Last 24 Hrs  T(C): 36.5 (08 Oct 2018 00:50), Max: 37.1 (07 Oct 2018 17:31)  T(F): 97.7 (08 Oct 2018 00:50), Max: 98.8 (07 Oct 2018 17:31)  HR: 67 (08 Oct 2018 00:50) (67 - 79)  BP: 108/68 (08 Oct 2018 00:50) (104/67 - 127/67)  BP(mean): --  RR: 18 (08 Oct 2018 00:50) (18 - 18)  SpO2: 100% (08 Oct 2018 00:50) (92% - 100%)      06 Oct 2018 07:01  -  07 Oct 2018 07:00  --------------------------------------------------------  IN:    Oral Fluid: 820 mL    Solution: 300 mL  Total IN: 1120 mL    OUT:    T-Tube: 405 mL    Voided: 1575 mL  Total OUT: 1980 mL    Total NET: -860 mL      07 Oct 2018 07:01  -  08 Oct 2018 04:52  --------------------------------------------------------  IN:    lactated ringers.: 100 mL    Oral Fluid: 620 mL    Solution: 50 mL    Solution: 250 mL  Total IN: 1020 mL    OUT:    T-Tube: 225 mL    Voided: 1700 mL  Total OUT: 1925 mL    Total NET: -905 mL        Gen: NAD, laying comfortably in bed.  Lungs: Non labored breathing.   Ab: Soft, nontender, nondistended, drain in place w/ bilious fluid.  Ext: Moves all 4 spontaneously. PICC line in L. arm, in place and functioning appropriately.    ** LABS **                          10.2   7.82  )-----------( 244      ( 06 Oct 2018 08:45 )             31.8     06 Oct 2018 07:00    139    |  101    |  18     ----------------------------<  88     4.9     |  29     |  0.80     Ca    9.2        06 Oct 2018 07:00  Phos  3.1       06 Oct 2018 07:00  Mg     2.0       06 Oct 2018 07:00    TPro  7.5    /  Alb  3.4    /  TBili  0.5    /  DBili  0.3    /  AST  27     /  ALT  39     /  AlkPhos  142    06 Oct 2018 07:00      CAPILLARY BLOOD GLUCOSE            LIVER FUNCTIONS - ( 06 Oct 2018 07:00 )  Alb: 3.4 g/dL / Pro: 7.5 g/dL / ALK PHOS: 142 U/L / ALT: 39 U/L / AST: 27 U/L / GGT: x                 MEDICATIONS  (STANDING):  aspirin  chewable 81 milliGRAM(s) Oral daily  aztreonam  IVPB 1000 milliGRAM(s) IV Intermittent every 8 hours  buDESOnide   0.5 milliGRAM(s) Respule 0.5 milliGRAM(s) Inhalation two times a day  chlorhexidine 4% Liquid 1 Application(s) Topical <User Schedule>  enoxaparin Injectable 40 milliGRAM(s) SubCutaneous daily  famotidine    Tablet 20 milliGRAM(s) Oral daily  fluticasone propionate 50 MICROgram(s)/spray Nasal Spray 1 Spray(s) Both Nostrils two times a day  influenza   Vaccine 0.5 milliLiter(s) IntraMuscular once  lactated ringers. 1000 milliLiter(s) (100 mL/Hr) IV Continuous <Continuous>  lidocaine   Patch 1 Patch Transdermal daily  metoprolol succinate ER 25 milliGRAM(s) Oral daily  pantoprazole    Tablet 40 milliGRAM(s) Oral before breakfast  vancomycin  IVPB 750 milliGRAM(s) IV Intermittent every 24 hours    MEDICATIONS  (PRN):  acetaminophen   Tablet .. 650 milliGRAM(s) Oral every 6 hours PRN Mild Pain (1 - 3)  ALBUTerol    90 MICROgram(s) HFA Inhaler 2 Puff(s) Inhalation every 6 hours PRN Shortness of Breath and/or Wheezing  bisacodyl 5 milliGRAM(s) Oral daily PRN Constipation  oxyCODONE    IR 5 milliGRAM(s) Oral every 4 hours PRN Moderate to severe pain  senna 2 Tablet(s) Oral at bedtime PRN Constipation

## 2018-10-08 NOTE — CONSULT NOTE ADULT - SUBJECTIVE AND OBJECTIVE BOX
Patient is a 89y old  Female who presents with a chief complaint of Abdominal pain (08 Oct 2018 13:15)      HPI:  89 year old woman with medical history significant for HTN, Li's esophagus, CAD s/p stent (5-6 yrs ago, on aspirin 81mg), pulmonary fibrosis who presented with epigastric abdominal pain that started around 1am this morning.  The pain was mainly located in the epigastrium and radiated to bilateral upper abdomen, moderate in severity.  It had been persistent and was relieved with tylenol in the ED.  She has had a fever in the ED, no chills.  Some nausea, no emesis.  No diarrhea.  She last ate at dinner, 7pm last evening.  She has never experienced pain like this before. (28 Sep 2018 16:24)    WBC on admission of 8.8. Repeat CMP with transaminitis with , , Alk Phos 106, T Bili 2.2. Lactic acid of 2.6. U/A with 2 WBC. CT Chest with End-stage pulmonary fibrosis with honeycombing and Fluid-filled esophagus to the level of thoracic inlet. CT Abdomen/Pelvis with Distended gallbladder.  Small sludge ball versus gallstone in the gallbladder lumen. NM Biliary scan with high-grade obstruction. Started on Vancomycin/Aztreonam/Flagyl. Underwent perc melanie drain on 9/28/18. Blood cultures from 9/28 with 4/4 bottles with Strep gallolyticus and E. coli. Blood culture from 9/29 with 1/4 bottles with GPC in pairs and chains.   Patient treated with IV vancomycin, aztreonam, ad flagyl.  10/3 had cholecystotomy tube check and noted patent cystic duct and a  non obstructing CBD stone.  Contrast opacified to the small bowel.  Patient continues to do well and family at bedside.  I was asked to assess for ERCP.  Reviewed [place] records, MRCP, PCT study.  At the moment there are no complains by the patient and family.  No abdominal pain, n.v.f.c.        PAST MEDICAL & SURGICAL HISTORY:  Barretts esophagus  Essential hypertension  CAD (coronary artery disease)  Idiopathic pulmonary fibrosis  Li's Esophagus  CAD (Coronary Artery Disease)  HTN (Hypertension)  Pulmonary Fibrosis  S/P Coronary Artery Stent Placement  S/P Shoulder Surgery      Allergies    penicillin (Angioedema)  penicillins (Swelling)    Intolerances        MEDICATIONS  (STANDING):  aspirin  chewable 81 milliGRAM(s) Oral daily  aztreonam  IVPB 1000 milliGRAM(s) IV Intermittent every 8 hours  buDESOnide   0.5 milliGRAM(s) Respule 0.5 milliGRAM(s) Inhalation two times a day  chlorhexidine 4% Liquid 1 Application(s) Topical <User Schedule>  enoxaparin Injectable 40 milliGRAM(s) SubCutaneous daily  famotidine    Tablet 20 milliGRAM(s) Oral daily  fluticasone propionate 50 MICROgram(s)/spray Nasal Spray 1 Spray(s) Both Nostrils two times a day  influenza   Vaccine 0.5 milliLiter(s) IntraMuscular once  lidocaine   Patch 1 Patch Transdermal daily  metoprolol succinate ER 25 milliGRAM(s) Oral daily  pantoprazole    Tablet 40 milliGRAM(s) Oral before breakfast  vancomycin  IVPB        MEDICATIONS  (PRN):  acetaminophen   Tablet .. 650 milliGRAM(s) Oral every 6 hours PRN Mild Pain (1 - 3)  ALBUTerol    90 MICROgram(s) HFA Inhaler 2 Puff(s) Inhalation every 6 hours PRN Shortness of Breath and/or Wheezing  bisacodyl 5 milliGRAM(s) Oral daily PRN Constipation  oxyCODONE    IR 5 milliGRAM(s) Oral every 4 hours PRN Moderate to severe pain  senna 2 Tablet(s) Oral at bedtime PRN Constipation      Review of Systems:  No chest pain, fever, chills, palpitation, anorexia, pruritis. abdominal pain.    Vital Signs Last 24 Hrs  T(C): 36.8 (08 Oct 2018 21:22), Max: 37 (08 Oct 2018 05:12)  T(F): 98.2 (08 Oct 2018 21:22), Max: 98.6 (08 Oct 2018 05:12)  HR: 69 (08 Oct 2018 21:22) (64 - 73)  BP: 103/63 (08 Oct 2018 21:22) (101/64 - 127/67)  BP(mean): --  RR: 18 (08 Oct 2018 21:22) (18 - 18)  SpO2: 97% (08 Oct 2018 21:22) (92% - 100%)    PHYSICAL EXAM:  Constitutional:  Has 2 l nasal canula for O2.  However, appears confortable.    HEENT:  anicteric sclera  Neck Supple. no adenopathy  Lungs:  dry crackles but no wheezing,   Heart RRR  Abdomen:  PCT noted with rdaford color bile.  + BS, nontender, non distended.    LABS:                        9.3    6.89  )-----------( 268      ( 08 Oct 2018 09:11 )             29.1     10-08    137  |  97  |  18  ----------------------------<  75  4.0   |  33<H>  |  0.74    Ca    8.7      08 Oct 2018 07:30  Phos  3.4     10-08  Mg     2.1     10-08    TPro  7.3  /  Alb  3.2<L>  /  TBili  0.4  /  DBili  x   /  AST  22  /  ALT  27  /  AlkPhos  119  10-08        LIVER FUNCTIONS - ( 08 Oct 2018 07:30 )  Alb: 3.2 g/dL / Pro: 7.3 g/dL / ALK PHOS: 119 U/L / ALT: 27 U/L / AST: 22 U/L / GGT: x             RADIOLOGY & ADDITIONAL TESTS: Reviewed PCT study, MRI.

## 2018-10-08 NOTE — PROGRESS NOTE ADULT - SUBJECTIVE AND OBJECTIVE BOX
PULMONARY PROGRESS NOTE    FELISA YANG  MRN-[id number]    Patient is a 89y old  Female who presents with a chief complaint of Abdominal pain (04 Oct 2018 08:33)      HPI:  breathing comfortably, on 2L nc.  no resp complaints, sounds like family does not want surgery.      MEDICATIONS  (STANDING):  aspirin  chewable 81 milliGRAM(s) Oral daily  aztreonam  IVPB 1000 milliGRAM(s) IV Intermittent every 8 hours  buDESOnide   0.5 milliGRAM(s) Respule 0.5 milliGRAM(s) Inhalation two times a day  chlorhexidine 4% Liquid 1 Application(s) Topical <User Schedule>  enoxaparin Injectable 40 milliGRAM(s) SubCutaneous daily  famotidine    Tablet 20 milliGRAM(s) Oral daily  fluticasone propionate 50 MICROgram(s)/spray Nasal Spray 1 Spray(s) Both Nostrils two times a day  influenza   Vaccine 0.5 milliLiter(s) IntraMuscular once  lidocaine   Patch 1 Patch Transdermal daily  metoprolol succinate ER 25 milliGRAM(s) Oral daily  pantoprazole    Tablet 40 milliGRAM(s) Oral before breakfast    MEDICATIONS  (PRN):  acetaminophen   Tablet .. 650 milliGRAM(s) Oral every 6 hours PRN Mild Pain (1 - 3)  ALBUTerol    90 MICROgram(s) HFA Inhaler 2 Puff(s) Inhalation every 6 hours PRN Shortness of Breath and/or Wheezing  bisacodyl 5 milliGRAM(s) Oral daily PRN Constipation  oxyCODONE    IR 5 milliGRAM(s) Oral every 4 hours PRN Moderate to severe pain  senna 2 Tablet(s) Oral at bedtime PRN Constipation          EXAM:  Vital Signs Last 24 Hrs  T(C): 36.7 (08 Oct 2018 09:29), Max: 37.1 (07 Oct 2018 17:31)  T(F): 98.1 (08 Oct 2018 09:29), Max: 98.8 (07 Oct 2018 17:31)  HR: 69 (08 Oct 2018 09:29) (64 - 79)  BP: 114/67 (08 Oct 2018 09:29) (104/67 - 127/67)  BP(mean): --  RR: 18 (08 Oct 2018 09:29) (18 - 18)  SpO2: 96% (08 Oct 2018 09:29) (92% - 100%)  GENERAL: The patient is awake and alert in no apparent distress.     LUNGS: diffuse dry crackles, stable    HEART: S1/S2    LABS/IMAGING: reviewed                          9.3    6.89  )-----------( 268      ( 08 Oct 2018 09:11 )             29.1   10-08    137  |  97  |  18  ----------------------------<  75  4.0   |  33<H>  |  0.74    Ca    8.7      08 Oct 2018 07:30  Phos  3.4     10-08  Mg     2.1     10-08    TPro  7.3  /  Alb  3.2<L>  /  TBili  0.4  /  DBili  x   /  AST  22  /  ALT  27  /  AlkPhos  119  10-08        < from: Xray Chest 1 View- PORTABLE-Routine (10.03.18 @ 09:07) >    IMPRESSION:    1.  Interstitial lung disease. No pneumothorax or new opacity.     < end of copied text >  89y Female with HEALTH ISSUES - PROBLEM Dx:  Pulmonary Fibrosis      RECS:  -Discussed case with GI and surgery last week.  -patient is at increased risk for anesthesia given pulmonary fibrosis, however fibrosis is stable, cxr unchanged and she is on minimal O2, ABG without CO2 retention.  She is currently as optimized as possible from a pulmonary standpoint.   -Risks/benefits of surgery/procedures need to be discussed with pt and family so they can make an informed decision regarding surgery vs. conservative management with abx/cholecystostomy tube.    -The pulmonary risks were discussed with patients family members at bedside including fibrosis flare secondary to intubation.  They understand.   -continue duonebs and PRN albuterol, supplemental O2.      Jenny Antonio MD   [phone]

## 2018-10-08 NOTE — PROGRESS NOTE ADULT - ATTENDING COMMENTS
I saw and examined the pt and discussed the tx plan with the House Staff. I agree with the exam and plan as documented in the surgery resident's note from today which I edited as indicated.  Pt admitted with cholangitis d/t choledocholithiasis. ***She did not have cholecystitis and therefore the tx with perc melanie tube that was orchestrated by GI was successful in treating the cholangitis. However, she has a CBD stone. I d/w the Pulmonary Attending who stated the pt is high risk but optimized for anesthesia from Pulmonary standpoint. I d/w Dr Lockett, who recommended against ERCP. His recommended plan is that the pt lives indefinitely with the perc melanie tube. This carries risks of the tube clogging or dislodging, further more the tube is painful and will need to be exchanged periodically.   I have requested a 2nd GI opinion from Dr Lomeli, who will see the pt today.     Ade Monte MD

## 2018-10-08 NOTE — PROVIDER CONTACT NOTE (CRITICAL VALUE NOTIFICATION) - SITUATION
Pt Vancomycin through results are 30.8
+BC prelim 9/29 growth in aerobic bottle gram + cocci in pairs and chains

## 2018-10-08 NOTE — PROGRESS NOTE ADULT - SUBJECTIVE AND OBJECTIVE BOX
CHIEF COMPLAINT:Patient is a 89y old  Female who presents with a chief complaint of Abdominal pain (08 Oct 2018 12:00)    	        PAST MEDICAL & SURGICAL HISTORY:  Barretts esophagus  Essential hypertension  CAD (coronary artery disease)  Idiopathic pulmonary fibrosis  Li's Esophagus  CAD (Coronary Artery Disease)  HTN (Hypertension)  Pulmonary Fibrosis  S/P Coronary Artery Stent Placement  S/P Shoulder Surgery          REVIEW OF SYSTEMS:  CONSTITUTIONALweak  EYES: No eye pain, visual disturbances, or discharge  NECK: No pain or stiffness  RESPIRATORY: No cough, wheezing, chills or hemoptysis; No Shortness of Breath  CARDIOVASCULAR: No chest pain, palpitations, passing out, dizziness, or leg swelling  GASTROINTESTINAL: No abdominal or epigastric pain. No nausea, vomiting, or hematemesis; No diarrhea or constipation. No melena or hematochezia.  GENITOURINARY: No dysuria, frequency, hematuria, or incontinence  NEUROLOGICAL: No headaches, memory loss, loss of strength, numbness, or tremors  MUSCULOSKELETAL: c/o r hand tingling     Medications:  MEDICATIONS  (STANDING):  aspirin  chewable 81 milliGRAM(s) Oral daily  aztreonam  IVPB 1000 milliGRAM(s) IV Intermittent every 8 hours  buDESOnide   0.5 milliGRAM(s) Respule 0.5 milliGRAM(s) Inhalation two times a day  chlorhexidine 4% Liquid 1 Application(s) Topical <User Schedule>  enoxaparin Injectable 40 milliGRAM(s) SubCutaneous daily  famotidine    Tablet 20 milliGRAM(s) Oral daily  fluticasone propionate 50 MICROgram(s)/spray Nasal Spray 1 Spray(s) Both Nostrils two times a day  influenza   Vaccine 0.5 milliLiter(s) IntraMuscular once  lidocaine   Patch 1 Patch Transdermal daily  metoprolol succinate ER 25 milliGRAM(s) Oral daily  pantoprazole    Tablet 40 milliGRAM(s) Oral before breakfast    MEDICATIONS  (PRN):  acetaminophen   Tablet .. 650 milliGRAM(s) Oral every 6 hours PRN Mild Pain (1 - 3)  ALBUTerol    90 MICROgram(s) HFA Inhaler 2 Puff(s) Inhalation every 6 hours PRN Shortness of Breath and/or Wheezing  bisacodyl 5 milliGRAM(s) Oral daily PRN Constipation  oxyCODONE    IR 5 milliGRAM(s) Oral every 4 hours PRN Moderate to severe pain  senna 2 Tablet(s) Oral at bedtime PRN Constipation    	    PHYSICAL EXAM:  T(C): 36.7 (10-08-18 @ 09:29), Max: 37.1 (10-07-18 @ 17:31)  HR: 69 (10-08-18 @ 09:29) (64 - 79)  BP: 114/67 (10-08-18 @ 09:29) (104/67 - 127/67)  RR: 18 (10-08-18 @ 09:29) (18 - 18)  SpO2: 96% (10-08-18 @ 09:29) (92% - 100%)  Wt(kg): --  I&O's Summary    07 Oct 2018 07:01  -  08 Oct 2018 07:00  --------------------------------------------------------  IN: 1720 mL / OUT: 2225 mL / NET: -505 mL    08 Oct 2018 07:01  -  08 Oct 2018 13:16  --------------------------------------------------------  IN: 0 mL / OUT: 100 mL / NET: -100 mL        Appearance: Normal	  HEENT:   Normal oral mucosa, PERRL, EOMI	  Lymphatic: No lymphadenopathy  Cardiovascular: Normal S1 S2, No JVD, No murmurs, No edema  Respiratory: Lungs clear to auscultation	  Psychiatry: A & O x 3, Mood & affect appropriate  Gastrointestinal:  Soft, Non-tender, + BS	  Skin: No rashes, No ecchymoses, No cyanosis	  Neurologic: Non-focal  Extremities: Normal range of motion, No clubbing, cyanosis or edema  Vascular: Peripheral pulses palpable 2+ bilaterally    TELEMETRY: 	    ECG:  	  RADIOLOGY:  OTHER: 	  	  LABS:	 	    CARDIAC MARKERS:                                9.3    6.89  )-----------( 268      ( 08 Oct 2018 09:11 )             29.1     10-08    137  |  97  |  18  ----------------------------<  75  4.0   |  33<H>  |  0.74    Ca    8.7      08 Oct 2018 07:30  Phos  3.4     10-08  Mg     2.1     10-08    TPro  7.3  /  Alb  3.2<L>  /  TBili  0.4  /  DBili  x   /  AST  22  /  ALT  27  /  AlkPhos  119  10-08    proBNP:   Lipid Profile:   HgA1c:   TSH:

## 2018-10-08 NOTE — CONSULT NOTE ADULT - ASSESSMENT
In summary, elderly female presented with cholangitis/sepsis related to CBD stone and successfully treated with a percutaneous cholecystotomy who is stable at the moment.  She has been optimized medically and appears stable and comfortable with only 2L of O2.   Currently there is no ongoing evidence of cholangitis and she continue to drain well from the PCT. ERC with extraction of the noted non obstructing CBD stone can be performed and will likely be successful but will require deep sedation / general anesthesia.  She clearly is not optimal candidate for general anesthesia, though I suspect given her current stable pulmonary status, she will do reasonable well. I had a lengthy discussion with the family members about the potential risks and benefits of an ERC.  Given that Ms Hill has stabilized, they are against an ERC.     Therefore, she will have chronic indwelling PCT which will need ongoing care.  In the event that she develops cholangitis, then family with reconsider ERC.  In the meantime, can start ursodiol therapy at 300 mg po bid.   Can advance diet.  I have answered all of there questions to their satisfaction.  I have given them my contact information as well.    Thank you.    Nasir Lomeli MD

## 2018-10-09 ENCOUNTER — TRANSCRIPTION ENCOUNTER (OUTPATIENT)
Age: 83
End: 2018-10-09

## 2018-10-09 LAB
ALBUMIN SERPL ELPH-MCNC: 3.1 G/DL — LOW (ref 3.3–5)
ALP SERPL-CCNC: 103 U/L — SIGNIFICANT CHANGE UP (ref 40–120)
ALT FLD-CCNC: 24 U/L — SIGNIFICANT CHANGE UP (ref 10–45)
ANION GAP SERPL CALC-SCNC: 7 MMOL/L — SIGNIFICANT CHANGE UP (ref 5–17)
AST SERPL-CCNC: 21 U/L — SIGNIFICANT CHANGE UP (ref 10–40)
BILIRUB DIRECT SERPL-MCNC: 0.1 MG/DL — SIGNIFICANT CHANGE UP (ref 0–0.2)
BILIRUB INDIRECT FLD-MCNC: 0.3 MG/DL — SIGNIFICANT CHANGE UP (ref 0.2–1)
BILIRUB SERPL-MCNC: 0.4 MG/DL — SIGNIFICANT CHANGE UP (ref 0.2–1.2)
BUN SERPL-MCNC: 20 MG/DL — SIGNIFICANT CHANGE UP (ref 7–23)
CALCIUM SERPL-MCNC: 8.7 MG/DL — SIGNIFICANT CHANGE UP (ref 8.4–10.5)
CHLORIDE SERPL-SCNC: 100 MMOL/L — SIGNIFICANT CHANGE UP (ref 96–108)
CO2 SERPL-SCNC: 32 MMOL/L — HIGH (ref 22–31)
CREAT SERPL-MCNC: 0.76 MG/DL — SIGNIFICANT CHANGE UP (ref 0.5–1.3)
GLUCOSE SERPL-MCNC: 78 MG/DL — SIGNIFICANT CHANGE UP (ref 70–99)
HCT VFR BLD CALC: 28.6 % — LOW (ref 34.5–45)
HGB BLD-MCNC: 9.1 G/DL — LOW (ref 11.5–15.5)
MAGNESIUM SERPL-MCNC: 2 MG/DL — SIGNIFICANT CHANGE UP (ref 1.6–2.6)
MCHC RBC-ENTMCNC: 31.8 GM/DL — LOW (ref 32–36)
MCHC RBC-ENTMCNC: 31.8 PG — SIGNIFICANT CHANGE UP (ref 27–34)
MCV RBC AUTO: 100 FL — SIGNIFICANT CHANGE UP (ref 80–100)
PHOSPHATE SERPL-MCNC: 2.8 MG/DL — SIGNIFICANT CHANGE UP (ref 2.5–4.5)
PLATELET # BLD AUTO: 270 K/UL — SIGNIFICANT CHANGE UP (ref 150–400)
POTASSIUM SERPL-MCNC: 4.4 MMOL/L — SIGNIFICANT CHANGE UP (ref 3.5–5.3)
POTASSIUM SERPL-SCNC: 4.4 MMOL/L — SIGNIFICANT CHANGE UP (ref 3.5–5.3)
PROT SERPL-MCNC: 7.1 G/DL — SIGNIFICANT CHANGE UP (ref 6–8.3)
RBC # BLD: 2.86 M/UL — LOW (ref 3.8–5.2)
RBC # FLD: 14.6 % — HIGH (ref 10.3–14.5)
SODIUM SERPL-SCNC: 139 MMOL/L — SIGNIFICANT CHANGE UP (ref 135–145)
WBC # BLD: 8.04 K/UL — SIGNIFICANT CHANGE UP (ref 3.8–10.5)
WBC # FLD AUTO: 8.04 K/UL — SIGNIFICANT CHANGE UP (ref 3.8–10.5)

## 2018-10-09 PROCEDURE — 99231 SBSQ HOSP IP/OBS SF/LOW 25: CPT

## 2018-10-09 RX ORDER — AZTREONAM 2 G
1000 VIAL (EA) INJECTION
Qty: 0 | Refills: 0 | COMMUNITY
Start: 2018-10-09

## 2018-10-09 RX ORDER — SENNA PLUS 8.6 MG/1
2 TABLET ORAL
Qty: 0 | Refills: 0 | DISCHARGE
Start: 2018-10-09

## 2018-10-09 RX ORDER — ALBUTEROL 90 UG/1
2 AEROSOL, METERED ORAL
Qty: 0 | Refills: 0 | DISCHARGE
Start: 2018-10-09

## 2018-10-09 RX ORDER — ACETAMINOPHEN 500 MG
2 TABLET ORAL
Qty: 0 | Refills: 0 | DISCHARGE
Start: 2018-10-09

## 2018-10-09 RX ORDER — URSODIOL 250 MG/1
1 TABLET, FILM COATED ORAL
Qty: 60 | Refills: 0
Start: 2018-10-09

## 2018-10-09 RX ORDER — VANCOMYCIN HCL 1 G
500 VIAL (EA) INTRAVENOUS
Qty: 0 | Refills: 0 | COMMUNITY
Start: 2018-10-09

## 2018-10-09 RX ORDER — URSODIOL 250 MG/1
300 TABLET, FILM COATED ORAL
Qty: 0 | Refills: 0 | Status: DISCONTINUED | OUTPATIENT
Start: 2018-10-09 | End: 2018-10-13

## 2018-10-09 RX ORDER — LIDOCAINE 4 G/100G
1 CREAM TOPICAL
Qty: 5 | Refills: 0
Start: 2018-10-09 | End: 2018-10-13

## 2018-10-09 RX ADMIN — LIDOCAINE 1 PATCH: 4 CREAM TOPICAL at 22:38

## 2018-10-09 RX ADMIN — FAMOTIDINE 20 MILLIGRAM(S): 10 INJECTION INTRAVENOUS at 11:30

## 2018-10-09 RX ADMIN — Medication 0.5 MILLIGRAM(S): at 17:47

## 2018-10-09 RX ADMIN — Medication 650 MILLIGRAM(S): at 22:39

## 2018-10-09 RX ADMIN — Medication 50 MILLIGRAM(S): at 05:51

## 2018-10-09 RX ADMIN — Medication 100 MILLIGRAM(S): at 17:47

## 2018-10-09 RX ADMIN — ENOXAPARIN SODIUM 40 MILLIGRAM(S): 100 INJECTION SUBCUTANEOUS at 11:30

## 2018-10-09 RX ADMIN — PANTOPRAZOLE SODIUM 40 MILLIGRAM(S): 20 TABLET, DELAYED RELEASE ORAL at 05:53

## 2018-10-09 RX ADMIN — Medication 50 MILLIGRAM(S): at 21:34

## 2018-10-09 RX ADMIN — Medication 1 SPRAY(S): at 17:47

## 2018-10-09 RX ADMIN — LIDOCAINE 1 PATCH: 4 CREAM TOPICAL at 11:31

## 2018-10-09 RX ADMIN — Medication 1 SPRAY(S): at 05:52

## 2018-10-09 RX ADMIN — Medication 100 MILLIGRAM(S): at 05:51

## 2018-10-09 RX ADMIN — Medication 650 MILLIGRAM(S): at 23:00

## 2018-10-09 RX ADMIN — Medication 0.5 MILLIGRAM(S): at 05:52

## 2018-10-09 RX ADMIN — URSODIOL 300 MILLIGRAM(S): 250 TABLET, FILM COATED ORAL at 17:48

## 2018-10-09 RX ADMIN — Medication 650 MILLIGRAM(S): at 00:20

## 2018-10-09 RX ADMIN — CHLORHEXIDINE GLUCONATE 1 APPLICATION(S): 213 SOLUTION TOPICAL at 11:31

## 2018-10-09 RX ADMIN — Medication 81 MILLIGRAM(S): at 11:30

## 2018-10-09 RX ADMIN — Medication 50 MILLIGRAM(S): at 13:04

## 2018-10-09 NOTE — PROVIDER CONTACT NOTE (OTHER) - ACTION/TREATMENT ORDERED:
MD Ramirez aware of patient oxygen saturation after ambulation and stated that might need oxygen when discharge, will continue to monitor.
MD aware. No intervention required by RN at this time. Will continue to monitor

## 2018-10-09 NOTE — PROVIDER CONTACT NOTE (OTHER) - SITUATION
Pt ambulated OOB to bathroom, oxygen checked after, it was 87% on room air, 1L O2 nasal cannula applied for 2minutes- O2 96%. Pt then placed on room air- 92% on room air.

## 2018-10-09 NOTE — CONSULT NOTE ADULT - ASSESSMENT
89 year old female with PMH Li's esophagus, CAD s/p stent (5-6 yrs ago, on aspirin 81mg), pulmonary fibrosis, p/w cholecystitis and biliary obstruction, now s/p perc cholecystostomy. Patient now complaining intermittent paresthesia of 5th finger of rt hand which sometimes involves all 5 fingers. Most likely compression of ulnar nerve, but may also involve median nerve. Sensation came back with tinel test.     Plan:  Would give patient a trial of NSAIDs to reduce inflammation in the area, can do naproxen 500 BID for 1 week  Can also wear a wrist splint 89 year old female with PMH Li's esophagus, CAD s/p stent (5-6 yrs ago, on aspirin 81mg), pulmonary fibrosis, p/w cholecystitis and biliary obstruction, now s/p perc cholecystostomy. Patient now complaining intermittent paresthesia of 5th finger of rt hand which sometimes involves all 5 fingers. Most likely compression of ulnar nerve, but may also involve median nerve. Sensation came back with tinel test.     Plan:  Would give patient a trial of NSAIDs to reduce inflammation in the area, can do naproxen 500 BID for 1 week  Can also wear a wrist splint    ULNAR NERVE -- symptoms likely to improve with time and less needles sticks. Despite her positive Tinnel's sign (suggesting median neuropathy), suspect ulnar neuropathy. Occasionally anastamoses between median and ulnar nerves occur. Regardless, likely to improve with time. avoid bending elbow, especailly when sleeping.

## 2018-10-09 NOTE — CONSULT NOTE ADULT - CONSULT REQUESTED BY NAME
Ade Monte
Ade Monte
Dr Monte
ER
Medicine
General Surgery - [facility]

## 2018-10-09 NOTE — DISCHARGE NOTE ADULT - CARE PROVIDER_API CALL
Travis Medina), Internal Medicine; Pulmonary Disease  [address]  [address]  [place] [address]  Phone: [phone]  Fax: [phone]    Nasir Lomeli), Gastroenterology  [address] [place] [address]  [address]  [place] [address]  Phone: [phone]  Fax: [phone]    Mathew Lockett), Gastroenterology; Internal Medicine  Division of Gastroenterology  [address] [place] [address]  Phone: [phone]  Fax: [phone]

## 2018-10-09 NOTE — DISCHARGE NOTE ADULT - DURABLE MEDICAL EQUIPMENT AGENCY
[facility]        delivered    Jr.  RW    &   portable  02  to bedside.      Delivered  Home   02  and accessory supplies.

## 2018-10-09 NOTE — DISCHARGE NOTE ADULT - ADDITIONAL INSTRUCTIONS
Follow up with Dr. Lomeli (GI) or Dr. Lockett(GI) in 1-2 weeks. Please call to schedule an appointment.   Follow up with Dr. Medina (Pulmonology) in 1-2 weeks. Please call to schedule an appointment.   Follow up with outpt Interventional Radiology in 1-2 weeks. Please call to schedule an appointment. Follow up with Dr. Lomeli (GI) or Dr. Lockett(GI) in 1-2 weeks. Please call to schedule an appointment.   Follow up with Dr. Medina (Pulmonology) in 1-2 weeks. Please call to schedule an appointment.   Follow up with outpt Interventional Radiology (Dr. Velazquez) in 1 week. Please call to schedule an appointment.

## 2018-10-09 NOTE — PROGRESS NOTE ADULT - SUBJECTIVE AND OBJECTIVE BOX
CHIEF COMPLAINT:Patient is a 89y old  Female who presents with a chief complaint of Abdominal pain (09 Oct 2018 11:30)    	        PAST MEDICAL & SURGICAL HISTORY:  Barretts esophagus  Essential hypertension  CAD (coronary artery disease)  Idiopathic pulmonary fibrosis  Li's Esophagus  CAD (Coronary Artery Disease)  HTN (Hypertension)  Pulmonary Fibrosis  S/P Coronary Artery Stent Placement  S/P Shoulder Surgery          REVIEW OF SYSTEMS:  CONSTITUTIONAL: No fever, weight loss, or fatigue  EYES: No eye pain, visual disturbances, or discharge  NECK: No pain or stiffness  RESPIRATORY: No cough, wheezing, chills or hemoptysis; No Shortness of Breath  CARDIOVASCULAR: No chest pain, palpitations, passing out, dizziness, or leg swelling  GASTROINTESTINAL: No abdominal or epigastric pain. No nausea, vomiting, or hematemesis; No diarrhea or constipation. No melena or hematochezia.  GENITOURINARY: No dysuria, frequency, hematuria, or incontinence  NEUROLOGICAL: No headaches,   MUSCULOSKELETAL: No joint pain or swelling; No muscle, back, or extremity pain    Medications:  MEDICATIONS  (STANDING):  aspirin  chewable 81 milliGRAM(s) Oral daily  aztreonam  IVPB 1000 milliGRAM(s) IV Intermittent every 8 hours  buDESOnide   0.5 milliGRAM(s) Respule 0.5 milliGRAM(s) Inhalation two times a day  chlorhexidine 4% Liquid 1 Application(s) Topical <User Schedule>  enoxaparin Injectable 40 milliGRAM(s) SubCutaneous daily  famotidine    Tablet 20 milliGRAM(s) Oral daily  fluticasone propionate 50 MICROgram(s)/spray Nasal Spray 1 Spray(s) Both Nostrils two times a day  influenza   Vaccine 0.5 milliLiter(s) IntraMuscular once  lidocaine   Patch 1 Patch Transdermal daily  metoprolol succinate ER 25 milliGRAM(s) Oral daily  pantoprazole    Tablet 40 milliGRAM(s) Oral before breakfast  ursodiol Capsule 300 milliGRAM(s) Oral two times a day  vancomycin  IVPB      vancomycin  IVPB 500 milliGRAM(s) IV Intermittent every 12 hours    MEDICATIONS  (PRN):  acetaminophen   Tablet .. 650 milliGRAM(s) Oral every 6 hours PRN Mild Pain (1 - 3)  ALBUTerol    90 MICROgram(s) HFA Inhaler 2 Puff(s) Inhalation every 6 hours PRN Shortness of Breath and/or Wheezing  bisacodyl 5 milliGRAM(s) Oral daily PRN Constipation  senna 2 Tablet(s) Oral at bedtime PRN Constipation    	    PHYSICAL EXAM:  T(C): 36.8 (10-09-18 @ 10:00), Max: 36.9 (10-08-18 @ 13:23)  HR: 72 (10-09-18 @ 11:28) (65 - 72)  BP: 119/60 (10-09-18 @ 10:00) (101/64 - 122/74)  RR: 18 (10-09-18 @ 11:28) (18 - 18)  SpO2: 94% (10-09-18 @ 11:28) (94% - 100%)  Wt(kg): --  I&O's Summary    08 Oct 2018 07:01  -  09 Oct 2018 07:00  --------------------------------------------------------  IN: 1450 mL / OUT: 1500 mL / NET: -50 mL    09 Oct 2018 07:01  -  09 Oct 2018 13:19  --------------------------------------------------------  IN: 570 mL / OUT: 0 mL / NET: 570 mL        Appearance: Normal	  HEENT:   Normal oral mucosa, PERRL, EOMI	  Lymphatic: No lymphadenopathy  Cardiovascular: Normal S1 S2, No JVD, No murmurs, No edema  Respiratory: Lungs clear to auscultation	  Psychiatry: A & O x 3, Mood & affect appropriate  Gastrointestinal:  Soft, Non-tender, + BS	choly tube in place  Skin: No rashes, No ecchymoses, No cyanosis	  Neurologic: Non-focal  Extremities: Normal range of motion, No clubbing, cyanosis or edema  Vascular: Peripheral pulses palpable 2+ bilaterally    TELEMETRY: 	    ECG:  	  RADIOLOGY:  OTHER: 	  	  LABS:	 	    CARDIAC MARKERS:                                9.1    8.04  )-----------( 270      ( 09 Oct 2018 10:55 )             28.6     10-09    139  |  100  |  20  ----------------------------<  78  4.4   |  32<H>  |  0.76    Ca    8.7      09 Oct 2018 08:24  Phos  2.8     10-09  Mg     2.0     10-09    TPro  7.1  /  Alb  3.1<L>  /  TBili  0.4  /  DBili  0.1  /  AST  21  /  ALT  24  /  AlkPhos  103  10-09    proBNP:   Lipid Profile:   HgA1c:   TSH:

## 2018-10-09 NOTE — PROGRESS NOTE ADULT - ATTENDING COMMENTS
I saw and examined the pt and discussed the tx plan with the House Staff. I agree with the exam and plan as documented in the surgery resident's note from today which I edited as indicated.  2nd opinion GI consult provided.  Pt to be discharged today.   Perc melanie tube placed for cholangitis, to be managed in the future by GI (of their choice) and IR.  F/u with me as needed.   Ade Monte MD

## 2018-10-09 NOTE — PROGRESS NOTE ADULT - ASSESSMENT
89 year old female with PMH Li's esophagus, CAD s/p stent (5-6 yrs ago, on aspirin 81mg), pulmonary fibrosis, p/w cholecystitis and biliary obstruction, now s/p perc cholecystostomy:  1. Cholecystitis, biliary obstruction s/p perc melanie, care and diet per Surg, abx per ID,   incentive spirometry, OOB,   for d/c today   f/u w/ gi for further care   2. Pulmonary fibrosis - c/w inhalers per Pulm  pulm noted  3. CAD - stable,   c/w ASA, BB  d//c planning as per sx

## 2018-10-09 NOTE — DISCHARGE NOTE ADULT - PATIENT PORTAL LINK FT
You can access the [facility] Patient Portal, offered by [facility], by registering with the following website: [email or web address]

## 2018-10-09 NOTE — DISCHARGE NOTE ADULT - CARE PROVIDERS DIRECT ADDRESSES
,DirectAddress_Unknown,DirectAddress_Unknown,[email or web address]

## 2018-10-09 NOTE — PROGRESS NOTE ADULT - ASSESSMENT
89F with a hx of pulmonary fibrosis, Li's esophagus, CAD s/p stents (approx 5 years ago per daughters, on ASA), presents with a 1-day history of acute-onset epigastric abdominal pain, elevated LFTs and fevers. Picture c/w cholangitis - not a candidate for ERCP at the time of admission as per GI. Patient underwent PCT placement which she tolerated well. transitioned from sicu to floor. S/p cholecystotomy tube check 10/3 with findings of CBD stone. S/p MRCP 10/4. S/p PICC placement 10/5.     PLAN:   - regular diet  - c/w PICC for abx- AZT and vanc  - OOB, ambulate as tolerated  - Encourage use of IS  - Monitor dressings  - DVT ppx  - cont to F/u pt's pulmonologist (Dr. Travis Medina- [phone]) recs

## 2018-10-09 NOTE — PROGRESS NOTE ADULT - SUBJECTIVE AND OBJECTIVE BOX
General Surgery Progress Note    SUBJECTIVE:  The patient was seen and examined. No acute events overnight.   Per GI recs, no ERCP needed and pt will live with PCT w/ ongoing care since pt is stable w/ no evidence of cholangitis at the moment.  +bowel function  no n/v  pain well controlled  oob to chair    OBJECTIVE:     ** VITAL SIGNS / I&O's **    Vital Signs Last 24 Hrs  T(C): 36.6 (09 Oct 2018 01:07), Max: 37 (08 Oct 2018 05:12)  T(F): 97.8 (09 Oct 2018 01:07), Max: 98.6 (08 Oct 2018 05:12)  HR: 72 (09 Oct 2018 01:07) (64 - 72)  BP: 122/74 (09 Oct 2018 01:07) (101/64 - 124/73)  BP(mean): --  RR: 18 (09 Oct 2018 01:07) (18 - 18)  SpO2: 100% (09 Oct 2018 01:07) (96% - 100%)      07 Oct 2018 07:01  -  08 Oct 2018 07:00  --------------------------------------------------------  IN:    lactated ringers.: 750 mL    Oral Fluid: 620 mL    Solution: 100 mL    Solution: 250 mL  Total IN: 1720 mL    OUT:    T-Tube: 325 mL    Voided: 1900 mL  Total OUT: 2225 mL    Total NET: -505 mL      08 Oct 2018 07:01  -  09 Oct 2018 04:54  --------------------------------------------------------  IN:    lactated ringers.: 800 mL    Oral Fluid: 300 mL    Solution: 100 mL    Solution: 100 mL  Total IN: 1300 mL    OUT:    T-Tube: 275 mL    Voided: 900 mL  Total OUT: 1175 mL    Total NET: 125 mL          ** PHYSICAL EXAM **    Gen: NAD, laying comfortably in bed.  Lungs: Non labored breathing.   Ab: Soft, nontender, nondistended, drain in place w/ bilious fluid.  Ext: Moves all 4 spontaneously. PICC line in L. arm, in place and functioning appropriately.    ** LABS **                          9.3    6.89  )-----------( 268      ( 08 Oct 2018 09:11 )             29.1     08 Oct 2018 07:30    137    |  97     |  18     ----------------------------<  75     4.0     |  33     |  0.74     Ca    8.7        08 Oct 2018 07:30  Phos  3.4       08 Oct 2018 07:30  Mg     2.1       08 Oct 2018 07:30    TPro  7.3    /  Alb  3.2    /  TBili  0.4    /  DBili  x      /  AST  22     /  ALT  27     /  AlkPhos  119    08 Oct 2018 07:30      CAPILLARY BLOOD GLUCOSE            LIVER FUNCTIONS - ( 08 Oct 2018 07:30 )  Alb: 3.2 g/dL / Pro: 7.3 g/dL / ALK PHOS: 119 U/L / ALT: 27 U/L / AST: 22 U/L / GGT: x                 MEDICATIONS  (STANDING):  aspirin  chewable 81 milliGRAM(s) Oral daily  aztreonam  IVPB 1000 milliGRAM(s) IV Intermittent every 8 hours  buDESOnide   0.5 milliGRAM(s) Respule 0.5 milliGRAM(s) Inhalation two times a day  chlorhexidine 4% Liquid 1 Application(s) Topical <User Schedule>  enoxaparin Injectable 40 milliGRAM(s) SubCutaneous daily  famotidine    Tablet 20 milliGRAM(s) Oral daily  fluticasone propionate 50 MICROgram(s)/spray Nasal Spray 1 Spray(s) Both Nostrils two times a day  influenza   Vaccine 0.5 milliLiter(s) IntraMuscular once  lidocaine   Patch 1 Patch Transdermal daily  metoprolol succinate ER 25 milliGRAM(s) Oral daily  pantoprazole    Tablet 40 milliGRAM(s) Oral before breakfast  vancomycin  IVPB      vancomycin  IVPB 500 milliGRAM(s) IV Intermittent every 12 hours    MEDICATIONS  (PRN):  acetaminophen   Tablet .. 650 milliGRAM(s) Oral every 6 hours PRN Mild Pain (1 - 3)  ALBUTerol    90 MICROgram(s) HFA Inhaler 2 Puff(s) Inhalation every 6 hours PRN Shortness of Breath and/or Wheezing  bisacodyl 5 milliGRAM(s) Oral daily PRN Constipation  senna 2 Tablet(s) Oral at bedtime PRN Constipation General Surgery Progress Note    SUBJECTIVE:  The patient was seen and examined. No acute events overnight.   Saw Dr Lomeli as 2nd opinion GI, no plans for ERCP at this admission as per family decision. Pt will live with PCT.  +bowel function  no n/v  pain well controlled  oob to chair    OBJECTIVE:     ** VITAL SIGNS / I&O's **    Vital Signs Last 24 Hrs  T(C): 36.6 (09 Oct 2018 01:07), Max: 37 (08 Oct 2018 05:12)  T(F): 97.8 (09 Oct 2018 01:07), Max: 98.6 (08 Oct 2018 05:12)  HR: 72 (09 Oct 2018 01:07) (64 - 72)  BP: 122/74 (09 Oct 2018 01:07) (101/64 - 124/73)  BP(mean): --  RR: 18 (09 Oct 2018 01:07) (18 - 18)  SpO2: 100% (09 Oct 2018 01:07) (96% - 100%)      07 Oct 2018 07:01  -  08 Oct 2018 07:00  --------------------------------------------------------  IN:    lactated ringers.: 750 mL    Oral Fluid: 620 mL    Solution: 100 mL    Solution: 250 mL  Total IN: 1720 mL    OUT:    T-Tube: 325 mL    Voided: 1900 mL  Total OUT: 2225 mL    Total NET: -505 mL      08 Oct 2018 07:01  -  09 Oct 2018 04:54  --------------------------------------------------------  IN:    lactated ringers.: 800 mL    Oral Fluid: 300 mL    Solution: 100 mL    Solution: 100 mL  Total IN: 1300 mL    OUT:    T-Tube: 275 mL    Voided: 900 mL  Total OUT: 1175 mL    Total NET: 125 mL          ** PHYSICAL EXAM **    Gen: NAD, laying comfortably in bed.  Lungs: Non labored breathing.   Ab: Soft, nontender, nondistended, drain in place w/ bilious fluid.  Ext: Moves all 4 spontaneously. PICC line in L. arm, in place and functioning appropriately.    ** LABS **                          9.3    6.89  )-----------( 268      ( 08 Oct 2018 09:11 )             29.1     08 Oct 2018 07:30    137    |  97     |  18     ----------------------------<  75     4.0     |  33     |  0.74     Ca    8.7        08 Oct 2018 07:30  Phos  3.4       08 Oct 2018 07:30  Mg     2.1       08 Oct 2018 07:30    TPro  7.3    /  Alb  3.2    /  TBili  0.4    /  DBili  x      /  AST  22     /  ALT  27     /  AlkPhos  119    08 Oct 2018 07:30      CAPILLARY BLOOD GLUCOSE            LIVER FUNCTIONS - ( 08 Oct 2018 07:30 )  Alb: 3.2 g/dL / Pro: 7.3 g/dL / ALK PHOS: 119 U/L / ALT: 27 U/L / AST: 22 U/L / GGT: x                 MEDICATIONS  (STANDING):  aspirin  chewable 81 milliGRAM(s) Oral daily  aztreonam  IVPB 1000 milliGRAM(s) IV Intermittent every 8 hours  buDESOnide   0.5 milliGRAM(s) Respule 0.5 milliGRAM(s) Inhalation two times a day  chlorhexidine 4% Liquid 1 Application(s) Topical <User Schedule>  enoxaparin Injectable 40 milliGRAM(s) SubCutaneous daily  famotidine    Tablet 20 milliGRAM(s) Oral daily  fluticasone propionate 50 MICROgram(s)/spray Nasal Spray 1 Spray(s) Both Nostrils two times a day  influenza   Vaccine 0.5 milliLiter(s) IntraMuscular once  lidocaine   Patch 1 Patch Transdermal daily  metoprolol succinate ER 25 milliGRAM(s) Oral daily  pantoprazole    Tablet 40 milliGRAM(s) Oral before breakfast  vancomycin  IVPB      vancomycin  IVPB 500 milliGRAM(s) IV Intermittent every 12 hours    MEDICATIONS  (PRN):  acetaminophen   Tablet .. 650 milliGRAM(s) Oral every 6 hours PRN Mild Pain (1 - 3)  ALBUTerol    90 MICROgram(s) HFA Inhaler 2 Puff(s) Inhalation every 6 hours PRN Shortness of Breath and/or Wheezing  bisacodyl 5 milliGRAM(s) Oral daily PRN Constipation  senna 2 Tablet(s) Oral at bedtime PRN Constipation

## 2018-10-09 NOTE — DISCHARGE NOTE ADULT - PLAN OF CARE
recover from infection Follow up with Dr. Lomeli (GI) or Dr. Lockett(GI) in 1-2 weeks. Please call to schedule an appointment.   Follow up with  Follow up with Dr. Lomeli (GI) or Dr. Lockett(GI) in 1-2 weeks. Please call to schedule an appointment.   Follow up with Dr. Medina (Pulmonology) in 1-2 weeks. Please call to schedule an appointment.   Follow up with outpt Interventional Radiology in 1-2 weeks. Please call to schedule an appointment. Follow up with Dr. Lomeli (GI) or Dr. Lockett(GI) in 1-2 weeks. Please call to schedule an appointment.   Follow up with outpt Interventional Radiology in 1-2 weeks. Please call to schedule an appointment. maintain respiratory health Follow up with Dr. Medina (Pulmonology) in 1-2 weeks. Please call to schedule an appointment. Follow up with Dr. Lomeli (GI) or Dr. Lockett(GI) in 1-2 weeks. Please call to schedule an appointment.   Follow up with outpt Interventional Radiology (Dr Velazquez) in 1 week for tube check. Please call to schedule an appointment. Low salt diet  Activity as tolerated.  Take all medication as prescribed.  Follow up with your medical doctor for routine blood pressure monitoring at your next visit.  Notify your doctor if you have any of the following symptoms:   Dizziness, Lightheadedness, Blurry vision, Headache, Chest pain, Shortness of breath Coronary artery disease is a condition where the arteries the supply the heart muscle get clogged with fatty deposits & puts you at risk for a heart attack  Call your doctor if you have any new pain, pressure, or discomfort in the center of your chest, pain, tingling or discomfort in arms, back, neck, jaw, or stomach, shortness of breath, nausea, vomiting, burping or heartburn, sweating, cold and clammy skin, racing or abnormal heartbeat for more than 10 minutes or if they keep coming & going.  Call 911 and do not tr to get to hospital by care  You can help yourself with lefestyle changes (quitting smoking if you smoke), eat lots of fruits & vegetables & low fat dairy products, not a lot of meat & fatty foods, walk or some form of physical activity most days of the week, lose weight if you are overweight  Take your cardiac medication as prescribed to lower cholesterol, to lower blood pressure, aspirin to prevent blood clots, and diabetes control  Make sure to keep appointments with doctor for cardiac follow up care Follow up with Dr. Lomeli (GI) or Dr. Lockett(GI) in 1-2 weeks. Please call to schedule an appointment.   Follow up with outpt Interventional Radiology (Dr Velazquez) in 1 week for tube check. Please call to schedule an appointment.  Home care for tube check and care will start tomorrow 10/14. Follow up with Dr. Medina (Pulmonology) in 1-2 weeks. Please call to schedule an appointment.  Please continue inhalers as ordered and directed

## 2018-10-09 NOTE — PROGRESS NOTE ADULT - SUBJECTIVE AND OBJECTIVE BOX
Requirement for HOME O2:    1) Resting RA 94%  2) Exercise RA 87%  3) Exercise on O2 96% at 1L/min  4) Pt in need of home O2 and is aware. Dx of h/o pulmonary fibrosis and home O2 will improve functional status. While in-hospital, pt utilizing albuterol inhaler q6h and budesonide BID.     Joanna Ramirez, PGY-1  General Surgery Green Team [phone] Requirement for HOME O2:    1) Resting RA 94%  2) Exercise RA 87%  3) Exercise on O2 96% at 2L/min  4) Pt in need of home O2 and is aware. Dx of h/o pulmonary fibrosis and home O2 will improve functional status. While in-hospital, pt utilizing albuterol inhaler q6h and budesonide BID.     Joanna Ramirez, PGY-1  General Surgery Green Team [phone] Requirement for HOME O2:    1) Resting RA 94%  2) Exercise RA 87%  3) Exercise on O2 96% at 2L/min  4) Pt in need of home O2 and is aware. Dx of h/o pulmonary fibrosis and home O2 will improve functional status. While in-hospital, pt utilizing albuterol inhaler q6h, budesonide BID and supplemental oxygenation.     Liza Meyers PA-C  General Surgery Green Team [phone]

## 2018-10-09 NOTE — CONSULT NOTE ADULT - SUBJECTIVE AND OBJECTIVE BOX
Neurology Consult    Name  FELISA YANG    HPI:  89 year old woman with PMH of HTN, Li's esophagus, CAD s/p stent (5-6 yrs ago, on aspirin 81mg), pulmonary fibrosis who is s/p percutaneous cholecystostomy. Neurology consulted for right hand paresthesia. Patient reports that pain starts around the mid forearm and usually radiates down to the pinky but sometimes involves all 5 fingers. Denies any weakness. Describes the sensation as ants crawling. The sensation is intermittent and usually involves the 5th finger but sometimes involves the whole hand.  Denies any shooting pain from elbow. Patient also complaining about multiple bruise marks from needle sticks on right arm.    Daughter at bedside translated in Greenlandic for patient.        MEDICATIONS  (STANDING):  aspirin  chewable 81 milliGRAM(s) Oral daily  aztreonam  IVPB 1000 milliGRAM(s) IV Intermittent every 8 hours  buDESOnide   0.5 milliGRAM(s) Respule 0.5 milliGRAM(s) Inhalation two times a day  chlorhexidine 4% Liquid 1 Application(s) Topical <User Schedule>  enoxaparin Injectable 40 milliGRAM(s) SubCutaneous daily  famotidine    Tablet 20 milliGRAM(s) Oral daily  fluticasone propionate 50 MICROgram(s)/spray Nasal Spray 1 Spray(s) Both Nostrils two times a day  influenza   Vaccine 0.5 milliLiter(s) IntraMuscular once  lidocaine   Patch 1 Patch Transdermal daily  metoprolol succinate ER 25 milliGRAM(s) Oral daily  pantoprazole    Tablet 40 milliGRAM(s) Oral before breakfast  ursodiol Capsule 300 milliGRAM(s) Oral two times a day  vancomycin  IVPB      vancomycin  IVPB 500 milliGRAM(s) IV Intermittent every 12 hours    MEDICATIONS  (PRN):  acetaminophen   Tablet .. 650 milliGRAM(s) Oral every 6 hours PRN Mild Pain (1 - 3)  ALBUTerol    90 MICROgram(s) HFA Inhaler 2 Puff(s) Inhalation every 6 hours PRN Shortness of Breath and/or Wheezing  bisacodyl 5 milliGRAM(s) Oral daily PRN Constipation  senna 2 Tablet(s) Oral at bedtime PRN Constipation      Allergies    penicillin (Angioedema)  penicillins (Swelling)    Intolerances        Objective:   Vital Signs Last 24 Hrs  T(C): 36.4 (09 Oct 2018 14:45), Max: 36.8 (08 Oct 2018 16:39)  T(F): 97.6 (09 Oct 2018 14:45), Max: 98.3 (08 Oct 2018 16:39)  HR: 80 (09 Oct 2018 14:45) (66 - 80)  BP: 106/53 (09 Oct 2018 14:45) (103/63 - 122/74)  BP(mean): --  RR: 18 (09 Oct 2018 14:45) (18 - 22)  SpO2: 92% (09 Oct 2018 14:45) (87% - 100%)    General Exam:   General appearance: No acute distress                   Neurological Exam:  Mental Status: AAOx3, fluent speech, follows commands    Cranial Nerves: EOMI, PERRL, V1-V3 intact, facial symmetry intact, no dysarthria, tongue midline    Motor: RUE hand  4/5, wrist extension 4/5, wrist flexion 4/5. 4/5 Elbow flexion and extension. 4+/5 LUE. No drift x4  parasthenia is illustrated during tapping of wrist    Sensation: Intact to LT throughout    Coordination: FTN intact b/l        Labs:    10-09    139  |  100  |  20  ----------------------------<  78  4.4   |  32<H>  |  0.76    Ca    8.7      09 Oct 2018 08:24  Phos  2.8     10-09  Mg     2.0     10-09    TPro  7.1  /  Alb  3.1<L>  /  TBili  0.4  /  DBili  0.1  /  AST  21  /  ALT  24  /  AlkPhos  103  10-09    LIVER FUNCTIONS - ( 09 Oct 2018 08:24 )  Alb: 3.1 g/dL / Pro: 7.1 g/dL / ALK PHOS: 103 U/L / ALT: 24 U/L / AST: 21 U/L / GGT: x           CBC Full  -  ( 09 Oct 2018 10:55 )  WBC Count : 8.04 K/uL  Hemoglobin : 9.1 g/dL  Hematocrit : 28.6 %  Platelet Count - Automated : 270 K/uL  Mean Cell Volume : 100.0 fl  Mean Cell Hemoglobin : 31.8 pg  Mean Cell Hemoglobin Concentration : 31.8 gm/dL  Auto Neutrophil # : x  Auto Lymphocyte # : x  Auto Monocyte # : x  Auto Eosinophil # : x  Auto Basophil # : x  Auto Neutrophil % : x  Auto Lymphocyte % : x  Auto Monocyte % : x  Auto Eosinophil % : x  Auto Basophil % : x      Radiology  CTH:  MRI Brain: Neurology Consult    Name  FELISA YANG    HPI:  89 year old woman with PMH of HTN, Li's esophagus, CAD s/p stent (5-6 yrs ago, on aspirin 81mg), pulmonary fibrosis who is s/p percutaneous cholecystostomy. Neurology consulted for right hand paresthesia. Patient reports that pain starts around the mid forearm and usually radiates down to the pinky but sometimes involves all 5 fingers. Denies any weakness. Describes the sensation as ants crawling. The sensation is intermittent and usually involves the 5th finger but sometimes involves the whole hand.  Denies any shooting pain from elbow. Patient also complaining about multiple bruise marks from needle sticks on right arm.    Daughter at bedside translated in [place]ill[place] for patient.        MEDICATIONS  (STANDING):  aspirin  chewable 81 milliGRAM(s) Oral daily  aztreonam  IVPB 1000 milliGRAM(s) IV Intermittent every 8 hours  buDESOnide   0.5 milliGRAM(s) Respule 0.5 milliGRAM(s) Inhalation two times a day  chlorhexidine 4% Liquid 1 Application(s) Topical <User Schedule>  enoxaparin Injectable 40 milliGRAM(s) SubCutaneous daily  famotidine    Tablet 20 milliGRAM(s) Oral daily  fluticasone propionate 50 MICROgram(s)/spray Nasal Spray 1 Spray(s) Both Nostrils two times a day  influenza   Vaccine 0.5 milliLiter(s) IntraMuscular once  lidocaine   Patch 1 Patch Transdermal daily  metoprolol succinate ER 25 milliGRAM(s) Oral daily  pantoprazole    Tablet 40 milliGRAM(s) Oral before breakfast  ursodiol Capsule 300 milliGRAM(s) Oral two times a day  vancomycin  IVPB      vancomycin  IVPB 500 milliGRAM(s) IV Intermittent every 12 hours    MEDICATIONS  (PRN):  acetaminophen   Tablet .. 650 milliGRAM(s) Oral every 6 hours PRN Mild Pain (1 - 3)  ALBUTerol    90 MICROgram(s) HFA Inhaler 2 Puff(s) Inhalation every 6 hours PRN Shortness of Breath and/or Wheezing  bisacodyl 5 milliGRAM(s) Oral daily PRN Constipation  senna 2 Tablet(s) Oral at bedtime PRN Constipation      Allergies    penicillin (Angioedema)  penicillins (Swelling)    Intolerances        Objective:   Vital Signs Last 24 Hrs  T(C): 36.4 (09 Oct 2018 14:45), Max: 36.8 (08 Oct 2018 16:39)  T(F): 97.6 (09 Oct 2018 14:45), Max: 98.3 (08 Oct 2018 16:39)  HR: 80 (09 Oct 2018 14:45) (66 - 80)  BP: 106/53 (09 Oct 2018 14:45) (103/63 - 122/74)  BP(mean): --  RR: 18 (09 Oct 2018 14:45) (18 - 22)  SpO2: 92% (09 Oct 2018 14:45) (87% - 100%)    General Exam:   General appearance: No acute distress                   Neurological Exam:  Mental Status: AAOx3, fluent speech, follows commands    Cranial Nerves: EOMI, PERRL, V1-V3 intact, facial symmetry intact, no dysarthria, tongue midline    Motor: RUE hand  4/5, wrist extension 4/5, wrist flexion 4/5. 4/5 Elbow flexion and extension. 4+/5 LUE. No drift x4  parasthenia is illustrated during tapping of wrist    Sensation: Intact to LT throughout    Coordination: FTN intact b/l        Labs:    10-09    139  |  100  |  20  ----------------------------<  78  4.4   |  32<H>  |  0.76    Ca    8.7      09 Oct 2018 08:24  Phos  2.8     10-09  Mg     2.0     10-09    TPro  7.1  /  Alb  3.1<L>  /  TBili  0.4  /  DBili  0.1  /  AST  21  /  ALT  24  /  AlkPhos  103  10-09    LIVER FUNCTIONS - ( 09 Oct 2018 08:24 )  Alb: 3.1 g/dL / Pro: 7.1 g/dL / ALK PHOS: 103 U/L / ALT: 24 U/L / AST: 21 U/L / GGT: x           CBC Full  -  ( 09 Oct 2018 10:55 )  WBC Count : 8.04 K/uL  Hemoglobin : 9.1 g/dL  Hematocrit : 28.6 %  Platelet Count - Automated : 270 K/uL  Mean Cell Volume : 100.0 fl  Mean Cell Hemoglobin : 31.8 pg  Mean Cell Hemoglobin Concentration : 31.8 gm/dL  Auto Neutrophil # : x  Auto Lymphocyte # : x  Auto Monocyte # : x  Auto Eosinophil # : x  Auto Basophil # : x  Auto Neutrophil % : x  Auto Lymphocyte % : x  Auto Monocyte % : x  Auto Eosinophil % : x  Auto Basophil % : x

## 2018-10-09 NOTE — CONSULT NOTE ADULT - CONSULT REASON
2nd opinion regarding ERCP
Cholecystitis, Bacteremia
Concern for choledocholithiasis
medical management
right hand paresthesia
Acute cholecystitis vs. cholangitis

## 2018-10-09 NOTE — CONSULT NOTE ADULT - CONSULT REQUESTED DATE/TIME
04-Oct-2018 09:37
08-Oct-2018 09:26
09-Oct-2018 15:35
28-Sep-2018 14:59
30-Sep-2018 15:40
28-Sep-2018 19:02

## 2018-10-09 NOTE — PROGRESS NOTE ADULT - SUBJECTIVE AND OBJECTIVE BOX
PULMONARY PROGRESS NOTE    FELISA YANG  MRN-[id number]    Patient is a 89y old  Female who presents with a chief complaint of Abdominal pain (04 Oct 2018 08:33)      HPI:  breathing comfortably, on room air, being discharged.      MEDICATIONS  (STANDING):  aspirin  chewable 81 milliGRAM(s) Oral daily  aztreonam  IVPB 1000 milliGRAM(s) IV Intermittent every 8 hours  buDESOnide   0.5 milliGRAM(s) Respule 0.5 milliGRAM(s) Inhalation two times a day  chlorhexidine 4% Liquid 1 Application(s) Topical <User Schedule>  enoxaparin Injectable 40 milliGRAM(s) SubCutaneous daily  famotidine    Tablet 20 milliGRAM(s) Oral daily  fluticasone propionate 50 MICROgram(s)/spray Nasal Spray 1 Spray(s) Both Nostrils two times a day  influenza   Vaccine 0.5 milliLiter(s) IntraMuscular once  lidocaine   Patch 1 Patch Transdermal daily  metoprolol succinate ER 25 milliGRAM(s) Oral daily  pantoprazole    Tablet 40 milliGRAM(s) Oral before breakfast  ursodiol Capsule 300 milliGRAM(s) Oral two times a day  vancomycin  IVPB      vancomycin  IVPB 500 milliGRAM(s) IV Intermittent every 12 hours    MEDICATIONS  (PRN):  acetaminophen   Tablet .. 650 milliGRAM(s) Oral every 6 hours PRN Mild Pain (1 - 3)  ALBUTerol    90 MICROgram(s) HFA Inhaler 2 Puff(s) Inhalation every 6 hours PRN Shortness of Breath and/or Wheezing  bisacodyl 5 milliGRAM(s) Oral daily PRN Constipation  senna 2 Tablet(s) Oral at bedtime PRN Constipation            EXAM:  Vital Signs Last 24 Hrs  T(C): 36.8 (09 Oct 2018 10:00), Max: 36.9 (08 Oct 2018 13:23)  T(F): 98.3 (09 Oct 2018 10:00), Max: 98.4 (08 Oct 2018 13:23)  HR: 72 (09 Oct 2018 11:28) (65 - 72)  BP: 119/60 (09 Oct 2018 10:00) (101/64 - 122/74)  BP(mean): --  RR: 18 (09 Oct 2018 11:28) (18 - 18)  SpO2: 94% (09 Oct 2018 11:28) (94% - 100%)  GENERAL: The patient is awake and alert in no apparent distress.     LUNGS: diffuse dry crackles, stable        LABS/IMAGING: reviewed                        9.1    8.04  )-----------( 270      ( 09 Oct 2018 10:55 )             28.6   10-09    139  |  100  |  x   ----------------------------<  x   4.4   |  x   |  x     Ca    8.7      08 Oct 2018 07:30  Phos  3.4     10-08  Mg     2.1     10-08    TPro  7.3  /  Alb  3.2<L>  /  TBili  0.4  /  DBili  x   /  AST  22  /  ALT  27  /  AlkPhos  119  10-08      < from: Xray Chest 1 View- PORTABLE-Routine (10.03.18 @ 09:07) >    IMPRESSION:    1.  Interstitial lung disease. No pneumothorax or new opacity.     < end of copied text >  89y Female with HEALTH ISSUES - PROBLEM Dx:  Pulmonary Fibrosis      RECS:  -would assess need for home O2, check ambulatory sat if <88% will need to be set up with home O2 prior to discharge.   -continue duonebs and PRN albuterol  -no pulmonary contraindication to discharge. follow up with  after jordan Antonio MD   [phone]

## 2018-10-09 NOTE — DISCHARGE NOTE ADULT - MEDICATION SUMMARY - MEDICATIONS TO TAKE
I will START or STAY ON the medications listed below when I get home from the hospital:    Pulmicort Flexhaler 180 mcg/inh inhalation powder  -- 1 puff(s) inhaled 2 times a day  -- Indication: For home med    Ecotrin Adult Low Strength 81 mg oral delayed release tablet  -- 1 tab(s) by mouth once a day  -- Indication: For Pain relief    acetaminophen 325 mg oral tablet  -- 2 tab(s) by mouth every 6 hours, As needed, Mild Pain (1 - 3)  -- Indication: For Pain relief    metoprolol succinate 25 mg oral tablet, extended release  -- 1 tab(s) by mouth once a day  -- Indication: For cardiovascular health    albuterol 90 mcg/inh inhalation aerosol  -- 2 puff(s) inhaled every 6 hours, As needed, Shortness of Breath and/or Wheezing  -- Indication: For difficulty breathing    lidocaine 5% topical film  -- Apply on skin to affected area once a day MDD:1  -- Indication: For pain relief    ursodiol 300 mg oral capsule  -- 1 cap(s) by mouth 2 times a day  -- Indication: For Acute cholecystitis    vancomycin  -- 500 milligram(s) intravenous 2 times a day  -- Indication: For Acute cholecystitis    famotidine 20 mg oral tablet  -- 1  by mouth once a day  -- Indication: For reflux    bisacodyl 5 mg oral delayed release tablet  -- 1 tab(s) by mouth once a day, As needed, Constipation  -- Indication: For constipation    senna oral tablet  -- 2 tab(s) by mouth once a day (at bedtime), As needed, Constipation  -- Indication: For constipation    aztreonam  -- 1000 milligram(s) intravenous 3 times a day  -- Indication: For Acute cholecystitis    fluticasone 50 mcg/inh nasal spray  -- 1  into nose 2 times a day  -- Indication: For Allergies    omeprazole 40 mg oral delayed release capsule  -- 1 cap(s) by mouth once a day  -- Indication: For reflux    HYDROcodone-homatropine 5 mg-1.5 mg/5 mL oral syrup  -- 1  by mouth every 8 hours, As Needed  -- Indication: For respiratory health I will START or STAY ON the medications listed below when I get home from the hospital:    Pulmicort Flexhaler 180 mcg/inh inhalation powder  -- 1 puff(s) inhaled 2 times a day  -- Indication: For shortness of breath    Ecotrin Adult Low Strength 81 mg oral delayed release tablet  -- 1 tab(s) by mouth once a day  -- Indication: For Protect from clot formation    acetaminophen 325 mg oral tablet  -- 2 tab(s) by mouth every 6 hours, As needed, Mild Pain (1 - 3)  -- Indication: For Pain relief    metoprolol succinate 25 mg oral tablet, extended release  -- 1 tab(s) by mouth once a day  -- Indication: For high blood pressure    albuterol 90 mcg/inh inhalation aerosol  -- 2 puff(s) inhaled every 6 hours, As needed, Shortness of Breath and/or Wheezing  -- Indication: For shortness of breath/wheezing    lidocaine 5% topical film  -- Apply on skin to affected area once a day MDD:1  -- Indication: For pain relief    ursodiol 300 mg oral capsule  -- 1 cap(s) by mouth 2 times a day  -- Indication: For Acute cholecystitis    famotidine 20 mg oral tablet  -- 1  by mouth once a day  -- Indication: For reflux    bisacodyl 5 mg oral delayed release tablet  -- 1 tab(s) by mouth once a day, As needed, Constipation  -- Indication: For constipation    senna oral tablet  -- 2 tab(s) by mouth once a day (at bedtime), As needed, Constipation  -- Indication: For constipation    fluticasone 50 mcg/inh nasal spray  -- 1  into nose 2 times a day  -- Indication: For Allergies    omeprazole 40 mg oral delayed release capsule  -- 1 cap(s) by mouth once a day  -- Indication: For reflux    HYDROcodone-homatropine 5 mg-1.5 mg/5 mL oral syrup  -- 1  by mouth every 8 hours, As Needed  -- Indication: For cough

## 2018-10-09 NOTE — DISCHARGE NOTE ADULT - HOSPITAL COURSE
89 yo F with PMHx of HTN, pulmonary fibrosis, Li's esophagus, CAD s/p stent (5-6 yrs ago, on ASA 81mg) who p/w epigastric abd pain concerning for cholangitis with choledocholithiasis.  RUQ US concerning for acute cholecystitis. HIDA findings represented high grade obstruction. Pt was found to be in poor functional status 2/2 h/o end stage pulmonary fibrosis and admitted to SICU for hemodynamic monitoring. IR performed a percutaneous cholecystostomy tube on 9/28. Found to have E.coli/strep bacteremia, started on AZT, metronidazole, and vancomycin. ID was consulted and rec d/c metronidazole, and continuing AZT and vanc as a 2wk course from the last negative blood culture. GI consulted and recommended an MRCP 89 yo F with PMHx of HTN, pulmonary fibrosis, Li's esophagus, CAD s/p stent (5-6 yrs ago, on ASA 81mg) who p/w epigastric abd pain concerning for cholangitis with choledocholithiasis.  RUQ US concerning for acute cholecystitis. HIDA findings represented high grade obstruction. Pt was found to be in poor functional status 2/2 h/o end stage pulmonary fibrosis and admitted to SICU for hemodynamic monitoring. IR performed a percutaneous cholecystostomy tube on 9/28. Found to have E.coli/strep bacteremia, started on AZT, metronidazole, and vancomycin. ID was consulted and rec d/c metronidazole, and continuing AZT and vanc as a 2wk course from the last negative blood culture. IR tube study performed and demonstrated patent cystic and common bile ducts, non-obstructing stone in the CBD. GI consulted and recommended an MRCP which did not show biliary ductal dilatation or choledocholithiasis. After consulting gastroenterology and obtaining a second opinion from GI, an ERCP was not deemed a necessary intervention. GI noted that the extraction of the non-obstructing CBD stone may be successfully performed but will require deep sedation/general anesthesia d/t current pulmonary status and h/o pulmonary fibrosis. Additionally, the patient's PTC has been draining well and pt has stabilized hemodynamically. The medical teams spoke extensively with the family and patient, and they understand and agree with the plans to have a chronic indwelling PTC that will require ongoing care. In the event that the pt develops cholangitis, the family understands to reconsider ERCP. In the meantime, GI recommends starting ursodiol therapy at 300mg PO BID. Pt received a PICC on 10/5/18 to continue IV abx of Aztreonam and Vancomycin until 10/13/18. Pt is stable and ready to be discharged home today with instructions to follow-up with Gastroenterology, Interventional Radiology, and Pulmonary. 89 yo F with PMHx of HTN, pulmonary fibrosis, Li's esophagus, CAD s/p stent (5-6 yrs ago, on ASA 81mg) who p/w epigastric abd pain concerning for cholangitis with choledocholithiasis.  RUQ US concerning for acute cholecystitis. HIDA findings represented high grade obstruction. Pt was found to be in poor functional status 2/2 h/o end stage pulmonary fibrosis and admitted to SICU for hemodynamic monitoring. IR performed a percutaneous cholecystostomy tube on 9/28. Found to have E.coli/strep bacteremia, started on AZT, metronidazole, and vancomycin. ID was consulted and rec d/c metronidazole, and continuing AZT and vanc as a 2wk course from the last negative blood culture. IR tube study performed and demonstrated patent cystic and common bile ducts, non-obstructing stone in the CBD. GI consulted and recommended an MRCP which did not show biliary ductal dilatation or choledocholithiasis. After consulting gastroenterology and obtaining a second opinion from GI, an ERCP was not deemed a necessary intervention. GI noted that the extraction of the non-obstructing CBD stone may be successfully performed but will require deep sedation/general anesthesia d/t current pulmonary status and h/o pulmonary fibrosis. Additionally, the patient's PTC has been draining well and pt has stabilized hemodynamically. The medical teams spoke extensively with the family and patient, and they understand and agree with the plans to have a chronic indwelling PTC that will require ongoing care. In the event that the pt develops cholangitis, the family understands to reconsider ERCP. In the meantime, GI recommends starting ursodiol therapy at 300mg PO BID. Pt received a PICC on 10/5/18 to continue IV abx of Aztreonam and Vancomycin until 10/13/18. Pt was on 2L of O2 NC while in the hospital, and was found to desaturate to the low 90s while walking on RA. Pt is not currently on home O2. On day of discharge, pt was taken off of O2 and saturating well on room air. Per pulm, home O2 would be necessary if patient had a SpO2 of below 88% on RA. Pt is stable and ready to be discharged home today with instructions to follow-up with Gastroenterology, Interventional Radiology, and Pulmonary. No follow-up was necessary with surgery (Dr. Monte). 89 yo F with PMHx of HTN, pulmonary fibrosis, Li's esophagus, CAD s/p stent (5-6 yrs ago, on ASA 81mg) who p/w epigastric abd pain concerning for cholangitis with choledocholithiasis.  RUQ US concerning for acute cholecystitis. HIDA findings represented high grade obstruction. Pt was found to be in poor functional status 2/2 h/o end stage pulmonary fibrosis and admitted to SICU for hemodynamic monitoring. IR performed a percutaneous cholecystostomy tube on 9/28. Found to have E.coli/strep bacteremia, started on AZT, metronidazole, and vancomycin. ID was consulted and rec d/c metronidazole, and continuing AZT and vanc as a 2wk course from the last negative blood culture. IR tube study performed and demonstrated patent cystic and common bile ducts, non-obstructing stone in the CBD. GI consulted and recommended an MRCP which did not show biliary ductal dilatation or choledocholithiasis. After consulting gastroenterology and obtaining a second opinion from GI, an ERCP was not deemed a necessary intervention. GI noted that the extraction of the non-obstructing CBD stone may be successfully performed but will require deep sedation/general anesthesia d/t current pulmonary status and h/o pulmonary fibrosis. Additionally, the patient's PTC has been draining well and pt has stabilized hemodynamically. The medical teams spoke extensively with the family and patient, and they understand and agree with the plans to have a chronic indwelling PTC that will require ongoing care. In the event that the pt develops cholangitis, the family understands to reconsider ERCP. In the meantime, GI recommends starting ursodiol therapy at 300mg PO BID. Pt received a PICC on 10/5/18 to continue IV abx of Aztreonam and Vancomycin until 10/13/18. Pt was on 2L of O2 NC while in the hospital, and was found to desaturate to the low 90s while walking with room air. Pt is not currently on home O2. On day of discharge, pt was taken off of O2 and saturating well on room air while sitting, however, when walking around, pt desaturated to 87% and required supplemental O2.  Per pulm, home O2 would be necessary if patient had a SpO2 of below 88% on RA. Home O2 was set up prior to discharge. Pt is stable and ready to be discharged home today with instructions to follow-up with Gastroenterology, Interventional Radiology, and Pulmonary. No follow-up was necessary with surgery (Dr. Monte). 89 yo F with PMHx of HTN, pulmonary fibrosis, Li's esophagus, CAD s/p stent (5-6 yrs ago, on ASA 81mg) who p/w epigastric abd pain concerning for cholangitis with choledocholithiasis.  RUQ US concerning for acute cholecystitis. HIDA findings represented high grade obstruction. Pt was found to be in poor functional status 2/2 h/o end stage pulmonary fibrosis and admitted to SICU for hemodynamic monitoring. IR performed a percutaneous cholecystostomy tube on 9/28. Found to have E.coli/strep bacteremia, started on AZT, metronidazole, and vancomycin. ID was consulted and rec d/c metronidazole, and continuing AZT and vanc as a 2wk course from the last negative blood culture. IR tube study performed and demonstrated patent cystic and common bile ducts, non-obstructing stone in the CBD. GI consulted and recommended an MRCP which did not show biliary ductal dilatation or choledocholithiasis. After consulting gastroenterology and obtaining a second opinion from GI, an ERCP was not deemed a necessary intervention. GI noted that the extraction of the non-obstructing CBD stone may be successfully performed but will require deep sedation/general anesthesia d/t current pulmonary status and h/o pulmonary fibrosis. Additionally, the patient's PTC has been draining well and pt has stabilized hemodynamically. The medical teams spoke extensively with the family and patient, and they understand and agree with the plans to have a chronic indwelling PTC that will require ongoing care. In the event that the pt develops cholangitis, the family understands to reconsider ERCP. In the meantime, GI recommends starting ursodiol therapy at 300mg PO BID. Pt received a PICC on 10/5/18 to continue IV abx of Aztreonam and Vancomycin until 10/13/18. Pt was on 2L of O2 NC while in the hospital, and was found to desaturate to the low 90s while walking with room air. Pt is not currently on home O2. On day of discharge, pt was taken off of O2 and saturating well on room air while sitting, however, when walking around, pt desaturated to 87% and required supplemental O2.  Per pulm, home O2 would be necessary if patient had a SpO2 of below 88% on RA. Home O2 was set up prior to discharge. Pt is stable and ready to be discharged home today with instructions to follow-up with Gastroenterology, Interventional Radiology in 1 wk for possible tube check, and Pulmonary. No follow-up was necessary with surgery (Dr. Monte). 89 yo F with PMHx of HTN, pulmonary fibrosis, Li's esophagus, CAD s/p stent (5-6 yrs ago, on ASA 81mg) who p/w epigastric abd pain concerning for cholangitis with choledocholithiasis.  RUQ US concerning for acute cholecystitis. HIDA findings represented high grade obstruction. Pt was found to be in poor functional status 2/2 h/o end stage pulmonary fibrosis and admitted to SICU for hemodynamic monitoring. IR performed a percutaneous cholecystostomy tube on 9/28. Found to have E.coli/strep bacteremia, started on AZT, metronidazole, and vancomycin. ID was consulted and rec d/c metronidazole, and continuing AZT and vanc as a 2wk course from the last negative blood culture. IR tube study performed and demonstrated patent cystic and common bile ducts, non-obstructing stone in the CBD. GI consulted and recommended an MRCP which did not show biliary ductal dilatation or choledocholithiasis. After consulting gastroenterology and obtaining a second opinion from GI, an ERCP was not deemed a necessary intervention. GI noted that the extraction of the non-obstructing CBD stone may be successfully performed but will require deep sedation/general anesthesia d/t current pulmonary status and h/o pulmonary fibrosis. Additionally, the patient's PTC has been draining well and pt has stabilized hemodynamically. The medical teams spoke extensively with the family and patient, and they understand and agree with the plans to have a chronic indwelling PTC that will require ongoing care. In the event that the pt develops cholangitis, the family understands to reconsider ERCP. In the meantime, GI recommends starting ursodiol therapy at 300mg PO BID. Pt received a PICC on 10/5/18 to continue IV abx of Aztreonam and Vancomycin until 10/13/18. Pt was on 2L of O2 NC while in the hospital, and was found to desaturate to the low 90s while walking with room air. Pt is not currently on home O2. On day of discharge, pt was taken off of O2 and saturating well on room air while sitting, however, when walking around, pt desaturated to 87% and required supplemental O2.  Per pulm, home O2 would be necessary if patient had a SpO2 of below 88% on RA. Home O2 was set up prior to discharge. Pt is stable and ready to be discharged home today with instructions to follow-up with Gastroenterology, Interventional Radiology in 1 wk for possible tube check, and Pulmonary. No follow-up was necessary with surgery (Dr. Monte).       Pt completed Iv antibiotics and is cleared by attending for discharge to home with homecare.

## 2018-10-09 NOTE — DISCHARGE NOTE ADULT - CARE PLAN
Principal Discharge DX:	Acute cholecystitis  Goal:	recover from infection  Assessment and plan of treatment:	Follow up with Dr. Lomeli (GI) or Dr. Lockett(GI) in 1-2 weeks. Please call to schedule an appointment.   Follow up with  Principal Discharge DX:	Acute cholecystitis  Goal:	recover from infection  Assessment and plan of treatment:	Follow up with Dr. Lomeli (GI) or Dr. Lockett(GI) in 1-2 weeks. Please call to schedule an appointment.   Follow up with Dr. Medina (Pulmonology) in 1-2 weeks. Please call to schedule an appointment.   Follow up with outpt Interventional Radiology in 1-2 weeks. Please call to schedule an appointment. Principal Discharge DX:	Acute cholecystitis  Goal:	recover from infection  Assessment and plan of treatment:	Follow up with Dr. Lomeli (GI) or Dr. Lockett(GI) in 1-2 weeks. Please call to schedule an appointment.   Follow up with outpt Interventional Radiology in 1-2 weeks. Please call to schedule an appointment.  Secondary Diagnosis:	Pulmonary fibrosis, unspecified  Goal:	maintain respiratory health  Assessment and plan of treatment:	Follow up with Dr. Medina (Pulmonology) in 1-2 weeks. Please call to schedule an appointment. Principal Discharge DX:	Acute cholecystitis  Goal:	recover from infection  Assessment and plan of treatment:	Follow up with Dr. Lomeli (GI) or Dr. Lockett(GI) in 1-2 weeks. Please call to schedule an appointment.   Follow up with outpt Interventional Radiology (Dr Velazquez) in 1 week for tube check. Please call to schedule an appointment.  Secondary Diagnosis:	Pulmonary fibrosis, unspecified  Goal:	maintain respiratory health  Assessment and plan of treatment:	Follow up with Dr. Medina (Pulmonology) in 1-2 weeks. Please call to schedule an appointment. Principal Discharge DX:	Acute cholecystitis  Goal:	recover from infection  Assessment and plan of treatment:	Follow up with Dr. Lomeli (GI) or Dr. Lockett(GI) in 1-2 weeks. Please call to schedule an appointment.   Follow up with outpt Interventional Radiology (Dr Velazquez) in 1 week for tube check. Please call to schedule an appointment.  Secondary Diagnosis:	Pulmonary fibrosis, unspecified  Goal:	maintain respiratory health  Assessment and plan of treatment:	Follow up with Dr. Medina (Pulmonology) in 1-2 weeks. Please call to schedule an appointment.  Secondary Diagnosis:	Essential hypertension  Assessment and plan of treatment:	Low salt diet  Activity as tolerated.  Take all medication as prescribed.  Follow up with your medical doctor for routine blood pressure monitoring at your next visit.  Notify your doctor if you have any of the following symptoms:   Dizziness, Lightheadedness, Blurry vision, Headache, Chest pain, Shortness of breath  Secondary Diagnosis:	CAD (coronary artery disease)  Assessment and plan of treatment:	Coronary artery disease is a condition where the arteries the supply the heart muscle get clogged with fatty deposits & puts you at risk for a heart attack  Call your doctor if you have any new pain, pressure, or discomfort in the center of your chest, pain, tingling or discomfort in arms, back, neck, jaw, or stomach, shortness of breath, nausea, vomiting, burping or heartburn, sweating, cold and clammy skin, racing or abnormal heartbeat for more than 10 minutes or if they keep coming & going.  Call 911 and do not tr to get to hospital by care  You can help yourself with lefestyle changes (quitting smoking if you smoke), eat lots of fruits & vegetables & low fat dairy products, not a lot of meat & fatty foods, walk or some form of physical activity most days of the week, lose weight if you are overweight  Take your cardiac medication as prescribed to lower cholesterol, to lower blood pressure, aspirin to prevent blood clots, and diabetes control  Make sure to keep appointments with doctor for cardiac follow up care Principal Discharge DX:	Acute cholecystitis  Goal:	recover from infection  Assessment and plan of treatment:	Follow up with Dr. Lomeli (GI) or Dr. Lockett(GI) in 1-2 weeks. Please call to schedule an appointment.   Follow up with outpt Interventional Radiology (Dr Velazquez) in 1 week for tube check. Please call to schedule an appointment.  Home care for tube check and care will start tomorrow 10/14.  Secondary Diagnosis:	Pulmonary fibrosis, unspecified  Goal:	maintain respiratory health  Assessment and plan of treatment:	Follow up with Dr. Medina (Pulmonology) in 1-2 weeks. Please call to schedule an appointment.  Please continue inhalers as ordered and directed  Secondary Diagnosis:	Essential hypertension  Assessment and plan of treatment:	Low salt diet  Activity as tolerated.  Take all medication as prescribed.  Follow up with your medical doctor for routine blood pressure monitoring at your next visit.  Notify your doctor if you have any of the following symptoms:   Dizziness, Lightheadedness, Blurry vision, Headache, Chest pain, Shortness of breath  Secondary Diagnosis:	CAD (coronary artery disease)  Assessment and plan of treatment:	Coronary artery disease is a condition where the arteries the supply the heart muscle get clogged with fatty deposits & puts you at risk for a heart attack  Call your doctor if you have any new pain, pressure, or discomfort in the center of your chest, pain, tingling or discomfort in arms, back, neck, jaw, or stomach, shortness of breath, nausea, vomiting, burping or heartburn, sweating, cold and clammy skin, racing or abnormal heartbeat for more than 10 minutes or if they keep coming & going.  Call 911 and do not tr to get to hospital by care  You can help yourself with lefestyle changes (quitting smoking if you smoke), eat lots of fruits & vegetables & low fat dairy products, not a lot of meat & fatty foods, walk or some form of physical activity most days of the week, lose weight if you are overweight  Take your cardiac medication as prescribed to lower cholesterol, to lower blood pressure, aspirin to prevent blood clots, and diabetes control  Make sure to keep appointments with doctor for cardiac follow up care

## 2018-10-10 LAB — VANCOMYCIN TROUGH SERPL-MCNC: 17.2 UG/ML — SIGNIFICANT CHANGE UP (ref 10–20)

## 2018-10-10 PROCEDURE — 99222 1ST HOSP IP/OBS MODERATE 55: CPT | Mod: GC

## 2018-10-10 RX ADMIN — Medication 50 MILLIGRAM(S): at 13:31

## 2018-10-10 RX ADMIN — ENOXAPARIN SODIUM 40 MILLIGRAM(S): 100 INJECTION SUBCUTANEOUS at 12:23

## 2018-10-10 RX ADMIN — Medication 81 MILLIGRAM(S): at 12:23

## 2018-10-10 RX ADMIN — Medication 650 MILLIGRAM(S): at 19:06

## 2018-10-10 RX ADMIN — Medication 1 SPRAY(S): at 05:07

## 2018-10-10 RX ADMIN — Medication 50 MILLIGRAM(S): at 05:07

## 2018-10-10 RX ADMIN — PANTOPRAZOLE SODIUM 40 MILLIGRAM(S): 20 TABLET, DELAYED RELEASE ORAL at 05:07

## 2018-10-10 RX ADMIN — FAMOTIDINE 20 MILLIGRAM(S): 10 INJECTION INTRAVENOUS at 12:23

## 2018-10-10 RX ADMIN — Medication 100 MILLIGRAM(S): at 20:57

## 2018-10-10 RX ADMIN — LIDOCAINE 1 PATCH: 4 CREAM TOPICAL at 12:23

## 2018-10-10 RX ADMIN — Medication 1 SPRAY(S): at 17:42

## 2018-10-10 RX ADMIN — URSODIOL 300 MILLIGRAM(S): 250 TABLET, FILM COATED ORAL at 17:41

## 2018-10-10 RX ADMIN — Medication 50 MILLIGRAM(S): at 22:59

## 2018-10-10 RX ADMIN — Medication 100 MILLIGRAM(S): at 06:03

## 2018-10-10 RX ADMIN — CHLORHEXIDINE GLUCONATE 1 APPLICATION(S): 213 SOLUTION TOPICAL at 12:24

## 2018-10-10 RX ADMIN — Medication 650 MILLIGRAM(S): at 18:36

## 2018-10-10 RX ADMIN — Medication 0.5 MILLIGRAM(S): at 05:07

## 2018-10-10 RX ADMIN — Medication 0.5 MILLIGRAM(S): at 17:41

## 2018-10-10 RX ADMIN — URSODIOL 300 MILLIGRAM(S): 250 TABLET, FILM COATED ORAL at 05:07

## 2018-10-10 RX ADMIN — LIDOCAINE 1 PATCH: 4 CREAM TOPICAL at 23:01

## 2018-10-10 NOTE — PROGRESS NOTE ADULT - ASSESSMENT
89F with a hx of pulmonary fibrosis, Li's esophagus, CAD s/p stents (approx 5 years ago per daughters, on ASA), presents with a 1-day history of acute-onset epigastric abdominal pain, elevated LFTs and fevers. Picture c/w cholangitis - not a candidate for ERCP at the time of admission as per GI. Patient underwent PCT placement which she tolerated well. transitioned from sicu to floor. S/p cholecystotomy tube check 10/3 with findings of CBD stone. S/p MRCP 10/4. S/p PICC placement 10/5.     PLAN:   - regular diet  - c/w PICC for abx- AZT and vanc  - OOB, ambulate as tolerated  - Encourage use of IS  - Monitor dressings  - DVT ppx  - F/u pt's pulmonologist (Dr. Travis Medina- [phone])   - F/u IR in one week for tube check (Dr. Velazquez)  - DISPO: f/u infusion center acceptance    Joanna Ramirez, PGY-1  General Surgery Green Team [id number]

## 2018-10-10 NOTE — PROGRESS NOTE ADULT - ATTENDING COMMENTS
I saw and examined the pt and discussed the tx plan with the House Staff. I agree with the exam and plan as documented in the surgery resident's note from today.  Dispo planning.  Ade Monte MD

## 2018-10-10 NOTE — PROGRESS NOTE ADULT - SUBJECTIVE AND OBJECTIVE BOX
CHIEF COMPLAINT:Patient is a 89y old  Female who presents with a chief complaint of Abdominal pain (09 Oct 2018 11:30)  no acute events    PAST MEDICAL & SURGICAL HISTORY:  Barretts esophagus  Essential hypertension  CAD (coronary artery disease)  Idiopathic pulmonary fibrosis  Li's Esophagus  CAD (Coronary Artery Disease)  HTN (Hypertension)  Pulmonary Fibrosis  S/P Coronary Artery Stent Placement  S/P Shoulder Surgery          REVIEW OF SYSTEMS:  CONSTITUTIONAL: No fever, weight loss, or fatigue  EYES: No eye pain, visual disturbances, or discharge  NECK: No pain or stiffness  RESPIRATORY: No cough, wheezing, chills or hemoptysis; No Shortness of Breath  CARDIOVASCULAR: No chest pain, palpitations, passing out, dizziness, or leg swelling  GASTROINTESTINAL: No abdominal or epigastric pain. No nausea, vomiting, or hematemesis; No diarrhea or constipation. No melena or hematochezia.  GENITOURINARY: No dysuria, frequency, hematuria, or incontinence  NEUROLOGICAL: No headaches,   MUSCULOSKELETAL: No joint pain or swelling; No muscle, back, or extremity pain      Medications:  MEDICATIONS  (STANDING):  aspirin  chewable 81 milliGRAM(s) Oral daily  aztreonam  IVPB 1000 milliGRAM(s) IV Intermittent every 8 hours  buDESOnide   0.5 milliGRAM(s) Respule 0.5 milliGRAM(s) Inhalation two times a day  chlorhexidine 4% Liquid 1 Application(s) Topical <User Schedule>  enoxaparin Injectable 40 milliGRAM(s) SubCutaneous daily  famotidine    Tablet 20 milliGRAM(s) Oral daily  fluticasone propionate 50 MICROgram(s)/spray Nasal Spray 1 Spray(s) Both Nostrils two times a day  influenza   Vaccine 0.5 milliLiter(s) IntraMuscular once  lidocaine   Patch 1 Patch Transdermal daily  metoprolol succinate ER 25 milliGRAM(s) Oral daily  pantoprazole    Tablet 40 milliGRAM(s) Oral before breakfast  ursodiol Capsule 300 milliGRAM(s) Oral two times a day  vancomycin  IVPB      vancomycin  IVPB 500 milliGRAM(s) IV Intermittent every 12 hours    MEDICATIONS  (PRN):  acetaminophen   Tablet .. 650 milliGRAM(s) Oral every 6 hours PRN Mild Pain (1 - 3)  ALBUTerol    90 MICROgram(s) HFA Inhaler 2 Puff(s) Inhalation every 6 hours PRN Shortness of Breath and/or Wheezing  bisacodyl 5 milliGRAM(s) Oral daily PRN Constipation  senna 2 Tablet(s) Oral at bedtime PRN Constipation    	    PHYSICAL EXAM:  T(C): 36.8 (10-10-18 @ 05:05), Max: 36.8 (10-09-18 @ 10:00)  HR: 86 (10-10-18 @ 05:05) (68 - 99)  BP: 115/73 (10-10-18 @ 05:05) (99/62 - 148/75)  RR: 18 (10-10-18 @ 05:05) (18 - 22)  SpO2: 94% (10-10-18 @ 05:05) (87% - 100%)  Wt(kg): --  I&O's Summary    09 Oct 2018 07:01  -  10 Oct 2018 07:00  --------------------------------------------------------  IN: 1590 mL / OUT: 2675 mL / NET: -1085 mL      Appearance: Normal	  HEENT:   Normal oral mucosa, PERRL, EOMI	  Lymphatic: No lymphadenopathy  Cardiovascular: Normal S1 S2, No JVD, No murmurs, No edema  Respiratory: Lungs clear to auscultation	  Psychiatry: A & O x 3, Mood & affect appropriate  Gastrointestinal:  Soft, Non-tender, + BS	choly tube in place  Skin: No rashes, No ecchymoses, No cyanosis	  Neurologic: Non-focal  Extremities: Normal range of motion, No clubbing, cyanosis or edema  Vascular: Peripheral pulses palpable 2+ bilaterally    LABS:	 	    CARDIAC MARKERS:                                9.1    8.04  )-----------( 270      ( 09 Oct 2018 10:55 )             28.6     10-09    139  |  100  |  20  ----------------------------<  78  4.4   |  32<H>  |  0.76    Ca    8.7      09 Oct 2018 08:24  Phos  2.8     10-09  Mg     2.0     10-09    TPro  7.1  /  Alb  3.1<L>  /  TBili  0.4  /  DBili  0.1  /  AST  21  /  ALT  24  /  AlkPhos  103  10-09    proBNP:   Lipid Profile:   HgA1c:   TSH:

## 2018-10-10 NOTE — PROGRESS NOTE ADULT - SUBJECTIVE AND OBJECTIVE BOX
General Surgery Progress Note    SUBJECTIVE:  The patient was seen and examined. No acute events overnight. Performed a trial of walking without supp O2 and pt desaturated to 87%, went back up to 92-96% on 1-2L of O2 NC. Home O2 was ordered. Denies dizziness, CP, SOB, N/V. Mateo reg diet. +flatus/+BM    OBJECTIVE:     ** VITAL SIGNS / I&O's **    Vital Signs Last 24 Hrs  T(C): 36.7 (10 Oct 2018 01:58), Max: 36.8 (09 Oct 2018 05:14)  T(F): 98.1 (10 Oct 2018 01:58), Max: 98.3 (09 Oct 2018 10:00)  HR: 99 (10 Oct 2018 01:58) (68 - 99)  BP: 148/75 (10 Oct 2018 01:58) (99/62 - 148/75)  BP(mean): --  RR: 18 (10 Oct 2018 01:58) (18 - 22)  SpO2: 93% (10 Oct 2018 01:58) (87% - 100%)      08 Oct 2018 07:01  -  09 Oct 2018 07:00  --------------------------------------------------------  IN:    lactated ringers.: 800 mL    Oral Fluid: 300 mL    Solution: 150 mL    Solution: 200 mL  Total IN: 1450 mL    OUT:    T-Tube: 300 mL    Voided: 1200 mL  Total OUT: 1500 mL    Total NET: -50 mL      09 Oct 2018 07:01  -  10 Oct 2018 02:11  --------------------------------------------------------  IN:    Oral Fluid: 1240 mL    Solution: 100 mL    Solution: 100 mL  Total IN: 1440 mL    OUT:    T-Tube: 375 mL    Voided: 1300 mL  Total OUT: 1675 mL    Total NET: -235 mL          ** PHYSICAL EXAM **    -- CONSTITUTIONAL: Alert, NAD  -- PULMONARY: non-labored respirations  -- ABDOMEN: soft, non-distended, non-tender, IR drain x1 with yellow/bilious output  -- NEURO: A&Ox3    ** LABS **                          9.1    8.04  )-----------( 270      ( 09 Oct 2018 10:55 )             28.6     09 Oct 2018 08:24    139    |  100    |  20     ----------------------------<  78     4.4     |  32     |  0.76     Ca    8.7        09 Oct 2018 08:24  Phos  2.8       09 Oct 2018 08:24  Mg     2.0       09 Oct 2018 08:24    TPro  7.1    /  Alb  3.1    /  TBili  0.4    /  DBili  0.1    /  AST  21     /  ALT  24     /  AlkPhos  103    09 Oct 2018 08:24      CAPILLARY BLOOD GLUCOSE            LIVER FUNCTIONS - ( 09 Oct 2018 08:24 )  Alb: 3.1 g/dL / Pro: 7.1 g/dL / ALK PHOS: 103 U/L / ALT: 24 U/L / AST: 21 U/L / GGT: x                 MEDICATIONS  (STANDING):  aspirin  chewable 81 milliGRAM(s) Oral daily  aztreonam  IVPB 1000 milliGRAM(s) IV Intermittent every 8 hours  buDESOnide   0.5 milliGRAM(s) Respule 0.5 milliGRAM(s) Inhalation two times a day  chlorhexidine 4% Liquid 1 Application(s) Topical <User Schedule>  enoxaparin Injectable 40 milliGRAM(s) SubCutaneous daily  famotidine    Tablet 20 milliGRAM(s) Oral daily  fluticasone propionate 50 MICROgram(s)/spray Nasal Spray 1 Spray(s) Both Nostrils two times a day  influenza   Vaccine 0.5 milliLiter(s) IntraMuscular once  lidocaine   Patch 1 Patch Transdermal daily  metoprolol succinate ER 25 milliGRAM(s) Oral daily  pantoprazole    Tablet 40 milliGRAM(s) Oral before breakfast  ursodiol Capsule 300 milliGRAM(s) Oral two times a day  vancomycin  IVPB      vancomycin  IVPB 500 milliGRAM(s) IV Intermittent every 12 hours    MEDICATIONS  (PRN):  acetaminophen   Tablet .. 650 milliGRAM(s) Oral every 6 hours PRN Mild Pain (1 - 3)  ALBUTerol    90 MICROgram(s) HFA Inhaler 2 Puff(s) Inhalation every 6 hours PRN Shortness of Breath and/or Wheezing  bisacodyl 5 milliGRAM(s) Oral daily PRN Constipation  senna 2 Tablet(s) Oral at bedtime PRN Constipation

## 2018-10-11 LAB
ALBUMIN SERPL ELPH-MCNC: 3.3 G/DL — SIGNIFICANT CHANGE UP (ref 3.3–5)
ALP SERPL-CCNC: 90 U/L — SIGNIFICANT CHANGE UP (ref 40–120)
ALT FLD-CCNC: 17 U/L — SIGNIFICANT CHANGE UP (ref 10–45)
ANION GAP SERPL CALC-SCNC: 10 MMOL/L — SIGNIFICANT CHANGE UP (ref 5–17)
AST SERPL-CCNC: 17 U/L — SIGNIFICANT CHANGE UP (ref 10–40)
BILIRUB SERPL-MCNC: 0.4 MG/DL — SIGNIFICANT CHANGE UP (ref 0.2–1.2)
BUN SERPL-MCNC: 27 MG/DL — HIGH (ref 7–23)
CALCIUM SERPL-MCNC: 9.1 MG/DL — SIGNIFICANT CHANGE UP (ref 8.4–10.5)
CHLORIDE SERPL-SCNC: 99 MMOL/L — SIGNIFICANT CHANGE UP (ref 96–108)
CO2 SERPL-SCNC: 30 MMOL/L — SIGNIFICANT CHANGE UP (ref 22–31)
CREAT SERPL-MCNC: 0.88 MG/DL — SIGNIFICANT CHANGE UP (ref 0.5–1.3)
GLUCOSE SERPL-MCNC: 81 MG/DL — SIGNIFICANT CHANGE UP (ref 70–99)
HCT VFR BLD CALC: 27.5 % — LOW (ref 34.5–45)
HGB BLD-MCNC: 8.8 G/DL — LOW (ref 11.5–15.5)
MAGNESIUM SERPL-MCNC: 2 MG/DL — SIGNIFICANT CHANGE UP (ref 1.6–2.6)
MCHC RBC-ENTMCNC: 32 GM/DL — SIGNIFICANT CHANGE UP (ref 32–36)
MCHC RBC-ENTMCNC: 32.4 PG — SIGNIFICANT CHANGE UP (ref 27–34)
MCV RBC AUTO: 101.1 FL — HIGH (ref 80–100)
PHOSPHATE SERPL-MCNC: 3.6 MG/DL — SIGNIFICANT CHANGE UP (ref 2.5–4.5)
PLATELET # BLD AUTO: 300 K/UL — SIGNIFICANT CHANGE UP (ref 150–400)
POTASSIUM SERPL-MCNC: 4.3 MMOL/L — SIGNIFICANT CHANGE UP (ref 3.5–5.3)
POTASSIUM SERPL-SCNC: 4.3 MMOL/L — SIGNIFICANT CHANGE UP (ref 3.5–5.3)
PROT SERPL-MCNC: 7.3 G/DL — SIGNIFICANT CHANGE UP (ref 6–8.3)
RBC # BLD: 2.72 M/UL — LOW (ref 3.8–5.2)
RBC # FLD: 15 % — HIGH (ref 10.3–14.5)
SODIUM SERPL-SCNC: 139 MMOL/L — SIGNIFICANT CHANGE UP (ref 135–145)
WBC # BLD: 7.62 K/UL — SIGNIFICANT CHANGE UP (ref 3.8–10.5)
WBC # FLD AUTO: 7.62 K/UL — SIGNIFICANT CHANGE UP (ref 3.8–10.5)

## 2018-10-11 RX ADMIN — Medication 50 MILLIGRAM(S): at 21:31

## 2018-10-11 RX ADMIN — Medication 100 MILLIGRAM(S): at 21:31

## 2018-10-11 RX ADMIN — Medication 1 SPRAY(S): at 17:12

## 2018-10-11 RX ADMIN — LIDOCAINE 1 PATCH: 4 CREAM TOPICAL at 11:21

## 2018-10-11 RX ADMIN — Medication 0.5 MILLIGRAM(S): at 17:12

## 2018-10-11 RX ADMIN — CHLORHEXIDINE GLUCONATE 1 APPLICATION(S): 213 SOLUTION TOPICAL at 08:16

## 2018-10-11 RX ADMIN — Medication 650 MILLIGRAM(S): at 03:00

## 2018-10-11 RX ADMIN — Medication 650 MILLIGRAM(S): at 02:06

## 2018-10-11 RX ADMIN — Medication 1 SPRAY(S): at 04:58

## 2018-10-11 RX ADMIN — URSODIOL 300 MILLIGRAM(S): 250 TABLET, FILM COATED ORAL at 04:58

## 2018-10-11 RX ADMIN — PANTOPRAZOLE SODIUM 40 MILLIGRAM(S): 20 TABLET, DELAYED RELEASE ORAL at 04:58

## 2018-10-11 RX ADMIN — Medication 0.5 MILLIGRAM(S): at 04:58

## 2018-10-11 RX ADMIN — Medication 50 MILLIGRAM(S): at 04:58

## 2018-10-11 RX ADMIN — Medication 50 MILLIGRAM(S): at 14:54

## 2018-10-11 RX ADMIN — URSODIOL 300 MILLIGRAM(S): 250 TABLET, FILM COATED ORAL at 17:12

## 2018-10-11 RX ADMIN — ENOXAPARIN SODIUM 40 MILLIGRAM(S): 100 INJECTION SUBCUTANEOUS at 11:20

## 2018-10-11 RX ADMIN — FAMOTIDINE 20 MILLIGRAM(S): 10 INJECTION INTRAVENOUS at 11:20

## 2018-10-11 RX ADMIN — Medication 81 MILLIGRAM(S): at 11:20

## 2018-10-11 RX ADMIN — Medication 100 MILLIGRAM(S): at 08:16

## 2018-10-11 NOTE — CHART NOTE - NSCHARTNOTEFT_GEN_A_CORE
Source: Patient [ ]    Family [ X]     other [X ] medical record, pt's daughter at bedside    Diet : Low Fat, Ensure Enlive- 3 per day (provides additional 1050cal, 60gm protein)    Pt seen for nutrition follow up. Medical chart reviewed/events noted. Per chart, pt  p/w cholecystitis and biliary obstruction, now s/p perc cholecystostomy. Pt sleeping at time of RD visit. Per pt's daughter, pt with fair appetite and PO intake - consuming 50% of meals and 2 Ensure daily. No GI distress reported, last BM yesterday. Pt's daughter requesting low fat/healthy nutrition education- provided and noted below.     PO intake:  < 50% [ ] 50-75% [x ]   % [ ]  other :     Source for PO intake [ ] Patient [x ] family [ ] chart [ ] staff [ ] other    Weight: dosing (9/28) 131.2, (9/30) 128.7 pounds  (10/5) 130.9, (10/7) 124.7 pounds - fluctuations noted likely scale discrepancy vs fluid shifts     Pertinent Medications: MEDICATIONS  (STANDING):  aspirin  chewable 81 milliGRAM(s) Oral daily  aztreonam  IVPB 1000 milliGRAM(s) IV Intermittent every 8 hours  buDESOnide   0.5 milliGRAM(s) Respule 0.5 milliGRAM(s) Inhalation two times a day  chlorhexidine 4% Liquid 1 Application(s) Topical <User Schedule>  enoxaparin Injectable 40 milliGRAM(s) SubCutaneous daily  famotidine    Tablet 20 milliGRAM(s) Oral daily  fluticasone propionate 50 MICROgram(s)/spray Nasal Spray 1 Spray(s) Both Nostrils two times a day  influenza   Vaccine 0.5 milliLiter(s) IntraMuscular once  lidocaine   Patch 1 Patch Transdermal daily  metoprolol succinate ER 25 milliGRAM(s) Oral daily  pantoprazole    Tablet 40 milliGRAM(s) Oral before breakfast  ursodiol Capsule 300 milliGRAM(s) Oral two times a day  vancomycin  IVPB      vancomycin  IVPB 500 milliGRAM(s) IV Intermittent every 12 hours    MEDICATIONS  (PRN):  acetaminophen   Tablet .. 650 milliGRAM(s) Oral every 6 hours PRN Mild Pain (1 - 3)  ALBUTerol    90 MICROgram(s) HFA Inhaler 2 Puff(s) Inhalation every 6 hours PRN Shortness of Breath and/or Wheezing  bisacodyl 5 milliGRAM(s) Oral daily PRN Constipation  senna 2 Tablet(s) Oral at bedtime PRN Constipation    Pertinent Labs:  10-11 Na139 mmol/L Glu 81 mg/dL K+ 4.3 mmol/L Cr  0.88 mg/dL BUN 27 mg/dL<H> 10-11 Phos 3.6 mg/dL 10-11 Alb 3.3 g/dL      Skin: No edema. No pressure ulcers documented.     Estimated Needs:   [x ] no change since previous assessment  [ ] recalculated:       Previous Nutrition Diagnosis: N/A           New Nutrition Diagnosis: [x ] not applicable       Interventions:   Nutrition education: Low fat nutrition therapy discussed with patient's daughter including foods recommended, foods to avoid and healthy alternatives. Discussed appropriate portion sizes and increased consumption of lean meats, whole grains, fruit and vegetables.  Provided sample menu and low fat nutrition therapy handout from [employer]. Pt's daughter verbalized understanding and accepted written handout.     Recommend    1) Continue current diet  2) Continue to provide Ensure Enlive- 3 per day (provides additional 1050cal, 60gm protein)  3) Continue to encourage PO intake      Monitoring and Evaluation:     [x ] PO intake [ x] Tolerance to diet prescription [x ] weights [ x] follow up per protocol    [ ] other:    Mare Ceja RD pager #[phone]

## 2018-10-11 NOTE — PROGRESS NOTE ADULT - ASSESSMENT
89 year old female with PMH Li's esophagus, CAD s/p stent (5-6 yrs ago, on aspirin 81mg), pulmonary fibrosis, p/w cholecystitis and biliary obstruction, now s/p perc cholecystostomy:  1. Cholecystitis, biliary obstruction s/p perc melanie, care and diet per Surg, abx per ID, last day 10/13, may finish inpt if outpt abx can't be arranged   incentive spirometry, OOB,   f/u w/ gi for further care   2. Pulmonary fibrosis - c/w inhalers per Pulm  pulm noted  3. CAD - stable,   c/w ASA, BB  d/c planning if outpt abx infusion set up

## 2018-10-11 NOTE — PROGRESS NOTE ADULT - SUBJECTIVE AND OBJECTIVE BOX
CHIEF COMPLAINT:Patient is a 89y old  Female who presents with a chief complaint of Abdominal pain (09 Oct 2018 11:30)  no acute events    PAST MEDICAL & SURGICAL HISTORY:  Barretts esophagus  Essential hypertension  CAD (coronary artery disease)  Idiopathic pulmonary fibrosis  Li's Esophagus  CAD (Coronary Artery Disease)  HTN (Hypertension)  Pulmonary Fibrosis  S/P Coronary Artery Stent Placement  S/P Shoulder Surgery          REVIEW OF SYSTEMS:  CONSTITUTIONAL: No fever, weight loss, or fatigue  EYES: No eye pain, visual disturbances, or discharge  NECK: No pain or stiffness  RESPIRATORY: No cough, wheezing, chills or hemoptysis; No Shortness of Breath  CARDIOVASCULAR: No chest pain, palpitations, passing out, dizziness, or leg swelling  GASTROINTESTINAL: No abdominal or epigastric pain. No nausea, vomiting, or hematemesis; No diarrhea or constipation. No melena or hematochezia.  GENITOURINARY: No dysuria, frequency, hematuria, or incontinence  NEUROLOGICAL: No headaches,   MUSCULOSKELETAL: No joint pain or swelling; No muscle, back, or extremity pain      Medications:  MEDICATIONS  (STANDING):  aspirin  chewable 81 milliGRAM(s) Oral daily  aztreonam  IVPB 1000 milliGRAM(s) IV Intermittent every 8 hours  buDESOnide   0.5 milliGRAM(s) Respule 0.5 milliGRAM(s) Inhalation two times a day  chlorhexidine 4% Liquid 1 Application(s) Topical <User Schedule>  enoxaparin Injectable 40 milliGRAM(s) SubCutaneous daily  famotidine    Tablet 20 milliGRAM(s) Oral daily  fluticasone propionate 50 MICROgram(s)/spray Nasal Spray 1 Spray(s) Both Nostrils two times a day  influenza   Vaccine 0.5 milliLiter(s) IntraMuscular once  lidocaine   Patch 1 Patch Transdermal daily  metoprolol succinate ER 25 milliGRAM(s) Oral daily  pantoprazole    Tablet 40 milliGRAM(s) Oral before breakfast  ursodiol Capsule 300 milliGRAM(s) Oral two times a day  vancomycin  IVPB      vancomycin  IVPB 500 milliGRAM(s) IV Intermittent every 12 hours    MEDICATIONS  (PRN):  acetaminophen   Tablet .. 650 milliGRAM(s) Oral every 6 hours PRN Mild Pain (1 - 3)  ALBUTerol    90 MICROgram(s) HFA Inhaler 2 Puff(s) Inhalation every 6 hours PRN Shortness of Breath and/or Wheezing  bisacodyl 5 milliGRAM(s) Oral daily PRN Constipation  senna 2 Tablet(s) Oral at bedtime PRN Constipation    	    PHYSICAL EXAM:  T(C): 36.6 (10-11-18 @ 13:29), Max: 36.9 (10-10-18 @ 17:39)  HR: 78 (10-11-18 @ 13:29) (78 - 98)  BP: 125/74 (10-11-18 @ 13:29) (99/61 - 126/60)  RR: 18 (10-11-18 @ 13:29) (18 - 18)  SpO2: 95% (10-11-18 @ 13:29) (93% - 95%)  Wt(kg): --  I&O's Summary    10 Oct 2018 07:01  -  11 Oct 2018 07:00  --------------------------------------------------------  IN: 1160 mL / OUT: 1025 mL / NET: 135 mL    11 Oct 2018 07:01  -  11 Oct 2018 13:41  --------------------------------------------------------  IN: 300 mL / OUT: 400 mL / NET: -100 mL      Appearance: Normal	  HEENT:   Normal oral mucosa, PERRL, EOMI	  Lymphatic: No lymphadenopathy  Cardiovascular: Normal S1 S2, No JVD, No murmurs, No edema  Respiratory: Lungs clear to auscultation	  Psychiatry: A & O x 3, Mood & affect appropriate  Gastrointestinal:  Soft, Non-tender, + BS	choly tube in place  Skin: No rashes, No ecchymoses, No cyanosis	  Neurologic: Non-focal  Extremities: Normal range of motion, No clubbing, cyanosis or edema  Vascular: Peripheral pulses palpable 2+ bilaterally    LABS:	 	    CARDIAC MARKERS:                                8.8    7.62  )-----------( 300      ( 11 Oct 2018 08:10 )             27.5     10-11    139  |  99  |  27<H>  ----------------------------<  81  4.3   |  30  |  0.88    Ca    9.1      11 Oct 2018 07:11  Phos  3.6     10-11  Mg     2.0     10-11    TPro  7.3  /  Alb  3.3  /  TBili  0.4  /  DBili  x   /  AST  17  /  ALT  17  /  AlkPhos  90  10-11    proBNP:   Lipid Profile:   HgA1c:   TSH:

## 2018-10-12 LAB
ANION GAP SERPL CALC-SCNC: 11 MMOL/L — SIGNIFICANT CHANGE UP (ref 5–17)
BUN SERPL-MCNC: 26 MG/DL — HIGH (ref 7–23)
CALCIUM SERPL-MCNC: 8.9 MG/DL — SIGNIFICANT CHANGE UP (ref 8.4–10.5)
CHLORIDE SERPL-SCNC: 99 MMOL/L — SIGNIFICANT CHANGE UP (ref 96–108)
CO2 SERPL-SCNC: 28 MMOL/L — SIGNIFICANT CHANGE UP (ref 22–31)
CREAT SERPL-MCNC: 0.8 MG/DL — SIGNIFICANT CHANGE UP (ref 0.5–1.3)
GLUCOSE SERPL-MCNC: 85 MG/DL — SIGNIFICANT CHANGE UP (ref 70–99)
HCT VFR BLD CALC: 28.8 % — LOW (ref 34.5–45)
HGB BLD-MCNC: 9.2 G/DL — LOW (ref 11.5–15.5)
MCHC RBC-ENTMCNC: 31.9 GM/DL — LOW (ref 32–36)
MCHC RBC-ENTMCNC: 32.1 PG — SIGNIFICANT CHANGE UP (ref 27–34)
MCV RBC AUTO: 100.3 FL — HIGH (ref 80–100)
PLATELET # BLD AUTO: 341 K/UL — SIGNIFICANT CHANGE UP (ref 150–400)
POTASSIUM SERPL-MCNC: 4.1 MMOL/L — SIGNIFICANT CHANGE UP (ref 3.5–5.3)
POTASSIUM SERPL-SCNC: 4.1 MMOL/L — SIGNIFICANT CHANGE UP (ref 3.5–5.3)
RBC # BLD: 2.87 M/UL — LOW (ref 3.8–5.2)
RBC # FLD: 14.8 % — HIGH (ref 10.3–14.5)
SODIUM SERPL-SCNC: 138 MMOL/L — SIGNIFICANT CHANGE UP (ref 135–145)
VANCOMYCIN TROUGH SERPL-MCNC: 16.6 UG/ML — SIGNIFICANT CHANGE UP (ref 10–20)
WBC # BLD: 8.47 K/UL — SIGNIFICANT CHANGE UP (ref 3.8–10.5)
WBC # FLD AUTO: 8.47 K/UL — SIGNIFICANT CHANGE UP (ref 3.8–10.5)

## 2018-10-12 RX ADMIN — LIDOCAINE 1 PATCH: 4 CREAM TOPICAL at 11:36

## 2018-10-12 RX ADMIN — Medication 0.5 MILLIGRAM(S): at 05:35

## 2018-10-12 RX ADMIN — CHLORHEXIDINE GLUCONATE 1 APPLICATION(S): 213 SOLUTION TOPICAL at 08:23

## 2018-10-12 RX ADMIN — Medication 1 SPRAY(S): at 05:35

## 2018-10-12 RX ADMIN — URSODIOL 300 MILLIGRAM(S): 250 TABLET, FILM COATED ORAL at 05:35

## 2018-10-12 RX ADMIN — Medication 50 MILLIGRAM(S): at 15:02

## 2018-10-12 RX ADMIN — URSODIOL 300 MILLIGRAM(S): 250 TABLET, FILM COATED ORAL at 17:35

## 2018-10-12 RX ADMIN — Medication 1 SPRAY(S): at 17:33

## 2018-10-12 RX ADMIN — FAMOTIDINE 20 MILLIGRAM(S): 10 INJECTION INTRAVENOUS at 11:36

## 2018-10-12 RX ADMIN — Medication 50 MILLIGRAM(S): at 05:35

## 2018-10-12 RX ADMIN — Medication 100 MILLIGRAM(S): at 09:38

## 2018-10-12 RX ADMIN — LIDOCAINE 1 PATCH: 4 CREAM TOPICAL at 22:54

## 2018-10-12 RX ADMIN — Medication 0.5 MILLIGRAM(S): at 17:34

## 2018-10-12 RX ADMIN — Medication 100 MILLIGRAM(S): at 22:15

## 2018-10-12 RX ADMIN — Medication 81 MILLIGRAM(S): at 11:36

## 2018-10-12 RX ADMIN — ENOXAPARIN SODIUM 40 MILLIGRAM(S): 100 INJECTION SUBCUTANEOUS at 11:37

## 2018-10-12 RX ADMIN — PANTOPRAZOLE SODIUM 40 MILLIGRAM(S): 20 TABLET, DELAYED RELEASE ORAL at 05:35

## 2018-10-12 RX ADMIN — Medication 50 MILLIGRAM(S): at 20:52

## 2018-10-12 NOTE — PROGRESS NOTE ADULT - SUBJECTIVE AND OBJECTIVE BOX
CHIEF COMPLAINT:Patient is a 89y old  Female who presents with a chief complaint of Abdominal pain (09 Oct 2018 11:30)  no acute events    PAST MEDICAL & SURGICAL HISTORY:  Barretts esophagus  Essential hypertension  CAD (coronary artery disease)  Idiopathic pulmonary fibrosis  Li's Esophagus  CAD (Coronary Artery Disease)  HTN (Hypertension)  Pulmonary Fibrosis  S/P Coronary Artery Stent Placement  S/P Shoulder Surgery          REVIEW OF SYSTEMS:  CONSTITUTIONAL: No fever, weight loss, or fatigue  EYES: No eye pain, visual disturbances, or discharge  NECK: No pain or stiffness  RESPIRATORY: No cough, wheezing, chills or hemoptysis; No Shortness of Breath  CARDIOVASCULAR: No chest pain, palpitations, passing out, dizziness, or leg swelling  GASTROINTESTINAL: No abdominal or epigastric pain. No nausea, vomiting, or hematemesis; No diarrhea or constipation. No melena or hematochezia.  GENITOURINARY: No dysuria, frequency, hematuria, or incontinence  NEUROLOGICAL: No headaches,   MUSCULOSKELETAL: No joint pain or swelling; No muscle, back, or extremity pain      Medications:  MEDICATIONS  (STANDING):  aspirin  chewable 81 milliGRAM(s) Oral daily  aztreonam  IVPB 1000 milliGRAM(s) IV Intermittent every 8 hours  buDESOnide   0.5 milliGRAM(s) Respule 0.5 milliGRAM(s) Inhalation two times a day  chlorhexidine 4% Liquid 1 Application(s) Topical <User Schedule>  enoxaparin Injectable 40 milliGRAM(s) SubCutaneous daily  famotidine    Tablet 20 milliGRAM(s) Oral daily  fluticasone propionate 50 MICROgram(s)/spray Nasal Spray 1 Spray(s) Both Nostrils two times a day  influenza   Vaccine 0.5 milliLiter(s) IntraMuscular once  lidocaine   Patch 1 Patch Transdermal daily  metoprolol succinate ER 25 milliGRAM(s) Oral daily  pantoprazole    Tablet 40 milliGRAM(s) Oral before breakfast  ursodiol Capsule 300 milliGRAM(s) Oral two times a day  vancomycin  IVPB      vancomycin  IVPB 500 milliGRAM(s) IV Intermittent every 12 hours    MEDICATIONS  (PRN):  acetaminophen   Tablet .. 650 milliGRAM(s) Oral every 6 hours PRN Mild Pain (1 - 3)  ALBUTerol    90 MICROgram(s) HFA Inhaler 2 Puff(s) Inhalation every 6 hours PRN Shortness of Breath and/or Wheezing  bisacodyl 5 milliGRAM(s) Oral daily PRN Constipation  senna 2 Tablet(s) Oral at bedtime PRN Constipation    	    PHYSICAL EXAM:  T(C): 36.6 (10-12-18 @ 10:00), Max: 36.9 (10-11-18 @ 21:46)  HR: 82 (10-12-18 @ 10:00) (78 - 97)  BP: 118/57 (10-12-18 @ 10:00) (109/66 - 129/72)  RR: 18 (10-12-18 @ 10:00) (18 - 18)  SpO2: 93% (10-12-18 @ 10:00) (92% - 95%)  Wt(kg): --  I&O's Summary    11 Oct 2018 07:01  -  12 Oct 2018 07:00  --------------------------------------------------------  IN: 1120 mL / OUT: 1125 mL / NET: -5 mL    12 Oct 2018 07:01  -  12 Oct 2018 12:17  --------------------------------------------------------  IN: 200 mL / OUT: 350 mL / NET: -150 mL      Appearance: Normal	  HEENT:   Normal oral mucosa, PERRL, EOMI	  Lymphatic: No lymphadenopathy  Cardiovascular: Normal S1 S2, No JVD, No murmurs, No edema  Respiratory: Lungs clear to auscultation	  Psychiatry: A & O x 3, Mood & affect appropriate  Gastrointestinal:  Soft, Non-tender, + BS	choly tube in place  Skin: No rashes, No ecchymoses, No cyanosis	  Neurologic: Non-focal  Extremities: Normal range of motion, No clubbing, cyanosis or edema  Vascular: Peripheral pulses palpable 2+ bilaterally    LABS:	 	    CARDIAC MARKERS:                                9.2    8.47  )-----------( 341      ( 12 Oct 2018 08:14 )             28.8     10-12    138  |  99  |  26<H>  ----------------------------<  85  4.1   |  28  |  0.80    Ca    8.9      12 Oct 2018 07:19  Phos  3.6     10-11  Mg     2.0     10-11    TPro  7.3  /  Alb  3.3  /  TBili  0.4  /  DBili  x   /  AST  17  /  ALT  17  /  AlkPhos  90  10-11    proBNP:   Lipid Profile:   HgA1c:   TSH:

## 2018-10-13 VITALS
TEMPERATURE: 98 F | SYSTOLIC BLOOD PRESSURE: 99 MMHG | RESPIRATION RATE: 18 BRPM | DIASTOLIC BLOOD PRESSURE: 63 MMHG | OXYGEN SATURATION: 93 % | HEART RATE: 81 BPM

## 2018-10-13 RX ADMIN — LIDOCAINE 1 PATCH: 4 CREAM TOPICAL at 11:32

## 2018-10-13 RX ADMIN — FAMOTIDINE 20 MILLIGRAM(S): 10 INJECTION INTRAVENOUS at 11:31

## 2018-10-13 RX ADMIN — Medication 0.5 MILLIGRAM(S): at 05:10

## 2018-10-13 RX ADMIN — ENOXAPARIN SODIUM 40 MILLIGRAM(S): 100 INJECTION SUBCUTANEOUS at 11:31

## 2018-10-13 RX ADMIN — CHLORHEXIDINE GLUCONATE 1 APPLICATION(S): 213 SOLUTION TOPICAL at 09:29

## 2018-10-13 RX ADMIN — Medication 25 MILLIGRAM(S): at 05:10

## 2018-10-13 RX ADMIN — Medication 100 MILLIGRAM(S): at 09:29

## 2018-10-13 RX ADMIN — Medication 81 MILLIGRAM(S): at 11:31

## 2018-10-13 RX ADMIN — Medication 50 MILLIGRAM(S): at 13:11

## 2018-10-13 RX ADMIN — URSODIOL 300 MILLIGRAM(S): 250 TABLET, FILM COATED ORAL at 05:10

## 2018-10-13 RX ADMIN — Medication 1 SPRAY(S): at 05:10

## 2018-10-13 RX ADMIN — PANTOPRAZOLE SODIUM 40 MILLIGRAM(S): 20 TABLET, DELAYED RELEASE ORAL at 05:10

## 2018-10-13 RX ADMIN — Medication 50 MILLIGRAM(S): at 05:10

## 2018-10-13 NOTE — PROGRESS NOTE ADULT - REASON FOR ADMISSION
Abdominal pain

## 2018-10-13 NOTE — PROGRESS NOTE ADULT - SUBJECTIVE AND OBJECTIVE BOX
CHIEF COMPLAINT:Patient is a 89y old  Female who presents with a chief complaint of Abdominal pain (09 Oct 2018 11:30)  no acute events    PAST MEDICAL & SURGICAL HISTORY:  Barretts esophagus  Essential hypertension  CAD (coronary artery disease)  Idiopathic pulmonary fibrosis  Li's Esophagus  CAD (Coronary Artery Disease)  HTN (Hypertension)  Pulmonary Fibrosis  S/P Coronary Artery Stent Placement  S/P Shoulder Surgery          REVIEW OF SYSTEMS:  CONSTITUTIONAL: No fever, weight loss, or fatigue  EYES: No eye pain, visual disturbances, or discharge  NECK: No pain or stiffness  RESPIRATORY: No cough, wheezing, chills or hemoptysis; No Shortness of Breath  CARDIOVASCULAR: No chest pain, palpitations, passing out, dizziness, or leg swelling  GASTROINTESTINAL: No abdominal or epigastric pain. No nausea, vomiting, or hematemesis; No diarrhea or constipation. No melena or hematochezia.  GENITOURINARY: No dysuria, frequency, hematuria, or incontinence  NEUROLOGICAL: No headaches,   MUSCULOSKELETAL: No joint pain or swelling; No muscle, back, or extremity pain      Medications:  MEDICATIONS  (STANDING):  aspirin  chewable 81 milliGRAM(s) Oral daily  aztreonam  IVPB 1000 milliGRAM(s) IV Intermittent every 8 hours  buDESOnide   0.5 milliGRAM(s) Respule 0.5 milliGRAM(s) Inhalation two times a day  chlorhexidine 4% Liquid 1 Application(s) Topical <User Schedule>  enoxaparin Injectable 40 milliGRAM(s) SubCutaneous daily  famotidine    Tablet 20 milliGRAM(s) Oral daily  fluticasone propionate 50 MICROgram(s)/spray Nasal Spray 1 Spray(s) Both Nostrils two times a day  influenza   Vaccine 0.5 milliLiter(s) IntraMuscular once  lidocaine   Patch 1 Patch Transdermal daily  metoprolol succinate ER 25 milliGRAM(s) Oral daily  pantoprazole    Tablet 40 milliGRAM(s) Oral before breakfast  ursodiol Capsule 300 milliGRAM(s) Oral two times a day  vancomycin  IVPB      vancomycin  IVPB 500 milliGRAM(s) IV Intermittent every 12 hours    MEDICATIONS  (PRN):  acetaminophen   Tablet .. 650 milliGRAM(s) Oral every 6 hours PRN Mild Pain (1 - 3)  ALBUTerol    90 MICROgram(s) HFA Inhaler 2 Puff(s) Inhalation every 6 hours PRN Shortness of Breath and/or Wheezing  bisacodyl 5 milliGRAM(s) Oral daily PRN Constipation  senna 2 Tablet(s) Oral at bedtime PRN Constipation    	    PHYSICAL EXAM:  T(C): 36.8 (10-13-18 @ 09:14), Max: 37 (10-13-18 @ 00:57)  HR: 94 (10-13-18 @ 09:14) (78 - 94)  BP: 96/60 (10-13-18 @ 09:14) (96/60 - 129/73)  RR: 18 (10-13-18 @ 09:14) (18 - 18)  SpO2: 92% (10-13-18 @ 09:14) (92% - 97%)  Wt(kg): --  I&O's Summary    12 Oct 2018 07:01  -  13 Oct 2018 07:00  --------------------------------------------------------  IN: 820 mL / OUT: 1650 mL / NET: -830 mL    13 Oct 2018 07:01  -  13 Oct 2018 10:40  --------------------------------------------------------  IN: 300 mL / OUT: 50 mL / NET: 250 mL      Appearance: Normal	  HEENT:   Normal oral mucosa, PERRL, EOMI	  Lymphatic: No lymphadenopathy  Cardiovascular: Normal S1 S2, No JVD, No murmurs, No edema  Respiratory: Lungs clear to auscultation	  Psychiatry: A & O x 3, Mood & affect appropriate  Gastrointestinal:  Soft, Non-tender, + BS	choly tube in place  Skin: No rashes, No ecchymoses, No cyanosis	  Neurologic: Non-focal  Extremities: Normal range of motion, No clubbing, cyanosis or edema  Vascular: Peripheral pulses palpable 2+ bilaterally    LABS:	 	    CARDIAC MARKERS:                                9.2    8.47  )-----------( 341      ( 12 Oct 2018 08:14 )             28.8     10-12    138  |  99  |  26<H>  ----------------------------<  85  4.1   |  28  |  0.80    Ca    8.9      12 Oct 2018 07:19      proBNP:   Lipid Profile:   HgA1c:   TSH:

## 2018-10-13 NOTE — PROGRESS NOTE ADULT - ASSESSMENT
89 year old female with PMH Li's esophagus, CAD s/p stent (5-6 yrs ago, on aspirin 81mg), pulmonary fibrosis, p/w cholecystitis and biliary obstruction, now s/p perc cholecystostomy:  1. Cholecystitis, biliary obstruction s/p perc melanie, care and diet per Surg, abx per ID, last day today   incentive spirometry, OOB,   f/u w/ gi for further care   2. Pulmonary fibrosis - c/w inhalers per Pulm  pulm noted  3. CAD - stable,   c/w ASA, BB  d/c planning

## 2018-10-13 NOTE — CHART NOTE - NSCHARTNOTEFT_GEN_A_CORE
PICC line removal     PICC line removed from left UE w/o complication.   Site left with pressure dressing.  No s/s of bleeding noted to site.   Patient to be discharged home today.

## 2018-10-19 ENCOUNTER — APPOINTMENT (OUTPATIENT)
Dept: PULMONOLOGY | Facility: CLINIC | Age: 83
End: 2018-10-19
Payer: MEDICARE

## 2018-10-19 VITALS
HEIGHT: 58 IN | DIASTOLIC BLOOD PRESSURE: 68 MMHG | BODY MASS INDEX: 26.24 KG/M2 | HEART RATE: 77 BPM | OXYGEN SATURATION: 98 % | RESPIRATION RATE: 16 BRPM | SYSTOLIC BLOOD PRESSURE: 124 MMHG | WEIGHT: 125 LBS

## 2018-10-19 DIAGNOSIS — K80.71 CALCULUS OF GALLBLADDER AND BILE DUCT W/OUT CHOLECYSTITIS WITH OBSTRUCTION: ICD-10-CM

## 2018-10-19 PROCEDURE — 99213 OFFICE O/P EST LOW 20 MIN: CPT

## 2018-10-19 RX ORDER — LIDOCAINE 5% 700 MG/1
5 PATCH TOPICAL
Refills: 0 | Status: ACTIVE | COMMUNITY
Start: 2018-10-19

## 2018-10-19 RX ORDER — URSODIOL 300 MG/1
300 CAPSULE ORAL
Refills: 0 | Status: ACTIVE | COMMUNITY
Start: 2018-10-19

## 2018-10-30 ENCOUNTER — MEDICATION RENEWAL (OUTPATIENT)
Age: 83
End: 2018-10-30

## 2018-11-11 PROCEDURE — 80202 ASSAY OF VANCOMYCIN: CPT

## 2018-11-11 PROCEDURE — 97116 GAIT TRAINING THERAPY: CPT

## 2018-11-11 PROCEDURE — 78226 HEPATOBILIARY SYSTEM IMAGING: CPT

## 2018-11-11 PROCEDURE — 87070 CULTURE OTHR SPECIMN AEROBIC: CPT

## 2018-11-11 PROCEDURE — C1729: CPT

## 2018-11-11 PROCEDURE — 47531 INJECTION FOR CHOLANGIOGRAM: CPT

## 2018-11-11 PROCEDURE — 36569 INSJ PICC 5 YR+ W/O IMAGING: CPT

## 2018-11-11 PROCEDURE — 71045 X-RAY EXAM CHEST 1 VIEW: CPT

## 2018-11-11 PROCEDURE — 84100 ASSAY OF PHOSPHORUS: CPT

## 2018-11-11 PROCEDURE — A9537: CPT

## 2018-11-11 PROCEDURE — 87205 SMEAR GRAM STAIN: CPT

## 2018-11-11 PROCEDURE — 83605 ASSAY OF LACTIC ACID: CPT

## 2018-11-11 PROCEDURE — 82947 ASSAY GLUCOSE BLOOD QUANT: CPT

## 2018-11-11 PROCEDURE — 85027 COMPLETE CBC AUTOMATED: CPT

## 2018-11-11 PROCEDURE — 80076 HEPATIC FUNCTION PANEL: CPT

## 2018-11-11 PROCEDURE — 87186 SC STD MICRODIL/AGAR DIL: CPT

## 2018-11-11 PROCEDURE — 84132 ASSAY OF SERUM POTASSIUM: CPT

## 2018-11-11 PROCEDURE — 85730 THROMBOPLASTIN TIME PARTIAL: CPT

## 2018-11-11 PROCEDURE — 82330 ASSAY OF CALCIUM: CPT

## 2018-11-11 PROCEDURE — C1769: CPT

## 2018-11-11 PROCEDURE — 71260 CT THORAX DX C+: CPT

## 2018-11-11 PROCEDURE — 82435 ASSAY OF BLOOD CHLORIDE: CPT

## 2018-11-11 PROCEDURE — 83735 ASSAY OF MAGNESIUM: CPT

## 2018-11-11 PROCEDURE — 93005 ELECTROCARDIOGRAM TRACING: CPT

## 2018-11-11 PROCEDURE — 82565 ASSAY OF CREATININE: CPT

## 2018-11-11 PROCEDURE — 84295 ASSAY OF SERUM SODIUM: CPT

## 2018-11-11 PROCEDURE — 81001 URINALYSIS AUTO W/SCOPE: CPT

## 2018-11-11 PROCEDURE — 87184 SC STD DISK METHOD PER PLATE: CPT

## 2018-11-11 PROCEDURE — 96375 TX/PRO/DX INJ NEW DRUG ADDON: CPT | Mod: XU

## 2018-11-11 PROCEDURE — 76705 ECHO EXAM OF ABDOMEN: CPT

## 2018-11-11 PROCEDURE — 80053 COMPREHEN METABOLIC PANEL: CPT

## 2018-11-11 PROCEDURE — C1751: CPT

## 2018-11-11 PROCEDURE — 85610 PROTHROMBIN TIME: CPT

## 2018-11-11 PROCEDURE — 94640 AIRWAY INHALATION TREATMENT: CPT

## 2018-11-11 PROCEDURE — 80048 BASIC METABOLIC PNL TOTAL CA: CPT

## 2018-11-11 PROCEDURE — 84484 ASSAY OF TROPONIN QUANT: CPT

## 2018-11-11 PROCEDURE — 74177 CT ABD & PELVIS W/CONTRAST: CPT

## 2018-11-11 PROCEDURE — 82962 GLUCOSE BLOOD TEST: CPT

## 2018-11-11 PROCEDURE — 82553 CREATINE MB FRACTION: CPT

## 2018-11-11 PROCEDURE — 87040 BLOOD CULTURE FOR BACTERIA: CPT

## 2018-11-11 PROCEDURE — 74181 MRI ABDOMEN W/O CONTRAST: CPT

## 2018-11-11 PROCEDURE — 82248 BILIRUBIN DIRECT: CPT

## 2018-11-11 PROCEDURE — 82803 BLOOD GASES ANY COMBINATION: CPT

## 2018-11-11 PROCEDURE — 87075 CULTR BACTERIA EXCEPT BLOOD: CPT

## 2018-11-11 PROCEDURE — 47490 INCISION OF GALLBLADDER: CPT

## 2018-11-11 PROCEDURE — 97530 THERAPEUTIC ACTIVITIES: CPT

## 2018-11-11 PROCEDURE — 93306 TTE W/DOPPLER COMPLETE: CPT

## 2018-11-11 PROCEDURE — 99285 EMERGENCY DEPT VISIT HI MDM: CPT | Mod: 25

## 2018-11-11 PROCEDURE — 85014 HEMATOCRIT: CPT

## 2018-11-11 PROCEDURE — 83690 ASSAY OF LIPASE: CPT

## 2018-11-11 PROCEDURE — 87150 DNA/RNA AMPLIFIED PROBE: CPT

## 2018-11-11 PROCEDURE — 96365 THER/PROPH/DIAG IV INF INIT: CPT | Mod: XU

## 2018-11-11 PROCEDURE — 97162 PT EVAL MOD COMPLEX 30 MIN: CPT

## 2018-11-11 PROCEDURE — 84145 PROCALCITONIN (PCT): CPT

## 2018-11-17 ENCOUNTER — EMERGENCY (EMERGENCY)
Facility: HOSPITAL | Age: 83
LOS: 1 days | Discharge: HOME | End: 2018-11-17
Attending: EMERGENCY MEDICINE
Payer: MEDICARE

## 2018-11-17 VITALS
SYSTOLIC BLOOD PRESSURE: 135 MMHG | DIASTOLIC BLOOD PRESSURE: 76 MMHG | RESPIRATION RATE: 18 BRPM | OXYGEN SATURATION: 99 % | HEART RATE: 76 BPM | HEIGHT: 59 IN | WEIGHT: 119.93 LBS | TEMPERATURE: 98 F

## 2018-11-17 VITALS
SYSTOLIC BLOOD PRESSURE: 128 MMHG | RESPIRATION RATE: 18 BRPM | DIASTOLIC BLOOD PRESSURE: 72 MMHG | OXYGEN SATURATION: 100 % | HEART RATE: 69 BPM

## 2018-11-17 PROCEDURE — 99283 EMERGENCY DEPT VISIT LOW MDM: CPT | Mod: GC

## 2018-11-17 PROCEDURE — 99283 EMERGENCY DEPT VISIT LOW MDM: CPT

## 2018-11-17 NOTE — ED PROVIDER NOTE - OBJECTIVE STATEMENT
89f w hx biliary stent placement 9/28 for cholangitis here c/o redness around stent insertion site. Pt and family at bedside state redness started 2  wks ago. Was seen by GI who believed might be local fungal skin infxn and prescribed clotrimazole and betamethasone. Since then family feels area of redness has expanded. Unsure whether draining pus or whiteness from topical creams. No pain at site, no fever, stent has been draining well.

## 2018-11-17 NOTE — ED ADULT NURSE NOTE - NSIMPLEMENTINTERV_GEN_ALL_ED
Implemented All Fall with Harm Risk Interventions:  [place] to call system. Call bell, personal items and telephone within reach. Instruct patient to call for assistance. Room bathroom lighting operational. Non-slip footwear when patient is off stretcher. Physically safe environment: no spills, clutter or unnecessary equipment. Stretcher in lowest position, wheels locked, appropriate side rails in place. Provide visual cue, wrist band, yellow gown, etc. Monitor gait and stability. Monitor for mental status changes and reorient to person, place, and time. Review medications for side effects contributing to fall risk. Reinforce activity limits and safety measures with patient and family. Provide visual clues: red socks.

## 2018-11-17 NOTE — ED PROVIDER NOTE - MEDICAL DECISION MAKING DETAILS
89f w recent biliary stent placement here c/o redness at insertion site. Afebrile, w benign exam s/f mild erythema at insertion site w/o secondary signs infxn. Low susp for cellulitis or underlying abscess, stable for dc w GI Follow up and continued conservative management. 89f w recent biliary stent placement here c/o redness at insertion site. Afebrile, w benign exam s/f mild erythema at insertion site w/o secondary signs infxn. Low susp for cellulitis or underlying abscess, stable for dc w GI Follow up and continued conservative management.  Attending Natividad Gray: 89 y/o female presenting with concern for skin infection. on exam small areas of erythema which are nontender. appearance suggestive of irritation. less likely acute cellulitis. pt using topical cream. biliary tube side c/d/i. no purulent drainage. abd soft and nontender and pt afebrile making deep space infection less likely. will have pt follow up with PCP, close return precautions

## 2018-11-17 NOTE — ED ADULT NURSE NOTE - OBJECTIVE STATEMENT
Patient is alert and oriented  x3.  Color is  good and  skin warm to touch.   Redness  noted   around  RUQ  biliary tube  site.  Patient  denies  any  pain or  fever.

## 2018-11-17 NOTE — ED PROVIDER NOTE - CARE PLAN
Principal Discharge DX:	Rash  Assessment and plan of treatment:	You were seen in the emergency department for rash. Please follow up with your gastroenterologist in the next 3-5 days. Continue applying topical creams as prescribed. Return to the emergency department immediately if you experience fever, abdominal pain, vomiting or any other concerning symptoms.

## 2018-11-17 NOTE — ED PROVIDER NOTE - ABDOMINAL EXAM
ruq biliary stent in place w tiny area surrounding erythema, no tenderness, no fluctuance, no drainage/nontender.../soft/nondistended

## 2018-11-17 NOTE — ED ADULT TRIAGE NOTE - CHIEF COMPLAINT QUOTE
Patient came to the ED c/o redness around biliary drain since 11/5 that is worsening.  Patient had biliary drained placed 9/28.  Daughter said there is slightly less drainage coming out of drain and denies fevers or abdominal pain.  Patient on Clotrimazole and Betamethasone creme for rash which daughter is applying since 11/6.  Dr. Velazquez IR.  Patient on 2L NC for Pulmonary Fibrosis.

## 2018-11-17 NOTE — ED PROVIDER NOTE - PHYSICAL EXAMINATION
Attending Natividad Gray: Gen: NAD, heent: atrauamtic, eomi, perrla, mmm, op pink, uvula midline, neck; nttp, no nuchal rigidity, chest: nttp, no crepitus, cv: rrr, no murmurs, lungs: ctab, abd: soft, nontender, nondistended, no peritoneal signs, +BS, no guarding, ext: wwp, neg homans, skin: biliary tube in place in ruq, mild small area of erythema, tube insertion site c/d/i with some surorunding granulation tissue, neuro: awake and alert, following

## 2018-11-17 NOTE — ED PROVIDER NOTE - PLAN OF CARE
You were seen in the emergency department for rash. Please follow up with your gastroenterologist in the next 3-5 days. Continue applying topical creams as prescribed. Return to the emergency department immediately if you experience fever, abdominal pain, vomiting or any other concerning symptoms.

## 2018-11-17 NOTE — ED PROVIDER NOTE - ATTENDING CONTRIBUTION TO CARE
Attending MD Natividad Gray:  I personally have seen and examined this patient.  Resident note reviewed and agree on plan of care and except where noted.  See HPI, PE, and MDM for details.

## 2018-12-09 ENCOUNTER — FORM ENCOUNTER (OUTPATIENT)
Age: 83
End: 2018-12-09

## 2018-12-10 ENCOUNTER — OUTPATIENT (OUTPATIENT)
Dept: OUTPATIENT SERVICES | Facility: HOSPITAL | Age: 83
LOS: 1 days | End: 2018-12-10
Payer: MEDICARE

## 2018-12-10 DIAGNOSIS — K81.9 CHOLECYSTITIS, UNSPECIFIED: ICD-10-CM

## 2018-12-10 PROCEDURE — C1729: CPT

## 2018-12-10 PROCEDURE — 47536 EXCHANGE BILIARY DRG CATH: CPT

## 2018-12-10 PROCEDURE — C1769: CPT

## 2018-12-16 DIAGNOSIS — K81.9 CHOLECYSTITIS, UNSPECIFIED: ICD-10-CM

## 2018-12-16 DIAGNOSIS — Z43.4 ENCOUNTER FOR ATTENTION TO OTHER ARTIFICIAL OPENINGS OF DIGESTIVE TRACT: ICD-10-CM

## 2018-12-26 ENCOUNTER — RX RENEWAL (OUTPATIENT)
Age: 83
End: 2018-12-26

## 2019-01-30 ENCOUNTER — MEDICATION RENEWAL (OUTPATIENT)
Age: 84
End: 2019-01-30

## 2019-01-30 ENCOUNTER — RX RENEWAL (OUTPATIENT)
Age: 84
End: 2019-01-30

## 2019-02-04 ENCOUNTER — APPOINTMENT (OUTPATIENT)
Dept: PULMONOLOGY | Facility: CLINIC | Age: 84
End: 2019-02-04
Payer: MEDICARE

## 2019-02-04 VITALS — HEART RATE: 82 BPM | SYSTOLIC BLOOD PRESSURE: 107 MMHG | DIASTOLIC BLOOD PRESSURE: 65 MMHG | OXYGEN SATURATION: 94 %

## 2019-02-04 PROCEDURE — 99213 OFFICE O/P EST LOW 20 MIN: CPT | Mod: 25

## 2019-02-04 PROCEDURE — 94060 EVALUATION OF WHEEZING: CPT

## 2019-02-04 RX ORDER — CLOTRIMAZOLE AND BETAMETHASONE DIPROPIONATE 10; .5 MG/G; MG/G
1-0.05 CREAM TOPICAL
Qty: 45 | Refills: 0 | Status: DISCONTINUED | COMMUNITY
Start: 2018-11-06

## 2019-02-04 RX ORDER — METOPROLOL SUCCINATE 25 MG/1
25 TABLET, EXTENDED RELEASE ORAL
Qty: 90 | Refills: 0 | Status: ACTIVE | COMMUNITY
Start: 2018-10-29

## 2019-02-04 NOTE — PHYSICAL EXAM
[General Appearance - Well Developed] : well developed [General Appearance - Well Nourished] : well nourished [Heart Sounds] : normal S1 and S2 [Bowel Sounds] : normal bowel sounds [Abdomen Soft] : soft [Skin Color & Pigmentation] : normal skin color and pigmentation [Oriented To Time, Place, And Person] : oriented to person, place, and time [Impaired Insight] : insight and judgment were intact [Affect] : the affect was normal [Jugular Venous Distention Increased] : there was no jugular-venous distention [Nail Clubbing] : no clubbing of the fingernails [Cyanosis, Localized] : no localized cyanosis [Petechial Hemorrhages (___cm)] : no petechial hemorrhages [] : no ischemic changes [FreeTextEntry1] : Bilat 2 plus crackles basilar predom.

## 2019-02-04 NOTE — ASSESSMENT
[FreeTextEntry1] : Pulmonary fibrosis slowly progressive.\par \par Remains weak since hospitalization for GB disease. \par \par Continue Oxygen discussed parameters. \par \par Chronic cough no change.

## 2019-02-04 NOTE — PROCEDURE
[FreeTextEntry1] : Pulmonary function testing.\par There is a mild ventilatory impairment in a restrictive pattern. There is no significant bronchodilator response. \par Slight decrease in function.

## 2019-02-04 NOTE — REVIEW OF SYSTEMS
[Recent Wt Loss (___ Lbs)] : recent [unfilled] ~Ulb weight loss [Hypertension] : ~T hypertension [As Noted in HPI] : as noted in HPI [Negative] : Endocrine [FreeTextEntry6] : using walker

## 2019-02-04 NOTE — HISTORY OF PRESENT ILLNESS
[FreeTextEntry1] : In general doing well , needs o2 24/hr , if off has decrease o2 down to 82 percent. Needs bigger tansk for going out because the tanks she has now only lasts 2 hours\par \par using wheelchair most of time when out\par \par minimal cough minimal mucus

## 2019-03-19 ENCOUNTER — FORM ENCOUNTER (OUTPATIENT)
Age: 84
End: 2019-03-19

## 2019-03-20 ENCOUNTER — OUTPATIENT (OUTPATIENT)
Dept: OUTPATIENT SERVICES | Facility: HOSPITAL | Age: 84
LOS: 1 days | End: 2019-03-20
Payer: MEDICARE

## 2019-03-20 DIAGNOSIS — K81.9 CHOLECYSTITIS, UNSPECIFIED: ICD-10-CM

## 2019-03-20 PROCEDURE — C1729: CPT

## 2019-03-20 PROCEDURE — C1769: CPT

## 2019-03-20 PROCEDURE — 47536 EXCHANGE BILIARY DRG CATH: CPT

## 2019-03-27 DIAGNOSIS — Z43.4 ENCOUNTER FOR ATTENTION TO OTHER ARTIFICIAL OPENINGS OF DIGESTIVE TRACT: ICD-10-CM

## 2019-03-27 DIAGNOSIS — K81.9 CHOLECYSTITIS, UNSPECIFIED: ICD-10-CM

## 2019-04-08 ENCOUNTER — APPOINTMENT (OUTPATIENT)
Dept: PULMONOLOGY | Facility: CLINIC | Age: 84
End: 2019-04-08
Payer: MEDICARE

## 2019-04-08 VITALS
RESPIRATION RATE: 16 BRPM | TEMPERATURE: 98.4 F | HEART RATE: 85 BPM | OXYGEN SATURATION: 92 % | DIASTOLIC BLOOD PRESSURE: 71 MMHG | SYSTOLIC BLOOD PRESSURE: 115 MMHG

## 2019-04-08 PROCEDURE — 94727 GAS DIL/WSHOT DETER LNG VOL: CPT

## 2019-04-08 PROCEDURE — 94060 EVALUATION OF WHEEZING: CPT

## 2019-04-08 PROCEDURE — 94729 DIFFUSING CAPACITY: CPT

## 2019-04-08 PROCEDURE — 88738 HGB QUANT TRANSCUTANEOUS: CPT

## 2019-04-08 PROCEDURE — 99213 OFFICE O/P EST LOW 20 MIN: CPT | Mod: 25

## 2019-04-08 RX ORDER — HYDROCODONE BITARTRATE AND HOMATROPINE METHYLBROMIDE 5; 1.5 MG/1; MG/1
5-1.5 TABLET ORAL
Qty: 450 | Refills: 0 | Status: DISCONTINUED | COMMUNITY
Start: 2018-10-30 | End: 2019-04-08

## 2019-04-09 LAB — POCT - HEMOGLOBIN (HGB), QUANTITATIVE, TRANSCUTANEOUS: 10.7

## 2019-04-09 NOTE — REVIEW OF SYSTEMS
[As Noted in HPI] : as noted in HPI [Negative] : Allergy/Immunology [FreeTextEntry7] : gall bladder drain

## 2019-04-09 NOTE — PHYSICAL EXAM
[Jugular Venous Distention Increased] : there was no jugular-venous distention [Heart Sounds] : normal S1 and S2 [Nail Clubbing] : no clubbing of the fingernails [Cyanosis, Localized] : no localized cyanosis [Petechial Hemorrhages (___cm)] : no petechial hemorrhages [] : no ischemic changes [FreeTextEntry1] : Bilat 2 plus crackles basilar predom.

## 2019-04-09 NOTE — PROCEDURE
[FreeTextEntry1] : Pulmonary function testing.\par Normal Flow Rates There is no significant bronchodilator response. There is a mild reduction in lung volume. There is elevation in the RV/TLC ratio indicative of possible air trapping. Normal Lung Volumes. There is a severe diffusion impairment. Corrects to moderate with lung volume correction \par PFT attached relatively stable function.\par

## 2019-04-09 NOTE — HISTORY OF PRESENT ILLNESS
[FreeTextEntry1] : Still with gallbladder drain, maintaining weight. Slight cough prn mucus. On Pulmicort bid . Mild dyspnea with walking . Checking o2 at home much better. Now less o2 use during the day . always uses at night

## 2019-04-11 NOTE — DISCHARGE NOTE ADULT - COMPLETE THE FOLLOWING IF THE PATIENT REFUSES THE INFLUENZA VACCINE:
Alert and oriented to person, place and time/Patient baseline mental status/Awake Risks/benefits discussed with patient/surrogate

## 2019-04-21 ENCOUNTER — FORM ENCOUNTER (OUTPATIENT)
Age: 84
End: 2019-04-21

## 2019-04-22 ENCOUNTER — OUTPATIENT (OUTPATIENT)
Dept: OUTPATIENT SERVICES | Facility: HOSPITAL | Age: 84
LOS: 1 days | End: 2019-04-22
Payer: MEDICARE

## 2019-04-22 DIAGNOSIS — K81.9 CHOLECYSTITIS, UNSPECIFIED: ICD-10-CM

## 2019-04-22 DIAGNOSIS — K81.1 CHRONIC CHOLECYSTITIS: ICD-10-CM

## 2019-04-22 PROCEDURE — 20501 NJX SINUS TRACT DIAGNOSTIC: CPT

## 2019-04-22 PROCEDURE — C1887: CPT

## 2019-04-22 PROCEDURE — 76080 X-RAY EXAM OF FISTULA: CPT | Mod: 26

## 2019-04-22 PROCEDURE — 76080 X-RAY EXAM OF FISTULA: CPT

## 2019-04-22 PROCEDURE — C1769: CPT

## 2019-04-25 DIAGNOSIS — Z43.4 ENCOUNTER FOR ATTENTION TO OTHER ARTIFICIAL OPENINGS OF DIGESTIVE TRACT: ICD-10-CM

## 2019-04-26 ENCOUNTER — RX RENEWAL (OUTPATIENT)
Age: 84
End: 2019-04-26

## 2019-05-21 ENCOUNTER — RX RENEWAL (OUTPATIENT)
Age: 84
End: 2019-05-21

## 2019-06-10 NOTE — DISCHARGE NOTE ADULT - MEDICATION SUMMARY - MEDICATIONS TO STOP TAKING
wife used to smoke
I will STOP taking the medications listed below when I get home from the hospital:  None

## 2019-07-08 ENCOUNTER — APPOINTMENT (OUTPATIENT)
Dept: PULMONOLOGY | Facility: CLINIC | Age: 84
End: 2019-07-08
Payer: MEDICARE

## 2019-07-08 VITALS
OXYGEN SATURATION: 93 % | RESPIRATION RATE: 16 BRPM | SYSTOLIC BLOOD PRESSURE: 113 MMHG | DIASTOLIC BLOOD PRESSURE: 70 MMHG | TEMPERATURE: 98.5 F | HEART RATE: 71 BPM

## 2019-07-08 PROCEDURE — 94060 EVALUATION OF WHEEZING: CPT

## 2019-07-08 PROCEDURE — 99213 OFFICE O/P EST LOW 20 MIN: CPT | Mod: 25

## 2019-07-08 NOTE — ASSESSMENT
[FreeTextEntry1] : Exercise limitation likely secondary to pulmonary fibrosis and global weakness.\par Chronic cough only responsive to cough suppressants.\par \par Medications reviewed and renewed.\par

## 2019-07-08 NOTE — PHYSICAL EXAM
[Jugular Venous Distention Increased] : there was no jugular-venous distention [Heart Sounds] : normal S1 and S2 [Cyanosis, Localized] : no localized cyanosis [Nail Clubbing] : no clubbing of the fingernails [Petechial Hemorrhages (___cm)] : no petechial hemorrhages [] : no ischemic changes [FreeTextEntry1] : Bilat 2 plus crackles basilar predom.

## 2019-07-08 NOTE — HISTORY OF PRESENT ILLNESS
[FreeTextEntry1] : SAMS minimal exertion.\par No definitive change\par Wearing Oxygen night.\par Still with cough clear mucous

## 2019-09-03 NOTE — ED PROVIDER NOTE - PSH
Influenza vaccination (VIS Pub Date: August 7, 2015) S/P Coronary Artery Stent Placement    S/P Shoulder Surgery

## 2019-09-23 ENCOUNTER — RX RENEWAL (OUTPATIENT)
Age: 84
End: 2019-09-23

## 2019-10-04 ENCOUNTER — APPOINTMENT (OUTPATIENT)
Dept: PULMONOLOGY | Facility: CLINIC | Age: 84
End: 2019-10-04
Payer: MEDICARE

## 2019-10-04 VITALS — OXYGEN SATURATION: 85 %

## 2019-10-04 VITALS — DIASTOLIC BLOOD PRESSURE: 70 MMHG | HEART RATE: 99 BPM | SYSTOLIC BLOOD PRESSURE: 117 MMHG | OXYGEN SATURATION: 87 %

## 2019-10-04 DIAGNOSIS — R09.02 HYPOXEMIA: ICD-10-CM

## 2019-10-04 DIAGNOSIS — R05 COUGH: ICD-10-CM

## 2019-10-04 DIAGNOSIS — J84.10 PULMONARY FIBROSIS, UNSPECIFIED: ICD-10-CM

## 2019-10-04 PROCEDURE — 99214 OFFICE O/P EST MOD 30 MIN: CPT

## 2019-10-04 RX ORDER — PREDNISONE 10 MG/1
10 TABLET ORAL
Qty: 30 | Refills: 1 | Status: ACTIVE | COMMUNITY
Start: 2019-10-04 | End: 1900-01-01

## 2019-10-04 RX ORDER — IPRATROPIUM BROMIDE 21 UG/1
0.03 SPRAY NASAL
Qty: 1 | Refills: 5 | Status: ACTIVE | COMMUNITY
Start: 2019-10-04 | End: 1900-01-01

## 2019-10-07 NOTE — ASSESSMENT
[FreeTextEntry1] : Exercise limitation likely secondary to pulmonary fibrosis and global weakness.\par Chronic cough only responsive to cough suppressants. For trial of prednisone 10 mg /day and call in 2 weeks.\par Medications reviewed and renewed.\par Continue oxygen\par

## 2019-10-07 NOTE — DISCUSSION/SUMMARY
[FreeTextEntry1] : Pulmonary Fibrosis progressive\par Chronic cough persistent. No significant change.\par Hypoxemia needs oxygen

## 2019-10-07 NOTE — REASON FOR VISIT
[Follow-Up] : a follow-up visit [ILD] : ILD [FreeTextEntry2] : needing O2 constantly, more so needs more over past 2 months

## 2019-10-07 NOTE — REVIEW OF SYSTEMS
[Recent Wt Loss (___ Lbs)] : recent [unfilled] ~Ulb weight loss [As Noted in HPI] : as noted in HPI [Hypertension] : ~T hypertension [Negative] : Neurologic [FreeTextEntry6] : using walker

## 2019-10-07 NOTE — PHYSICAL EXAM
[Jugular Venous Distention Increased] : there was no jugular-venous distention [Heart Sounds] : normal S1 and S2 [Nail Clubbing] : no clubbing of the fingernails [Cyanosis, Localized] : no localized cyanosis [Petechial Hemorrhages (___cm)] : no petechial hemorrhages [Abdomen Tenderness] : non-tender [Abdomen Soft] : soft [Abdomen Mass (___ Cm)] : no abdominal mass palpated [] : no hepato-splenomegaly [FreeTextEntry1] : in wheel chair, weight stable 120

## 2019-10-07 NOTE — HISTORY OF PRESENT ILLNESS
[FreeTextEntry1] : SAMS with walking , family asking if can increase O2 from 2 liters\par \par O2 day and night\par Still with cough clear mucous and sneezing, running nose\par \par needs O2 recertified

## 2019-10-30 ENCOUNTER — RX RENEWAL (OUTPATIENT)
Age: 84
End: 2019-10-30

## 2019-11-01 ENCOUNTER — RX RENEWAL (OUTPATIENT)
Age: 84
End: 2019-11-01

## 2019-11-08 ENCOUNTER — OTHER (OUTPATIENT)
Age: 84
End: 2019-11-08

## 2019-11-18 ENCOUNTER — MEDICATION RENEWAL (OUTPATIENT)
Age: 84
End: 2019-11-18

## 2019-11-18 RX ORDER — AZITHROMYCIN 250 MG/1
250 TABLET, FILM COATED ORAL
Qty: 7 | Refills: 0 | Status: DISCONTINUED | COMMUNITY
Start: 2019-11-08 | End: 2019-11-18

## 2019-11-18 RX ORDER — HYDROCODONE BITARTRATE AND HOMATROPINE METHYLBROMIDE 5; 1.5 MG/5ML; MG/5ML
5-1.5 SYRUP ORAL
Qty: 450 | Refills: 0 | Status: ACTIVE | COMMUNITY
Start: 2019-01-30 | End: 1900-01-01

## 2019-11-18 RX ORDER — PREDNISONE 10 MG/1
10 TABLET ORAL
Qty: 60 | Refills: 3 | Status: ACTIVE | COMMUNITY
Start: 2019-11-18 | End: 1900-01-01

## 2019-12-26 ENCOUNTER — INPATIENT (INPATIENT)
Facility: HOSPITAL | Age: 84
LOS: 4 days | Discharge: HOME HEALTH | End: 2019-12-31
Attending: INTERNAL MEDICINE | Admitting: INTERNAL MEDICINE
Payer: MEDICARE

## 2019-12-26 VITALS
RESPIRATION RATE: 25 BRPM | DIASTOLIC BLOOD PRESSURE: 87 MMHG | TEMPERATURE: 98 F | SYSTOLIC BLOOD PRESSURE: 185 MMHG | OXYGEN SATURATION: 87 % | HEART RATE: 140 BPM

## 2019-12-26 DIAGNOSIS — I25.10 ATHEROSCLEROTIC HEART DISEASE OF NATIVE CORONARY ARTERY WITHOUT ANGINA PECTORIS: ICD-10-CM

## 2019-12-26 DIAGNOSIS — J12.1 RESPIRATORY SYNCYTIAL VIRUS PNEUMONIA: ICD-10-CM

## 2019-12-26 DIAGNOSIS — J84.112 IDIOPATHIC PULMONARY FIBROSIS: ICD-10-CM

## 2019-12-26 DIAGNOSIS — J96.21 ACUTE AND CHRONIC RESPIRATORY FAILURE WITH HYPOXIA: ICD-10-CM

## 2019-12-26 DIAGNOSIS — Z02.9 ENCOUNTER FOR ADMINISTRATIVE EXAMINATIONS, UNSPECIFIED: ICD-10-CM

## 2019-12-26 DIAGNOSIS — D53.9 NUTRITIONAL ANEMIA, UNSPECIFIED: ICD-10-CM

## 2019-12-26 DIAGNOSIS — J20.5 ACUTE BRONCHITIS DUE TO RESPIRATORY SYNCYTIAL VIRUS: ICD-10-CM

## 2019-12-26 DIAGNOSIS — I10 ESSENTIAL (PRIMARY) HYPERTENSION: ICD-10-CM

## 2019-12-26 DIAGNOSIS — Z29.9 ENCOUNTER FOR PROPHYLACTIC MEASURES, UNSPECIFIED: ICD-10-CM

## 2019-12-26 DIAGNOSIS — Z71.89 OTHER SPECIFIED COUNSELING: ICD-10-CM

## 2019-12-26 LAB
ALBUMIN SERPL ELPH-MCNC: 3.4 G/DL — SIGNIFICANT CHANGE UP (ref 3.3–5)
ALP SERPL-CCNC: 82 U/L — SIGNIFICANT CHANGE UP (ref 40–120)
ALT FLD-CCNC: 13 U/L — SIGNIFICANT CHANGE UP (ref 4–33)
ANION GAP SERPL CALC-SCNC: 17 MMO/L — HIGH (ref 7–14)
APPEARANCE UR: CLEAR — SIGNIFICANT CHANGE UP
APTT BLD: 29.9 SEC — SIGNIFICANT CHANGE UP (ref 27.5–36.3)
AST SERPL-CCNC: 28 U/L — SIGNIFICANT CHANGE UP (ref 4–32)
B PERT DNA SPEC QL NAA+PROBE: NOT DETECTED — SIGNIFICANT CHANGE UP
BACTERIA # UR AUTO: SIGNIFICANT CHANGE UP
BASE EXCESS BLDV CALC-SCNC: 3.8 MMOL/L — SIGNIFICANT CHANGE UP
BASE EXCESS BLDV CALC-SCNC: 5.7 MMOL/L — SIGNIFICANT CHANGE UP
BASE EXCESS BLDV CALC-SCNC: 7.1 MMOL/L — SIGNIFICANT CHANGE UP
BASE EXCESS BLDV CALC-SCNC: 7.2 MMOL/L — SIGNIFICANT CHANGE UP
BASOPHILS # BLD AUTO: 0.06 K/UL — SIGNIFICANT CHANGE UP (ref 0–0.2)
BASOPHILS NFR BLD AUTO: 0.3 % — SIGNIFICANT CHANGE UP (ref 0–2)
BILIRUB SERPL-MCNC: 0.3 MG/DL — SIGNIFICANT CHANGE UP (ref 0.2–1.2)
BILIRUB UR-MCNC: NEGATIVE — SIGNIFICANT CHANGE UP
BLOOD GAS VENOUS - CREATININE: 0.7 MG/DL — SIGNIFICANT CHANGE UP (ref 0.5–1.3)
BLOOD GAS VENOUS - CREATININE: 0.73 MG/DL — SIGNIFICANT CHANGE UP (ref 0.5–1.3)
BLOOD GAS VENOUS - CREATININE: 0.77 MG/DL — SIGNIFICANT CHANGE UP (ref 0.5–1.3)
BLOOD GAS VENOUS - CREATININE: 0.85 MG/DL — SIGNIFICANT CHANGE UP (ref 0.5–1.3)
BLOOD GAS VENOUS - FIO2: 30 — SIGNIFICANT CHANGE UP
BLOOD GAS VENOUS - FIO2: 40 — SIGNIFICANT CHANGE UP
BLOOD UR QL VISUAL: SIGNIFICANT CHANGE UP
BUN SERPL-MCNC: 23 MG/DL — SIGNIFICANT CHANGE UP (ref 7–23)
C PNEUM DNA SPEC QL NAA+PROBE: NOT DETECTED — SIGNIFICANT CHANGE UP
CALCIUM SERPL-MCNC: 9.4 MG/DL — SIGNIFICANT CHANGE UP (ref 8.4–10.5)
CHLORIDE BLDV-SCNC: 100 MMOL/L — SIGNIFICANT CHANGE UP (ref 96–108)
CHLORIDE BLDV-SCNC: 93 MMOL/L — LOW (ref 96–108)
CHLORIDE BLDV-SCNC: 96 MMOL/L — SIGNIFICANT CHANGE UP (ref 96–108)
CHLORIDE BLDV-SCNC: 96 MMOL/L — SIGNIFICANT CHANGE UP (ref 96–108)
CHLORIDE SERPL-SCNC: 89 MMOL/L — LOW (ref 98–107)
CO2 SERPL-SCNC: 27 MMOL/L — SIGNIFICANT CHANGE UP (ref 22–31)
COLOR SPEC: YELLOW — SIGNIFICANT CHANGE UP
CREAT SERPL-MCNC: 0.82 MG/DL — SIGNIFICANT CHANGE UP (ref 0.5–1.3)
EOSINOPHIL # BLD AUTO: 0.06 K/UL — SIGNIFICANT CHANGE UP (ref 0–0.5)
EOSINOPHIL NFR BLD AUTO: 0.3 % — SIGNIFICANT CHANGE UP (ref 0–6)
FLUAV H1 2009 PAND RNA SPEC QL NAA+PROBE: NOT DETECTED — SIGNIFICANT CHANGE UP
FLUAV H1 RNA SPEC QL NAA+PROBE: NOT DETECTED — SIGNIFICANT CHANGE UP
FLUAV H3 RNA SPEC QL NAA+PROBE: NOT DETECTED — SIGNIFICANT CHANGE UP
FLUAV SUBTYP SPEC NAA+PROBE: NOT DETECTED — SIGNIFICANT CHANGE UP
FLUBV RNA SPEC QL NAA+PROBE: NOT DETECTED — SIGNIFICANT CHANGE UP
GAS PNL BLDV: 133 MMOL/L — LOW (ref 136–146)
GAS PNL BLDV: 134 MMOL/L — LOW (ref 136–146)
GAS PNL BLDV: 135 MMOL/L — LOW (ref 136–146)
GAS PNL BLDV: 137 MMOL/L — SIGNIFICANT CHANGE UP (ref 136–146)
GLUCOSE BLDV-MCNC: 158 MG/DL — HIGH (ref 70–99)
GLUCOSE BLDV-MCNC: 188 MG/DL — HIGH (ref 70–99)
GLUCOSE BLDV-MCNC: 198 MG/DL — HIGH (ref 70–99)
GLUCOSE BLDV-MCNC: 208 MG/DL — HIGH (ref 70–99)
GLUCOSE SERPL-MCNC: 215 MG/DL — HIGH (ref 70–99)
GLUCOSE UR-MCNC: 50 — SIGNIFICANT CHANGE UP
HADV DNA SPEC QL NAA+PROBE: NOT DETECTED — SIGNIFICANT CHANGE UP
HCO3 BLDV-SCNC: 26 MMOL/L — SIGNIFICANT CHANGE UP (ref 20–27)
HCO3 BLDV-SCNC: 28 MMOL/L — HIGH (ref 20–27)
HCO3 BLDV-SCNC: 29 MMOL/L — HIGH (ref 20–27)
HCO3 BLDV-SCNC: 30 MMOL/L — HIGH (ref 20–27)
HCOV PNL SPEC NAA+PROBE: SIGNIFICANT CHANGE UP
HCT VFR BLD CALC: 35.9 % — SIGNIFICANT CHANGE UP (ref 34.5–45)
HCT VFR BLDV CALC: 27 % — LOW (ref 34.5–45)
HCT VFR BLDV CALC: 29.9 % — LOW (ref 34.5–45)
HCT VFR BLDV CALC: 30.8 % — LOW (ref 34.5–45)
HCT VFR BLDV CALC: 32.9 % — LOW (ref 34.5–45)
HGB BLD-MCNC: 10.9 G/DL — LOW (ref 11.5–15.5)
HGB BLDV-MCNC: 10 G/DL — LOW (ref 11.5–15.5)
HGB BLDV-MCNC: 10.7 G/DL — LOW (ref 11.5–15.5)
HGB BLDV-MCNC: 8.7 G/DL — LOW (ref 11.5–15.5)
HGB BLDV-MCNC: 9.7 G/DL — LOW (ref 11.5–15.5)
HMPV RNA SPEC QL NAA+PROBE: NOT DETECTED — SIGNIFICANT CHANGE UP
HPIV1 RNA SPEC QL NAA+PROBE: NOT DETECTED — SIGNIFICANT CHANGE UP
HPIV2 RNA SPEC QL NAA+PROBE: NOT DETECTED — SIGNIFICANT CHANGE UP
HPIV3 RNA SPEC QL NAA+PROBE: NOT DETECTED — SIGNIFICANT CHANGE UP
HPIV4 RNA SPEC QL NAA+PROBE: NOT DETECTED — SIGNIFICANT CHANGE UP
HYALINE CASTS # UR AUTO: SIGNIFICANT CHANGE UP
IMM GRANULOCYTES NFR BLD AUTO: 1.1 % — SIGNIFICANT CHANGE UP (ref 0–1.5)
INR BLD: 1.23 — HIGH (ref 0.88–1.17)
KETONES UR-MCNC: NEGATIVE — SIGNIFICANT CHANGE UP
LACTATE BLDV-MCNC: 1.5 MMOL/L — SIGNIFICANT CHANGE UP (ref 0.5–2)
LACTATE BLDV-MCNC: 2.2 MMOL/L — HIGH (ref 0.5–2)
LACTATE BLDV-MCNC: 2.2 MMOL/L — HIGH (ref 0.5–2)
LACTATE BLDV-MCNC: 5 MMOL/L — CRITICAL HIGH (ref 0.5–2)
LEUKOCYTE ESTERASE UR-ACNC: NEGATIVE — SIGNIFICANT CHANGE UP
LYMPHOCYTES # BLD AUTO: 12.6 % — LOW (ref 13–44)
LYMPHOCYTES # BLD AUTO: 2.2 K/UL — SIGNIFICANT CHANGE UP (ref 1–3.3)
MCHC RBC-ENTMCNC: 30.4 % — LOW (ref 32–36)
MCHC RBC-ENTMCNC: 33.2 PG — SIGNIFICANT CHANGE UP (ref 27–34)
MCV RBC AUTO: 109.5 FL — HIGH (ref 80–100)
MONOCYTES # BLD AUTO: 1.01 K/UL — HIGH (ref 0–0.9)
MONOCYTES NFR BLD AUTO: 5.8 % — SIGNIFICANT CHANGE UP (ref 2–14)
NEUTROPHILS # BLD AUTO: 13.89 K/UL — HIGH (ref 1.8–7.4)
NEUTROPHILS NFR BLD AUTO: 79.9 % — HIGH (ref 43–77)
NITRITE UR-MCNC: NEGATIVE — SIGNIFICANT CHANGE UP
NRBC # FLD: 0 K/UL — SIGNIFICANT CHANGE UP (ref 0–0)
PCO2 BLDV: 56 MMHG — HIGH (ref 41–51)
PCO2 BLDV: 59 MMHG — HIGH (ref 41–51)
PCO2 BLDV: 70 MMHG — HIGH (ref 41–51)
PCO2 BLDV: 74 MMHG — HIGH (ref 41–51)
PH BLDV: 7.24 PH — LOW (ref 7.32–7.43)
PH BLDV: 7.29 PH — LOW (ref 7.32–7.43)
PH BLDV: 7.36 PH — SIGNIFICANT CHANGE UP (ref 7.32–7.43)
PH BLDV: 7.38 PH — SIGNIFICANT CHANGE UP (ref 7.32–7.43)
PH UR: 6.5 — SIGNIFICANT CHANGE UP (ref 5–8)
PLATELET # BLD AUTO: 346 K/UL — SIGNIFICANT CHANGE UP (ref 150–400)
PMV BLD: 8.8 FL — SIGNIFICANT CHANGE UP (ref 7–13)
PO2 BLDV: 36 MMHG — SIGNIFICANT CHANGE UP (ref 35–40)
PO2 BLDV: 38 MMHG — SIGNIFICANT CHANGE UP (ref 35–40)
PO2 BLDV: 62 MMHG — HIGH (ref 35–40)
PO2 BLDV: < 24 MMHG — LOW (ref 35–40)
POTASSIUM BLDV-SCNC: 4.1 MMOL/L — SIGNIFICANT CHANGE UP (ref 3.4–4.5)
POTASSIUM BLDV-SCNC: 4.1 MMOL/L — SIGNIFICANT CHANGE UP (ref 3.4–4.5)
POTASSIUM BLDV-SCNC: 4.4 MMOL/L — SIGNIFICANT CHANGE UP (ref 3.4–4.5)
POTASSIUM BLDV-SCNC: SIGNIFICANT CHANGE UP MMOL/L (ref 3.4–4.5)
POTASSIUM SERPL-MCNC: 4.4 MMOL/L — SIGNIFICANT CHANGE UP (ref 3.5–5.3)
POTASSIUM SERPL-SCNC: 4.4 MMOL/L — SIGNIFICANT CHANGE UP (ref 3.5–5.3)
PROT SERPL-MCNC: 9.1 G/DL — HIGH (ref 6–8.3)
PROT UR-MCNC: 300 — HIGH
PROTHROM AB SERPL-ACNC: 13.7 SEC — HIGH (ref 9.8–13.1)
RBC # BLD: 3.28 M/UL — LOW (ref 3.8–5.2)
RBC # FLD: 14.3 % — SIGNIFICANT CHANGE UP (ref 10.3–14.5)
RBC CASTS # UR COMP ASSIST: HIGH (ref 0–?)
RSV RNA SPEC QL NAA+PROBE: DETECTED — HIGH
RV+EV RNA SPEC QL NAA+PROBE: NOT DETECTED — SIGNIFICANT CHANGE UP
SAO2 % BLDV: 31.7 % — LOW (ref 60–85)
SAO2 % BLDV: 61.5 % — SIGNIFICANT CHANGE UP (ref 60–85)
SAO2 % BLDV: 62.6 % — SIGNIFICANT CHANGE UP (ref 60–85)
SAO2 % BLDV: 90.9 % — HIGH (ref 60–85)
SODIUM SERPL-SCNC: 133 MMOL/L — LOW (ref 135–145)
SP GR SPEC: 1.02 — SIGNIFICANT CHANGE UP (ref 1–1.04)
SQUAMOUS # UR AUTO: SIGNIFICANT CHANGE UP
TSH SERPL-MCNC: 0.58 UIU/ML — SIGNIFICANT CHANGE UP (ref 0.27–4.2)
TSH SERPL-MCNC: 0.77 UIU/ML — SIGNIFICANT CHANGE UP (ref 0.27–4.2)
UROBILINOGEN FLD QL: NORMAL — SIGNIFICANT CHANGE UP
WBC # BLD: 17.42 K/UL — HIGH (ref 3.8–10.5)
WBC # FLD AUTO: 17.42 K/UL — HIGH (ref 3.8–10.5)
WBC UR QL: SIGNIFICANT CHANGE UP (ref 0–?)

## 2019-12-26 PROCEDURE — 71045 X-RAY EXAM CHEST 1 VIEW: CPT | Mod: 26

## 2019-12-26 PROCEDURE — 99233 SBSQ HOSP IP/OBS HIGH 50: CPT | Mod: GC

## 2019-12-26 PROCEDURE — 99233 SBSQ HOSP IP/OBS HIGH 50: CPT

## 2019-12-26 PROCEDURE — 99223 1ST HOSP IP/OBS HIGH 75: CPT | Mod: GC

## 2019-12-26 RX ORDER — INFLUENZA VIRUS VACCINE 15; 15; 15; 15 UG/.5ML; UG/.5ML; UG/.5ML; UG/.5ML
0.5 SUSPENSION INTRAMUSCULAR ONCE
Refills: 0 | Status: DISCONTINUED | OUTPATIENT
Start: 2019-12-26 | End: 2019-12-31

## 2019-12-26 RX ORDER — ENOXAPARIN SODIUM 100 MG/ML
40 INJECTION SUBCUTANEOUS DAILY
Refills: 0 | Status: DISCONTINUED | OUTPATIENT
Start: 2019-12-26 | End: 2019-12-31

## 2019-12-26 RX ORDER — AZTREONAM 2 G
2000 VIAL (EA) INJECTION EVERY 8 HOURS
Refills: 0 | Status: DISCONTINUED | OUTPATIENT
Start: 2019-12-26 | End: 2019-12-26

## 2019-12-26 RX ORDER — CEFTRIAXONE 500 MG/1
1000 INJECTION, POWDER, FOR SOLUTION INTRAMUSCULAR; INTRAVENOUS ONCE
Refills: 0 | Status: DISCONTINUED | OUTPATIENT
Start: 2019-12-26 | End: 2019-12-26

## 2019-12-26 RX ORDER — CLOTRIMAZOLE AND BETAMETHASONE DIPROPIONATE 10; .5 MG/G; MG/G
1 CREAM TOPICAL
Qty: 0 | Refills: 0 | DISCHARGE

## 2019-12-26 RX ORDER — FLUTICASONE PROPIONATE 50 MCG
1 SPRAY, SUSPENSION NASAL
Qty: 0 | Refills: 0 | DISCHARGE

## 2019-12-26 RX ORDER — MORPHINE SULFATE 50 MG/1
4 CAPSULE, EXTENDED RELEASE ORAL ONCE
Refills: 0 | Status: DISCONTINUED | OUTPATIENT
Start: 2019-12-26 | End: 2019-12-26

## 2019-12-26 RX ORDER — METOPROLOL TARTRATE 50 MG
1 TABLET ORAL
Qty: 0 | Refills: 0 | DISCHARGE

## 2019-12-26 RX ORDER — ASPIRIN/CALCIUM CARB/MAGNESIUM 324 MG
1 TABLET ORAL
Qty: 0 | Refills: 0 | DISCHARGE

## 2019-12-26 RX ORDER — BUDESONIDE, MICRONIZED 100 %
1 POWDER (GRAM) MISCELLANEOUS
Qty: 0 | Refills: 0 | DISCHARGE

## 2019-12-26 RX ORDER — ACETAMINOPHEN 500 MG
650 TABLET ORAL ONCE
Refills: 0 | Status: COMPLETED | OUTPATIENT
Start: 2019-12-26 | End: 2019-12-26

## 2019-12-26 RX ORDER — VANCOMYCIN HCL 1 G
1000 VIAL (EA) INTRAVENOUS ONCE
Refills: 0 | Status: COMPLETED | OUTPATIENT
Start: 2019-12-26 | End: 2019-12-26

## 2019-12-26 RX ORDER — IPRATROPIUM/ALBUTEROL SULFATE 18-103MCG
3 AEROSOL WITH ADAPTER (GRAM) INHALATION ONCE
Refills: 0 | Status: COMPLETED | OUTPATIENT
Start: 2019-12-26 | End: 2019-12-26

## 2019-12-26 RX ORDER — URSODIOL 250 MG/1
1 TABLET, FILM COATED ORAL
Qty: 0 | Refills: 0 | DISCHARGE

## 2019-12-26 RX ORDER — AZTREONAM 2 G
1000 VIAL (EA) INJECTION ONCE
Refills: 0 | Status: COMPLETED | OUTPATIENT
Start: 2019-12-26 | End: 2019-12-26

## 2019-12-26 RX ORDER — AZITHROMYCIN 500 MG/1
500 TABLET, FILM COATED ORAL ONCE
Refills: 0 | Status: COMPLETED | OUTPATIENT
Start: 2019-12-26 | End: 2019-12-26

## 2019-12-26 RX ORDER — CHOLECALCIFEROL (VITAMIN D3) 125 MCG
1 CAPSULE ORAL
Qty: 0 | Refills: 0 | DISCHARGE

## 2019-12-26 RX ORDER — METOPROLOL TARTRATE 50 MG
25 TABLET ORAL DAILY
Refills: 0 | Status: DISCONTINUED | OUTPATIENT
Start: 2019-12-26 | End: 2019-12-31

## 2019-12-26 RX ORDER — LIDOCAINE 4 G/100G
1 CREAM TOPICAL
Qty: 0 | Refills: 0 | DISCHARGE

## 2019-12-26 RX ORDER — IPRATROPIUM/ALBUTEROL SULFATE 18-103MCG
3 AEROSOL WITH ADAPTER (GRAM) INHALATION EVERY 6 HOURS
Refills: 0 | Status: DISCONTINUED | OUTPATIENT
Start: 2019-12-26 | End: 2019-12-27

## 2019-12-26 RX ORDER — DOCUSATE SODIUM 100 MG
1 CAPSULE ORAL
Qty: 0 | Refills: 0 | DISCHARGE

## 2019-12-26 RX ORDER — URSODIOL 250 MG/1
300 TABLET, FILM COATED ORAL
Refills: 0 | Status: DISCONTINUED | OUTPATIENT
Start: 2019-12-26 | End: 2019-12-31

## 2019-12-26 RX ORDER — ASPIRIN/CALCIUM CARB/MAGNESIUM 324 MG
81 TABLET ORAL DAILY
Refills: 0 | Status: DISCONTINUED | OUTPATIENT
Start: 2019-12-26 | End: 2019-12-31

## 2019-12-26 RX ORDER — FAMOTIDINE 10 MG/ML
1 INJECTION INTRAVENOUS
Qty: 0 | Refills: 0 | DISCHARGE

## 2019-12-26 RX ORDER — BUDESONIDE, MICRONIZED 100 %
0.25 POWDER (GRAM) MISCELLANEOUS
Refills: 0 | Status: DISCONTINUED | OUTPATIENT
Start: 2019-12-26 | End: 2019-12-31

## 2019-12-26 RX ORDER — SODIUM CHLORIDE 9 MG/ML
1750 INJECTION INTRAMUSCULAR; INTRAVENOUS; SUBCUTANEOUS ONCE
Refills: 0 | Status: COMPLETED | OUTPATIENT
Start: 2019-12-26 | End: 2019-12-26

## 2019-12-26 RX ADMIN — MORPHINE SULFATE 4 MILLIGRAM(S): 50 CAPSULE, EXTENDED RELEASE ORAL at 10:30

## 2019-12-26 RX ADMIN — SODIUM CHLORIDE 1750 MILLILITER(S): 9 INJECTION INTRAMUSCULAR; INTRAVENOUS; SUBCUTANEOUS at 08:54

## 2019-12-26 RX ADMIN — Medication 650 MILLIGRAM(S): at 10:30

## 2019-12-26 RX ADMIN — MORPHINE SULFATE 4 MILLIGRAM(S): 50 CAPSULE, EXTENDED RELEASE ORAL at 08:53

## 2019-12-26 RX ADMIN — Medication 125 MILLIGRAM(S): at 10:03

## 2019-12-26 RX ADMIN — Medication 50 MILLIGRAM(S): at 10:03

## 2019-12-26 RX ADMIN — Medication 650 MILLIGRAM(S): at 23:18

## 2019-12-26 RX ADMIN — Medication 110 MILLIGRAM(S): at 18:09

## 2019-12-26 RX ADMIN — Medication 3 MILLILITER(S): at 10:32

## 2019-12-26 RX ADMIN — Medication 3 MILLILITER(S): at 11:58

## 2019-12-26 RX ADMIN — Medication 50 MILLIGRAM(S): at 11:58

## 2019-12-26 RX ADMIN — Medication 250 MILLIGRAM(S): at 11:58

## 2019-12-26 RX ADMIN — Medication 650 MILLIGRAM(S): at 08:54

## 2019-12-26 RX ADMIN — AZITHROMYCIN 255 MILLIGRAM(S): 500 TABLET, FILM COATED ORAL at 09:35

## 2019-12-26 RX ADMIN — URSODIOL 300 MILLIGRAM(S): 250 TABLET, FILM COATED ORAL at 23:20

## 2019-12-26 NOTE — ED PROVIDER NOTE - PROGRESS NOTE DETAILS
Pt more comfortable appearing than on arrival, tachycardia improved, satting well on NRB. MICU attg at bedside. Have tried 2x to reach pt's PMD Xiang to find provider for admission. Family prefers no intubation regarding possible decompensating respiratory status and end stage lung disease. Per MICU attg discussion with pt and family pt to be DNR/DNI will page hospitalist for admission as still no callback from pt JOEL thapa

## 2019-12-26 NOTE — ED PROVIDER NOTE - PHYSICAL EXAMINATION
General: Alert. In distress  Head: Normocephalic and atraumatic.  Eyes: PERRLA with EOMI.  Neck: Supple. Trachea midline.   Cardiac: Normal S1 and S2 w/ RRR. No murmurs appreciated.   Pulmonary: B/l rales and crackles  Abdominal: Diffuse tenderness  Skin: Color appropriate for race. Intact, warm

## 2019-12-26 NOTE — H&P ADULT - PROBLEM SELECTOR PLAN 1
- Initially hypoxic, tachypneic, worsening hypercapnea on repeat VBG.  - In setting of chronic severe IPF  - DNR/DNI  - Will start BiPAP given tachypnea to 40s during exam, and worsening hypercapnea; repeat gas in 2 hours. - Initially hypoxic, tachypneic, worsening hypercapnea on repeat VBG.  - In setting of chronic severe IPF  - DNR/DNI  - Will start BiPAP given tachypnea to 40s during exam, and worsening hypercapnia; repeat gas in 2 hours.

## 2019-12-26 NOTE — H&P ADULT - PROBLEM SELECTOR PLAN 3
- Baseline severe disease  - On prednisone 10mg at home, will continue (given <20mg prednisone is considered non-adrenal insufficient empirically).  - DNR/DNI  - Private pulmonology saw patient in ED  - Hycodin syrup for cough suppression, continue home regimen  - continue home pulmicort and fluticasone - Baseline severe disease  - On prednisone 10mg at home, given 125mg solumedrol in ED.  Will continue 40mg solumedrol daily for stress dose steroid.  - DNR/DNI  - Private pulmonology saw patient in ED  - Hycodin syrup for cough suppression, continue home regimen  - continue home pulmicort and fluticasone

## 2019-12-26 NOTE — CONSULT NOTE ADULT - ATTENDING COMMENTS
Patient with acute on chronic hypoxic respiratory failure due to RSV superimposed on pulmonary fibrosis, possible pneumonia.  Feels much better after arrival to ED, breathing much less labored. Agree with steroids, abx, supplemental O2 (may benefit from HFNC if still dyspneic).  Of note Patient and daughter state she is DNR/DNI.    Patient does not require MICU level of care at this time.  Please re-consult as needed.

## 2019-12-26 NOTE — ED ADULT NURSE NOTE - CHIEF COMPLAINT QUOTE
Brought in by [facility] from home complaining of abdominal pain that started last night, however only notified family this morning. Patient is tachypneic and saturating at 87% on 5 L, normally on 2 L at home. PMH pulmonary fibrosis, gall stones, HTN, 2 stents. EKG in progress. Hypertensive.

## 2019-12-26 NOTE — CONSULT NOTE ADULT - SUBJECTIVE AND OBJECTIVE BOX
CHIEF COMPLAINT:    HPI:    The patient is a 89 yo woman with PMH of pulmonary fibrosis, CAD s/p stent 10 yrs prior hx of acute cholecystitis p/w worsening tachypnea and abdominal pain. Patient in normal state of health yesterday afternoon. Throuhgout now worsening respiratory distress w/ productive cough. Family is denying recent fevers/ chills/chest pain/N/V/abdominal pain. Patient w/ recent treatment for possible lung infection with azithromycin and long steroid taper. Pt uses home O2 3L. MICU consulted for hypoxia on NC requiring NRB    In the ED:  VS: T 103.3, HR , /49  Labs: WBC 17.42, Hgb 10.9, Platelet 346, INR 1.23, BUN 23, Cr 0.82, 7.36/56, RVP RSV+     PAST MEDICAL & SURGICAL HISTORY:  Barretts esophagus  Essential hypertension  CAD (coronary artery disease)  Idiopathic pulmonary fibrosis  Li's Esophagus  CAD (Coronary Artery Disease)  HTN (Hypertension)  Pulmonary Fibrosis  S/P Coronary Artery Stent Placement  S/P Shoulder Surgery      FAMILY HISTORY:  No pertinent family history in first degree relatives      SOCIAL HISTORY:  Smoking: __ packs x ___ years  EtOH Use:  Marital Status:  Occupation:  Recent Travel:  Country of Birth:  Advance Directives:    Allergies    latex (Rash)  penicillin (Angioedema)  penicillins (Swelling)    Intolerances        HOME MEDICATIONS:      CONSTITUTIONAL: No weakness, +fevers or chills  EYES/ENT: No visual changes;  No vertigo or throat pain   NECK: No pain or stiffness  RESPIRATORY: +cough, wheezing, hemoptysis; No shortness of breath  CARDIOVASCULAR: No chest pain or palpitations  GASTROINTESTINAL: No abdominal or epigastric pain. No nausea, vomiting, or hematemesis; No diarrhea or constipation. No melena or hematochezia.  GENITOURINARY: No dysuria, frequency or hematuria  NEUROLOGICAL: No numbness or weakness  SKIN: No itching, burning, rashes, or lesions   All other review of systems is negative unless indicated above.    [ ] All other systems negative  [ ] Unable to assess ROS because ________    OBJECTIVE:  ICU Vital Signs Last 24 Hrs  T(C): 37.3 (26 Dec 2019 14:27), Max: 39.6 (26 Dec 2019 08:55)  T(F): 99.1 (26 Dec 2019 14:27), Max: 103.3 (26 Dec 2019 08:55)  HR: 103 (26 Dec 2019 14:27) (98 - 140)  BP: 138/81 (26 Dec 2019 14:27) (120/49 - 185/87)  BP(mean): --  ABP: --  ABP(mean): --  RR: 24 (26 Dec 2019 14:27) (22 - 25)  SpO2: 95% (26 Dec 2019 14:27) (87% - 100%)        CAPILLARY BLOOD GLUCOSE          PHYSICAL EXAM:  General: Awake, alert, oriented X 3.   HEENT: Atraumatic, normocephalic.                Mallampatti Grade 4  Lymph Nodes: No palpable lymphadenopathy  Neck: No JVD. No carotid bruit.   Respiratory: course rhonchi b/l w/ loud crackles  Cardiovascular: S1 S2 normal.Abdomen: Soft, non-tender, non-distended. No organomegaly.  Extremities: Warm to touch. 1+ pitting edema noted in both lower extremities this am.  Skin: No rashes or skin lesions  Neurological: Motor and sensory examination equal and normal in all four extremities.  Psychiatry: Appropriate mood and affect.  HOSPITAL MEDICATIONS:  MEDICATIONS  (STANDING):    MEDICATIONS  (PRN):      LABS:                        10.9   17.42 )-----------( 346      ( 26 Dec 2019 08:40 )             35.9     12-26    133<L>  |  89<L>  |  23  ----------------------------<  215<H>  4.4   |  27  |  0.82    Ca    9.4      26 Dec 2019 08:40    TPro  9.1<H>  /  Alb  3.4  /  TBili  0.3  /  DBili  x   /  AST  28  /  ALT  13  /  AlkPhos  82  12-26    PT/INR - ( 26 Dec 2019 08:40 )   PT: 13.7 SEC;   INR: 1.23          PTT - ( 26 Dec 2019 08:40 )  PTT:29.9 SEC  Urinalysis Basic - ( 26 Dec 2019 08:40 )    Color: YELLOW / Appearance: CLEAR / S.025 / pH: 6.5  Gluc: 50 / Ketone: NEGATIVE  / Bili: NEGATIVE / Urobili: NORMAL   Blood: SMALL / Protein: 300 / Nitrite: NEGATIVE   Leuk Esterase: NEGATIVE / RBC: 11-25 / WBC 0-2   Sq Epi: OCC / Non Sq Epi: x / Bacteria: FEW        Venous Blood Gas:   @ 10:50  7.24/74/38/26/61.5  VBG Lactate: 2.2  Venous Blood Gas:   @ 08:40  7.36/59/< 24/29/31.7  VBG Lactate: 5.0      MICROBIOLOGY:     RADIOLOGY:  [ ] Reviewed and interpreted by me    EKG:

## 2019-12-26 NOTE — ED PROVIDER NOTE - CLINICAL SUMMARY MEDICAL DECISION MAKING FREE TEXT BOX
Pt p/w abd pain, fever, tachycardia, suspect sepsis from unclear etiology. Will also obtain CTAP to evaluate for pyelo/AAA as well. Pt p/w abd pain, fever, tachycardia, suspect sepsis from unclear etiology. Will also obtain CTAP to evaluate for pyelo/AAA as well. Plan: Sepsis labs, XR, CT, IVF/abx, admission Mihailos att: 89 with PMHx of HTN, pulmonary fibrosis, Li's esophagus, CAD s/p stent, hx acute cholecystitis with percutaneous cholecystostomy in October 2018 p/w abd pain, fever, tachycardia, suspect sepsis from unclear etiology. ABdominal exam is benign and bedside sono showing no signs of cholecystitis. Plan: Sepsis labs including RVP, XR, CT, IVF/abx, admission.

## 2019-12-26 NOTE — CONSULT NOTE ADULT - ASSESSMENT
89 yo woman with PMH of pulmonary fibrosis, CAD s/p stent 10 yrs prior hx of acute cholecystitis p/w worsening tachypnea and abdominal pain w/ hypoxic respiratory failure in setting of RSV    - pt received solumedrol 125 and duoneb in ED x 2 w/ improvement able to titrate to NC w/ patient reporting improvement in symptoms  - would continue with duonebs standing q6  - pt would benefit steroid course  - on bedside TTE normal LV low suspicion volume overload contributing to underlying disease  - after discussion w/ family, pt and family in agreement to not intubate for further escalation in care  - Pt does not require MICU level care at this time

## 2019-12-26 NOTE — H&P ADULT - ATTENDING COMMENTS
Patient was seen and examined personally by me. I have discussed the plan and reviewed the resident's note and agree with the above physical exam findings including assessment and plan except as indicated below.    89F PMH severe pulmonary fibrosis on home O2 3L, CAD s/p 3 stents, HTN, polanco's esophagus presents with cough, SOB. Admitted with acute on chronic hypoxic and hypercapnic respiratory failure likely due to RSV pneumonia and possible worsening pulmonary fibrosis.    Wean off bipap as tolerated. Repeat VBG to check improvement in hypercapnia. Lactate downtrending  Continue Solumedrol 40mg IVP daily, ATC nebs q6, doxycycline for now. F/u BCx  Tele with patient in sinus tachycardia, no Afib recorded: p waves were identifiable. TSH WNL  DNR/DNI, family interested in hearing about home hospice option Patient was seen and examined personally by me. I have discussed the plan and reviewed the resident's note and agree with the above physical exam findings including assessment and plan except as indicated below.    89F PMH severe pulmonary fibrosis on home O2 3L, CAD s/p 3 stents, HTN, polanco's esophagus presents with cough, SOB. Admitted with acute on chronic hypoxic and hypercapnic respiratory failure likely and sepsis 2/2 RSV pneumonia and possible worsening pulmonary fibrosis.    Wean off bipap as tolerated. Repeat VBG to check improvement in hypercapnia. Lactate downtrending  Supportive care for RSV infection, Continue Solumedrol 40mg IVP daily, ATC nebs q6, doxycycline for now. F/u BCx  Pulm following  Tele with patient in sinus tachycardia, no Afib recorded: p waves were identifiable. TSH WNL  DNR/DNI, family interested in hearing about home hospice option

## 2019-12-26 NOTE — H&P ADULT - PROBLEM SELECTOR PLAN 2
- Focal consolidation on CXR in left mid-lung  - RSV positive on RVP  - Supportive care - Focal consolidation on CXR in left mid-lung  - RSV positive on RVP  - s/p 1 dose azithromycin, 2g aztreonam, and vancomycin 1g in ED  - Supportive care - Focal consolidation on CXR in left mid-lung  - RSV positive on RVP  - s/p 1 dose azithromycin, 2g aztreonam, and vancomycin 1g in ED  - continue azithromycin, aztreonam, vancomycin dosed by level. - Focal consolidation on CXR in left mid-lung  - RSV positive on RVP  - s/p 1 dose azithromycin, 2g aztreonam, and vancomycin 1g in ED  - continue aztreonam + doxycycline for MRSA, atypical, and gram positive coverage - Focal consolidation on CXR in left mid-lung  - RSV positive on RVP  - s/p 1 dose azithromycin, 2g aztreonam, and vancomycin 1g in ED  - continue doxycycline for MRSA, atypical, and gram positive coverage - admitted with sepsis: fever, tachycardia, consolidation on CXR in left mid-lung  - RSV positive on RVP  - s/p 1 dose azithromycin, 2g aztreonam, and vancomycin 1g in ED  - continue doxycycline for MRSA, atypical, and gram positive coverage

## 2019-12-26 NOTE — H&P ADULT - NSHPPHYSICALEXAM_GEN_ALL_CORE
Vital signs reviewed.  CONSTITUTIONAL: NAD, awake  HEAD: Normocephalic; atraumatic  EYES: EOMI, no nystagmus, no conjunctival injection, no scleral icterus  MOUTH/THROAT:  MMM  NECK: Trachea midline, no JVD  CV: Normal S1, S2; no audible murmurs; tachycardic, extremities WWP  RESP: no stridor, tachypneic to 40s during exam with accessory muscle use, fine crackles in b/l bases.  ABD: soft, non-distended; non-tender  : Deferred  MSK/EXT: 1+ edema in b/l LE, normal ROM in all four extremities, no deformities  SKIN: No gross rashes or lesions on exposed skin, no significant trophic changes  NEURO: aaox3, moving all extremities purposefully and spontaneously Vital Signs Last 24 Hrs  T(C): 37.3 (26 Dec 2019 14:27), Max: 39.6 (26 Dec 2019 08:55)  T(F): 99.1 (26 Dec 2019 14:27), Max: 103.3 (26 Dec 2019 08:55)  HR: 103 (26 Dec 2019 14:27) (98 - 140)  BP: 138/81 (26 Dec 2019 14:27) (120/49 - 185/87)  BP(mean): --  RR: 24 (26 Dec 2019 14:27) (22 - 25)  SpO2: 95% (26 Dec 2019 14:27) (87% - 100%)      CONSTITUTIONAL: NAD, awake  HEAD: Normocephalic; atraumatic  EYES: EOMI, no nystagmus, no conjunctival injection, no scleral icterus  MOUTH/THROAT:  MMM  NECK: Trachea midline, no JVD  CV: Normal S1, S2; no audible murmurs; tachycardic, extremities WWP  RESP: no stridor, tachypneic to 40s during exam with accessory muscle use, fine crackles in b/l bases.  ABD: soft, non-distended; non-tender  : Deferred  MSK/EXT: 1+ edema in b/l LE, normal ROM in all four extremities, no deformities  SKIN: No gross rashes or lesions on exposed skin, no significant trophic changes  NEURO: aaox3, moving all extremities purposefully and spontaneously

## 2019-12-26 NOTE — H&P ADULT - NSHPLABSRESULTS_GEN_ALL_CORE
10.9   17.42 )-----------( 346      ( 26 Dec 2019 08:40 )             35.9           133<L>  |  89<L>  |  23  ----------------------------<  215<H>  4.4   |  27  |  0.82    Ca    9.4      26 Dec 2019 08:40    TPro  9.1<H>  /  Alb  3.4  /  TBili  0.3  /  DBili  x   /  AST  28  /  ALT  13  /  AlkPhos  82          10:50 - VBG - pH: 7.24  | pCO2: 74    | pO2: 38    | Lactate: 2.2    08:40 - VBG - pH: 7.36  | pCO2: 59    | pO2: < 24  | Lactate: 5.0        Urinalysis Basic - ( 26 Dec 2019 08:40 )    Color: YELLOW / Appearance: CLEAR / S.025 / pH: 6.5  Gluc: 50 / Ketone: NEGATIVE  / Bili: NEGATIVE / Urobili: NORMAL   Blood: SMALL / Protein: 300 / Nitrite: NEGATIVE   Leuk Esterase: NEGATIVE / RBC: 11-25 / WBC 0-2   Sq Epi: OCC / Non Sq Epi: x / Bacteria: FEW        PT/INR - ( 26 Dec 2019 08:40 )   PT: 13.7 SEC;   INR: 1.23     PTT - ( 26 Dec 2019 08:40 )  PTT:29.9 SEC      CAPILLARY BLOOD GLUCOSE    Xray Chest 1 View AP/PA:   EXAM:  XR CHEST AP OR PA 1V      INTERPRETATION:   There is no focal consolidation.  Bilateral diffuse honeycomb appearance of the lungs more on the left than the right side unchanged consistent with CT diagnosis of pulmonary fibrosis. The heart is not enlarged and there are no effusions or pneumothorax.  Patient's chin obscures the right apex.      COMPARISON:  CT chest 2018  IMPRESSION:  Pulmonary fibrosis but no focal consolidation. 10.9   17.42 )-----------( 346      ( 26 Dec 2019 08:40 )             35.9           133<L>  |  89<L>  |  23  ----------------------------<  215<H>  4.4   |  27  |  0.82    Ca    9.4      26 Dec 2019 08:40    TPro  9.1<H>  /  Alb  3.4  /  TBili  0.3  /  DBili  x   /  AST  28  /  ALT  13  /  AlkPhos  82          10:50 - VBG - pH: 7.24  | pCO2: 74    | pO2: 38    | Lactate: 2.2    08:40 - VBG - pH: 7.36  | pCO2: 59    | pO2: < 24  | Lactate: 5.0        Urinalysis Basic - ( 26 Dec 2019 08:40 )    Color: YELLOW / Appearance: CLEAR / S.025 / pH: 6.5  Gluc: 50 / Ketone: NEGATIVE  / Bili: NEGATIVE / Urobili: NORMAL   Blood: SMALL / Protein: 300 / Nitrite: NEGATIVE   Leuk Esterase: NEGATIVE / RBC: 11-25 / WBC 0-2   Sq Epi: OCC / Non Sq Epi: x / Bacteria: FEW        PT/INR - ( 26 Dec 2019 08:40 )   PT: 13.7 SEC;   INR: 1.23     PTT - ( 26 Dec 2019 08:40 )  PTT:29.9 SEC      CAPILLARY BLOOD GLUCOSE    Xray Chest 1 View AP/PA: personally reviewed: worse patchiness in LLL compared to prior CXR  EXAM:  XR CHEST AP OR PA 1V      INTERPRETATION:   There is no focal consolidation.  Bilateral diffuse honeycomb appearance of the lungs more on the left than the right side unchanged consistent with CT diagnosis of pulmonary fibrosis. The heart is not enlarged and there are no effusions or pneumothorax.  Patient's chin obscures the right apex.      COMPARISON:  CT chest 2018  IMPRESSION:  Pulmonary fibrosis but no focal consolidation.    EKG personally reviewed: Sinus tach to 160s (says Afib but p waves were identified and regular rhythm)

## 2019-12-26 NOTE — ED ADULT NURSE NOTE - OBJECTIVE STATEMENT
Pt presenting to room 18 AxOx1 and lethargic, ambulatory at baseline arriving from home with family at bedside, for c/o lower abdominal pain. PMH of pulmonary fibrosis, HTN, gallstones. As per family pt started complaining of abdominal pain last night, without N/V. On arrival to ED pt's breathing is even and labored, pt placed on nonrebreather 15L satting well at 100%. Palor/diaphoresis not noted. Pt appears uncomfortable, abdomen soft and rounded. Pt noted to be tachycardic at 125 and febrile, as well as hypertensive with vitals as noted. Skin dry and intact. IV established with 20g in LAC, labs drawn and sent, MD at bedside, will continue to monitor.

## 2019-12-26 NOTE — H&P ADULT - PROBLEM SELECTOR PLAN 6
- DVT ppx: Lovenox  - Dispo: likely home vs rehab, PT to eval tomorrow  - DNR/DNI - patient is DNR/DNI  - will refer to hospice given patient preference - not on home antihypertensives aside from lasix  - continue lasix

## 2019-12-26 NOTE — ED ADULT TRIAGE NOTE - CHIEF COMPLAINT QUOTE
Brought in by [facility] from home complaining of abdominal pain that started last night, however only notified family this morning. Patient is tachypneic and saturating at 87% on 5 L, normally on 2 L at home. PMH pulmonary fibrosis, gall stones, HTN, 2 stents. EKG in progress. Hypertensive.

## 2019-12-26 NOTE — H&P ADULT - PROBLEM SELECTOR PLAN 7
Transitions of Care Status:  1.  Name of PCP: Xiang  2.  PCP Contacted on Admission: [ ] Y    [x ] N    3.  PCP contacted at Discharge: [ ] Y    [ ] N    [ ] N/A  4.  Post-Discharge Appointment Date and Location:  5.  Summary of Handoff given to PCP: - DVT ppx: Lovenox  - Dispo: likely home vs rehab, PT to eval tomorrow  - DNR/DNI - patient is DNR/DNI  - will refer to hospice given patient preference

## 2019-12-26 NOTE — H&P ADULT - NSICDXPASTMEDICALHX_GEN_ALL_CORE_FT
PAST MEDICAL HISTORY:  Li's Esophagus     Barretts esophagus     CAD (Coronary Artery Disease)     CAD (coronary artery disease)     Essential hypertension     HTN (Hypertension)     Idiopathic pulmonary fibrosis     Pulmonary Fibrosis

## 2019-12-26 NOTE — ED ADULT NURSE REASSESSMENT NOTE - NS ED NURSE REASSESS COMMENT FT1
Pt verbalizing full relief in abdominal pain. Pt remains c/o SOB, respirations remain labored. Pt satting well with nonrebreather, noted to be desatting to 60s when taken off and switched to NC. MD Beaver aware. Chest PT performed on pt, pt coughing up green tinged secretions. Palor/diaphoresis not noted. Pt remains sinus tachycardic at 120 on monitor. Pulmonary MD at bedside. Pt awaiting CT. Will continue to monitor.

## 2019-12-26 NOTE — PROGRESS NOTE ADULT - SUBJECTIVE AND OBJECTIVE BOX
PULMONARY PROGRESS NOTE    FELISA YANG  MRN-[id number]    Patient is a 89y old  Female who presents with a chief complaint of     HPI:  history of pulmonary fibrosis  came to hospital for abdominal pain  Noted severe shortness of breath on admission  RVP positive for RSV  -      ROS:   -lower abd pain  History of gallbladder disease managed with tube  -    ACTIVE MEDICATION LIST:  MEDICATIONS  (STANDING):  aztreonam  IVPB 1000 milliGRAM(s) IV Intermittent once  vancomycin  IVPB 1000 milliGRAM(s) IV Intermittent once    MEDICATIONS  (PRN):      EXAM:  Vital Signs Last 24 Hrs  T(C): 37.7 (26 Dec 2019 10:46), Max: 39.6 (26 Dec 2019 08:55)  T(F): 99.8 (26 Dec 2019 10:46), Max: 103.3 (26 Dec 2019 08:55)  HR: 115 (26 Dec 2019 10:46) (115 - 140)  BP: 120/49 (26 Dec 2019 10:46) (120/49 - 185/87)  BP(mean): --  RR: 24 (26 Dec 2019 10:46) (22 - 25)  SpO2: 100% (26 Dec 2019 10:46) (87% - 100%)    GENERAL: acutely ill female    SKIN: Warm, dry, no rashes    LUNGS:  diffuse bilateral crackles    HEART: Regular rate and rhythm without murmur.    ABDOMEN: +BS, Soft, Nontender    EXTREMITIES: No clubbing, cyanosis, edema                              10.9   17.42 )-----------( 346      ( 26 Dec 2019 08:40 )             35.9       12-26    133<L>  |  89<L>  |  23  ----------------------------<  215<H>  4.4   |  27  |  0.82    Ca    9.4      26 Dec 2019 08:40    TPro  9.1<H>  /  Alb  3.4  /  TBili  0.3  /  DBili  x   /  AST  28  /  ALT  13  /  AlkPhos  82  12-26      LIVER FUNCTIONS - ( 26 Dec 2019 08:40 )  Alb: 3.4 g/dL / Pro: 9.1 g/dL / ALK PHOS: 82 u/L / ALT: 13 u/L / AST: 28 u/L / GGT: x           Chest x-ray demonstrates bilateral interstitial changes left greater than right; in comparison to outpatient chest x-ray 2/18 some interval progression with focal consolidation left mid lung zone      PROBLEM LIST:  89y Female with HEALTH ISSUES - PROBLEM Dx:    Interstitial lung disease  RSV infection  Abdominal pain        RECS:  Supportive care  Supplemental oxygen  IV antibiotics and steroids  Obtain CT of abdomen in light of abdominal pain and need for steroids  Prognosis very guarded in light of age and severe baseline lung disease    Erasmo Britt MD  [phone]

## 2019-12-26 NOTE — H&P ADULT - NSHPSOCIALHISTORY_GEN_ALL_CORE
lives with daughter in 2 family home, son in other part of house.  Gets around with wheelchair at baseline.  No smoking, drinking, other drug use.

## 2019-12-26 NOTE — H&P ADULT - PROBLEM SELECTOR PLAN 8
Transitions of Care Status:  1.  Name of PCP: Xiang  2.  PCP Contacted on Admission: [ ] Y    [x ] N    3.  PCP contacted at Discharge: [ ] Y    [ ] N    [ ] N/A  4.  Post-Discharge Appointment Date and Location:  5.  Summary of Handoff given to PCP: - DVT ppx: Lovenox  - Dispo: likely home vs rehab, PT to eval tomorrow  - DNR/DNI

## 2019-12-26 NOTE — H&P ADULT - HISTORY OF PRESENT ILLNESS
89F PMH of HTN, severe pulmonary fibrosis, Li's esophagus, CAD s/p stent x3, hx acute cholecystitis with percutaneous cholecystostomy in October 2018 presenting with 1 day of abdominal pain.  Found to have focal consolidation on CXR with bilateral interstitial changes, positive RSV, and hypoxemia worse than baseline.  She has significant SOB and cough at baseline, she notes that she is currently SOB but similar to baseline.  +cough, +nausea no vomiting.  No diarrhea or constipation.  Abd pain diffuse, improved after bowel movement today worst in LUQ not bothering her much during exam.  She endorses fever today but not currently.  Daughter notes that entire house is sick with cold recently.    Daughter notes that patient established her DNR/DNI intentions and MOLST completed in ED.    She follows with private pulmonologist Dr. Britt who saw her in ED. 89F PMH of HTN, severe pulmonary fibrosis, Li's esophagus, CAD s/p stent x3, hx acute cholecystitis with percutaneous cholecystostomy in October 2018 presenting with 1 day of abdominal pain.  Found to have focal consolidation on CXR with bilateral interstitial changes, positive RSV, and hypoxemia worse than baseline.  She has significant SOB and cough at baseline, she notes that she is currently SOB but similar to baseline.  +cough, +nausea no vomiting.  No diarrhea or constipation.  Abd pain diffuse, improved after bowel movement today worst in LUQ not bothering her much during exam.  She endorses fever today but not currently.  Daughter notes that entire house is sick with cold recently.  Patient on 3L home oxygen at baseline.      Daughter notes that patient established her DNR/DNI intentions and MOLST completed in ED.    She follows with private pulmonologist Dr. Britt who saw her in ED.

## 2019-12-26 NOTE — ED ADULT NURSE REASSESSMENT NOTE - NS ED NURSE REASSESS COMMENT FT1
Pt AxOx3, appearing less lethargic, pt verbalizing improvement in breathing since being medicated. Pt remains tachypneic, satting well on nonrebreather. Pt sinus tachy at 118 on monitor. Palor/diaphoresis not noted. VS as noted. BP stable. MD at bedside, will continue to monitor. Pt AxOx3, appearing less lethargic, pt verbalizing improvement in breathing since being medicated. Pt remains tachypneic, satting well on nonrebreather. Pt sinus tachy at 118 on monitor. Palor/diaphoresis not noted. VS as noted. BP stable. Pt and family educated on droplet and contact precautions for RSV. MD at bedside, will continue to monitor.

## 2019-12-26 NOTE — H&P ADULT - REASON FOR ADMISSION
acute on chronic hypoxic and hypercapnic respiratory failure acute on chronic hypoxic and hypercapnic respiratory failure, sepsis 2/2 RSV infection

## 2019-12-26 NOTE — H&P ADULT - NSHPREVIEWOFSYSTEMS_GEN_ALL_CORE
In additional the that documented in the HPI, the additional ROS was obtained:    CONSTITUTIONAL: +fever, no chills  EYES: no change in vision, no blurred vision  HEENT: no trouble swallowing, no trouble speaking, no sore throat  NECK: no pain, no stiffness  CV: no chest pain, no palpitations  RESP: +cough, +shortness of breath  GI: +abdominal pain, +nausea, no vomiting, no diarrhea, no constipation, no BRBPR or melena  : No dysuria, no frequency  NEURO: no headache, no new numbness, no weakness, no dizziness  SKIN: no new rashes  HEME: No easy bruising or bleeding  MSK: No joint pain, no recent trauma  Dangelo John M.D. -Resident

## 2019-12-26 NOTE — H&P ADULT - PROBLEM SELECTOR PLAN 5
- not on home antihypertensives aside from lasix  - continue lasix - continue metoprolol  - continue aspirin  - continue home lasix

## 2019-12-26 NOTE — H&P ADULT - PROBLEM SELECTOR PLAN 9
Transitions of Care Status:  1.  Name of PCP: Xiang  2.  PCP Contacted on Admission: [ ] Y    [x ] N    3.  PCP contacted at Discharge: [ ] Y    [ ] N    [ ] N/A  4.  Post-Discharge Appointment Date and Location:  5.  Summary of Handoff given to PCP:

## 2019-12-26 NOTE — H&P ADULT - ASSESSMENT
89F PMH severe pulmonary fibrosis, CAD s/p 3 stents, HTN, polanco's esophagus presents with RSV pneumonia and acute on chronic hypoxic and hypercapnic  respiratory failure. 89F PMH severe pulmonary fibrosis on home O2 3L, CAD s/p 3 stents, HTN, polanco's esophagus presents with cough, SOB. Admitted with acute on chronic hypoxic and hypercapnic respiratory failure likely due to RSV pneumonia and possible worsening pulmonary fibrosis 89F PMH severe pulmonary fibrosis on home O2 3L and prednisone, CAD s/p 3 stents, HTN, polanco's esophagus presents with cough, SOB. Admitted with acute on chronic hypoxic and hypercapnic respiratory failure likely due to RSV pneumonia and possible worsening pulmonary fibrosis

## 2019-12-26 NOTE — ED PROVIDER NOTE - OBJECTIVE STATEMENT
89 with PMHx of HTN, pulmonary fibrosis, Li's esophagus, CAD s/p stent, hx acute cholecystitis with percutaneous cholecystostomy in October 2018 presenting with 1 day of abdominal pain. Pt reports diffuse abd pain that began last night associated with nausea but has not vomitted. Per daughter at bedside translating for pt she has not had any c/o cp, urianry complaints, blood in stool or urine, increase in cough or sob.

## 2019-12-27 LAB
ALBUMIN SERPL ELPH-MCNC: 2.7 G/DL — LOW (ref 3.3–5)
ALP SERPL-CCNC: 60 U/L — SIGNIFICANT CHANGE UP (ref 40–120)
ALT FLD-CCNC: 9 U/L — SIGNIFICANT CHANGE UP (ref 4–33)
ANION GAP SERPL CALC-SCNC: 8 MMO/L — SIGNIFICANT CHANGE UP (ref 7–14)
AST SERPL-CCNC: 28 U/L — SIGNIFICANT CHANGE UP (ref 4–32)
BACTERIA UR CULT: SIGNIFICANT CHANGE UP
BASE EXCESS BLDV CALC-SCNC: 7.4 MMOL/L — SIGNIFICANT CHANGE UP
BASE EXCESS BLDV CALC-SCNC: 7.7 MMOL/L — SIGNIFICANT CHANGE UP
BASE EXCESS BLDV CALC-SCNC: 7.7 MMOL/L — SIGNIFICANT CHANGE UP
BASOPHILS # BLD AUTO: 0.01 K/UL — SIGNIFICANT CHANGE UP (ref 0–0.2)
BASOPHILS NFR BLD AUTO: 0.1 % — SIGNIFICANT CHANGE UP (ref 0–2)
BILIRUB SERPL-MCNC: < 0.2 MG/DL — LOW (ref 0.2–1.2)
BLOOD GAS VENOUS - CREATININE: 0.73 MG/DL — SIGNIFICANT CHANGE UP (ref 0.5–1.3)
BLOOD GAS VENOUS - CREATININE: 0.74 MG/DL — SIGNIFICANT CHANGE UP (ref 0.5–1.3)
BLOOD GAS VENOUS - CREATININE: 0.75 MG/DL — SIGNIFICANT CHANGE UP (ref 0.5–1.3)
BLOOD GAS VENOUS - FIO2: 30 — SIGNIFICANT CHANGE UP
BLOOD GAS VENOUS - FIO2: 30 — SIGNIFICANT CHANGE UP
BUN SERPL-MCNC: 18 MG/DL — SIGNIFICANT CHANGE UP (ref 7–23)
CALCIUM SERPL-MCNC: 8.8 MG/DL — SIGNIFICANT CHANGE UP (ref 8.4–10.5)
CHLORIDE BLDV-SCNC: 100 MMOL/L — SIGNIFICANT CHANGE UP (ref 96–108)
CHLORIDE BLDV-SCNC: 101 MMOL/L — SIGNIFICANT CHANGE UP (ref 96–108)
CHLORIDE BLDV-SCNC: 97 MMOL/L — SIGNIFICANT CHANGE UP (ref 96–108)
CHLORIDE SERPL-SCNC: 97 MMOL/L — LOW (ref 98–107)
CO2 SERPL-SCNC: 30 MMOL/L — SIGNIFICANT CHANGE UP (ref 22–31)
CREAT SERPL-MCNC: 0.76 MG/DL — SIGNIFICANT CHANGE UP (ref 0.5–1.3)
EOSINOPHIL # BLD AUTO: 0 K/UL — SIGNIFICANT CHANGE UP (ref 0–0.5)
EOSINOPHIL NFR BLD AUTO: 0 % — SIGNIFICANT CHANGE UP (ref 0–6)
FOLATE SERPL-MCNC: > 20 NG/ML — HIGH (ref 4.7–20)
GAS PNL BLDV: 134 MMOL/L — LOW (ref 136–146)
GAS PNL BLDV: 135 MMOL/L — LOW (ref 136–146)
GAS PNL BLDV: 137 MMOL/L — SIGNIFICANT CHANGE UP (ref 136–146)
GLUCOSE BLDV-MCNC: 132 MG/DL — HIGH (ref 70–99)
GLUCOSE BLDV-MCNC: 149 MG/DL — HIGH (ref 70–99)
GLUCOSE BLDV-MCNC: 164 MG/DL — HIGH (ref 70–99)
GLUCOSE SERPL-MCNC: 139 MG/DL — HIGH (ref 70–99)
HCO3 BLDV-SCNC: 30 MMOL/L — HIGH (ref 20–27)
HCO3 BLDV-SCNC: 31 MMOL/L — HIGH (ref 20–27)
HCO3 BLDV-SCNC: 31 MMOL/L — HIGH (ref 20–27)
HCT VFR BLD CALC: 29.1 % — LOW (ref 34.5–45)
HCT VFR BLDV CALC: 24.6 % — LOW (ref 34.5–45)
HCT VFR BLDV CALC: 30.6 % — LOW (ref 34.5–45)
HCT VFR BLDV CALC: 31.7 % — LOW (ref 34.5–45)
HGB BLD-MCNC: 8.8 G/DL — LOW (ref 11.5–15.5)
HGB BLDV-MCNC: 10.3 G/DL — LOW (ref 11.5–15.5)
HGB BLDV-MCNC: 7.9 G/DL — LOW (ref 11.5–15.5)
HGB BLDV-MCNC: 9.9 G/DL — LOW (ref 11.5–15.5)
IMM GRANULOCYTES NFR BLD AUTO: 0.8 % — SIGNIFICANT CHANGE UP (ref 0–1.5)
LACTATE BLDV-MCNC: 1.4 MMOL/L — SIGNIFICANT CHANGE UP (ref 0.5–2)
LACTATE BLDV-MCNC: 2 MMOL/L — SIGNIFICANT CHANGE UP (ref 0.5–2)
LACTATE BLDV-MCNC: 3.1 MMOL/L — HIGH (ref 0.5–2)
LYMPHOCYTES # BLD AUTO: 0.99 K/UL — LOW (ref 1–3.3)
LYMPHOCYTES # BLD AUTO: 7.2 % — LOW (ref 13–44)
MAGNESIUM SERPL-MCNC: 2.1 MG/DL — SIGNIFICANT CHANGE UP (ref 1.6–2.6)
MCHC RBC-ENTMCNC: 30.2 % — LOW (ref 32–36)
MCHC RBC-ENTMCNC: 32.8 PG — SIGNIFICANT CHANGE UP (ref 27–34)
MCV RBC AUTO: 108.6 FL — HIGH (ref 80–100)
MONOCYTES # BLD AUTO: 0.71 K/UL — SIGNIFICANT CHANGE UP (ref 0–0.9)
MONOCYTES NFR BLD AUTO: 5.2 % — SIGNIFICANT CHANGE UP (ref 2–14)
NEUTROPHILS # BLD AUTO: 11.95 K/UL — HIGH (ref 1.8–7.4)
NEUTROPHILS NFR BLD AUTO: 86.7 % — HIGH (ref 43–77)
NRBC # FLD: 0 K/UL — SIGNIFICANT CHANGE UP (ref 0–0)
PCO2 BLDV: 58 MMHG — HIGH (ref 41–51)
PCO2 BLDV: 64 MMHG — HIGH (ref 41–51)
PCO2 BLDV: 68 MMHG — HIGH (ref 41–51)
PH BLDV: 7.32 PH — SIGNIFICANT CHANGE UP (ref 7.32–7.43)
PH BLDV: 7.34 PH — SIGNIFICANT CHANGE UP (ref 7.32–7.43)
PH BLDV: 7.37 PH — SIGNIFICANT CHANGE UP (ref 7.32–7.43)
PHOSPHATE SERPL-MCNC: 3.1 MG/DL — SIGNIFICANT CHANGE UP (ref 2.5–4.5)
PLATELET # BLD AUTO: 255 K/UL — SIGNIFICANT CHANGE UP (ref 150–400)
PMV BLD: 8.8 FL — SIGNIFICANT CHANGE UP (ref 7–13)
PO2 BLDV: 175 MMHG — HIGH (ref 35–40)
PO2 BLDV: 42 MMHG — HIGH (ref 35–40)
PO2 BLDV: 44 MMHG — HIGH (ref 35–40)
POTASSIUM BLDV-SCNC: 3.9 MMOL/L — SIGNIFICANT CHANGE UP (ref 3.4–4.5)
POTASSIUM BLDV-SCNC: 4 MMOL/L — SIGNIFICANT CHANGE UP (ref 3.4–4.5)
POTASSIUM BLDV-SCNC: 4.1 MMOL/L — SIGNIFICANT CHANGE UP (ref 3.4–4.5)
POTASSIUM SERPL-MCNC: 4.3 MMOL/L — SIGNIFICANT CHANGE UP (ref 3.5–5.3)
POTASSIUM SERPL-SCNC: 4.3 MMOL/L — SIGNIFICANT CHANGE UP (ref 3.5–5.3)
PROT SERPL-MCNC: 7.4 G/DL — SIGNIFICANT CHANGE UP (ref 6–8.3)
RBC # BLD: 2.68 M/UL — LOW (ref 3.8–5.2)
RBC # FLD: 14.3 % — SIGNIFICANT CHANGE UP (ref 10.3–14.5)
SAO2 % BLDV: 74.5 % — SIGNIFICANT CHANGE UP (ref 60–85)
SAO2 % BLDV: 76.2 % — SIGNIFICANT CHANGE UP (ref 60–85)
SAO2 % BLDV: 99.1 % — HIGH (ref 60–85)
SODIUM SERPL-SCNC: 135 MMOL/L — SIGNIFICANT CHANGE UP (ref 135–145)
SPECIMEN SOURCE: SIGNIFICANT CHANGE UP
VIT B12 SERPL-MCNC: 875 PG/ML — SIGNIFICANT CHANGE UP (ref 200–900)
WBC # BLD: 13.77 K/UL — HIGH (ref 3.8–10.5)
WBC # FLD AUTO: 13.77 K/UL — HIGH (ref 3.8–10.5)

## 2019-12-27 PROCEDURE — 99233 SBSQ HOSP IP/OBS HIGH 50: CPT | Mod: GC

## 2019-12-27 PROCEDURE — 99233 SBSQ HOSP IP/OBS HIGH 50: CPT

## 2019-12-27 RX ORDER — IPRATROPIUM/ALBUTEROL SULFATE 18-103MCG
3 AEROSOL WITH ADAPTER (GRAM) INHALATION EVERY 4 HOURS
Refills: 0 | Status: DISCONTINUED | OUTPATIENT
Start: 2019-12-27 | End: 2019-12-31

## 2019-12-27 RX ORDER — ACETAMINOPHEN 500 MG
650 TABLET ORAL EVERY 6 HOURS
Refills: 0 | Status: DISCONTINUED | OUTPATIENT
Start: 2019-12-27 | End: 2019-12-31

## 2019-12-27 RX ORDER — LIDOCAINE 4 G/100G
1 CREAM TOPICAL DAILY
Refills: 0 | Status: DISCONTINUED | OUTPATIENT
Start: 2019-12-27 | End: 2019-12-31

## 2019-12-27 RX ORDER — SODIUM CHLORIDE 9 MG/ML
3 INJECTION INTRAMUSCULAR; INTRAVENOUS; SUBCUTANEOUS EVERY 4 HOURS
Refills: 0 | Status: DISCONTINUED | OUTPATIENT
Start: 2019-12-27 | End: 2019-12-31

## 2019-12-27 RX ADMIN — Medication 3 MILLILITER(S): at 04:53

## 2019-12-27 RX ADMIN — Medication 100 MILLIGRAM(S): at 10:03

## 2019-12-27 RX ADMIN — Medication 0.25 MILLIGRAM(S): at 21:55

## 2019-12-27 RX ADMIN — Medication 3 MILLILITER(S): at 17:19

## 2019-12-27 RX ADMIN — Medication 110 MILLIGRAM(S): at 17:40

## 2019-12-27 RX ADMIN — SODIUM CHLORIDE 3 MILLILITER(S): 9 INJECTION INTRAMUSCULAR; INTRAVENOUS; SUBCUTANEOUS at 17:19

## 2019-12-27 RX ADMIN — LIDOCAINE 1 PATCH: 4 CREAM TOPICAL at 13:53

## 2019-12-27 RX ADMIN — Medication 650 MILLIGRAM(S): at 16:05

## 2019-12-27 RX ADMIN — SODIUM CHLORIDE 3 MILLILITER(S): 9 INJECTION INTRAMUSCULAR; INTRAVENOUS; SUBCUTANEOUS at 21:55

## 2019-12-27 RX ADMIN — Medication 3 MILLILITER(S): at 00:14

## 2019-12-27 RX ADMIN — LIDOCAINE 1 PATCH: 4 CREAM TOPICAL at 19:11

## 2019-12-27 RX ADMIN — URSODIOL 300 MILLIGRAM(S): 250 TABLET, FILM COATED ORAL at 06:16

## 2019-12-27 RX ADMIN — Medication 110 MILLIGRAM(S): at 06:15

## 2019-12-27 RX ADMIN — Medication 40 MILLIGRAM(S): at 06:15

## 2019-12-27 RX ADMIN — Medication 100 MILLIGRAM(S): at 13:54

## 2019-12-27 RX ADMIN — Medication 81 MILLIGRAM(S): at 12:18

## 2019-12-27 RX ADMIN — Medication 650 MILLIGRAM(S): at 15:06

## 2019-12-27 RX ADMIN — Medication 650 MILLIGRAM(S): at 21:08

## 2019-12-27 RX ADMIN — Medication 0.25 MILLIGRAM(S): at 10:12

## 2019-12-27 RX ADMIN — Medication 25 MILLIGRAM(S): at 06:15

## 2019-12-27 RX ADMIN — Medication 3 MILLILITER(S): at 13:42

## 2019-12-27 RX ADMIN — Medication 650 MILLIGRAM(S): at 21:45

## 2019-12-27 RX ADMIN — Medication 650 MILLIGRAM(S): at 00:00

## 2019-12-27 RX ADMIN — SODIUM CHLORIDE 3 MILLILITER(S): 9 INJECTION INTRAMUSCULAR; INTRAVENOUS; SUBCUTANEOUS at 12:17

## 2019-12-27 RX ADMIN — ENOXAPARIN SODIUM 40 MILLIGRAM(S): 100 INJECTION SUBCUTANEOUS at 12:18

## 2019-12-27 RX ADMIN — Medication 100 MILLIGRAM(S): at 21:07

## 2019-12-27 RX ADMIN — Medication 3 MILLILITER(S): at 21:55

## 2019-12-27 RX ADMIN — Medication 100 MILLIGRAM(S): at 17:37

## 2019-12-27 RX ADMIN — Medication 100 MILLIGRAM(S): at 23:32

## 2019-12-27 RX ADMIN — URSODIOL 300 MILLIGRAM(S): 250 TABLET, FILM COATED ORAL at 17:40

## 2019-12-27 NOTE — PHYSICAL THERAPY INITIAL EVALUATION ADULT - PRECAUTIONS/LIMITATIONS, REHAB EVAL
isolation precautions/cardiac precautions/fall precautions/CONTACT-RSV/oxygen therapy device and L/min

## 2019-12-27 NOTE — PROGRESS NOTE ADULT - SUBJECTIVE AND OBJECTIVE BOX
f/u gas improved on 15/5 30%.  hypercarbic to 56 down from 70. f/u gas overnight improved on 15/5 40%.  hypercarbic to 56 down from 70.  Repeat this morning slightly more hypercarbic to 68 increased to 20/5 at 30% patient satting 95% on this.  Will start more aggressive duonebs and hypertonic saline 3% and chest PT q4h    PROGRESS NOTE:     Patient is a 89y old  Female who presents with a chief complaint of acute on chronic hypoxic and hypercapnic respiratory failure, sepsis 2/2 RSV infection (27 Dec 2019 07:13)      SUBJECTIVE / OVERNIGHT EVENTS:    ADDITIONAL REVIEW OF SYSTEMS: 10 point ROS negative except per HPI    MEDICATIONS  (STANDING):  albuterol/ipratropium for Nebulization 3 milliLiter(s) Nebulizer every 4 hours  aspirin enteric coated 81 milliGRAM(s) Oral daily  buDESOnide    Inhalation Suspension 0.25 milliGRAM(s) Inhalation two times a day  doxycycline IVPB 100 milliGRAM(s) IV Intermittent every 12 hours  enoxaparin Injectable 40 milliGRAM(s) SubCutaneous daily  influenza   Vaccine 0.5 milliLiter(s) IntraMuscular once  methylPREDNISolone sodium succinate Injectable 40 milliGRAM(s) IV Push daily  metoprolol succinate ER 25 milliGRAM(s) Oral daily  sodium chloride 3%  Inhalation 3 milliLiter(s) Inhalation every 4 hours  ursodiol Capsule 300 milliGRAM(s) Oral two times a day    MEDICATIONS  (PRN):  hydrocodone/homatropine Syrup 5 milliLiter(s) Oral every 8 hours PRN Cough      CAPILLARY BLOOD GLUCOSE        I&O's Summary      PHYSICAL EXAM:  Vital Signs Last 24 Hrs  T(C): 36.6 (27 Dec 2019 06:15), Max: 37.7 (26 Dec 2019 10:46)  T(F): 97.9 (27 Dec 2019 06:15), Max: 99.8 (26 Dec 2019 10:46)  HR: 94 (27 Dec 2019 07:36) (63 - 118)  BP: 109/61 (27 Dec 2019 06:15) (109/61 - 139/53)  BP(mean): --  RR: 19 (27 Dec 2019 06:15) (19 - 24)  SpO2: 98% (27 Dec 2019 07:36) (95% - 100%)    CONSTITUTIONAL: NAD, well-developed  RESPIRATORY: Normal respiratory effort; lungs are clear to auscultation bilaterally  CARDIOVASCULAR: Regular rate and rhythm, normal S1 and S2, no murmur/rub/gallop; No lower extremity edema; Peripheral pulses are 2+ bilaterally  ABDOMEN: Nontender to palpation, normoactive bowel sounds, no rebound/guarding; No hepatosplenomegaly  MUSCLOSKELETAL: no clubbing or cyanosis of digits; no joint swelling or tenderness to palpation  NEURO: CN 2-12 grossly intact, moves all limbs spontaneously  PSYCH: A+O to person, place, and time; affect appropriate    LABS:                        8.8    13.77 )-----------( 255      ( 27 Dec 2019 07:25 )             29.1         135  |  97<L>  |  18  ----------------------------<  139<H>  4.3   |  30  |  0.76    Ca    8.8      27 Dec 2019 07:25  Phos  3.1       Mg     2.1         TPro  7.4  /  Alb  2.7<L>  /  TBili  < 0.2<L>  /  DBili  x   /  AST  28  /  ALT  9   /  AlkPhos  60      PT/INR - ( 26 Dec 2019 08:40 )   PT: 13.7 SEC;   INR: 1.23          PTT - ( 26 Dec 2019 08:40 )  PTT:29.9 SEC      Urinalysis Basic - ( 26 Dec 2019 08:40 )    Color: YELLOW / Appearance: CLEAR / S.025 / pH: 6.5  Gluc: 50 / Ketone: NEGATIVE  / Bili: NEGATIVE / Urobili: NORMAL   Blood: SMALL / Protein: 300 / Nitrite: NEGATIVE   Leuk Esterase: NEGATIVE / RBC: 11-25 / WBC 0-2   Sq Epi: OCC / Non Sq Epi: x / Bacteria: FEW      Culture - Urine (collected 26 Dec 2019 09:36)  Source: URINE MIDSTREAM  Preliminary Report (27 Dec 2019 07:34):    NO GROWTH TO DATE    Culture - Blood (collected 26 Dec 2019 09:04)  Source: Peripheral Site 2  Preliminary Report (27 Dec 2019 09:04):    NO ORGANISMS ISOLATED    NO ORGANISMS ISOLATED AT 24 HOURS    Culture - Blood (collected 26 Dec 2019 09:04)  Source: Peripheral Site 1  Preliminary Report (27 Dec 2019 09:04):    NO ORGANISMS ISOLATED    NO ORGANISMS ISOLATED AT 24 HOURS      RADIOLOGY & ADDITIONAL TESTS:  Results Reviewed: y  Imaging Personally Reviewed:  Electrocardiogram Personally Reviewed:    COORDINATION OF CARE:  Care Discussed with Consultants/Other Providers [Y/N]: y  Prior or Outpatient Records Reviewed [Y/N]: PROGRESS NOTE:     Patient is a 89y old  Female who presents with a chief complaint of acute on chronic hypoxic and hypercapnic respiratory failure, sepsis 2/2 RSV infection (27 Dec 2019 07:13)    SUBJECTIVE / OVERNIGHT EVENTS: f/u gas overnight improved on 15/5 40%.  hypercarbic to 56 down from 70.  Repeat this morning slightly more hypercarbic to 68 increased to 20/5 at 30% patient satting 95% on this.  Will start more aggressive duonebs and hypertonic saline 3% and chest PT q4h.  With normalized pH this may represent her baseline hypercarbia will trial off bipap and re-do blood gas.  Patient otherwise well, feels at baseline SOB, continues to have cough.  No CP, abd pain, n/v/c/d    ADDITIONAL REVIEW OF SYSTEMS: 10 point ROS negative except per HPI    MEDICATIONS  (STANDING):  albuterol/ipratropium for Nebulization 3 milliLiter(s) Nebulizer every 4 hours  aspirin enteric coated 81 milliGRAM(s) Oral daily  buDESOnide    Inhalation Suspension 0.25 milliGRAM(s) Inhalation two times a day  doxycycline IVPB 100 milliGRAM(s) IV Intermittent every 12 hours  enoxaparin Injectable 40 milliGRAM(s) SubCutaneous daily  influenza   Vaccine 0.5 milliLiter(s) IntraMuscular once  methylPREDNISolone sodium succinate Injectable 40 milliGRAM(s) IV Push daily  metoprolol succinate ER 25 milliGRAM(s) Oral daily  sodium chloride 3%  Inhalation 3 milliLiter(s) Inhalation every 4 hours  ursodiol Capsule 300 milliGRAM(s) Oral two times a day    MEDICATIONS  (PRN):  hydrocodone/homatropine Syrup 5 milliLiter(s) Oral every 8 hours PRN Cough      CAPILLARY BLOOD GLUCOSE  I&O's Summary      PHYSICAL EXAM:  Vital Signs Last 24 Hrs  T(C): 36.6 (27 Dec 2019 06:15), Max: 37.7 (26 Dec 2019 10:46)  T(F): 97.9 (27 Dec 2019 06:15), Max: 99.8 (26 Dec 2019 10:46)  HR: 94 (27 Dec 2019 07:36) (63 - 118)  BP: 109/61 (27 Dec 2019 06:15) (109/61 - 139/53)  BP(mean): --  RR: 19 (27 Dec 2019 06:15) (19 - 24)  SpO2: 98% (27 Dec 2019 07:36) (95% - 100%)    CONSTITUTIONAL: NAD, well-developed  RESPIRATORY: tachypneic to 30s, lungs fine crackles diffusely  CARDIOVASCULAR: Regular rate and rhythm, normal S1 and S2, no murmur/rub/gallop; No lower extremity edema; Peripheral pulses are 2+ bilaterally  ABDOMEN: Nontender to palpation, normoactive bowel sounds, no rebound/guarding; No hepatosplenomegaly  MUSCLOSKELETAL: no clubbing or cyanosis of digits; no joint swelling or tenderness to palpation  NEURO: CN 2-12 grossly intact, moves all limbs spontaneously  PSYCH: A+O to person, place, and time; affect appropriate    LABS:                        8.8    13.77 )-----------( 255      ( 27 Dec 2019 07:25 )             29.1         135  |  97<L>  |  18  ----------------------------<  139<H>  4.3   |  30  |  0.76    Ca    8.8      27 Dec 2019 07:25  Phos  3.1       Mg     2.1         TPro  7.4  /  Alb  2.7<L>  /  TBili  < 0.2<L>  /  DBili  x   /  AST  28  /  ALT  9   /  AlkPhos  60      PT/INR - ( 26 Dec 2019 08:40 )   PT: 13.7 SEC;   INR: 1.23          PTT - ( 26 Dec 2019 08:40 )  PTT:29.9 SEC      Urinalysis Basic - ( 26 Dec 2019 08:40 )    Color: YELLOW / Appearance: CLEAR / S.025 / pH: 6.5  Gluc: 50 / Ketone: NEGATIVE  / Bili: NEGATIVE / Urobili: NORMAL   Blood: SMALL / Protein: 300 / Nitrite: NEGATIVE   Leuk Esterase: NEGATIVE / RBC: 11-25 / WBC 0-2   Sq Epi: OCC / Non Sq Epi: x / Bacteria: FEW      Culture - Urine (collected 26 Dec 2019 09:36)  Source: URINE MIDSTREAM  Preliminary Report (27 Dec 2019 07:34):    NO GROWTH TO DATE    Culture - Blood (collected 26 Dec 2019 09:04)  Source: Peripheral Site 2  Preliminary Report (27 Dec 2019 09:04):    NO ORGANISMS ISOLATED    NO ORGANISMS ISOLATED AT 24 HOURS    Culture - Blood (collected 26 Dec 2019 09:04)  Source: Peripheral Site 1  Preliminary Report (27 Dec 2019 09:04):    NO ORGANISMS ISOLATED    NO ORGANISMS ISOLATED AT 24 HOURS      RADIOLOGY & ADDITIONAL TESTS:  Results Reviewed: y  Imaging Personally Reviewed:  Electrocardiogram Personally Reviewed:    COORDINATION OF CARE:  Care Discussed with Consultants/Other Providers [Y/N]: y  Prior or Outpatient Records Reviewed [Y/N]: PROGRESS NOTE:     Patient is a 89y old  Female who presents with a chief complaint of acute on chronic hypoxic and hypercapnic respiratory failure, sepsis 2/2 RSV infection (27 Dec 2019 07:13)    SUBJECTIVE / OVERNIGHT EVENTS: f/u gas overnight improved on 15/5 40%.  hypercarbic to 56 down from 70.  Repeat this morning slightly more hypercarbic to 68 increased to 20/5 at 30% patient satting 95% on this.  With normalized pH this may represent her baseline hypercarbia.  Patient otherwise well, feels at baseline SOB, continues to have cough.  No CP, abd pain, n/v/c/d    ADDITIONAL REVIEW OF SYSTEMS: 10 point ROS negative except per HPI    MEDICATIONS  (STANDING):  albuterol/ipratropium for Nebulization 3 milliLiter(s) Nebulizer every 4 hours  aspirin enteric coated 81 milliGRAM(s) Oral daily  buDESOnide    Inhalation Suspension 0.25 milliGRAM(s) Inhalation two times a day  doxycycline IVPB 100 milliGRAM(s) IV Intermittent every 12 hours  enoxaparin Injectable 40 milliGRAM(s) SubCutaneous daily  influenza   Vaccine 0.5 milliLiter(s) IntraMuscular once  methylPREDNISolone sodium succinate Injectable 40 milliGRAM(s) IV Push daily  metoprolol succinate ER 25 milliGRAM(s) Oral daily  sodium chloride 3%  Inhalation 3 milliLiter(s) Inhalation every 4 hours  ursodiol Capsule 300 milliGRAM(s) Oral two times a day    MEDICATIONS  (PRN):  hydrocodone/homatropine Syrup 5 milliLiter(s) Oral every 8 hours PRN Cough      CAPILLARY BLOOD GLUCOSE  I&O's Summary      PHYSICAL EXAM:  Vital Signs Last 24 Hrs  T(C): 36.6 (27 Dec 2019 06:15), Max: 37.7 (26 Dec 2019 10:46)  T(F): 97.9 (27 Dec 2019 06:15), Max: 99.8 (26 Dec 2019 10:46)  HR: 94 (27 Dec 2019 07:36) (63 - 118)  BP: 109/61 (27 Dec 2019 06:15) (109/61 - 139/53)  BP(mean): --  RR: 19 (27 Dec 2019 06:15) (19 - 24)  SpO2: 98% (27 Dec 2019 07:36) (95% - 100%)    CONSTITUTIONAL: NAD, well-developed  RESPIRATORY: tachypneic to 30s, lungs fine crackles diffusely  CARDIOVASCULAR: Regular rate and rhythm, normal S1 and S2, no murmur/rub/gallop; No lower extremity edema; Peripheral pulses are 2+ bilaterally  ABDOMEN: Nontender to palpation, normoactive bowel sounds, no rebound/guarding; No hepatosplenomegaly  MUSCLOSKELETAL: no clubbing or cyanosis of digits; no joint swelling or tenderness to palpation  NEURO: CN 2-12 grossly intact, moves all limbs spontaneously  PSYCH: A+O to person, place, and time; affect appropriate    LABS:                        8.8    13.77 )-----------( 255      ( 27 Dec 2019 07:25 )             29.1         135  |  97<L>  |  18  ----------------------------<  139<H>  4.3   |  30  |  0.76    Ca    8.8      27 Dec 2019 07:25  Phos  3.1       Mg     2.1         TPro  7.4  /  Alb  2.7<L>  /  TBili  < 0.2<L>  /  DBili  x   /  AST  28  /  ALT  9   /  AlkPhos  60      PT/INR - ( 26 Dec 2019 08:40 )   PT: 13.7 SEC;   INR: 1.23          PTT - ( 26 Dec 2019 08:40 )  PTT:29.9 SEC      Urinalysis Basic - ( 26 Dec 2019 08:40 )    Color: YELLOW / Appearance: CLEAR / S.025 / pH: 6.5  Gluc: 50 / Ketone: NEGATIVE  / Bili: NEGATIVE / Urobili: NORMAL   Blood: SMALL / Protein: 300 / Nitrite: NEGATIVE   Leuk Esterase: NEGATIVE / RBC: 11-25 / WBC 0-2   Sq Epi: OCC / Non Sq Epi: x / Bacteria: FEW      Culture - Urine (collected 26 Dec 2019 09:36)  Source: URINE MIDSTREAM  Preliminary Report (27 Dec 2019 07:34):    NO GROWTH TO DATE    Culture - Blood (collected 26 Dec 2019 09:04)  Source: Peripheral Site 2  Preliminary Report (27 Dec 2019 09:04):    NO ORGANISMS ISOLATED    NO ORGANISMS ISOLATED AT 24 HOURS    Culture - Blood (collected 26 Dec 2019 09:04)  Source: Peripheral Site 1  Preliminary Report (27 Dec 2019 09:04):    NO ORGANISMS ISOLATED    NO ORGANISMS ISOLATED AT 24 HOURS      RADIOLOGY & ADDITIONAL TESTS:  Results Reviewed: y  Imaging Personally Reviewed:  Electrocardiogram Personally Reviewed:    COORDINATION OF CARE:  Care Discussed with Consultants/Other Providers [Y/N]: y  Prior or Outpatient Records Reviewed [Y/N]:

## 2019-12-27 NOTE — PHYSICAL THERAPY INITIAL EVALUATION ADULT - ACTIVE RANGE OF MOTION EXAMINATION, REHAB EVAL
Left UE Active ROM was WFL (within functional limits)/bilateral  lower extremity Active ROM was WFL (within functional limits)/right shoulder flexion ~30 degrees 2/2 OA; right elbow/hand/wrist WFL

## 2019-12-27 NOTE — PROGRESS NOTE ADULT - PROBLEM SELECTOR PLAN 2
- admitted with sepsis: fever, tachycardia, consolidation on CXR in left mid-lung  - RSV positive on RVP  - s/p 1 dose azithromycin, 2g aztreonam, and vancomycin 1g in ED  - continue doxycycline for MRSA, atypical, and gram positive coverage - admitted with sepsis: fever, tachycardia, consolidation on CXR in left mid-lung  - RSV positive on RVP  - s/p 1 dose azithromycin, 2g aztreonam, and vancomycin 1g in ED  - continue doxycycline, day 2

## 2019-12-27 NOTE — PROGRESS NOTE ADULT - ATTENDING COMMENTS
Patient was seen and examined personally by me. I have discussed the plan and reviewed the resident's note and agree with the above physical exam findings including assessment and plan except as indicated below.    89F PMH severe pulmonary fibrosis on home O2 3L, CAD s/p 3 stents, HTN, polanco's esophagus presents with cough, SOB. Admitted with acute on chronic hypoxic and hypercapnic respiratory failure likely and sepsis 2/2 RSV pneumonia and possible worsening pulmonary fibrosis.    pH improved on VBG so will trial off bipap today and repeat VBG in evening  Continue Solumedrol 40mg IVP daily, ATC nebs q6, doxycycline x 5 days for possible superimposed infxn. WBC downtrending  Bcx and Ucx NGTD  DNR/DNI, hospice referral

## 2019-12-27 NOTE — PHYSICAL THERAPY INITIAL EVALUATION ADULT - DIAGNOSIS, PT EVAL
Pt admitted for sepsis 2/2 RSV; pt presents with decreased strength, decreased balance, and decreased aerobic capacity/endurance.

## 2019-12-27 NOTE — PHYSICAL THERAPY INITIAL EVALUATION ADULT - ADDITIONAL COMMENTS
Pt lives in a private house with her children; daughter lives on the 2nd floor, pt lives on the first floor, and her son and son's family live in the basement. There are 2 steps to enter the house; no handrails. Prior to hospital admission pt ambulated with assistance using a rolling walker. Pt also uses a wheelchair from time to time as well. Pt denies any recent falls and used 3L of home O2.    Pt left comfortable in chair, NAD, all lines intact, all precautions maintained, with call bell in reach, daughter @bedside, and RN aware of PT evaluation.

## 2019-12-27 NOTE — PHYSICAL THERAPY INITIAL EVALUATION ADULT - PLANNED THERAPY INTERVENTIONS, PT EVAL
transfer training/balance training/gait training/postural re-education/bed mobility training/strengthening

## 2019-12-27 NOTE — PHYSICAL THERAPY INITIAL EVALUATION ADULT - PATIENT PROFILE REVIEW, REHAB EVAL
No Formal Activity Order in the Computer; spoke with ETELVINA Montes prior to PT evaluation--> Pt OK for PT consult/OOB activity/yes

## 2019-12-27 NOTE — PHYSICAL THERAPY INITIAL EVALUATION ADULT - PERTINENT HX OF CURRENT PROBLEM, REHAB EVAL
89F PMH severe pulmonary fibrosis on home O2 3L and prednisone, CAD s/p 3 stents, HTN, polanco's esophagus presents with cough, SOB. Admitted with acute on chronic hypoxic and hypercapnic respiratory failure likely due to RSV pneumonia and possible worsening pulmonary fibrosis

## 2019-12-27 NOTE — PROGRESS NOTE ADULT - PROBLEM SELECTOR PLAN 1
- Initially hypoxic, tachypneic, worsening hypercapnea on repeat VBG.  - In setting of chronic severe IPF  - DNR/DNI  - Will start BiPAP given tachypnea to 40s during exam, and worsening hypercapnia; repeat gas in 2 hours. - Initially hypoxic, tachypneic, worsening hypercapnea on repeat VBG.  - In setting of chronic severe IPF  - DNR/DNI  - Trial off bipap because given her normalized acidosis this may represent her baseline hypercarbia; repeat blood gas in 2h  - duonebs q4, hypersal 3% q4, chest pt q4 - In setting of chronic severe IPF  - DNR/DNI  - Trial off bipap because given her normalized acidosis this may represent her baseline hypercarbia; repeat blood gas in 2h  - duonebs q4, hypersal 3% q4, chest pt q4

## 2019-12-27 NOTE — PROGRESS NOTE ADULT - PROBLEM SELECTOR PLAN 3
- Baseline severe disease  - On prednisone 10mg at home, given 125mg solumedrol in ED.  Will continue 40mg solumedrol daily for stress dose steroid.  - DNR/DNI  - Private pulmonology saw patient in ED  - Hycodin syrup for cough suppression, continue home regimen  - continue home pulmicort and fluticasone - Baseline severe disease  - continue 40mg solumedrol daily for stress dose steroid.  - DNR/DNI  - Private pulmonology saw patient in ED  - Hycodin syrup for cough suppression, continue home regimen  - continue home pulmicort and fluticasone - Baseline severe disease  - continue 40mg solumedrol daily for stress dose steroid.  - DNR/DNI  - Private pulmonology saw patient in ED  - Hycodin syrup for cough suppression, continue home regimen will add guaifenisin  - continue home pulmicort and fluticasone - Baseline severe disease  - continue 40mg solumedrol daily for stress dose steroid.  - DNR/DNI  - Private pulmonology saw patient in ED  - Hycodin syrup for cough suppression, continue home regimen will add guaifenisin and tessalon perles  - continue home pulmicort and fluticasone

## 2019-12-27 NOTE — PHYSICAL THERAPY INITIAL EVALUATION ADULT - MANUAL MUSCLE TESTING RESULTS, REHAB EVAL
grossly assessed due to/Right shoulder 2/5, right elbow/hand/wrist 3/5, Left UE 3+/5, Bilateral LE 3/5

## 2019-12-27 NOTE — PROGRESS NOTE ADULT - SUBJECTIVE AND OBJECTIVE BOX
PULMONARY PROGRESS NOTE    FELISA YANG  MRN-[id number]    Patient is a 89y old  Female who presents with a chief complaint of     HPI:  breathing feels better today  dry cough  used bipap overnight      ROS:   neg    MEDICATIONS  (STANDING):  albuterol/ipratropium for Nebulization 3 milliLiter(s) Nebulizer every 4 hours  aspirin enteric coated 81 milliGRAM(s) Oral daily  buDESOnide    Inhalation Suspension 0.25 milliGRAM(s) Inhalation two times a day  doxycycline IVPB 100 milliGRAM(s) IV Intermittent every 12 hours  enoxaparin Injectable 40 milliGRAM(s) SubCutaneous daily  guaiFENesin   Syrup  (Sugar-Free) 100 milliGRAM(s) Oral every 6 hours  influenza   Vaccine 0.5 milliLiter(s) IntraMuscular once  methylPREDNISolone sodium succinate Injectable 40 milliGRAM(s) IV Push daily  metoprolol succinate ER 25 milliGRAM(s) Oral daily  sodium chloride 3%  Inhalation 3 milliLiter(s) Inhalation every 4 hours  ursodiol Capsule 300 milliGRAM(s) Oral two times a day    MEDICATIONS  (PRN):  hydrocodone/homatropine Syrup 5 milliLiter(s) Oral every 8 hours PRN Cough        EXAM:  Vital Signs Last 24 Hrs  T(C): 36.6 (27 Dec 2019 09:55), Max: 37.3 (26 Dec 2019 14:27)  T(F): 97.8 (27 Dec 2019 09:55), Max: 99.1 (26 Dec 2019 14:27)  HR: 93 (27 Dec 2019 10:12) (63 - 118)  BP: 116/62 (27 Dec 2019 09:55) (109/61 - 139/53)  BP(mean): --  RR: 19 (27 Dec 2019 09:55) (19 - 24)  SpO2: 93% (27 Dec 2019 09:55) (93% - 100%)    GENERAL: comfortable on nc    LUNGS:  diffuse bilateral crackles    HEART: Regular rate and rhythm without murmur.                          8.8    13.77 )-----------( 255      ( 27 Dec 2019 07:25 )             29.1   12-27    135  |  97<L>  |  18  ----------------------------<  139<H>  4.3   |  30  |  0.76    Ca    8.8      27 Dec 2019 07:25  Phos  3.1     12-27  Mg     2.1     12-27    TPro  7.4  /  Alb  2.7<L>  /  TBili  < 0.2<L>  /  DBili  x   /  AST  28  /  ALT  9   /  AlkPhos  60  12-27      Chest x-ray demonstrates bilateral interstitial changes left greater than right; in comparison to outpatient chest x-ray 2/18 some interval progression with focal consolidation left mid lung zone        PROBLEM LIST:  89y Female with HEALTH ISSUES - PROBLEM Dx:    Interstitial lung disease  RSV infection          RECS:  Supportive care  Supplemental oxygen  IV antibiotics and steroids  Prognosis very guarded in light of age and severe baseline lung disease    Jenny Antonio MD  [phone]

## 2019-12-27 NOTE — PHYSICAL THERAPY INITIAL EVALUATION ADULT - GAIT DISTANCE, PT EVAL
further ambulation deferred 2/2 SOB; SOB subsided once seated and when pt performed deep breathing/bed to chair

## 2019-12-27 NOTE — PHYSICAL THERAPY INITIAL EVALUATION ADULT - GENERAL OBSERVATIONS, REHAB EVAL
Pt encountered in semisupine position, no distress, AxOx4, with +IV, +cardiac monitor, +primafit, +O2 through nasal cannula, and daughter @ bedside.

## 2019-12-27 NOTE — PHYSICAL THERAPY INITIAL EVALUATION ADULT - PASSIVE RANGE OF MOTION EXAMINATION, REHAB EVAL
right shoulder flexion ~50 degrees 2/2 OA; right elbow/hand/wrist WFL/Left UE Passive ROM was WFL (within functional limits)/bilateral lower extremity Passive ROM was WFL (within functional limits)

## 2019-12-27 NOTE — PROVIDER CONTACT NOTE (CRITICAL VALUE NOTIFICATION) - BACKGROUND
89 y/o female admitted for acute bronchitis due to RSV. PMH of HTN, pulmonary fibrosis, Li's esophagus, CAD.

## 2019-12-27 NOTE — GOALS OF CARE CONVERSATION - ADVANCED CARE PLANNING - CONVERSATION DETAILS
HCN: Spoke to dtr. and discussed all hospice services. She declines at this time. SHe will consider a communtiy referral at a later time after discharge. Brochure provided. Dequan Kaplan RN

## 2019-12-28 ENCOUNTER — TRANSCRIPTION ENCOUNTER (OUTPATIENT)
Age: 84
End: 2019-12-28

## 2019-12-28 LAB
ALBUMIN SERPL ELPH-MCNC: 3.2 G/DL — LOW (ref 3.3–5)
ALP SERPL-CCNC: 74 U/L — SIGNIFICANT CHANGE UP (ref 40–120)
ALT FLD-CCNC: 14 U/L — SIGNIFICANT CHANGE UP (ref 4–33)
ANION GAP SERPL CALC-SCNC: 13 MMO/L — SIGNIFICANT CHANGE UP (ref 7–14)
AST SERPL-CCNC: 34 U/L — HIGH (ref 4–32)
BASE EXCESS BLDA CALC-SCNC: 7.8 MMOL/L — SIGNIFICANT CHANGE UP
BASE EXCESS BLDV CALC-SCNC: 7.2 MMOL/L — SIGNIFICANT CHANGE UP
BASE EXCESS BLDV CALC-SCNC: 8.2 MMOL/L — SIGNIFICANT CHANGE UP
BASOPHILS # BLD AUTO: 0.03 K/UL — SIGNIFICANT CHANGE UP (ref 0–0.2)
BASOPHILS NFR BLD AUTO: 0.2 % — SIGNIFICANT CHANGE UP (ref 0–2)
BILIRUB SERPL-MCNC: 0.2 MG/DL — SIGNIFICANT CHANGE UP (ref 0.2–1.2)
BLOOD GAS VENOUS - CREATININE: 0.72 MG/DL — SIGNIFICANT CHANGE UP (ref 0.5–1.3)
BLOOD GAS VENOUS - FIO2: 30 — SIGNIFICANT CHANGE UP
BUN SERPL-MCNC: 18 MG/DL — SIGNIFICANT CHANGE UP (ref 7–23)
CALCIUM SERPL-MCNC: 9 MG/DL — SIGNIFICANT CHANGE UP (ref 8.4–10.5)
CHLORIDE BLDV-SCNC: 95 MMOL/L — LOW (ref 96–108)
CHLORIDE SERPL-SCNC: 91 MMOL/L — LOW (ref 98–107)
CO2 SERPL-SCNC: 29 MMOL/L — SIGNIFICANT CHANGE UP (ref 22–31)
CREAT SERPL-MCNC: 0.71 MG/DL — SIGNIFICANT CHANGE UP (ref 0.5–1.3)
EOSINOPHIL # BLD AUTO: 0.03 K/UL — SIGNIFICANT CHANGE UP (ref 0–0.5)
EOSINOPHIL NFR BLD AUTO: 0.2 % — SIGNIFICANT CHANGE UP (ref 0–6)
GAS PNL BLDV: 134 MMOL/L — LOW (ref 136–146)
GAS PNL BLDV: 135 MMOL/L — LOW (ref 136–146)
GLUCOSE BLDA-MCNC: 162 MG/DL — HIGH (ref 70–99)
GLUCOSE BLDV-MCNC: 171 MG/DL — HIGH (ref 70–99)
GLUCOSE BLDV-MCNC: 259 MG/DL — HIGH (ref 70–99)
GLUCOSE SERPL-MCNC: 134 MG/DL — HIGH (ref 70–99)
HCO3 BLDA-SCNC: 31 MMOL/L — HIGH (ref 22–26)
HCO3 BLDV-SCNC: 31 MMOL/L — HIGH (ref 20–27)
HCO3 BLDV-SCNC: 31 MMOL/L — HIGH (ref 20–27)
HCT VFR BLD CALC: 34.7 % — SIGNIFICANT CHANGE UP (ref 34.5–45)
HCT VFR BLDA CALC: 30.3 % — LOW (ref 34.5–46.5)
HCT VFR BLDV CALC: 31.5 % — LOW (ref 34.5–45)
HCT VFR BLDV CALC: 33.4 % — LOW (ref 34.5–45)
HGB BLD-MCNC: 10.7 G/DL — LOW (ref 11.5–15.5)
HGB BLDA-MCNC: 9.8 G/DL — LOW (ref 11.5–15.5)
HGB BLDV-MCNC: 10.2 G/DL — LOW (ref 11.5–15.5)
HGB BLDV-MCNC: 10.8 G/DL — LOW (ref 11.5–15.5)
IMM GRANULOCYTES NFR BLD AUTO: 1 % — SIGNIFICANT CHANGE UP (ref 0–1.5)
LACTATE BLDV-MCNC: 1.3 MMOL/L — SIGNIFICANT CHANGE UP (ref 0.5–2)
LYMPHOCYTES # BLD AUTO: 1.77 K/UL — SIGNIFICANT CHANGE UP (ref 1–3.3)
LYMPHOCYTES # BLD AUTO: 12 % — LOW (ref 13–44)
MAGNESIUM SERPL-MCNC: 2 MG/DL — SIGNIFICANT CHANGE UP (ref 1.6–2.6)
MCHC RBC-ENTMCNC: 30.8 % — LOW (ref 32–36)
MCHC RBC-ENTMCNC: 32.8 PG — SIGNIFICANT CHANGE UP (ref 27–34)
MCV RBC AUTO: 106.4 FL — HIGH (ref 80–100)
MONOCYTES # BLD AUTO: 0.89 K/UL — SIGNIFICANT CHANGE UP (ref 0–0.9)
MONOCYTES NFR BLD AUTO: 6.1 % — SIGNIFICANT CHANGE UP (ref 2–14)
NEUTROPHILS # BLD AUTO: 11.85 K/UL — HIGH (ref 1.8–7.4)
NEUTROPHILS NFR BLD AUTO: 80.5 % — HIGH (ref 43–77)
NRBC # FLD: 0 K/UL — SIGNIFICANT CHANGE UP (ref 0–0)
PCO2 BLDA: 49 MMHG — HIGH (ref 32–48)
PCO2 BLDV: 51 MMHG — SIGNIFICANT CHANGE UP (ref 41–51)
PCO2 BLDV: 57 MMHG — HIGH (ref 41–51)
PH BLDA: 7.43 PH — SIGNIFICANT CHANGE UP (ref 7.35–7.45)
PH BLDV: 7.39 PH — SIGNIFICANT CHANGE UP (ref 7.32–7.43)
PH BLDV: 7.41 PH — SIGNIFICANT CHANGE UP (ref 7.32–7.43)
PHOSPHATE SERPL-MCNC: 1.8 MG/DL — LOW (ref 2.5–4.5)
PLATELET # BLD AUTO: 318 K/UL — SIGNIFICANT CHANGE UP (ref 150–400)
PMV BLD: 9 FL — SIGNIFICANT CHANGE UP (ref 7–13)
PO2 BLDA: 85 MMHG — SIGNIFICANT CHANGE UP (ref 83–108)
PO2 BLDV: 102 MMHG — HIGH (ref 35–40)
PO2 BLDV: 40 MMHG — SIGNIFICANT CHANGE UP (ref 35–40)
POTASSIUM BLDA-SCNC: 3.9 MMOL/L — SIGNIFICANT CHANGE UP (ref 3.4–4.5)
POTASSIUM BLDV-SCNC: 4 MMOL/L — SIGNIFICANT CHANGE UP (ref 3.4–4.5)
POTASSIUM BLDV-SCNC: 4.3 MMOL/L — SIGNIFICANT CHANGE UP (ref 3.4–4.5)
POTASSIUM SERPL-MCNC: 4.1 MMOL/L — SIGNIFICANT CHANGE UP (ref 3.5–5.3)
POTASSIUM SERPL-SCNC: 4.1 MMOL/L — SIGNIFICANT CHANGE UP (ref 3.5–5.3)
PROT SERPL-MCNC: 8.5 G/DL — HIGH (ref 6–8.3)
RBC # BLD: 3.26 M/UL — LOW (ref 3.8–5.2)
RBC # FLD: 14.2 % — SIGNIFICANT CHANGE UP (ref 10.3–14.5)
SAO2 % BLDA: 96.5 % — SIGNIFICANT CHANGE UP (ref 95–99)
SAO2 % BLDV: 71.1 % — SIGNIFICANT CHANGE UP (ref 60–85)
SAO2 % BLDV: 97.9 % — HIGH (ref 60–85)
SODIUM BLDA-SCNC: 134 MMOL/L — LOW (ref 136–146)
SODIUM SERPL-SCNC: 133 MMOL/L — LOW (ref 135–145)
WBC # BLD: 14.71 K/UL — HIGH (ref 3.8–10.5)
WBC # FLD AUTO: 14.71 K/UL — HIGH (ref 3.8–10.5)

## 2019-12-28 PROCEDURE — 99233 SBSQ HOSP IP/OBS HIGH 50: CPT

## 2019-12-28 PROCEDURE — 99233 SBSQ HOSP IP/OBS HIGH 50: CPT | Mod: GC

## 2019-12-28 RX ORDER — MORPHINE SULFATE 50 MG/1
1 CAPSULE, EXTENDED RELEASE ORAL ONCE
Refills: 0 | Status: DISCONTINUED | OUTPATIENT
Start: 2019-12-28 | End: 2019-12-28

## 2019-12-28 RX ORDER — SODIUM,POTASSIUM PHOSPHATES 278-250MG
1 POWDER IN PACKET (EA) ORAL ONCE
Refills: 0 | Status: COMPLETED | OUTPATIENT
Start: 2019-12-28 | End: 2019-12-28

## 2019-12-28 RX ORDER — POTASSIUM PHOSPHATE, MONOBASIC POTASSIUM PHOSPHATE, DIBASIC 236; 224 MG/ML; MG/ML
15 INJECTION, SOLUTION INTRAVENOUS ONCE
Refills: 0 | Status: COMPLETED | OUTPATIENT
Start: 2019-12-28 | End: 2019-12-28

## 2019-12-28 RX ADMIN — Medication 100 MILLIGRAM(S): at 23:06

## 2019-12-28 RX ADMIN — Medication 100 MILLIGRAM(S): at 16:01

## 2019-12-28 RX ADMIN — Medication 0.25 MILLIGRAM(S): at 09:47

## 2019-12-28 RX ADMIN — Medication 110 MILLIGRAM(S): at 06:16

## 2019-12-28 RX ADMIN — SODIUM CHLORIDE 3 MILLILITER(S): 9 INJECTION INTRAMUSCULAR; INTRAVENOUS; SUBCUTANEOUS at 12:43

## 2019-12-28 RX ADMIN — Medication 100 MILLIGRAM(S): at 18:01

## 2019-12-28 RX ADMIN — URSODIOL 300 MILLIGRAM(S): 250 TABLET, FILM COATED ORAL at 18:01

## 2019-12-28 RX ADMIN — Medication 3 MILLILITER(S): at 12:42

## 2019-12-28 RX ADMIN — Medication 40 MILLIGRAM(S): at 06:17

## 2019-12-28 RX ADMIN — LIDOCAINE 1 PATCH: 4 CREAM TOPICAL at 12:39

## 2019-12-28 RX ADMIN — Medication 3 MILLILITER(S): at 21:17

## 2019-12-28 RX ADMIN — Medication 3 MILLILITER(S): at 05:25

## 2019-12-28 RX ADMIN — Medication 1 PACKET(S): at 12:37

## 2019-12-28 RX ADMIN — POTASSIUM PHOSPHATE, MONOBASIC POTASSIUM PHOSPHATE, DIBASIC 62.5 MILLIMOLE(S): 236; 224 INJECTION, SOLUTION INTRAVENOUS at 12:37

## 2019-12-28 RX ADMIN — SODIUM CHLORIDE 3 MILLILITER(S): 9 INJECTION INTRAMUSCULAR; INTRAVENOUS; SUBCUTANEOUS at 16:36

## 2019-12-28 RX ADMIN — ENOXAPARIN SODIUM 40 MILLIGRAM(S): 100 INJECTION SUBCUTANEOUS at 12:39

## 2019-12-28 RX ADMIN — Medication 81 MILLIGRAM(S): at 12:39

## 2019-12-28 RX ADMIN — Medication 3 MILLILITER(S): at 09:47

## 2019-12-28 RX ADMIN — Medication 100 MILLIGRAM(S): at 06:14

## 2019-12-28 RX ADMIN — Medication 100 MILLIGRAM(S): at 12:39

## 2019-12-28 RX ADMIN — Medication 25 MILLIGRAM(S): at 06:09

## 2019-12-28 RX ADMIN — Medication 3 MILLILITER(S): at 00:32

## 2019-12-28 RX ADMIN — MORPHINE SULFATE 1 MILLIGRAM(S): 50 CAPSULE, EXTENDED RELEASE ORAL at 06:33

## 2019-12-28 RX ADMIN — Medication 100 MILLIGRAM(S): at 06:17

## 2019-12-28 RX ADMIN — Medication 3 MILLILITER(S): at 16:36

## 2019-12-28 RX ADMIN — Medication 110 MILLIGRAM(S): at 18:01

## 2019-12-28 RX ADMIN — URSODIOL 300 MILLIGRAM(S): 250 TABLET, FILM COATED ORAL at 06:09

## 2019-12-28 RX ADMIN — MORPHINE SULFATE 1 MILLIGRAM(S): 50 CAPSULE, EXTENDED RELEASE ORAL at 08:42

## 2019-12-28 RX ADMIN — LIDOCAINE 1 PATCH: 4 CREAM TOPICAL at 18:26

## 2019-12-28 RX ADMIN — SODIUM CHLORIDE 3 MILLILITER(S): 9 INJECTION INTRAMUSCULAR; INTRAVENOUS; SUBCUTANEOUS at 00:32

## 2019-12-28 RX ADMIN — MORPHINE SULFATE 1 MILLIGRAM(S): 50 CAPSULE, EXTENDED RELEASE ORAL at 07:49

## 2019-12-28 RX ADMIN — Medication 0.25 MILLIGRAM(S): at 21:18

## 2019-12-28 RX ADMIN — SODIUM CHLORIDE 3 MILLILITER(S): 9 INJECTION INTRAMUSCULAR; INTRAVENOUS; SUBCUTANEOUS at 21:17

## 2019-12-28 RX ADMIN — SODIUM CHLORIDE 3 MILLILITER(S): 9 INJECTION INTRAMUSCULAR; INTRAVENOUS; SUBCUTANEOUS at 05:26

## 2019-12-28 NOTE — DISCHARGE NOTE PROVIDER - NSDCMRMEDTOKEN_GEN_ALL_CORE_FT
clotrimazole-betamethasone dipropionate 1%-0.05% topical cream: Apply topically to affected area 2 times a day, As Needed  docusate sodium 100 mg oral capsule: 1 cap(s) orally 2 times a day, As Needed  Ecotrin Adult Low Strength 81 mg oral delayed release tablet: 1 tab(s) orally once a day  famotidine 20 mg oral tablet: 1 tab(s) orally once a day  fluticasone 50 mcg/inh nasal spray: 1 spray(s) in each nostril once a day  HYDROcodone-homatropine 5 mg-1.5 mg/5 mL oral syrup: 5 milliliter(s) orally every 8 hours, As Needed  metoprolol succinate 25 mg oral tablet, extended release: 1 tab(s) orally once a day  Multiple Vitamins oral tablet: 1 tab(s) orally once a day  omeprazole 40 mg oral delayed release capsule: 1 cap(s) orally once a day  predniSONE 10 mg oral tablet: 1 tab(s) orally once a day  Pulmicort Flexhaler 180 mcg/inh inhalation powder: 1 puff(s) inhaled 2 times a day  ursodiol 300 mg oral capsule: 1 cap(s) orally 2 times a day  Vitamin B Complex oral tablet: 1 tab(s) orally once a day  Vitamin D3: 1 tab(s) orally once a day benzonatate 100 mg oral capsule: 1 cap(s) orally 3 times a day  clotrimazole-betamethasone dipropionate 1%-0.05% topical cream: Apply topically to affected area 2 times a day, As Needed  Combivent Respimat 20 mcg-100 mcg/inh inhalation aerosol: 2 puff(s) inhaled 2 times a day   docusate sodium 100 mg oral capsule: 1 cap(s) orally 2 times a day, As Needed  doxycycline hyclate 100 mg oral capsule: 1 cap(s) orally 2 times a day, last dose 1/2 PM dose   Ecotrin Adult Low Strength 81 mg oral delayed release tablet: 1 tab(s) orally once a day  famotidine 20 mg oral tablet: 1 tab(s) orally once a day  fluticasone 50 mcg/inh nasal spray: 1 spray(s) in each nostril once a day  guaiFENesin 100 mg/5 mL oral liquid: 5 milliliter(s) orally every 6 hours  HYDROcodone-homatropine 5 mg-1.5 mg/5 mL oral syrup: 5 milliliter(s) orally every 8 hours, As Needed  melatonin 3 mg oral tablet: 1 tab(s) orally once a day (at bedtime)  metoprolol succinate 25 mg oral tablet, extended release: 1 tab(s) orally once a day  Multiple Vitamins oral tablet: 1 tab(s) orally once a day  predniSONE 10 mg oral tablet: 1 tab(s) orally once a day  Pulmicort Flexhaler 180 mcg/inh inhalation powder: 1 puff(s) inhaled 2 times a day  ursodiol 300 mg oral capsule: 1 cap(s) orally 2 times a day  Vitamin B Complex oral tablet: 1 tab(s) orally once a day  Vitamin D3: 1 tab(s) orally once a day

## 2019-12-28 NOTE — PROGRESS NOTE ADULT - SUBJECTIVE AND OBJECTIVE BOX
PULMONARY PROGRESS NOTE    FELISA YANG  MRN-[id number]    Patient is a 89y old  Female who presents with a chief complaint of acute on chronic hypoxic and hypercapnic respiratory failure, sepsis 2/2 RSV infection (28 Dec 2019 07:08)      HPI:  -Appears comfortable. NAD  -  -  -    ROS:   -  -    ACTIVE MEDICATION LIST:  MEDICATIONS  (STANDING):  albuterol/ipratropium for Nebulization 3 milliLiter(s) Nebulizer every 4 hours  aspirin enteric coated 81 milliGRAM(s) Oral daily  benzonatate 100 milliGRAM(s) Oral three times a day  buDESOnide    Inhalation Suspension 0.25 milliGRAM(s) Inhalation two times a day  doxycycline IVPB 100 milliGRAM(s) IV Intermittent every 12 hours  enoxaparin Injectable 40 milliGRAM(s) SubCutaneous daily  guaiFENesin   Syrup  (Sugar-Free) 100 milliGRAM(s) Oral every 6 hours  influenza   Vaccine 0.5 milliLiter(s) IntraMuscular once  lidocaine   Patch 1 Patch Transdermal daily  methylPREDNISolone sodium succinate Injectable 40 milliGRAM(s) IV Push daily  metoprolol succinate ER 25 milliGRAM(s) Oral daily  potassium phosphate / sodium phosphate powder 1 Packet(s) Oral once  potassium phosphate IVPB 15 milliMole(s) IV Intermittent once  sodium chloride 3%  Inhalation 3 milliLiter(s) Inhalation every 4 hours  ursodiol Capsule 300 milliGRAM(s) Oral two times a day    MEDICATIONS  (PRN):  acetaminophen   Tablet .. 650 milliGRAM(s) Oral every 6 hours PRN Mild Pain (1 - 3), Moderate Pain (4 - 6)  hydrocodone/homatropine Syrup 5 milliLiter(s) Oral every 8 hours PRN Cough      EXAM:  Vital Signs Last 24 Hrs  T(C): 36.5 (28 Dec 2019 06:42), Max: 36.8 (27 Dec 2019 12:16)  T(F): 97.7 (28 Dec 2019 06:42), Max: 98.3 (27 Dec 2019 17:35)  HR: 110 (28 Dec 2019 09:49) (74 - 118)  BP: 155/95 (28 Dec 2019 06:42) (109/54 - 170/120)  BP(mean): --  RR: 22 (28 Dec 2019 06:42) (15 - 22)  SpO2: 100% (28 Dec 2019 09:49) (96% - 100%)    GENERAL: The patient is awake and alert in no apparent distress.     SKIN: Warm, dry, no rashes    LUNGS: Clear to auscultation without wheezing, rales or rhonchi; respirations unlabored    HEART: Regular rate and rhythm without murmur.    ABDOMEN: +BS, Soft, Nontender    EXTREMITIES: No clubbing, cyanosis, edema    PROBLEM LIST:  89y Female with HEALTH ISSUES - PROBLEM Dx:  Macrocytic anemia: Macrocytic anemia  Goals of care, counseling/discussion: Goals of care, counseling/discussion  Discharge planning issues: Discharge planning issues  Prophylactic measure: Prophylactic measure  Essential hypertension: Essential hypertension  CAD (Coronary Artery Disease): CAD (Coronary Artery Disease)  Idiopathic pulmonary fibrosis: Idiopathic pulmonary fibrosis  RSV (respiratory syncytial virus pneumonia): RSV (respiratory syncytial virus pneumonia)  Acute on chronic respiratory failure with hypoxia and hypercapnia: Acute on chronic respiratory failure with hypoxia and hypercapnia            RECS:  O2  Nebs  Steroids    Yanique Dimas DO  [phone]

## 2019-12-28 NOTE — DISCHARGE NOTE PROVIDER - CARE PROVIDER_API CALL
Angel Otoole (MD)  Gastroenterology; Internal Medicine  [address], [address]  [place] [address]  Phone: [phone]  Fax: [phone]  Established Patient  Follow Up Time: 1-3 days

## 2019-12-28 NOTE — PROGRESS NOTE ADULT - PROBLEM SELECTOR PLAN 8
- DVT ppx: Lovenox  - Dispo: likely home vs rehab, PT to eval tomorrow  - DNR/DNI - DVT ppx: Lovenox  - Dispo: likely home vs rehab, PT to eval  - DNR/DNI - DVT ppx: Lovenox  - Dispo: home with home PT  - DNR/DNI

## 2019-12-28 NOTE — PROGRESS NOTE ADULT - SUBJECTIVE AND OBJECTIVE BOX
overnight, 2 episodes tachycardic to 120s-160s patient was uncomfortable with bipap at the time, somewhat agitated.  No EKG performed but appeared sinus tach on telemetry per night float resident.  Patient was given 1mg morphine with some improvement, stat ABG ordered not drawn. PROGRESS NOTE:     Patient is a 89y old  Female who presents with a chief complaint of acute on chronic hypoxic and hypercapnic respiratory failure, sepsis 2/2 RSV infection (28 Dec 2019 07:08)    SUBJECTIVE / OVERNIGHT EVENTS:  overnight, 2 episodes tachycardic to 120s-160s patient was uncomfortable with bipap at the time, somewhat agitated.  No EKG performed but appeared sinus tach on telemetry per night float resident.  Patient was given 1mg morphine with some improvement, stat ABG ordered not drawn.  ABG subsequently drawn by me, and telemetry reviewed - tachycardic to 160s at multiple times with frequent PVCs and PACs.  Patient was improved after morphine in terms of air hunger.    ADDITIONAL REVIEW OF SYSTEMS: 10 point ROS negative except per HPI    MEDICATIONS  (STANDING):  albuterol/ipratropium for Nebulization 3 milliLiter(s) Nebulizer every 4 hours  aspirin enteric coated 81 milliGRAM(s) Oral daily  benzonatate 100 milliGRAM(s) Oral three times a day  buDESOnide    Inhalation Suspension 0.25 milliGRAM(s) Inhalation two times a day  doxycycline IVPB 100 milliGRAM(s) IV Intermittent every 12 hours  enoxaparin Injectable 40 milliGRAM(s) SubCutaneous daily  guaiFENesin   Syrup  (Sugar-Free) 100 milliGRAM(s) Oral every 6 hours  influenza   Vaccine 0.5 milliLiter(s) IntraMuscular once  lidocaine   Patch 1 Patch Transdermal daily  methylPREDNISolone sodium succinate Injectable 40 milliGRAM(s) IV Push daily  metoprolol succinate ER 25 milliGRAM(s) Oral daily  sodium chloride 3%  Inhalation 3 milliLiter(s) Inhalation every 4 hours  ursodiol Capsule 300 milliGRAM(s) Oral two times a day    MEDICATIONS  (PRN):  acetaminophen   Tablet .. 650 milliGRAM(s) Oral every 6 hours PRN Mild Pain (1 - 3), Moderate Pain (4 - 6)  hydrocodone/homatropine Syrup 5 milliLiter(s) Oral every 8 hours PRN Cough      CAPILLARY BLOOD GLUCOSE  I&O's Summary      PHYSICAL EXAM:  Vital Signs Last 24 Hrs  T(C): 36.5 (28 Dec 2019 06:42), Max: 36.8 (27 Dec 2019 12:16)  T(F): 97.7 (28 Dec 2019 06:42), Max: 98.3 (27 Dec 2019 17:35)  HR: 106 (28 Dec 2019 07:51) (74 - 118)  BP: 155/95 (28 Dec 2019 06:42) (109/54 - 170/120)  BP(mean): --  RR: 22 (28 Dec 2019 06:42) (15 - 22)  SpO2: 100% (28 Dec 2019 07:51) (93% - 100%)    CONSTITUTIONAL: NAD, well-developed  RESPIRATORY: tachypneic to 40s, poor inspiratory effort, accessory muscle use agitated with BiPAP.  CARDIOVASCULAR: Regular rate and rhythm, normal S1 and S2, no murmur/rub/gallop; No lower extremity edema; Peripheral pulses are 2+ bilaterally  ABDOMEN: Nontender to palpation, normoactive bowel sounds, no rebound/guarding; No hepatosplenomegaly  MUSCLOSKELETAL: no clubbing or cyanosis of digits; no joint swelling or tenderness to palpation  NEURO: CN 2-12 grossly intact, moves all limbs spontaneously  PSYCH: A+O to person, place, and time; affect appropriate    LABS:                        8.8    13.77 )-----------( 255      ( 27 Dec 2019 07:25 )             29.1     12-27    135  |  97<L>  |  18  ----------------------------<  139<H>  4.3   |  30  |  0.76    Ca    8.8      27 Dec 2019 07:25  Phos  3.1     12-27  Mg     2.1     12-27    TPro  7.4  /  Alb  2.7<L>  /  TBili  < 0.2<L>  /  DBili  x   /  AST  28  /  ALT  9   /  AlkPhos  60  12-27    Culture - Urine (collected 26 Dec 2019 09:36)  Source: URINE MIDSTREAM  Final Report (27 Dec 2019 13:30):    NO GROWTH AT 24 HOURS    Culture - Blood (collected 26 Dec 2019 09:04)  Source: Peripheral Site 2  Preliminary Report (27 Dec 2019 09:04):    NO ORGANISMS ISOLATED    NO ORGANISMS ISOLATED AT 24 HOURS    Culture - Blood (collected 26 Dec 2019 09:04)  Source: Peripheral Site 1  Preliminary Report (27 Dec 2019 09:04):    NO ORGANISMS ISOLATED    NO ORGANISMS ISOLATED AT 24 HOURS    RADIOLOGY & ADDITIONAL TESTS:  Results Reviewed: y  Imaging Personally Reviewed:  Electrocardiogram Personally Reviewed: y    COORDINATION OF CARE:  Care Discussed with Consultants/Other Providers [Y/N]: y  Prior or Outpatient Records Reviewed [Y/N]:

## 2019-12-28 NOTE — DISCHARGE NOTE PROVIDER - NSDCFUSCHEDAPPT_GEN_ALL_CORE_FT
FELISA YANG ; 01/21/2020 ; [facility] [id number] FELISA YANG ; 01/21/2020 ; [facility] [id number] FELISA YANG ; 01/21/2020 ; [facility] [address]

## 2019-12-28 NOTE — DISCHARGE NOTE PROVIDER - NSDCCPCAREPLAN_GEN_ALL_CORE_FT
PRINCIPAL DISCHARGE DIAGNOSIS  Diagnosis: RSV bronchitis  Assessment and Plan of Treatment: You presented with symptoms consistent with pneumonia, and found to have RSV (Respiratory Synctitial Virus) pneumonia.  You were given steroids and treated empirically with antibiotics although a bacterial infection was not seen.  You should follow up with your pulmonologist Dr. Dimas about this issue.  You should follow up with your PCP in 1-3 days after discharge to ensure this is improving.      SECONDARY DISCHARGE DIAGNOSES  Diagnosis: Acute on chronic respiratory failure with hypoxia and hypercapnia  Assessment and Plan of Treatment: You were admitted in respiratory distress.  This improved with BiPAP use, and we were able to wean this back to your home oxygen requirement of 3L.  You were treated with aggressive pulmonary regimen including nebulizer treatment, hypertonic saline to break up mucus, and chest therapy every 4 hours.    Diagnosis: Goals of care, counseling/discussion  Assessment and Plan of Treatment: We discussed the possibility of your requiring intubation because of your acute respiratory failure, and you expressed that this was not in line with your goals of care.  We completed a MOLST form outlining your wishes to be made DNR/DNI.  You may take this form upon discharge and present it to any medical provider within the State of [place] to document your care status.  You received full medical care during your visit aside from the possibility of intubation or CPR. PRINCIPAL DISCHARGE DIAGNOSIS  Diagnosis: RSV bronchitis  Assessment and Plan of Treatment: You presented with symptoms consistent with pneumonia, and found to have RSV (Respiratory Synctitial Virus) pneumonia.  You were given steroids transition to home maintenance dose and treated empirically with antibiotics although a bacterial infection was not seen. Please  complete 2 more days of antibiotics to end on 1/2  You should follow up with your pulmonologist Dr. Dimas about this issue.  You should follow up with your PCP in 1-3 days after discharge to ensure this is improving.      SECONDARY DISCHARGE DIAGNOSES  Diagnosis: Acute on chronic respiratory failure with hypoxia and hypercapnia  Assessment and Plan of Treatment: You were admitted in respiratory distress.  This improved with BiPAP use, and we were able to wean this back to your home oxygen requirement of 3L.  You were treated with aggressive pulmonary regimen including nebulizer treatment, hypertonic saline to break up mucus, and chest therapy every 4 hours.    Diagnosis: Goals of care, counseling/discussion  Assessment and Plan of Treatment: We discussed the possibility of your requiring intubation because of your acute respiratory failure, and you expressed that this was not in line with your goals of care.  We completed a MOLST form outlining your wishes to be made DNR/DNI.  You may take this form upon discharge and present it to any medical provider within the State [place] to document your care status.  You received full medical care during your visit aside from the possibility of intubation or CPR.    Diagnosis: CAD (Coronary Artery Disease)  Assessment and Plan of Treatment: continue home meds and followup with your PMD or cardiologist per routine    Diagnosis: Idiopathic pulmonary fibrosis  Assessment and Plan of Treatment: followup with your pulmonologist    Diagnosis: Essential hypertension  Assessment and Plan of Treatment: Low sodium and fat diet, continue anti-hypertensive medications, and follow up with primary care physician.

## 2019-12-28 NOTE — DISCHARGE NOTE PROVIDER - HOSPITAL COURSE
89F PMH severe pulmonary fibrosis on home O2 3L and prednisone, CAD s/p 3 stents, HTN, polanco's esophagus presents with cough, SOB. Admitted with acute on chronic hypoxic and hypercapnic respiratory failure likely due to RSV pneumonia and possible worsening pulmonary fibrosis.        Patient is DNR/DNI, MOLST completed.        Placed on BiPAP in ED by inpatient team, patient's acidosis resolved and hypercapnia improved.  She tolerated weaning to home 3L during the day, BiPAP at night.  She still had some air hunger with this regimen which improved with morphine as needed.        Patient was also treated with q4h regimen chest PT, duonebs, and 3% hypertonic saline.  Cough medication including hycodan and robitussin given for cough with improvement to her baseline SOB and cough.        Patient was seen by Dr. Dimas, Pulmonology, inpatient. 89F PMH severe pulmonary fibrosis on home O2 3L and prednisone, CAD s/p 3 stents, HTN, polanco's esophagus presents with cough, SOB. Admitted with acute on chronic hypoxic and hypercapnic respiratory failure likely due to RSV pneumonia and possible worsening pulmonary fibrosis.        Hospital course:        Pt with acute on chronic respiratory failure with hypoxia and hypercapnia in setting of RSV and chronic severe IPF. Supportive care with chest PT, nebs, inhaer, cough suppressant. pt s/p BiPAP in ED weaned off now on NC (home O2). Pt s/p prednisone nena now on tiffany dose steroid. Pt to complete 7 days total antibiotics for viral PNA given severe pulmonary disease. PT recommended home PT.     Patient is DNR/DNI, MOLST completed.        As per Dr Dias pt cleared for discharge on 12/31. Reviewed discharge medications with patient; All new medications requiring new prescription sent to pharmacy of patients choice. Reviewed need for prescription for previous home medication and new prescriptions sent if requested. Patient in agreement and understands.

## 2019-12-28 NOTE — PROGRESS NOTE ADULT - PROBLEM SELECTOR PLAN 1
- In setting of chronic severe IPF  - DNR/DNI  - Trial off bipap because given her normalized acidosis this may represent her baseline hypercarbia; repeat blood gas in 2h  - duonebs q4, hypersal 3% q4, chest pt q4 - In setting of chronic severe IPF  - DNR/DNI  - duonebs q4, hypersal 3% q4, chest pt q4  - BiPAP mask is very uncomfortable for patient and she gets agitated; blood gas somewhat improved (still hypercarbic likely baseline) after overnight use will trial off bipap again (at 3L O2 - home dose) and possibly trial bipap only overnight.

## 2019-12-28 NOTE — PROGRESS NOTE ADULT - PROBLEM SELECTOR PLAN 2
- admitted with sepsis: fever, tachycardia, consolidation on CXR in left mid-lung  - RSV positive on RVP  - s/p 1 dose azithromycin, 2g aztreonam, and vancomycin 1g in ED  - continue doxycycline, day 2 - RSV positive on RVP  - continue doxycycline, day 3

## 2019-12-28 NOTE — PROGRESS NOTE ADULT - ATTENDING COMMENTS
Patient seen and examined by myself , case discussed  with resident ,agree with the above finding and plan      89F PMH severe pulmonary fibrosis on home O2 3L, CAD s/p 3 stents, HTN, polanco's esophagus presents with cough, SOB. Admitted with acute on chronic hypoxic and hypercapnic respiratory failure likely and sepsis 2/2 RSV pneumonia and possible worsening pulmonary fibrosis.  VBG  noted, improved hypercarbia , Ph 7.41   Continue Solumedrol 40mg IVP daily, ATC nebs q6, doxycycline x 5 days for possible superimposed infxn. Leucocytosis likely secondary to steroids , lactate improved   Bcx and Ucx NGTD  DNR/DNI, hospice referral

## 2019-12-28 NOTE — PROGRESS NOTE ADULT - PROBLEM SELECTOR PLAN 3
- Baseline severe disease  - continue 40mg solumedrol daily for stress dose steroid.  - DNR/DNI  - Private pulmonology saw patient in ED  - Hycodin syrup for cough suppression, continue home regimen will add guaifenisin and tessalon perles  - continue home pulmicort and fluticasone - Baseline severe disease  - continue 40mg solumedrol daily for stress dose steroid.  - DNR/DNI  - Private pulmonology saw patient in ED  - Hycodin syrup for cough suppression, guaifenisin and tessalon perles  - continue home pulmicort and fluticasone

## 2019-12-29 LAB
ALBUMIN SERPL ELPH-MCNC: 2.6 G/DL — LOW (ref 3.3–5)
ALP SERPL-CCNC: 55 U/L — SIGNIFICANT CHANGE UP (ref 40–120)
ALT FLD-CCNC: 12 U/L — SIGNIFICANT CHANGE UP (ref 4–33)
ANION GAP SERPL CALC-SCNC: 11 MMO/L — SIGNIFICANT CHANGE UP (ref 7–14)
AST SERPL-CCNC: 26 U/L — SIGNIFICANT CHANGE UP (ref 4–32)
BASE EXCESS BLDV CALC-SCNC: 10 MMOL/L — SIGNIFICANT CHANGE UP
BASE EXCESS BLDV CALC-SCNC: 7.4 MMOL/L — SIGNIFICANT CHANGE UP
BASOPHILS # BLD AUTO: 0.01 K/UL — SIGNIFICANT CHANGE UP (ref 0–0.2)
BASOPHILS NFR BLD AUTO: 0.1 % — SIGNIFICANT CHANGE UP (ref 0–2)
BILIRUB SERPL-MCNC: < 0.2 MG/DL — LOW (ref 0.2–1.2)
BLOOD GAS VENOUS - CREATININE: 0.67 MG/DL — SIGNIFICANT CHANGE UP (ref 0.5–1.3)
BLOOD GAS VENOUS - CREATININE: 0.78 MG/DL — SIGNIFICANT CHANGE UP (ref 0.5–1.3)
BLOOD GAS VENOUS - FIO2: 30 — SIGNIFICANT CHANGE UP
BLOOD GAS VENOUS - FIO2: 30 — SIGNIFICANT CHANGE UP
BUN SERPL-MCNC: 18 MG/DL — SIGNIFICANT CHANGE UP (ref 7–23)
CALCIUM SERPL-MCNC: 8.6 MG/DL — SIGNIFICANT CHANGE UP (ref 8.4–10.5)
CHLORIDE BLDV-SCNC: 94 MMOL/L — LOW (ref 96–108)
CHLORIDE BLDV-SCNC: 97 MMOL/L — SIGNIFICANT CHANGE UP (ref 96–108)
CHLORIDE SERPL-SCNC: 93 MMOL/L — LOW (ref 98–107)
CO2 SERPL-SCNC: 32 MMOL/L — HIGH (ref 22–31)
CREAT SERPL-MCNC: 0.7 MG/DL — SIGNIFICANT CHANGE UP (ref 0.5–1.3)
EOSINOPHIL # BLD AUTO: 0 K/UL — SIGNIFICANT CHANGE UP (ref 0–0.5)
EOSINOPHIL NFR BLD AUTO: 0 % — SIGNIFICANT CHANGE UP (ref 0–6)
GAS PNL BLDV: 134 MMOL/L — LOW (ref 136–146)
GAS PNL BLDV: 134 MMOL/L — LOW (ref 136–146)
GLUCOSE BLDV-MCNC: 120 MG/DL — HIGH (ref 70–99)
GLUCOSE BLDV-MCNC: 182 MG/DL — HIGH (ref 70–99)
GLUCOSE SERPL-MCNC: 116 MG/DL — HIGH (ref 70–99)
HCO3 BLDV-SCNC: 30 MMOL/L — HIGH (ref 20–27)
HCO3 BLDV-SCNC: 33 MMOL/L — HIGH (ref 20–27)
HCT VFR BLD CALC: 30.6 % — LOW (ref 34.5–45)
HCT VFR BLDV CALC: 30.6 % — LOW (ref 34.5–45)
HCT VFR BLDV CALC: 33.1 % — LOW (ref 34.5–45)
HGB BLD-MCNC: 9.4 G/DL — LOW (ref 11.5–15.5)
HGB BLDV-MCNC: 10.7 G/DL — LOW (ref 11.5–15.5)
HGB BLDV-MCNC: 9.9 G/DL — LOW (ref 11.5–15.5)
IMM GRANULOCYTES NFR BLD AUTO: 1.7 % — HIGH (ref 0–1.5)
LACTATE BLDV-MCNC: 1.7 MMOL/L — SIGNIFICANT CHANGE UP (ref 0.5–2)
LACTATE BLDV-MCNC: 2.3 MMOL/L — HIGH (ref 0.5–2)
LYMPHOCYTES # BLD AUTO: 1.56 K/UL — SIGNIFICANT CHANGE UP (ref 1–3.3)
LYMPHOCYTES # BLD AUTO: 17.9 % — SIGNIFICANT CHANGE UP (ref 13–44)
MAGNESIUM SERPL-MCNC: 1.9 MG/DL — SIGNIFICANT CHANGE UP (ref 1.6–2.6)
MCHC RBC-ENTMCNC: 30.7 % — LOW (ref 32–36)
MCHC RBC-ENTMCNC: 32.5 PG — SIGNIFICANT CHANGE UP (ref 27–34)
MCV RBC AUTO: 105.9 FL — HIGH (ref 80–100)
MONOCYTES # BLD AUTO: 0.85 K/UL — SIGNIFICANT CHANGE UP (ref 0–0.9)
MONOCYTES NFR BLD AUTO: 9.7 % — SIGNIFICANT CHANGE UP (ref 2–14)
NEUTROPHILS # BLD AUTO: 6.15 K/UL — SIGNIFICANT CHANGE UP (ref 1.8–7.4)
NEUTROPHILS NFR BLD AUTO: 70.6 % — SIGNIFICANT CHANGE UP (ref 43–77)
NRBC # FLD: 0 K/UL — SIGNIFICANT CHANGE UP (ref 0–0)
PCO2 BLDV: 53 MMHG — HIGH (ref 41–51)
PCO2 BLDV: 64 MMHG — HIGH (ref 41–51)
PH BLDV: 7.36 PH — SIGNIFICANT CHANGE UP (ref 7.32–7.43)
PH BLDV: 7.4 PH — SIGNIFICANT CHANGE UP (ref 7.32–7.43)
PHOSPHATE SERPL-MCNC: 3 MG/DL — SIGNIFICANT CHANGE UP (ref 2.5–4.5)
PLATELET # BLD AUTO: 247 K/UL — SIGNIFICANT CHANGE UP (ref 150–400)
PMV BLD: 8.9 FL — SIGNIFICANT CHANGE UP (ref 7–13)
PO2 BLDV: 50 MMHG — HIGH (ref 35–40)
PO2 BLDV: 72 MMHG — HIGH (ref 35–40)
POTASSIUM BLDV-SCNC: 3.9 MMOL/L — SIGNIFICANT CHANGE UP (ref 3.4–4.5)
POTASSIUM BLDV-SCNC: 4.1 MMOL/L — SIGNIFICANT CHANGE UP (ref 3.4–4.5)
POTASSIUM SERPL-MCNC: 4.1 MMOL/L — SIGNIFICANT CHANGE UP (ref 3.5–5.3)
POTASSIUM SERPL-SCNC: 4.1 MMOL/L — SIGNIFICANT CHANGE UP (ref 3.5–5.3)
PROT SERPL-MCNC: 7.1 G/DL — SIGNIFICANT CHANGE UP (ref 6–8.3)
RBC # BLD: 2.89 M/UL — LOW (ref 3.8–5.2)
RBC # FLD: 14.1 % — SIGNIFICANT CHANGE UP (ref 10.3–14.5)
SAO2 % BLDV: 85.9 % — HIGH (ref 60–85)
SAO2 % BLDV: 93 % — HIGH (ref 60–85)
SODIUM SERPL-SCNC: 136 MMOL/L — SIGNIFICANT CHANGE UP (ref 135–145)
WBC # BLD: 8.72 K/UL — SIGNIFICANT CHANGE UP (ref 3.8–10.5)
WBC # FLD AUTO: 8.72 K/UL — SIGNIFICANT CHANGE UP (ref 3.8–10.5)

## 2019-12-29 PROCEDURE — 99233 SBSQ HOSP IP/OBS HIGH 50: CPT

## 2019-12-29 PROCEDURE — 99233 SBSQ HOSP IP/OBS HIGH 50: CPT | Mod: GC

## 2019-12-29 RX ORDER — POLYETHYLENE GLYCOL 3350 17 G/17G
17 POWDER, FOR SOLUTION ORAL DAILY
Refills: 0 | Status: DISCONTINUED | OUTPATIENT
Start: 2019-12-29 | End: 2019-12-31

## 2019-12-29 RX ORDER — SENNA PLUS 8.6 MG/1
2 TABLET ORAL AT BEDTIME
Refills: 0 | Status: DISCONTINUED | OUTPATIENT
Start: 2019-12-29 | End: 2019-12-31

## 2019-12-29 RX ORDER — LANOLIN ALCOHOL/MO/W.PET/CERES
3 CREAM (GRAM) TOPICAL AT BEDTIME
Refills: 0 | Status: COMPLETED | OUTPATIENT
Start: 2019-12-29 | End: 2019-12-29

## 2019-12-29 RX ADMIN — POLYETHYLENE GLYCOL 3350 17 GRAM(S): 17 POWDER, FOR SOLUTION ORAL at 12:08

## 2019-12-29 RX ADMIN — Medication 100 MILLIGRAM(S): at 12:05

## 2019-12-29 RX ADMIN — SODIUM CHLORIDE 3 MILLILITER(S): 9 INJECTION INTRAMUSCULAR; INTRAVENOUS; SUBCUTANEOUS at 01:37

## 2019-12-29 RX ADMIN — Medication 3 MILLILITER(S): at 07:58

## 2019-12-29 RX ADMIN — SODIUM CHLORIDE 3 MILLILITER(S): 9 INJECTION INTRAMUSCULAR; INTRAVENOUS; SUBCUTANEOUS at 13:08

## 2019-12-29 RX ADMIN — Medication 0.25 MILLIGRAM(S): at 20:52

## 2019-12-29 RX ADMIN — Medication 100 MILLIGRAM(S): at 05:49

## 2019-12-29 RX ADMIN — Medication 3 MILLIGRAM(S): at 00:48

## 2019-12-29 RX ADMIN — Medication 3 MILLILITER(S): at 16:51

## 2019-12-29 RX ADMIN — SENNA PLUS 2 TABLET(S): 8.6 TABLET ORAL at 22:45

## 2019-12-29 RX ADMIN — ENOXAPARIN SODIUM 40 MILLIGRAM(S): 100 INJECTION SUBCUTANEOUS at 12:07

## 2019-12-29 RX ADMIN — SODIUM CHLORIDE 3 MILLILITER(S): 9 INJECTION INTRAMUSCULAR; INTRAVENOUS; SUBCUTANEOUS at 20:52

## 2019-12-29 RX ADMIN — Medication 3 MILLILITER(S): at 20:52

## 2019-12-29 RX ADMIN — Medication 81 MILLIGRAM(S): at 12:07

## 2019-12-29 RX ADMIN — LIDOCAINE 1 PATCH: 4 CREAM TOPICAL at 00:00

## 2019-12-29 RX ADMIN — Medication 100 MILLIGRAM(S): at 17:51

## 2019-12-29 RX ADMIN — SODIUM CHLORIDE 3 MILLILITER(S): 9 INJECTION INTRAMUSCULAR; INTRAVENOUS; SUBCUTANEOUS at 16:52

## 2019-12-29 RX ADMIN — Medication 110 MILLIGRAM(S): at 17:51

## 2019-12-29 RX ADMIN — LIDOCAINE 1 PATCH: 4 CREAM TOPICAL at 12:07

## 2019-12-29 RX ADMIN — Medication 0.25 MILLIGRAM(S): at 07:59

## 2019-12-29 RX ADMIN — URSODIOL 300 MILLIGRAM(S): 250 TABLET, FILM COATED ORAL at 05:49

## 2019-12-29 RX ADMIN — SODIUM CHLORIDE 3 MILLILITER(S): 9 INJECTION INTRAMUSCULAR; INTRAVENOUS; SUBCUTANEOUS at 07:59

## 2019-12-29 RX ADMIN — Medication 40 MILLIGRAM(S): at 05:49

## 2019-12-29 RX ADMIN — Medication 3 MILLILITER(S): at 13:08

## 2019-12-29 RX ADMIN — Medication 100 MILLIGRAM(S): at 22:45

## 2019-12-29 RX ADMIN — Medication 3 MILLILITER(S): at 01:37

## 2019-12-29 RX ADMIN — Medication 25 MILLIGRAM(S): at 05:49

## 2019-12-29 RX ADMIN — URSODIOL 300 MILLIGRAM(S): 250 TABLET, FILM COATED ORAL at 17:51

## 2019-12-29 RX ADMIN — LIDOCAINE 1 PATCH: 4 CREAM TOPICAL at 19:00

## 2019-12-29 RX ADMIN — Medication 110 MILLIGRAM(S): at 05:49

## 2019-12-29 NOTE — PROGRESS NOTE ADULT - SUBJECTIVE AND OBJECTIVE BOX
Patient is a 89y old  Female who presents with a chief complaint of acute on chronic hypoxic and hypercapnic respiratory failure, sepsis 2/2 RSV infection (28 Dec 2019 12:10)        SUBJECTIVE / OVERNIGHT EVENTS: No acute overnight events. Pt was on NC all night and this morning is mentating well. No other complaints. Asking when she can go home.      MEDICATIONS  (STANDING):  albuterol/ipratropium for Nebulization 3 milliLiter(s) Nebulizer every 4 hours  aspirin enteric coated 81 milliGRAM(s) Oral daily  benzonatate 100 milliGRAM(s) Oral three times a day  buDESOnide    Inhalation Suspension 0.25 milliGRAM(s) Inhalation two times a day  doxycycline IVPB 100 milliGRAM(s) IV Intermittent every 12 hours  enoxaparin Injectable 40 milliGRAM(s) SubCutaneous daily  guaiFENesin   Syrup  (Sugar-Free) 100 milliGRAM(s) Oral every 6 hours  influenza   Vaccine 0.5 milliLiter(s) IntraMuscular once  lidocaine   Patch 1 Patch Transdermal daily  methylPREDNISolone sodium succinate Injectable 40 milliGRAM(s) IV Push daily  metoprolol succinate ER 25 milliGRAM(s) Oral daily  sodium chloride 3%  Inhalation 3 milliLiter(s) Inhalation every 4 hours  ursodiol Capsule 300 milliGRAM(s) Oral two times a day    MEDICATIONS  (PRN):  acetaminophen   Tablet .. 650 milliGRAM(s) Oral every 6 hours PRN Mild Pain (1 - 3), Moderate Pain (4 - 6)  hydrocodone/homatropine Syrup 5 milliLiter(s) Oral every 8 hours PRN Cough        CAPILLARY BLOOD GLUCOSE        I&O's Summary      PHYSICAL EXAM  GENERAL: NAD, well-developed  HEAD:  Atraumatic, Normocephalic  EYES: EOMI, PERRLA, conjunctiva and sclera clear  NECK: Supple, No JVD  CHEST/LUNG: Decreased breath sounds; fine crackles in the lower lung fields.  HEART: Regular rate and rhythm; No murmurs, rubs, or gallops  ABDOMEN: Soft, Nontender, Nondistended; Bowel sounds present  EXTREMITIES:  2+ Peripheral Pulses, No clubbing, cyanosis, or edema  PSYCH: AAOx3  SKIN: No rashes or lesions    LABS:                        10.7   14.71 )-----------( 318      ( 28 Dec 2019 07:20 )             34.7     12-28    133<L>  |  91<L>  |  18  ----------------------------<  134<H>  4.1   |  29  |  0.71    Ca    9.0      28 Dec 2019 07:20  Phos  1.8     12-28  Mg     2.0     12-28    TPro  8.5<H>  /  Alb  3.2<L>  /  TBili  0.2  /  DBili  x   /  AST  34<H>  /  ALT  14  /  AlkPhos  74  12-28              RADIOLOGY & ADDITIONAL TESTS:    Imaging Personally Reviewed:  Consultant(s) Notes Reviewed:    Care Discussed with Consultants/Other Providers:

## 2019-12-29 NOTE — PROGRESS NOTE ADULT - ATTENDING COMMENTS
Patient seen and examined by myself , case discussed  with resident ,agree with the above finding and plan  89F PMH severe pulmonary fibrosis on home O2 3L, CAD s/p 3 stents, HTN, polanco's esophagus presents with cough, SOB. Admitted with acute on chronic hypoxic and hypercapnic respiratory failure likely and sepsis 2/2 RSV pneumonia and possible worsening pulmonary fibrosis.  VBG  noted, improved hypercarbia , Ph 7.41   Continue Solumedrol 40mg IVP daily, ATC nebs q6, doxycycline x 5 days for possible superimposed infxn. Leucocytosis likely secondary to steroids , lactate improved   Bcx and Ucx NGTD  DNR/DNI,   family does not want hospice

## 2019-12-29 NOTE — PROGRESS NOTE ADULT - PROBLEM SELECTOR PLAN 1
- In setting of chronic severe IPF  - DNR/DNI  - duonebs q4, hypersal 3% q4, chest pt q4  - Doing well off of BIPAP and improving. Will continue to watch off of BIPAP.

## 2019-12-29 NOTE — PROGRESS NOTE ADULT - SUBJECTIVE AND OBJECTIVE BOX
PULMONARY PROGRESS NOTE    FELISA YANG  MRN-[id number]    Patient is a 89y old  Female who presents with a chief complaint of acute on chronic hypoxic and hypercapnic respiratory failure, sepsis 2/2 RSV infection (29 Dec 2019 07:38)      HPI:  -Appears comfortable. NAD  -  -  -    ROS:   -  -    ACTIVE MEDICATION LIST:  MEDICATIONS  (STANDING):  albuterol/ipratropium for Nebulization 3 milliLiter(s) Nebulizer every 4 hours  aspirin enteric coated 81 milliGRAM(s) Oral daily  benzonatate 100 milliGRAM(s) Oral three times a day  buDESOnide    Inhalation Suspension 0.25 milliGRAM(s) Inhalation two times a day  doxycycline IVPB 100 milliGRAM(s) IV Intermittent every 12 hours  enoxaparin Injectable 40 milliGRAM(s) SubCutaneous daily  guaiFENesin   Syrup  (Sugar-Free) 100 milliGRAM(s) Oral every 6 hours  influenza   Vaccine 0.5 milliLiter(s) IntraMuscular once  lidocaine   Patch 1 Patch Transdermal daily  methylPREDNISolone sodium succinate Injectable 40 milliGRAM(s) IV Push daily  metoprolol succinate ER 25 milliGRAM(s) Oral daily  sodium chloride 3%  Inhalation 3 milliLiter(s) Inhalation every 4 hours  ursodiol Capsule 300 milliGRAM(s) Oral two times a day    MEDICATIONS  (PRN):  acetaminophen   Tablet .. 650 milliGRAM(s) Oral every 6 hours PRN Mild Pain (1 - 3), Moderate Pain (4 - 6)  hydrocodone/homatropine Syrup 5 milliLiter(s) Oral every 8 hours PRN Cough      EXAM:  Vital Signs Last 24 Hrs  T(C): 36.9 (29 Dec 2019 08:54), Max: 37.3 (28 Dec 2019 12:33)  T(F): 98.4 (29 Dec 2019 08:54), Max: 99.2 (28 Dec 2019 12:33)  HR: 96 (29 Dec 2019 08:54) (62 - 110)  BP: 146/70 (29 Dec 2019 08:54) (138/80 - 154/87)  BP(mean): 94 (28 Dec 2019 12:33) (94 - 94)  RR: 19 (29 Dec 2019 08:54) (18 - 20)  SpO2: 77% (29 Dec 2019 08:54) (77% - 100%)    GENERAL: The patient is awake and alert in no apparent distress.     SKIN: Warm, dry, no rashes    LUNGS: Clear to auscultation without wheezing, rales or rhonchi; respirations unlabored    HEART: Regular rate and rhythm without murmur.    ABDOMEN: +BS, Soft, Nontender    EXTREMITIES: No clubbing, cyanosis, edema    Labs:                        9.4    8.72  )-----------( 247      ( 29 Dec 2019 06:30 )             30.6   12-29    136  |  93<L>  |  18  ----------------------------<  116<H>  4.1   |  32<H>  |  0.70    Ca    8.6      29 Dec 2019 06:30  Phos  3.0     12-29  Mg     1.9     12-29    TPro  7.1  /  Alb  2.6<L>  /  TBili  < 0.2<L>  /  DBili  x   /  AST  26  /  ALT  12  /  AlkPhos  55  12-29    PROBLEM LIST:  89y Female with HEALTH ISSUES - PROBLEM Dx:  Macrocytic anemia: Macrocytic anemia  Goals of care, counseling/discussion: Goals of care, counseling/discussion  Discharge planning issues: Discharge planning issues  Prophylactic measure: Prophylactic measure  Essential hypertension: Essential hypertension  CAD (Coronary Artery Disease): CAD (Coronary Artery Disease)  Idiopathic pulmonary fibrosis: Idiopathic pulmonary fibrosis  RSV (respiratory syncytial virus pneumonia): RSV (respiratory syncytial virus pneumonia)  Acute on chronic respiratory failure with hypoxia and hypercapnia: Acute on chronic respiratory failure with hypoxia and hypercapnia            RECS:  O2  Nebs  [facility]    Yanique Dimas DO  [phone]

## 2019-12-29 NOTE — PROGRESS NOTE ADULT - PROBLEM SELECTOR PLAN 3
- Baseline severe disease  - continue 40mg solumedrol daily for stress dose steroid. Today is day 4/5.  - DNR/DNI  - Private pulmonology saw patient in ED  - Hycodin syrup for cough suppression, guaifenisin and tessalon perles  - continue home pulmicort and fluticasone

## 2019-12-30 LAB
BASE EXCESS BLDV CALC-SCNC: 9.5 MMOL/L — SIGNIFICANT CHANGE UP
BLOOD GAS VENOUS - CREATININE: 0.7 MG/DL — SIGNIFICANT CHANGE UP (ref 0.5–1.3)
BLOOD GAS VENOUS - FIO2: 30 — SIGNIFICANT CHANGE UP
CHLORIDE BLDV-SCNC: 94 MMOL/L — LOW (ref 96–108)
GAS PNL BLDV: 133 MMOL/L — LOW (ref 136–146)
GLUCOSE BLDV-MCNC: 156 MG/DL — HIGH (ref 70–99)
HCO3 BLDV-SCNC: 32 MMOL/L — HIGH (ref 20–27)
HCT VFR BLDV CALC: 30.2 % — LOW (ref 34.5–45)
HGB BLDV-MCNC: 9.8 G/DL — LOW (ref 11.5–15.5)
LACTATE BLDV-MCNC: 2.6 MMOL/L — HIGH (ref 0.5–2)
PCO2 BLDV: 61 MMHG — HIGH (ref 41–51)
PH BLDV: 7.38 PH — SIGNIFICANT CHANGE UP (ref 7.32–7.43)
PO2 BLDV: 101 MMHG — HIGH (ref 35–40)
POTASSIUM BLDV-SCNC: 4.6 MMOL/L — HIGH (ref 3.4–4.5)
SAO2 % BLDV: 97.5 % — HIGH (ref 60–85)

## 2019-12-30 PROCEDURE — 99232 SBSQ HOSP IP/OBS MODERATE 35: CPT

## 2019-12-30 PROCEDURE — 99233 SBSQ HOSP IP/OBS HIGH 50: CPT

## 2019-12-30 RX ORDER — LANOLIN ALCOHOL/MO/W.PET/CERES
3 CREAM (GRAM) TOPICAL AT BEDTIME
Refills: 0 | Status: DISCONTINUED | OUTPATIENT
Start: 2019-12-30 | End: 2019-12-31

## 2019-12-30 RX ADMIN — SODIUM CHLORIDE 3 MILLILITER(S): 9 INJECTION INTRAMUSCULAR; INTRAVENOUS; SUBCUTANEOUS at 20:38

## 2019-12-30 RX ADMIN — Medication 650 MILLIGRAM(S): at 22:00

## 2019-12-30 RX ADMIN — Medication 3 MILLILITER(S): at 20:30

## 2019-12-30 RX ADMIN — Medication 100 MILLIGRAM(S): at 06:18

## 2019-12-30 RX ADMIN — URSODIOL 300 MILLIGRAM(S): 250 TABLET, FILM COATED ORAL at 17:24

## 2019-12-30 RX ADMIN — Medication 81 MILLIGRAM(S): at 12:03

## 2019-12-30 RX ADMIN — Medication 40 MILLIGRAM(S): at 06:20

## 2019-12-30 RX ADMIN — ENOXAPARIN SODIUM 40 MILLIGRAM(S): 100 INJECTION SUBCUTANEOUS at 12:02

## 2019-12-30 RX ADMIN — Medication 0.25 MILLIGRAM(S): at 20:46

## 2019-12-30 RX ADMIN — Medication 3 MILLIGRAM(S): at 21:11

## 2019-12-30 RX ADMIN — SODIUM CHLORIDE 3 MILLILITER(S): 9 INJECTION INTRAMUSCULAR; INTRAVENOUS; SUBCUTANEOUS at 00:21

## 2019-12-30 RX ADMIN — Medication 3 MILLILITER(S): at 09:51

## 2019-12-30 RX ADMIN — Medication 100 MILLIGRAM(S): at 12:04

## 2019-12-30 RX ADMIN — SODIUM CHLORIDE 3 MILLILITER(S): 9 INJECTION INTRAMUSCULAR; INTRAVENOUS; SUBCUTANEOUS at 16:37

## 2019-12-30 RX ADMIN — Medication 110 MILLIGRAM(S): at 17:24

## 2019-12-30 RX ADMIN — LIDOCAINE 1 PATCH: 4 CREAM TOPICAL at 12:01

## 2019-12-30 RX ADMIN — Medication 3 MILLILITER(S): at 16:36

## 2019-12-30 RX ADMIN — LIDOCAINE 1 PATCH: 4 CREAM TOPICAL at 19:30

## 2019-12-30 RX ADMIN — POLYETHYLENE GLYCOL 3350 17 GRAM(S): 17 POWDER, FOR SOLUTION ORAL at 12:02

## 2019-12-30 RX ADMIN — Medication 3 MILLILITER(S): at 13:14

## 2019-12-30 RX ADMIN — Medication 25 MILLIGRAM(S): at 06:18

## 2019-12-30 RX ADMIN — SODIUM CHLORIDE 3 MILLILITER(S): 9 INJECTION INTRAMUSCULAR; INTRAVENOUS; SUBCUTANEOUS at 09:51

## 2019-12-30 RX ADMIN — Medication 110 MILLIGRAM(S): at 06:19

## 2019-12-30 RX ADMIN — Medication 100 MILLIGRAM(S): at 21:03

## 2019-12-30 RX ADMIN — SODIUM CHLORIDE 3 MILLILITER(S): 9 INJECTION INTRAMUSCULAR; INTRAVENOUS; SUBCUTANEOUS at 13:14

## 2019-12-30 RX ADMIN — Medication 3 MILLILITER(S): at 00:21

## 2019-12-30 RX ADMIN — Medication 650 MILLIGRAM(S): at 21:00

## 2019-12-30 RX ADMIN — SENNA PLUS 2 TABLET(S): 8.6 TABLET ORAL at 21:03

## 2019-12-30 RX ADMIN — URSODIOL 300 MILLIGRAM(S): 250 TABLET, FILM COATED ORAL at 06:18

## 2019-12-30 RX ADMIN — LIDOCAINE 1 PATCH: 4 CREAM TOPICAL at 00:00

## 2019-12-30 RX ADMIN — Medication 0.25 MILLIGRAM(S): at 09:51

## 2019-12-30 NOTE — PROGRESS NOTE ADULT - PROBLEM SELECTOR PLAN 3
- Baseline severe disease  - continue 40mg solumedrol daily for stress dose steroid. Today is day 4/5.  -taper down to Prednisone 20mg X1 tomorrow  - DNR/DNI  - Private pulmonology saw patient in ED  - Hycodin syrup for cough suppression, guaifenisin and tessalon perles  - continue home pulmicort and fluticasone

## 2019-12-30 NOTE — PROVIDER CONTACT NOTE (CRITICAL VALUE NOTIFICATION) - BACKGROUND
89 year old female admitted for acute bronchitis due to RSV. Has hx of pulmonary fibrosis, HTN, Li's esophagus.

## 2019-12-30 NOTE — PROGRESS NOTE ADULT - SUBJECTIVE AND OBJECTIVE BOX
PULMONARY PROGRESS NOTE    FELISA YANG  MRN-[id number]    Patient is a 89y old  Female who presents with a chief complaint of acute on chronic hypoxic and hypercapnic respiratory failure, sepsis 2/2 RSV infection (29 Dec 2019 07:38)      HPI:  -feeling better  -has cough at night    ROS:   -wants sleeping medication    MEDICATIONS  (STANDING):  albuterol/ipratropium for Nebulization 3 milliLiter(s) Nebulizer every 4 hours  aspirin enteric coated 81 milliGRAM(s) Oral daily  benzonatate 100 milliGRAM(s) Oral three times a day  buDESOnide    Inhalation Suspension 0.25 milliGRAM(s) Inhalation two times a day  doxycycline IVPB 100 milliGRAM(s) IV Intermittent every 12 hours  enoxaparin Injectable 40 milliGRAM(s) SubCutaneous daily  guaiFENesin   Syrup  (Sugar-Free) 100 milliGRAM(s) Oral every 6 hours  influenza   Vaccine 0.5 milliLiter(s) IntraMuscular once  lidocaine   Patch 1 Patch Transdermal daily  metoprolol succinate ER 25 milliGRAM(s) Oral daily  polyethylene glycol 3350 17 Gram(s) Oral daily  senna 2 Tablet(s) Oral at bedtime  sodium chloride 3%  Inhalation 3 milliLiter(s) Inhalation every 4 hours  ursodiol Capsule 300 milliGRAM(s) Oral two times a day    MEDICATIONS  (PRN):  acetaminophen   Tablet .. 650 milliGRAM(s) Oral every 6 hours PRN Mild Pain (1 - 3), Moderate Pain (4 - 6)  hydrocodone/homatropine Syrup 5 milliLiter(s) Oral every 8 hours PRN Cough        EXAM:  Vital Signs Last 24 Hrs  T(C): 36.6 (30 Dec 2019 12:19), Max: 36.9 (29 Dec 2019 22:43)  T(F): 97.9 (30 Dec 2019 12:19), Max: 98.5 (29 Dec 2019 22:43)  HR: 80 (30 Dec 2019 13:15) (64 - 99)  BP: 140/70 (30 Dec 2019 12:19) (123/68 - 150/79)  BP(mean): --  RR: 16 (30 Dec 2019 12:19) (16 - 20)  SpO2: 100% (30 Dec 2019 12:19) (97% - 100%)    GENERAL: The patient is awake and alert in no apparent distress.     LUNGS: Clear to auscultation without wheezing, rales or rhonchi; respirations unlabored    HEART: S1/S2        Labs:                        9.4    8.72  )-----------( 247      ( 29 Dec 2019 06:30 )             30.6   12-29    136  |  93<L>  |  18  ----------------------------<  116<H>  4.1   |  32<H>  |  0.70    Ca    8.6      29 Dec 2019 06:30  Phos  3.0     12-29  Mg     1.9     12-29    TPro  7.1  /  Alb  2.6<L>  /  TBili  < 0.2<L>  /  DBili  x   /  AST  26  /  ALT  12  /  AlkPhos  55  12-29    < from: Xray Chest 1 View AP/PA (12.26.19 @ 09:28) >  IMPRESSION:  Pulmonary fibrosis but no focal consolidation.    < end of copied text >      PROBLEM LIST:  89y Female with HEALTH ISSUES - PROBLEM Dx:  Macrocytic anemia: Macrocytic anemia  Goals of care, counseling/discussion: Goals of care, counseling/discussion  Discharge planning issues: Discharge planning issues  Prophylactic measure: Prophylactic measure  Essential hypertension: Essential hypertension  CAD (Coronary Artery Disease): CAD (Coronary Artery Disease)  Idiopathic pulmonary fibrosis: Idiopathic pulmonary fibrosis  RSV (respiratory syncytial virus pneumonia): RSV (respiratory syncytial virus pneumonia)  Acute on chronic respiratory failure with hypoxia and hypercapnia: Acute on chronic respiratory failure with hypoxia and hypercapnia      RECS:  -Continue pulmicort, duonebs  -supplemental O2  -no pulmonary objection to dc planning  -pt requests sleeping pill    Jenny Antonio MD  [phone]

## 2019-12-30 NOTE — PROVIDER CONTACT NOTE (CRITICAL VALUE NOTIFICATION) - ASSESSMENT
Patient does not appear in distress, Vital signs are as follow BP: 130/53 HR 88  RR: 21 O2 stat: 100% on 3L N/C.

## 2019-12-30 NOTE — PROGRESS NOTE ADULT - SUBJECTIVE AND OBJECTIVE BOX
Patient is a 89y old  Female who presents with a chief complaint of acute on chronic hypoxic and hypercapnic respiratory failure, sepsis 2/2 RSV infection (29 Dec 2019 09:22)      SUBJECTIVE / OVERNIGHT EVENTS: pt seen and examined, chart reviewed. No complaints, denies any SOB, at baseline as per son.     MEDICATIONS  (STANDING):  albuterol/ipratropium for Nebulization 3 milliLiter(s) Nebulizer every 4 hours  aspirin enteric coated 81 milliGRAM(s) Oral daily  benzonatate 100 milliGRAM(s) Oral three times a day  buDESOnide    Inhalation Suspension 0.25 milliGRAM(s) Inhalation two times a day  doxycycline IVPB 100 milliGRAM(s) IV Intermittent every 12 hours  enoxaparin Injectable 40 milliGRAM(s) SubCutaneous daily  guaiFENesin   Syrup  (Sugar-Free) 100 milliGRAM(s) Oral every 6 hours  influenza   Vaccine 0.5 milliLiter(s) IntraMuscular once  lidocaine   Patch 1 Patch Transdermal daily  methylPREDNISolone sodium succinate Injectable 40 milliGRAM(s) IV Push daily  metoprolol succinate ER 25 milliGRAM(s) Oral daily  polyethylene glycol 3350 17 Gram(s) Oral daily  senna 2 Tablet(s) Oral at bedtime  sodium chloride 3%  Inhalation 3 milliLiter(s) Inhalation every 4 hours  ursodiol Capsule 300 milliGRAM(s) Oral two times a day    MEDICATIONS  (PRN):  acetaminophen   Tablet .. 650 milliGRAM(s) Oral every 6 hours PRN Mild Pain (1 - 3), Moderate Pain (4 - 6)  hydrocodone/homatropine Syrup 5 milliLiter(s) Oral every 8 hours PRN Cough      Vital Signs Last 24 Hrs  T(C): 36.7 (30 Dec 2019 10:15), Max: 36.9 (29 Dec 2019 22:43)  T(F): 98 (30 Dec 2019 10:15), Max: 98.5 (29 Dec 2019 22:43)  HR: 88 (30 Dec 2019 10:15) (64 - 99)  BP: 130/53 (30 Dec 2019 10:15) (123/68 - 150/79)  BP(mean): --  RR: 16 (30 Dec 2019 10:15) (16 - 20)  SpO2: 100% (30 Dec 2019 10:15) (97% - 100%)  CAPILLARY BLOOD GLUCOSE        I&O's Summary      PHYSICAL EXAM:  GENERAL: NAD, well-developed  HEAD:  Atraumatic, Normocephalic  EYES: EOMI, PERRLA, conjunctiva and sclera clear  NECK: Supple, No JVD  CHEST/LUNG: (+) crackles diffusely bilaterally; No wheeze  HEART: Regular rate and rhythm; No murmurs, rubs, or gallops  ABDOMEN: Soft, Nontender, Nondistended; Bowel sounds present  EXTREMITIES:  2+ Peripheral Pulses, No clubbing, cyanosis, or edema  PSYCH: AAOx3  NEUROLOGY: non-focal  SKIN: No rashes or lesions    LABS:                        9.4    8.72  )-----------( 247      ( 29 Dec 2019 06:30 )             30.6     12-29    136  |  93<L>  |  18  ----------------------------<  116<H>  4.1   |  32<H>  |  0.70    Ca    8.6      29 Dec 2019 06:30  Phos  3.0     12-29  Mg     1.9     12-29    TPro  7.1  /  Alb  2.6<L>  /  TBili  < 0.2<L>  /  DBili  x   /  AST  26  /  ALT  12  /  AlkPhos  55  12-29              RADIOLOGY & ADDITIONAL TESTS:    Imaging Personally Reviewed:    Consultant(s) Notes Reviewed:  Pulmonary    Care Discussed with Consultants/Other Providers:

## 2019-12-30 NOTE — PROGRESS NOTE ADULT - PROBLEM SELECTOR PLAN 1
- In setting of chronic severe IPF  - DNR/DNI  - duonebs q4, hypersal 3% q4, chest pt q4  - Doing well off of BIPAP and improving. Will continue to watch off of BIPAP.  -taper down steroid, prednisone 20mg tomorrow then resume home regimen

## 2019-12-31 ENCOUNTER — TRANSCRIPTION ENCOUNTER (OUTPATIENT)
Age: 84
End: 2019-12-31

## 2019-12-31 VITALS — OXYGEN SATURATION: 99 %

## 2019-12-31 DIAGNOSIS — D72.829 ELEVATED WHITE BLOOD CELL COUNT, UNSPECIFIED: ICD-10-CM

## 2019-12-31 LAB
ANION GAP SERPL CALC-SCNC: 12 MMO/L — SIGNIFICANT CHANGE UP (ref 7–14)
BACTERIA BLD CULT: SIGNIFICANT CHANGE UP
BACTERIA BLD CULT: SIGNIFICANT CHANGE UP
BUN SERPL-MCNC: 23 MG/DL — SIGNIFICANT CHANGE UP (ref 7–23)
CALCIUM SERPL-MCNC: 9.4 MG/DL — SIGNIFICANT CHANGE UP (ref 8.4–10.5)
CHLORIDE SERPL-SCNC: 89 MMOL/L — LOW (ref 98–107)
CO2 SERPL-SCNC: 33 MMOL/L — HIGH (ref 22–31)
CREAT SERPL-MCNC: 0.7 MG/DL — SIGNIFICANT CHANGE UP (ref 0.5–1.3)
GLUCOSE SERPL-MCNC: 119 MG/DL — HIGH (ref 70–99)
HCT VFR BLD CALC: 35.9 % — SIGNIFICANT CHANGE UP (ref 34.5–45)
HGB BLD-MCNC: 11.4 G/DL — LOW (ref 11.5–15.5)
MAGNESIUM SERPL-MCNC: 1.8 MG/DL — SIGNIFICANT CHANGE UP (ref 1.6–2.6)
MCHC RBC-ENTMCNC: 31.8 % — LOW (ref 32–36)
MCHC RBC-ENTMCNC: 33 PG — SIGNIFICANT CHANGE UP (ref 27–34)
MCV RBC AUTO: 104.1 FL — HIGH (ref 80–100)
NRBC # FLD: 0.02 K/UL — SIGNIFICANT CHANGE UP (ref 0–0)
PHOSPHATE SERPL-MCNC: 2.6 MG/DL — SIGNIFICANT CHANGE UP (ref 2.5–4.5)
PLATELET # BLD AUTO: 301 K/UL — SIGNIFICANT CHANGE UP (ref 150–400)
PMV BLD: 8.6 FL — SIGNIFICANT CHANGE UP (ref 7–13)
POTASSIUM SERPL-MCNC: 4.4 MMOL/L — SIGNIFICANT CHANGE UP (ref 3.5–5.3)
POTASSIUM SERPL-SCNC: 4.4 MMOL/L — SIGNIFICANT CHANGE UP (ref 3.5–5.3)
RBC # BLD: 3.45 M/UL — LOW (ref 3.8–5.2)
RBC # FLD: 14 % — SIGNIFICANT CHANGE UP (ref 10.3–14.5)
SODIUM SERPL-SCNC: 134 MMOL/L — LOW (ref 135–145)
WBC # BLD: 18.06 K/UL — HIGH (ref 3.8–10.5)
WBC # FLD AUTO: 18.06 K/UL — HIGH (ref 3.8–10.5)

## 2019-12-31 PROCEDURE — 99239 HOSP IP/OBS DSCHRG MGMT >30: CPT

## 2019-12-31 RX ORDER — LANOLIN ALCOHOL/MO/W.PET/CERES
1 CREAM (GRAM) TOPICAL
Qty: 0 | Refills: 0 | DISCHARGE
Start: 2019-12-31

## 2019-12-31 RX ORDER — IPRATROPIUM/ALBUTEROL SULFATE 18-103MCG
2 AEROSOL WITH ADAPTER (GRAM) INHALATION
Qty: 1 | Refills: 0
Start: 2019-12-31 | End: 2020-01-29

## 2019-12-31 RX ORDER — OMEPRAZOLE 10 MG/1
1 CAPSULE, DELAYED RELEASE ORAL
Qty: 0 | Refills: 0 | DISCHARGE

## 2019-12-31 RX ORDER — SODIUM CHLORIDE 0.65 %
1 AEROSOL, SPRAY (ML) NASAL
Refills: 0 | Status: DISCONTINUED | OUTPATIENT
Start: 2019-12-31 | End: 2019-12-31

## 2019-12-31 RX ORDER — IPRATROPIUM/ALBUTEROL SULFATE 18-103MCG
2 AEROSOL WITH ADAPTER (GRAM) INHALATION
Refills: 0 | Status: DISCONTINUED | OUTPATIENT
Start: 2019-12-31 | End: 2019-12-31

## 2019-12-31 RX ADMIN — LIDOCAINE 1 PATCH: 4 CREAM TOPICAL at 00:00

## 2019-12-31 RX ADMIN — LIDOCAINE 1 PATCH: 4 CREAM TOPICAL at 11:46

## 2019-12-31 RX ADMIN — Medication 25 MILLIGRAM(S): at 05:58

## 2019-12-31 RX ADMIN — SODIUM CHLORIDE 3 MILLILITER(S): 9 INJECTION INTRAMUSCULAR; INTRAVENOUS; SUBCUTANEOUS at 04:42

## 2019-12-31 RX ADMIN — Medication 100 MILLIGRAM(S): at 11:46

## 2019-12-31 RX ADMIN — URSODIOL 300 MILLIGRAM(S): 250 TABLET, FILM COATED ORAL at 05:58

## 2019-12-31 RX ADMIN — Medication 110 MILLIGRAM(S): at 05:58

## 2019-12-31 RX ADMIN — Medication 3 MILLILITER(S): at 04:36

## 2019-12-31 RX ADMIN — Medication 20 MILLIGRAM(S): at 03:48

## 2019-12-31 RX ADMIN — Medication 100 MILLIGRAM(S): at 03:48

## 2019-12-31 RX ADMIN — Medication 100 MILLIGRAM(S): at 05:58

## 2019-12-31 RX ADMIN — Medication 3 MILLILITER(S): at 09:15

## 2019-12-31 RX ADMIN — ENOXAPARIN SODIUM 40 MILLIGRAM(S): 100 INJECTION SUBCUTANEOUS at 11:45

## 2019-12-31 RX ADMIN — Medication 81 MILLIGRAM(S): at 11:46

## 2019-12-31 RX ADMIN — Medication 3 MILLILITER(S): at 00:40

## 2019-12-31 RX ADMIN — SODIUM CHLORIDE 3 MILLILITER(S): 9 INJECTION INTRAMUSCULAR; INTRAVENOUS; SUBCUTANEOUS at 09:15

## 2019-12-31 RX ADMIN — POLYETHYLENE GLYCOL 3350 17 GRAM(S): 17 POWDER, FOR SOLUTION ORAL at 11:47

## 2019-12-31 RX ADMIN — SODIUM CHLORIDE 3 MILLILITER(S): 9 INJECTION INTRAMUSCULAR; INTRAVENOUS; SUBCUTANEOUS at 00:48

## 2019-12-31 NOTE — PROGRESS NOTE ADULT - PROBLEM SELECTOR PROBLEM 7
Essential hypertension
Goals of care, counseling/discussion

## 2019-12-31 NOTE — PROGRESS NOTE ADULT - REASON FOR ADMISSION
Abdominal pain
acute on chronic hypoxic and hypercapnic respiratory failure, sepsis 2/2 RSV infection

## 2019-12-31 NOTE — PROGRESS NOTE ADULT - PROVIDER SPECIALTY LIST ADULT
Hospitalist
Internal Medicine
Pulmonology
Hospitalist

## 2019-12-31 NOTE — PROGRESS NOTE ADULT - PROBLEM SELECTOR PROBLEM 6
CAD (Coronary Artery Disease)
Essential hypertension

## 2019-12-31 NOTE — PROGRESS NOTE ADULT - PROBLEM SELECTOR PROBLEM 1
Acute on chronic respiratory failure with hypoxia and hypercapnia

## 2019-12-31 NOTE — PROGRESS NOTE ADULT - PROBLEM SELECTOR PLAN 5
- stable
- continue metoprolol  - continue aspirin  - continue home lasix

## 2019-12-31 NOTE — PROGRESS NOTE ADULT - SUBJECTIVE AND OBJECTIVE BOX
Patient is a 89y old  Female who presents with a chief complaint of acute on chronic hypoxic and hypercapnic respiratory failure, sepsis 2/2 RSV infection (30 Dec 2019 16:14)      SUBJECTIVE / OVERNIGHT EVENTS: only c/o coughing, No SOB.     MEDICATIONS  (STANDING):  albuterol/ipratropium for Nebulization 3 milliLiter(s) Nebulizer every 4 hours  aspirin enteric coated 81 milliGRAM(s) Oral daily  benzonatate 100 milliGRAM(s) Oral three times a day  buDESOnide    Inhalation Suspension 0.25 milliGRAM(s) Inhalation two times a day  doxycycline IVPB 100 milliGRAM(s) IV Intermittent every 12 hours  enoxaparin Injectable 40 milliGRAM(s) SubCutaneous daily  guaiFENesin   Syrup  (Sugar-Free) 100 milliGRAM(s) Oral every 6 hours  influenza   Vaccine 0.5 milliLiter(s) IntraMuscular once  lidocaine   Patch 1 Patch Transdermal daily  melatonin 3 milliGRAM(s) Oral at bedtime  metoprolol succinate ER 25 milliGRAM(s) Oral daily  polyethylene glycol 3350 17 Gram(s) Oral daily  senna 2 Tablet(s) Oral at bedtime  sodium chloride 3%  Inhalation 3 milliLiter(s) Inhalation every 4 hours  ursodiol Capsule 300 milliGRAM(s) Oral two times a day    MEDICATIONS  (PRN):  acetaminophen   Tablet .. 650 milliGRAM(s) Oral every 6 hours PRN Mild Pain (1 - 3), Moderate Pain (4 - 6)  guaiFENesin   Syrup  (Sugar-Free) 100 milliGRAM(s) Oral every 6 hours PRN Cough  hydrocodone/homatropine Syrup 5 milliLiter(s) Oral every 8 hours PRN Cough  sodium chloride 0.65% Nasal 1 Spray(s) Both Nostrils two times a day PRN Nasal Congestion      Vital Signs Last 24 Hrs  T(C): 36.7 (31 Dec 2019 05:57), Max: 36.8 (30 Dec 2019 20:57)  T(F): 98.1 (31 Dec 2019 05:57), Max: 98.3 (30 Dec 2019 20:57)  HR: 81 (31 Dec 2019 09:15) (73 - 99)  BP: 146/77 (31 Dec 2019 05:57) (130/53 - 156/75)  BP(mean): --  RR: 19 (31 Dec 2019 05:57) (16 - 19)  SpO2: 99% (31 Dec 2019 09:15) (97% - 100%)  CAPILLARY BLOOD GLUCOSE        I&O's Summary      PHYSICAL EXAM:  GENERAL: NAD, well-developed  HEAD:  Atraumatic, Normocephalic  EYES: EOMI, PERRLA, conjunctiva and sclera clear  NECK: Supple, No JVD  CHEST/LUNG: (+) crackles bilaterally; No wheeze, no rhonchi   HEART: Regular rate and rhythm; No murmurs, rubs, or gallops  ABDOMEN: Soft, Nontender, Nondistended; Bowel sounds present  EXTREMITIES:  2+ Peripheral Pulses, No clubbing, cyanosis, or edema  PSYCH: AAOx3  NEUROLOGY: non-focal  SKIN: No rashes or lesions    LABS:                        11.4   18.06 )-----------( 301      ( 31 Dec 2019 05:00 )             35.9     12-31    134<L>  |  89<L>  |  23  ----------------------------<  119<H>  4.4   |  33<H>  |  0.70    Ca    9.4      31 Dec 2019 05:00  Phos  2.6     12-31  Mg     1.8     12-31                RADIOLOGY & ADDITIONAL TESTS:    Imaging Personally Reviewed:    Consultant(s) Notes Reviewed:      Care Discussed with Consultants/Other Providers:

## 2019-12-31 NOTE — PROGRESS NOTE ADULT - PROBLEM SELECTOR PROBLEM 8
Goals of care, counseling/discussion
Prophylactic measure

## 2019-12-31 NOTE — DISCHARGE NOTE NURSING/CASE MANAGEMENT/SOCIAL WORK - PATIENT PORTAL LINK FT
You can access the FollowMyHealth Patient Portal offered by [facility] by registering at the following website: [email or web address]. By joining [facility]’s FollowMyHealth portal, you will also be able to view your health information using other applications (apps) compatible with our system.

## 2019-12-31 NOTE — PROGRESS NOTE ADULT - PROBLEM SELECTOR PROBLEM 2
RSV (respiratory syncytial virus pneumonia)

## 2019-12-31 NOTE — PROGRESS NOTE ADULT - PROBLEM SELECTOR PLAN 8
- patient is DNR/DNI  - Spoke to pt and son yesterday about Hospice, they are not interested in Hospice at this time.  They understood their options and will consider hospice if pt's condition deteriorates.

## 2019-12-31 NOTE — PROGRESS NOTE ADULT - PROBLEM SELECTOR PROBLEM 9
Prophylactic measure
Discharge planning issues

## 2019-12-31 NOTE — PROGRESS NOTE ADULT - PROBLEM SELECTOR PLAN 4
No fever or chills, clinically not septic. Likely due to steroids   -Cont to monitor for signs of infection.   -Taper down steroids. No fever or chills, no diarrhea, clinically not septic. Likely due to steroids (on Solumedrol X 5 days)  -Cont to monitor for signs of infection.   -Taper down steroids to home regimen  -F/U with PCP Dr Otoole to trend CBC

## 2019-12-31 NOTE — PROGRESS NOTE ADULT - PROBLEM SELECTOR PROBLEM 3
Idiopathic pulmonary fibrosis

## 2019-12-31 NOTE — PROGRESS NOTE ADULT - PROBLEM SELECTOR PLAN 1
- In setting of chronic severe IPF  - DNR/DNI  - Change Duoneb to combivent 2 puff Q6H PRN for SOB  - Doing well off of BIPAP and improving. Will continue to watch off of BIPAP.  -taper down steroid, prednisone 20mg tomorrow then resume home regimen  -Cont.  Pulmicort  -Cont home O2

## 2019-12-31 NOTE — PROGRESS NOTE ADULT - PROBLEM SELECTOR PLAN 6
- continue metoprolol  - continue aspirin  - continue home lasix
- not on home antihypertensives aside from lasix  - continue lasix

## 2019-12-31 NOTE — PROGRESS NOTE ADULT - PROBLEM SELECTOR PLAN 9
- DVT ppx: Lovenox  - Dispo: home with home PT  - DNR/DNI
Transitions of Care Status:  1.  Name of PCP: Xiang  2.  PCP Contacted on Admission: [ ] Y    [x ] N    3.  PCP contacted at Discharge: [ ] Y    [ ] N    [ ] N/A  4.  Post-Discharge Appointment Date and Location:  5.  Summary of Handoff given to PCP:

## 2019-12-31 NOTE — PROGRESS NOTE ADULT - PROBLEM SELECTOR PLAN 7
- not on home antihypertensives aside from lasix  - continue lasix
- patient is DNR/DNI  - will refer to hospice given patient preference

## 2019-12-31 NOTE — PROGRESS NOTE ADULT - ATTENDING COMMENTS
Spoke to pt and daughter at length about f/u issues, they understood and agreed with the plan.  Total time: 35min Spoke to pt and daughter at length about f/u issues, they understood and agreed with the plan.  Spoke to Dr Otoole PCP about f/u issues on the phone  Total time: 35min

## 2019-12-31 NOTE — PROGRESS NOTE ADULT - PROBLEM SELECTOR PLAN 10
Transitions of Care Status:  1.  Name of PCP: Xiang  2.  PCP Contacted on Admission: [ ] Y    [x ] N    3.  PCP contacted at Discharge: [ ] Y    [ ] N    [ ] N/A  4.  Post-Discharge Appointment Date and Location:  5.  Summary of Handoff given to PCP: Transitions of Care Status:  1.  Name of PCP: Xiang  2.  PCP Contacted on Admission: [ ] Y    [x ] N    3.  PCP contacted at Discharge: [x ] Y    [ ] N    [ ] N/A, left message and phone # and awaiting call back   4.  Post-Discharge Appointment Date and Location:  5.  Summary of Handoff given to PCP: Transitions of Care Status:  1.  Name of PCP: Xiang  2.  PCP Contacted on Admission: [ ] Y    [x ] N    3.  PCP contacted at Discharge: [x ] Y    [ ] N    [ ] N/A, Spoke to Dr Otoole PCP on the phone about f/u issues, he will f/u pt early next week in office.   4.  Post-Discharge Appointment Date and Location:  5.  Summary of Handoff given to PCP:

## 2019-12-31 NOTE — PROGRESS NOTE ADULT - PROBLEM SELECTOR PROBLEM 5
CAD (Coronary Artery Disease)
Macrocytic anemia

## 2020-01-03 ENCOUNTER — MEDICATION RENEWAL (OUTPATIENT)
Age: 85
End: 2020-01-03

## 2020-01-03 RX ORDER — SODIUM CHLORIDE FOR INHALATION 3 %
3 VIAL, NEBULIZER (ML) INHALATION
Qty: 2 | Refills: 5 | Status: ACTIVE | COMMUNITY
Start: 2020-01-03 | End: 1900-01-01

## 2020-01-03 RX ORDER — DOXYCYCLINE HYCLATE 100 MG/1
100 CAPSULE ORAL
Qty: 4 | Refills: 0 | Status: ACTIVE | COMMUNITY
Start: 2020-01-03 | End: 1900-01-01

## 2020-01-07 ENCOUNTER — OTHER (OUTPATIENT)
Age: 85
End: 2020-01-07

## 2020-01-07 RX ORDER — SOFT LENS DISINFECTANT
SOLUTION, NON-ORAL MISCELLANEOUS
Qty: 1 | Refills: 0 | Status: ACTIVE | COMMUNITY
Start: 2020-01-07

## 2020-01-07 RX ORDER — NEBULIZER ACCESSORIES
KIT MISCELLANEOUS
Qty: 1 | Refills: 5 | Status: ACTIVE | COMMUNITY
Start: 2020-01-07

## 2020-01-13 DIAGNOSIS — B37.0 CANDIDAL STOMATITIS: ICD-10-CM

## 2020-01-13 RX ORDER — FLUCONAZOLE 100 MG/1
100 TABLET ORAL DAILY
Qty: 7 | Refills: 1 | Status: ACTIVE | COMMUNITY
Start: 2020-01-13 | End: 1900-01-01

## 2020-01-21 ENCOUNTER — APPOINTMENT (OUTPATIENT)
Dept: PULMONOLOGY | Facility: CLINIC | Age: 85
End: 2020-01-21

## 2020-01-22 ENCOUNTER — INPATIENT (INPATIENT)
Facility: HOSPITAL | Age: 85
LOS: 6 days | Discharge: HOME HEALTH | End: 2020-01-29
Attending: INTERNAL MEDICINE | Admitting: INTERNAL MEDICINE
Payer: MEDICARE

## 2020-01-22 VITALS
HEART RATE: 106 BPM | RESPIRATION RATE: 40 BRPM | TEMPERATURE: 95 F | DIASTOLIC BLOOD PRESSURE: 68 MMHG | SYSTOLIC BLOOD PRESSURE: 122 MMHG | OXYGEN SATURATION: 84 %

## 2020-01-22 DIAGNOSIS — I25.10 ATHEROSCLEROTIC HEART DISEASE OF NATIVE CORONARY ARTERY WITHOUT ANGINA PECTORIS: ICD-10-CM

## 2020-01-22 DIAGNOSIS — I10 ESSENTIAL (PRIMARY) HYPERTENSION: ICD-10-CM

## 2020-01-22 DIAGNOSIS — Z29.9 ENCOUNTER FOR PROPHYLACTIC MEASURES, UNSPECIFIED: ICD-10-CM

## 2020-01-22 DIAGNOSIS — J96.22 ACUTE AND CHRONIC RESPIRATORY FAILURE WITH HYPERCAPNIA: ICD-10-CM

## 2020-01-22 DIAGNOSIS — J96.21 ACUTE AND CHRONIC RESPIRATORY FAILURE WITH HYPOXIA: ICD-10-CM

## 2020-01-22 DIAGNOSIS — B37.0 CANDIDAL STOMATITIS: ICD-10-CM

## 2020-01-22 DIAGNOSIS — A41.9 SEPSIS, UNSPECIFIED ORGANISM: ICD-10-CM

## 2020-01-22 DIAGNOSIS — R79.89 OTHER SPECIFIED ABNORMAL FINDINGS OF BLOOD CHEMISTRY: ICD-10-CM

## 2020-01-22 DIAGNOSIS — E87.1 HYPO-OSMOLALITY AND HYPONATREMIA: ICD-10-CM

## 2020-01-22 DIAGNOSIS — J84.112 IDIOPATHIC PULMONARY FIBROSIS: ICD-10-CM

## 2020-01-22 DIAGNOSIS — Z02.9 ENCOUNTER FOR ADMINISTRATIVE EXAMINATIONS, UNSPECIFIED: ICD-10-CM

## 2020-01-22 LAB
ALBUMIN SERPL ELPH-MCNC: 3 G/DL — LOW (ref 3.3–5)
ALP SERPL-CCNC: 89 U/L — SIGNIFICANT CHANGE UP (ref 40–120)
ALT FLD-CCNC: 17 U/L — SIGNIFICANT CHANGE UP (ref 4–33)
ANION GAP SERPL CALC-SCNC: 13 MMO/L — SIGNIFICANT CHANGE UP (ref 7–14)
APPEARANCE UR: SIGNIFICANT CHANGE UP
APTT BLD: 26.9 SEC — LOW (ref 27.5–36.3)
AST SERPL-CCNC: 31 U/L — SIGNIFICANT CHANGE UP (ref 4–32)
B PERT DNA SPEC QL NAA+PROBE: NOT DETECTED — SIGNIFICANT CHANGE UP
BACTERIA # UR AUTO: HIGH
BASE EXCESS BLDV CALC-SCNC: 6 MMOL/L — SIGNIFICANT CHANGE UP
BASE EXCESS BLDV CALC-SCNC: 6.4 MMOL/L — SIGNIFICANT CHANGE UP
BASOPHILS # BLD AUTO: 0.05 K/UL — SIGNIFICANT CHANGE UP (ref 0–0.2)
BASOPHILS NFR BLD AUTO: 0.2 % — SIGNIFICANT CHANGE UP (ref 0–2)
BASOPHILS NFR SPEC: 0 % — SIGNIFICANT CHANGE UP (ref 0–2)
BILIRUB SERPL-MCNC: 0.2 MG/DL — SIGNIFICANT CHANGE UP (ref 0.2–1.2)
BILIRUB UR-MCNC: NEGATIVE — SIGNIFICANT CHANGE UP
BLASTS # FLD: 0 % — SIGNIFICANT CHANGE UP (ref 0–0)
BLOOD GAS VENOUS - CREATININE: 0.66 MG/DL — SIGNIFICANT CHANGE UP (ref 0.5–1.3)
BLOOD GAS VENOUS - CREATININE: 0.71 MG/DL — SIGNIFICANT CHANGE UP (ref 0.5–1.3)
BLOOD GAS VENOUS - FIO2: 21 — SIGNIFICANT CHANGE UP
BLOOD UR QL VISUAL: NEGATIVE — SIGNIFICANT CHANGE UP
BUN SERPL-MCNC: 29 MG/DL — HIGH (ref 7–23)
C PNEUM DNA SPEC QL NAA+PROBE: NOT DETECTED — SIGNIFICANT CHANGE UP
CALCIUM SERPL-MCNC: 9.1 MG/DL — SIGNIFICANT CHANGE UP (ref 8.4–10.5)
CHLORIDE BLDV-SCNC: 84 MMOL/L — LOW (ref 96–108)
CHLORIDE BLDV-SCNC: 88 MMOL/L — LOW (ref 96–108)
CHLORIDE SERPL-SCNC: 80 MMOL/L — LOW (ref 98–107)
CO2 SERPL-SCNC: 32 MMOL/L — HIGH (ref 22–31)
COLOR SPEC: YELLOW — SIGNIFICANT CHANGE UP
CREAT SERPL-MCNC: 0.99 MG/DL — SIGNIFICANT CHANGE UP (ref 0.5–1.3)
EOSINOPHIL # BLD AUTO: 0.01 K/UL — SIGNIFICANT CHANGE UP (ref 0–0.5)
EOSINOPHIL NFR BLD AUTO: 0 % — SIGNIFICANT CHANGE UP (ref 0–6)
EOSINOPHIL NFR FLD: 0.9 % — SIGNIFICANT CHANGE UP (ref 0–6)
EPI CELLS # UR: SIGNIFICANT CHANGE UP
FLUAV H1 2009 PAND RNA SPEC QL NAA+PROBE: NOT DETECTED — SIGNIFICANT CHANGE UP
FLUAV H1 RNA SPEC QL NAA+PROBE: NOT DETECTED — SIGNIFICANT CHANGE UP
FLUAV H3 RNA SPEC QL NAA+PROBE: NOT DETECTED — SIGNIFICANT CHANGE UP
FLUAV SUBTYP SPEC NAA+PROBE: NOT DETECTED — SIGNIFICANT CHANGE UP
FLUBV RNA SPEC QL NAA+PROBE: NOT DETECTED — SIGNIFICANT CHANGE UP
GAS PNL BLDV: 122 MMOL/L — LOW (ref 136–146)
GAS PNL BLDV: 125 MMOL/L — LOW (ref 136–146)
GLUCOSE BLDV-MCNC: 270 MG/DL — HIGH (ref 70–99)
GLUCOSE BLDV-MCNC: 292 MG/DL — HIGH (ref 70–99)
GLUCOSE SERPL-MCNC: 263 MG/DL — HIGH (ref 70–99)
GLUCOSE UR-MCNC: 50 — SIGNIFICANT CHANGE UP
HADV DNA SPEC QL NAA+PROBE: NOT DETECTED — SIGNIFICANT CHANGE UP
HCO3 BLDV-SCNC: 28 MMOL/L — HIGH (ref 20–27)
HCO3 BLDV-SCNC: 29 MMOL/L — HIGH (ref 20–27)
HCOV PNL SPEC NAA+PROBE: SIGNIFICANT CHANGE UP
HCT VFR BLD CALC: 32.6 % — LOW (ref 34.5–45)
HCT VFR BLDV CALC: 27.2 % — LOW (ref 34.5–45)
HCT VFR BLDV CALC: 31.8 % — LOW (ref 34.5–45)
HGB BLD-MCNC: 10 G/DL — LOW (ref 11.5–15.5)
HGB BLDV-MCNC: 10.3 G/DL — LOW (ref 11.5–15.5)
HGB BLDV-MCNC: 8.8 G/DL — LOW (ref 11.5–15.5)
HMPV RNA SPEC QL NAA+PROBE: NOT DETECTED — SIGNIFICANT CHANGE UP
HPIV1 RNA SPEC QL NAA+PROBE: NOT DETECTED — SIGNIFICANT CHANGE UP
HPIV2 RNA SPEC QL NAA+PROBE: NOT DETECTED — SIGNIFICANT CHANGE UP
HPIV3 RNA SPEC QL NAA+PROBE: NOT DETECTED — SIGNIFICANT CHANGE UP
HPIV4 RNA SPEC QL NAA+PROBE: NOT DETECTED — SIGNIFICANT CHANGE UP
IMM GRANULOCYTES NFR BLD AUTO: 2.5 % — HIGH (ref 0–1.5)
INR BLD: 1.34 — HIGH (ref 0.88–1.17)
KETONES UR-MCNC: NEGATIVE — SIGNIFICANT CHANGE UP
LACTATE BLDV-MCNC: 1.7 MMOL/L — SIGNIFICANT CHANGE UP (ref 0.5–2)
LACTATE BLDV-MCNC: 6.9 MMOL/L — CRITICAL HIGH (ref 0.5–2)
LEUKOCYTE ESTERASE UR-ACNC: SIGNIFICANT CHANGE UP
LYMPHOCYTES # BLD AUTO: 0.47 K/UL — LOW (ref 1–3.3)
LYMPHOCYTES # BLD AUTO: 2.2 % — LOW (ref 13–44)
LYMPHOCYTES NFR SPEC AUTO: 2.6 % — LOW (ref 13–44)
MANUAL SMEAR VERIFICATION: SIGNIFICANT CHANGE UP
MCHC RBC-ENTMCNC: 30.7 % — LOW (ref 32–36)
MCHC RBC-ENTMCNC: 31.7 PG — SIGNIFICANT CHANGE UP (ref 27–34)
MCV RBC AUTO: 103.5 FL — HIGH (ref 80–100)
METAMYELOCYTES # FLD: 0 % — SIGNIFICANT CHANGE UP (ref 0–1)
MONOCYTES # BLD AUTO: 0.55 K/UL — SIGNIFICANT CHANGE UP (ref 0–0.9)
MONOCYTES NFR BLD AUTO: 2.5 % — SIGNIFICANT CHANGE UP (ref 2–14)
MONOCYTES NFR BLD: 1.7 % — LOW (ref 2–9)
MORPHOLOGY BLD-IMP: NORMAL — SIGNIFICANT CHANGE UP
MYELOCYTES NFR BLD: 0 % — SIGNIFICANT CHANGE UP (ref 0–0)
NEUTROPHIL AB SER-ACNC: 85.2 % — HIGH (ref 43–77)
NEUTROPHILS # BLD AUTO: 20.08 K/UL — HIGH (ref 1.8–7.4)
NEUTROPHILS NFR BLD AUTO: 92.6 % — HIGH (ref 43–77)
NEUTS BAND # BLD: 9.6 % — HIGH (ref 0–6)
NITRITE UR-MCNC: NEGATIVE — SIGNIFICANT CHANGE UP
NRBC # BLD: 2 /100WBC — SIGNIFICANT CHANGE UP
NRBC # FLD: 0.11 K/UL — SIGNIFICANT CHANGE UP (ref 0–0)
OTHER - HEMATOLOGY %: 0 — SIGNIFICANT CHANGE UP
PCO2 BLDV: 60 MMHG — HIGH (ref 41–51)
PCO2 BLDV: 66 MMHG — HIGH (ref 41–51)
PH BLDV: 7.31 PH — LOW (ref 7.32–7.43)
PH BLDV: 7.34 PH — SIGNIFICANT CHANGE UP (ref 7.32–7.43)
PH UR: 8.5 — HIGH (ref 5–8)
PHOSPHATE SERPL-MCNC: 3.4 MG/DL — SIGNIFICANT CHANGE UP (ref 2.5–4.5)
PLATELET # BLD AUTO: 376 K/UL — SIGNIFICANT CHANGE UP (ref 150–400)
PLATELET COUNT - ESTIMATE: NORMAL — SIGNIFICANT CHANGE UP
PMV BLD: 8.9 FL — SIGNIFICANT CHANGE UP (ref 7–13)
PO2 BLDV: 101 MMHG — HIGH (ref 35–40)
PO2 BLDV: < 24 MMHG — LOW (ref 35–40)
POTASSIUM BLDV-SCNC: 5 MMOL/L — HIGH (ref 3.4–4.5)
POTASSIUM BLDV-SCNC: 5.1 MMOL/L — HIGH (ref 3.4–4.5)
POTASSIUM SERPL-MCNC: 5.4 MMOL/L — HIGH (ref 3.5–5.3)
POTASSIUM SERPL-SCNC: 5.4 MMOL/L — HIGH (ref 3.5–5.3)
PROMYELOCYTES # FLD: 0 % — SIGNIFICANT CHANGE UP (ref 0–0)
PROT SERPL-MCNC: 8.1 G/DL — SIGNIFICANT CHANGE UP (ref 6–8.3)
PROT UR-MCNC: >600 — SIGNIFICANT CHANGE UP
PROTHROM AB SERPL-ACNC: 15 SEC — HIGH (ref 9.8–13.1)
RBC # BLD: 3.15 M/UL — LOW (ref 3.8–5.2)
RBC # FLD: 14.9 % — HIGH (ref 10.3–14.5)
REVIEW TO FOLLOW: YES — SIGNIFICANT CHANGE UP
RSV RNA SPEC QL NAA+PROBE: NOT DETECTED — SIGNIFICANT CHANGE UP
RV+EV RNA SPEC QL NAA+PROBE: DETECTED — HIGH
SAO2 % BLDV: 24.8 % — LOW (ref 60–85)
SAO2 % BLDV: 97.2 % — HIGH (ref 60–85)
SODIUM SERPL-SCNC: 125 MMOL/L — LOW (ref 135–145)
SP GR SPEC: 1.02 — SIGNIFICANT CHANGE UP (ref 1–1.04)
TROPONIN T, HIGH SENSITIVITY: 95 NG/L — CRITICAL HIGH (ref ?–14)
UROBILINOGEN FLD QL: NORMAL — SIGNIFICANT CHANGE UP
VARIANT LYMPHS # BLD: 0 % — SIGNIFICANT CHANGE UP
WBC # BLD: 21.71 K/UL — HIGH (ref 3.8–10.5)
WBC # FLD AUTO: 21.71 K/UL — HIGH (ref 3.8–10.5)
WBC UR QL: >50 — HIGH (ref 0–?)

## 2020-01-22 PROCEDURE — 71045 X-RAY EXAM CHEST 1 VIEW: CPT | Mod: 26

## 2020-01-22 PROCEDURE — 99223 1ST HOSP IP/OBS HIGH 75: CPT | Mod: GC,AI

## 2020-01-22 RX ORDER — VANCOMYCIN HCL 1 G
1000 VIAL (EA) INTRAVENOUS ONCE
Refills: 0 | Status: COMPLETED | OUTPATIENT
Start: 2020-01-22 | End: 2020-01-22

## 2020-01-22 RX ORDER — INSULIN HUMAN 100 [IU]/ML
10 INJECTION, SOLUTION SUBCUTANEOUS ONCE
Refills: 0 | Status: COMPLETED | OUTPATIENT
Start: 2020-01-22 | End: 2020-01-22

## 2020-01-22 RX ORDER — IPRATROPIUM/ALBUTEROL SULFATE 18-103MCG
3 AEROSOL WITH ADAPTER (GRAM) INHALATION ONCE
Refills: 0 | Status: COMPLETED | OUTPATIENT
Start: 2020-01-22 | End: 2020-01-22

## 2020-01-22 RX ORDER — INSULIN LISPRO 100/ML
VIAL (ML) SUBCUTANEOUS EVERY 6 HOURS
Refills: 0 | Status: DISCONTINUED | OUTPATIENT
Start: 2020-01-22 | End: 2020-01-23

## 2020-01-22 RX ORDER — DEXTROSE 50 % IN WATER 50 %
15 SYRINGE (ML) INTRAVENOUS ONCE
Refills: 0 | Status: DISCONTINUED | OUTPATIENT
Start: 2020-01-22 | End: 2020-01-24

## 2020-01-22 RX ORDER — SODIUM CHLORIDE 9 MG/ML
1000 INJECTION, SOLUTION INTRAVENOUS
Refills: 0 | Status: DISCONTINUED | OUTPATIENT
Start: 2020-01-22 | End: 2020-01-24

## 2020-01-22 RX ORDER — SODIUM ZIRCONIUM CYCLOSILICATE 10 G/10G
10 POWDER, FOR SUSPENSION ORAL ONCE
Refills: 0 | Status: COMPLETED | OUTPATIENT
Start: 2020-01-22 | End: 2020-01-22

## 2020-01-22 RX ORDER — SODIUM CHLORIDE 9 MG/ML
500 INJECTION INTRAMUSCULAR; INTRAVENOUS; SUBCUTANEOUS ONCE
Refills: 0 | Status: COMPLETED | OUTPATIENT
Start: 2020-01-22 | End: 2020-01-22

## 2020-01-22 RX ORDER — DEXTROSE 50 % IN WATER 50 %
50 SYRINGE (ML) INTRAVENOUS ONCE
Refills: 0 | Status: COMPLETED | OUTPATIENT
Start: 2020-01-22 | End: 2020-01-22

## 2020-01-22 RX ORDER — CEFEPIME 1 G/1
1000 INJECTION, POWDER, FOR SOLUTION INTRAMUSCULAR; INTRAVENOUS EVERY 12 HOURS
Refills: 0 | Status: COMPLETED | OUTPATIENT
Start: 2020-01-23 | End: 2020-01-28

## 2020-01-22 RX ORDER — DEXTROSE 50 % IN WATER 50 %
25 SYRINGE (ML) INTRAVENOUS ONCE
Refills: 0 | Status: DISCONTINUED | OUTPATIENT
Start: 2020-01-22 | End: 2020-01-24

## 2020-01-22 RX ORDER — IPRATROPIUM/ALBUTEROL SULFATE 18-103MCG
3 AEROSOL WITH ADAPTER (GRAM) INHALATION EVERY 6 HOURS
Refills: 0 | Status: DISCONTINUED | OUTPATIENT
Start: 2020-01-22 | End: 2020-01-29

## 2020-01-22 RX ORDER — CEFEPIME 1 G/1
2000 INJECTION, POWDER, FOR SOLUTION INTRAMUSCULAR; INTRAVENOUS ONCE
Refills: 0 | Status: COMPLETED | OUTPATIENT
Start: 2020-01-22 | End: 2020-01-22

## 2020-01-22 RX ORDER — VANCOMYCIN HCL 1 G
1000 VIAL (EA) INTRAVENOUS EVERY 24 HOURS
Refills: 0 | Status: DISCONTINUED | OUTPATIENT
Start: 2020-01-23 | End: 2020-01-29

## 2020-01-22 RX ORDER — VANCOMYCIN HCL 1 G
1000 VIAL (EA) INTRAVENOUS EVERY 12 HOURS
Refills: 0 | Status: DISCONTINUED | OUTPATIENT
Start: 2020-01-22 | End: 2020-01-22

## 2020-01-22 RX ORDER — ACETAMINOPHEN 500 MG
650 TABLET ORAL ONCE
Refills: 0 | Status: COMPLETED | OUTPATIENT
Start: 2020-01-22 | End: 2020-01-22

## 2020-01-22 RX ORDER — ACETAMINOPHEN 500 MG
650 TABLET ORAL EVERY 6 HOURS
Refills: 0 | Status: DISCONTINUED | OUTPATIENT
Start: 2020-01-22 | End: 2020-01-23

## 2020-01-22 RX ORDER — ASPIRIN/CALCIUM CARB/MAGNESIUM 324 MG
81 TABLET ORAL DAILY
Refills: 0 | Status: DISCONTINUED | OUTPATIENT
Start: 2020-01-22 | End: 2020-01-29

## 2020-01-22 RX ORDER — GLUCAGON INJECTION, SOLUTION 0.5 MG/.1ML
1 INJECTION, SOLUTION SUBCUTANEOUS ONCE
Refills: 0 | Status: DISCONTINUED | OUTPATIENT
Start: 2020-01-22 | End: 2020-01-24

## 2020-01-22 RX ORDER — ACETAMINOPHEN 500 MG
975 TABLET ORAL ONCE
Refills: 0 | Status: DISCONTINUED | OUTPATIENT
Start: 2020-01-22 | End: 2020-01-22

## 2020-01-22 RX ORDER — DEXTROSE 50 % IN WATER 50 %
12.5 SYRINGE (ML) INTRAVENOUS ONCE
Refills: 0 | Status: DISCONTINUED | OUTPATIENT
Start: 2020-01-22 | End: 2020-01-24

## 2020-01-22 RX ORDER — CIPROFLOXACIN LACTATE 400MG/40ML
400 VIAL (ML) INTRAVENOUS ONCE
Refills: 0 | Status: COMPLETED | OUTPATIENT
Start: 2020-01-22 | End: 2020-01-22

## 2020-01-22 RX ORDER — ALBUTEROL 90 UG/1
10 AEROSOL, METERED ORAL ONCE
Refills: 0 | Status: COMPLETED | OUTPATIENT
Start: 2020-01-22 | End: 2020-01-22

## 2020-01-22 RX ORDER — AZITHROMYCIN 500 MG/1
500 TABLET, FILM COATED ORAL EVERY 24 HOURS
Refills: 0 | Status: DISCONTINUED | OUTPATIENT
Start: 2020-01-22 | End: 2020-01-25

## 2020-01-22 RX ORDER — CEFEPIME 1 G/1
2000 INJECTION, POWDER, FOR SOLUTION INTRAMUSCULAR; INTRAVENOUS EVERY 8 HOURS
Refills: 0 | Status: DISCONTINUED | OUTPATIENT
Start: 2020-01-22 | End: 2020-01-22

## 2020-01-22 RX ORDER — FLUCONAZOLE 150 MG/1
100 TABLET ORAL EVERY 24 HOURS
Refills: 0 | Status: DISCONTINUED | OUTPATIENT
Start: 2020-01-22 | End: 2020-01-23

## 2020-01-22 RX ORDER — ENOXAPARIN SODIUM 100 MG/ML
40 INJECTION SUBCUTANEOUS DAILY
Refills: 0 | Status: DISCONTINUED | OUTPATIENT
Start: 2020-01-22 | End: 2020-01-29

## 2020-01-22 RX ADMIN — Medication 40 MILLIGRAM(S): at 22:44

## 2020-01-22 RX ADMIN — Medication 200 MILLIGRAM(S): at 15:19

## 2020-01-22 RX ADMIN — Medication 650 MILLIGRAM(S): at 15:58

## 2020-01-22 RX ADMIN — ALBUTEROL 10 MILLIGRAM(S): 90 AEROSOL, METERED ORAL at 22:33

## 2020-01-22 RX ADMIN — Medication 3 MILLILITER(S): at 14:44

## 2020-01-22 RX ADMIN — Medication 50 MILLILITER(S): at 22:33

## 2020-01-22 RX ADMIN — Medication 3 MILLILITER(S): at 14:43

## 2020-01-22 RX ADMIN — AZITHROMYCIN 255 MILLIGRAM(S): 500 TABLET, FILM COATED ORAL at 22:21

## 2020-01-22 RX ADMIN — Medication 125 MILLIGRAM(S): at 14:43

## 2020-01-22 RX ADMIN — CEFEPIME 100 MILLIGRAM(S): 1 INJECTION, POWDER, FOR SOLUTION INTRAMUSCULAR; INTRAVENOUS at 16:18

## 2020-01-22 RX ADMIN — INSULIN HUMAN 10 UNIT(S): 100 INJECTION, SOLUTION SUBCUTANEOUS at 22:34

## 2020-01-22 RX ADMIN — Medication 250 MILLIGRAM(S): at 16:45

## 2020-01-22 RX ADMIN — SODIUM CHLORIDE 500 MILLILITER(S): 9 INJECTION INTRAMUSCULAR; INTRAVENOUS; SUBCUTANEOUS at 16:19

## 2020-01-22 NOTE — H&P ADULT - ATTENDING COMMENTS
Patient seen and examined, chart and labs reviewed. Case discussed with house staff.    89F PMH of severe pulmonary fibrosis on 5L home oxygen, Li's esophagus, CAD s/p stent x3, recent admission for hypoxic resp failure 2/2 RSV PNA admitted now with acute hypoxic respiratory failure requiring BIPAP 2/2 sepsis from entero/rhinovirus with possible superimposed pneumonia and UTI.    #Hypoxic resp failure - requiring BIPAP for hypoxia and WOB. Will attempt to wean as tolerated to high flow nasal cannula if tolerated in AM. Likely in setting of enterovirus +/- HAP. Will c/w duonebs ATC, solumedrol for pulm fibrosis, and broad spectrum antibiotics. Pulm consult in AM, follows w/ Dr. Medina.   #Sepsis - fever, tachycardia, and leukocytosis w/ 9.6% bands. C/w vanc/cefepime and azithro for HAP coverage. Also w/ +UA, will f/u blood and urine cultures, urine legionella. Also w/ +enterovirus, which may be cause of sepsis. Initial lactate 6.9, repeat significantly improved, may be in setting of nebulizers.   #Hyponatremia - unclear etiology, may be in setting of hypovolemic + diuretic use (lasix) vs. SIADH. per daughter, not eating well at home. S/p 500cc bolus in ED. Monitor BMP. Will follow TSH, cortisol, serum and urine osms and urine studies.   #Hyperkalemia - monitor BMP and treat as necessary.  #Elevated trops - likely in setting of demand ischemia 2/2 sepsis and hypoxia. Delta trop negative, no need for further tele monitoring.   #GOC - daughter confirmed DNR/DNI. KAHLIL in chart. Will re-address hospice w/ HCP this admission.

## 2020-01-22 NOTE — H&P ADULT - NSHPPHYSICALEXAM_GEN_ALL_CORE
.  VITAL SIGNS:  T(C): 38 (01-22-20 @ 17:31), Max: 38.4 (01-22-20 @ 14:32)  T(F): 100.4 (01-22-20 @ 17:31), Max: 101.2 (01-22-20 @ 14:32)  HR: 100 (01-22-20 @ 17:31) (98 - 117)  BP: 118/81 (01-22-20 @ 17:31) (107/64 - 122/68)  BP(mean): --  RR: 20 (01-22-20 @ 17:31) (20 - 40)  SpO2: 98% (01-22-20 @ 17:31) (84% - 100%)  Wt(kg): --    PHYSICAL EXAM:    Constitutional: tachypneic on BiPAP  Head: NC/AT  Eyes: clear conjunctiva  ENT: no nasal discharge; no oropharyngeal erythema or exudates; dry oral mucosa  Neck: supple; no JVD or thyromegaly  Respiratory: diffuse crackles in anterior lung fields with poor air entry  Cardiac: +S1/S2; +tachycardi  Gastrointestinal: soft, NT/ND; no rebound or guarding  Extremities: 2+ nonpitting peripheral edema  Vascular: 2+ radial, DP/PT pulses B/L  Lymphatic: no submandibular or cervical LAD  Neurologic: responsive to voice

## 2020-01-22 NOTE — H&P ADULT - PROBLEM SELECTOR PLAN 1
Due to entero/rhino virus with possible superimposed pna. CXR showing no focal consolidation, however pt recently hospitalized for RSV and at risk for HAP given severe pulm fibrosis. Currently saturating well on BiPAP, remains tachypneic to 30s. On 5L home oxygen   - will keep on BiPAP overnight for WOB and attempt to de-escalate to high flow nasal cannula in AM  - empiric abx for HAP: vanc/cefepime/azithro. Follow-up urine legionella  - c/w steroids: 40mg solumedrol Q8H  - duonebs Q4H ATC  - f/u urine and blood cultures  - pt is DNR/DNI, daughter at bedside confirms Due to entero/rhino virus with possible superimposed pna. CXR showing no focal consolidation, however pt recently hospitalized for RSV and at risk for HAP given severe pulm fibrosis. Currently saturating well on BiPAP, remains tachypneic to 30s.   - will keep on BiPAP overnight for WOB and attempt to de-escalate to high flow nasal cannula in AM  - empiric abx for HAP: vanc/cefepime/azithro. Follow-up urine legionella  - c/w steroids: 40mg solumedrol Q8H  - duonebs Q4H ATC  - f/u urine and blood cultures  - pt is DNR/DNI, daughter at bedside confirms

## 2020-01-22 NOTE — ED PROVIDER NOTE - CRITICAL CARE PROVIDED
additional history taking/documentation/consultation with other physicians/conducted a detailed discussion of DNR status/consult w/ pt's family directly relating to pts condition/interpretation of diagnostic studies/direct patient care (not related to procedure)

## 2020-01-22 NOTE — H&P ADULT - PROBLEM SELECTOR PLAN 10
Transitions of Care Status:   1.  Name of PCP: Dr. Angel Otoole  2.  PCP Contacted on Admission: [ ] Y    [ ] N    3.  PCP contacted at Discharge: [ ] Y    [ ] N    [ ] N/A  4.  Post-Discharge Appointment Date and Location:  5.  Summary of Handoff given to PCP:

## 2020-01-22 NOTE — ED ADULT NURSE REASSESSMENT NOTE - NS ED NURSE REASSESS COMMENT FT1
Report given to Floor ETELVINA Lam: Pt at baseline mental status with sister at bedside, pt medicated as per EMR. Pt currently endorsing pain to L hand IV with medication flowing. IV flushed, positive blood return, and no signs of infiltration. ETELVINA Lam made aware, medication paused and sent up with pt. Pt taken upstairs with respiratory at bedside, pt vitally stable as documented, no SOB noted, respirations even and unlabored, pt denying any other pain/discomfort at this time, and in no obvious distress.

## 2020-01-22 NOTE — ED ADULT NURSE NOTE - OBJECTIVE STATEMENT
Pt. brought to rm 10 via EMS from home for respiratory distress. Pt. arrives on non-rebreather, sating 88%. Respirations labored and tachypneic in 30-40s. Respiratory called to initiate bipap. Pt. primarily [place] speaking with family at bedside for translation. As per daughter, pt. has been more lethargic and weak x few days. h/o pulmonary fibrosis, on 2L NC daily. O2 increased to 4L for sob last 2 days. Pt. also had a fall today due to weakness in legs. Stage 3 noted on sacrum, 3 spots measuring about [id number] each. #20g IV placed to right AC and #20g to left hand, labs sent as ordered. Will continue to monitor.

## 2020-01-22 NOTE — H&P ADULT - PROBLEM SELECTOR PLAN 8
Pt diagnosed with thrush as outpatient, most likely from prolonged antibiotic and inhaled steroid use.  - will c/w fluconazole. Med rec in AM with other daughter with whom pt lives to confirm length of treatment.

## 2020-01-22 NOTE — H&P ADULT - ASSESSMENT
89F PMH of HTN, severe pulmonary fibrosis on 5L home oxygen, Li's esophagus, CAD s/p stent x3, hx acute cholecystitis with percutaneous cholecystostomy in October 2018 presenting with shortness of breath found to be in acute hypoxic respiratory failure 2/2 sepsis from entero/rhinovirus with possible superimposed pneumonia and UTI.

## 2020-01-22 NOTE — H&P ADULT - PROBLEM SELECTOR PROBLEM 1
Acute on chronic respiratory failure with hypercapnia Acute on chronic respiratory failure with hypoxia

## 2020-01-22 NOTE — H&P ADULT - PROBLEM SELECTOR PLAN 5
Pt with known hx of pulm fibrosis on 5L home oxygen.   - management of acute hypoxic resp failure as above  - hospice eval in AM Pt with known hx of pulm fibrosis on 5L home oxygen.   - management of acute hypoxic resp failure as above  - Will readdress hospice w/ family in AM

## 2020-01-22 NOTE — H&P ADULT - PROBLEM SELECTOR PLAN 2
Pt with severe sepsis, initial lactate 6.9, now < 2 on repeat. Most likely from HAP vs UTI.   - broad spectrum abx as above  - f/u cultures Pt with severe sepsis, with leukocytosis, fever and tachycardia likely in setting of HAP + enterovirus +/- UTI  - initial lactate 6.9, now < 2 on repeat. May be 2/2 neb treatments  - broad spectrum abx as above  - f/u blood and urine cultures  - f/u urine legionella  - caution with IVF to avoid overload

## 2020-01-22 NOTE — H&P ADULT - PROBLEM SELECTOR PLAN 3
Initial troponin 104, now 95. Most likely demand in setting of acute hypoxic event and known severe pulmonary fibrosis. EKG showing RBBB, which is unchanged from prior EKG. Pt not complaining of chest pain.  - Will discontinue telemetry monitoring

## 2020-01-22 NOTE — H&P ADULT - PROBLEM SELECTOR PLAN 7
Pt has hx of HTN, however not on antihypertensives at home currently. BPs wnl on admission and pt septic  - will cont to monitor vital signs Q4H

## 2020-01-22 NOTE — ED PROVIDER NOTE - ATTENDING CONTRIBUTION TO CARE
MD Valerio:  patient seen and evaluated with the PA.  I was present for key portions of the History and Physical, and I agree with the Impression and Plan.    MD Valerio:  Luke HADDAD, end-stage pulmonary fibrosis, recently hospitalized 3wk ago with viral PNA.    On exam: +hypoxic to 82% on FM, Febrile rectally, tachycardic, ++WOB.  Elderly F, chronically ill-appearing, tachypneic, diffuse rales throughout lung fields.    Impression:  possible HCAP superimposed upon severe underlying pulmonary disease with hypoxia.   Plan:  given +sepsis criteria and recent hospitalization, she will need sepsis bundle and empiric treatment for HCAP.  confirmed that she is DNR/DNI, *(MOLST form in the chart).  BiPAP, admit.

## 2020-01-22 NOTE — H&P ADULT - HISTORY OF PRESENT ILLNESS
89F PMH of HTN, severe pulmonary fibrosis on 5L home oxygen, Li's esophagus, CAD s/p stent x3, hx acute cholecystitis with percutaneous cholecystostomy in October 2018 presenting with shortness of breath. Pt recently hospitalized earlier this month with acute hypoxic respiratory failure from RSV with superimposed pneumonia. Pt discharged home and now presents with similar symptoms, shortness of breath and fever. Pt is currently responsive to voice on BiPAP, but cannot provide meaningful history. Daughter at bedside provided history. Pt initially was feeling better upon discharge from [facility], however over past week has been not eating or drinking anything, coughing, and ripping off her oxygen at night.  Daughter denies hx of known fevers, chest pain, abdominal pain, LE edema, diarrhea, or constipation. Daughter denies sick contacts, recent travel.     Daughter notes that patient re-established her DNR/DNI intentions and MOLST in ED.     In ED, initial VS were T95  /68 RR40 SaO2 84% on 5L NC. Labs notable for lactate of 6.9, trop of 104, hyponatremia to 125 and WBC of 21.71. UA grossly positive, RVP +entero/rhinovirus, CXR without focal consolidation. She was given 500cc bolus, vanc/cefepime/cipro, duonebs, and admitted to the general medical floor.

## 2020-01-22 NOTE — H&P ADULT - NSHPSOCIALHISTORY_GEN_ALL_CORE
Lives with daughter on whom she is completely dependent upon for ADLs. No alcohol, tobacco, or drug use.

## 2020-01-22 NOTE — H&P ADULT - NSICDXPASTMEDICALHX_GEN_ALL_CORE_FT
PAST MEDICAL HISTORY:  Il's Esophagus     Barretts esophagus     CAD (Coronary Artery Disease)     CAD (coronary artery disease)     Essential hypertension     HTN (Hypertension)     Idiopathic pulmonary fibrosis     Pulmonary Fibrosis

## 2020-01-22 NOTE — PHARMACY COMMUNICATION NOTE - COMMENTS
Pt has a documented allergy to penicillin - angioedema.    Upon further clarification with celia, reaction occurred over 40 years ago, lip swelling managed on an outpatient basis.    [email or web address] contacted and no beta-lactams (penicillins, cephs) were prescribed.    Recommendation(s):  1)  Please monitor the patient for allergic reaction with the 1st administration of cefepime.    José Miguel Martinez PharmD  Infectious Diseases Clinical Pharmacist  Spectra extension [phone]  .

## 2020-01-22 NOTE — H&P ADULT - NSHPLABSRESULTS_GEN_ALL_CORE
10.0   21.71 )-----------( 376      ( 2020 14:30 )             32.6       01-22    125<L>  |  80<L>  |  29<H>  ----------------------------<  263<H>  5.4<H>   |  32<H>  |  0.99    Ca    9.1      2020 14:30  Phos  3.4         TPro  8.1  /  Alb  3.0<L>  /  TBili  0.2  /  DBili  x   /  AST  31  /  ALT  17  /  AlkPhos  89                Urinalysis Basic - ( 2020 15:25 )    Color: YELLOW / Appearance: TURBID / S.024 / pH: 8.5  Gluc: 50 / Ketone: NEGATIVE  / Bili: NEGATIVE / Urobili: NORMAL   Blood: NEGATIVE / Protein: >600 / Nitrite: NEGATIVE   Leuk Esterase: LARGE / RBC: x / WBC >50   Sq Epi: x / Non Sq Epi: FEW / Bacteria: MANY        PT/INR - ( 2020 15:25 )   PT: 15.0 SEC;   INR: 1.34          PTT - ( 2020 15:25 )  PTT:26.9 SEC      < from: Xray Chest 1 View AP/PA (. @ 09:28) >    IMPRESSION:  Pulmonary fibrosis but no focal consolidation.    < end of copied text > 10.0   21.71 )-----------( 376      ( 2020 14:30 )             32.6       01-22    125<L>  |  80<L>  |  29<H>  ----------------------------<  263<H>  5.4<H>   |  32<H>  |  0.99    Ca    9.1      2020 14:30  Phos  3.4         TPro  8.1  /  Alb  3.0<L>  /  TBili  0.2  /  DBili  x   /  AST  31  /  ALT  17  /  AlkPhos  89                Urinalysis Basic - ( 2020 15:25 )    Color: YELLOW / Appearance: TURBID / S.024 / pH: 8.5  Gluc: 50 / Ketone: NEGATIVE  / Bili: NEGATIVE / Urobili: NORMAL   Blood: NEGATIVE / Protein: >600 / Nitrite: NEGATIVE   Leuk Esterase: LARGE / RBC: x / WBC >50   Sq Epi: x / Non Sq Epi: FEW / Bacteria: MANY        PT/INR - ( 2020 15:25 )   PT: 15.0 SEC;   INR: 1.34          PTT - ( 2020 15:25 )  PTT:26.9 SEC      < from: Xray Chest 1 View AP/PA (19 @ 09:28) >    IMPRESSION:  Pulmonary fibrosis but no focal consolidation.    < end of copied text >    EKG: RBBB, unchanged from previous

## 2020-01-22 NOTE — H&P ADULT - PROBLEM SELECTOR PLAN 4
sodium 125 on admission, however specimen hemolyzed and all electrolytes somewhat abnormal. Hyponatremia most likely hypovolemic from sepsis and poor PO intake with possible SIADH component  - will hold home lasix  - repeat BMP stat now  - cont to monitor BMP QD

## 2020-01-22 NOTE — ED ADULT TRIAGE NOTE - CHIEF COMPLAINT QUOTE
Family called for pt less responsive than usual, not eating and drinking well, became weak during a transfer.  Arrives with O2 100% NRB in use.  .  Hx pulmonary fibrosis

## 2020-01-22 NOTE — ED PROVIDER NOTE - PROGRESS NOTE DETAILS
Sister here with MOLST form, and is the proxy:  DNR/DNI.  Will attempt to bridge patient with BiPAP. O2 sat improving on BiPAP.  95% O2 sat improving on BiPAP.  95%  Pulmonary Fellow paged for RCU bed status.

## 2020-01-22 NOTE — ED PROVIDER NOTE - OBJECTIVE STATEMENT
89 y.o female with a PMhx of CAD, pulmonary fibrosis, barretts esophagus, history of chronic hypoxia on  recently admitted presents to the ED complaining of shortness of breath and weakness. Pt was recently admitted here a few weeks prior however family states that patient was worsening over the past few days. Pt had worsening hypoxia over the past few days and family was concerned for possible recurrent pna.

## 2020-01-22 NOTE — ED PROVIDER NOTE - CLINICAL SUMMARY MEDICAL DECISION MAKING FREE TEXT BOX
Impression:  possible HCAP superimposed upon severe underlying pulmonary disease with hypoxia.   Plan:  given +sepsis criteria and recent hospitalization, she will need sepsis bundle and empiric treatment for HCAP.  confirmed that she is DNR/DNI, *(MOLST form in the chart).  BiPAP, admit.

## 2020-01-23 LAB
ANION GAP SERPL CALC-SCNC: 11 MMO/L — SIGNIFICANT CHANGE UP (ref 7–14)
ANION GAP SERPL CALC-SCNC: 9 MMO/L — SIGNIFICANT CHANGE UP (ref 7–14)
APTT BLD: 28.4 SEC — SIGNIFICANT CHANGE UP (ref 27.5–36.3)
BASOPHILS # BLD AUTO: 0.03 K/UL — SIGNIFICANT CHANGE UP (ref 0–0.2)
BASOPHILS NFR BLD AUTO: 0.2 % — SIGNIFICANT CHANGE UP (ref 0–2)
BUN SERPL-MCNC: 19 MG/DL — SIGNIFICANT CHANGE UP (ref 7–23)
BUN SERPL-MCNC: 24 MG/DL — HIGH (ref 7–23)
CALCIUM SERPL-MCNC: 8.5 MG/DL — SIGNIFICANT CHANGE UP (ref 8.4–10.5)
CALCIUM SERPL-MCNC: 8.6 MG/DL — SIGNIFICANT CHANGE UP (ref 8.4–10.5)
CHLORIDE SERPL-SCNC: 83 MMOL/L — LOW (ref 98–107)
CHLORIDE SERPL-SCNC: 85 MMOL/L — LOW (ref 98–107)
CO2 SERPL-SCNC: 31 MMOL/L — SIGNIFICANT CHANGE UP (ref 22–31)
CO2 SERPL-SCNC: 35 MMOL/L — HIGH (ref 22–31)
CREAT SERPL-MCNC: 0.71 MG/DL — SIGNIFICANT CHANGE UP (ref 0.5–1.3)
CREAT SERPL-MCNC: 0.77 MG/DL — SIGNIFICANT CHANGE UP (ref 0.5–1.3)
EOSINOPHIL # BLD AUTO: 0.02 K/UL — SIGNIFICANT CHANGE UP (ref 0–0.5)
EOSINOPHIL NFR BLD AUTO: 0.1 % — SIGNIFICANT CHANGE UP (ref 0–6)
GLUCOSE BLDC GLUCOMTR-MCNC: 195 MG/DL — HIGH (ref 70–99)
GLUCOSE BLDC GLUCOMTR-MCNC: 209 MG/DL — HIGH (ref 70–99)
GLUCOSE SERPL-MCNC: 102 MG/DL — HIGH (ref 70–99)
GLUCOSE SERPL-MCNC: 151 MG/DL — HIGH (ref 70–99)
HBA1C BLD-MCNC: 7.5 % — HIGH (ref 4–5.6)
HCT VFR BLD CALC: 28 % — LOW (ref 34.5–45)
HGB BLD-MCNC: 8.7 G/DL — LOW (ref 11.5–15.5)
IMM GRANULOCYTES NFR BLD AUTO: 1.8 % — HIGH (ref 0–1.5)
LYMPHOCYTES # BLD AUTO: 0.59 K/UL — LOW (ref 1–3.3)
LYMPHOCYTES # BLD AUTO: 3.7 % — LOW (ref 13–44)
MAGNESIUM SERPL-MCNC: 2 MG/DL — SIGNIFICANT CHANGE UP (ref 1.6–2.6)
MAGNESIUM SERPL-MCNC: 2.1 MG/DL — SIGNIFICANT CHANGE UP (ref 1.6–2.6)
MANUAL SMEAR VERIFICATION: SIGNIFICANT CHANGE UP
MCHC RBC-ENTMCNC: 31.1 % — LOW (ref 32–36)
MCHC RBC-ENTMCNC: 31.6 PG — SIGNIFICANT CHANGE UP (ref 27–34)
MCV RBC AUTO: 101.8 FL — HIGH (ref 80–100)
MONOCYTES # BLD AUTO: 0.2 K/UL — SIGNIFICANT CHANGE UP (ref 0–0.9)
MONOCYTES NFR BLD AUTO: 1.3 % — LOW (ref 2–14)
NEUTROPHILS # BLD AUTO: 14.71 K/UL — HIGH (ref 1.8–7.4)
NEUTROPHILS NFR BLD AUTO: 92.9 % — HIGH (ref 43–77)
NRBC # FLD: 0.03 K/UL — SIGNIFICANT CHANGE UP (ref 0–0)
OSMOLALITY SERPL: 276 MOSMO/KG — SIGNIFICANT CHANGE UP (ref 275–295)
PHOSPHATE SERPL-MCNC: 3.2 MG/DL — SIGNIFICANT CHANGE UP (ref 2.5–4.5)
PHOSPHATE SERPL-MCNC: 3.2 MG/DL — SIGNIFICANT CHANGE UP (ref 2.5–4.5)
PLATELET # BLD AUTO: 313 K/UL — SIGNIFICANT CHANGE UP (ref 150–400)
PMV BLD: 8.7 FL — SIGNIFICANT CHANGE UP (ref 7–13)
POTASSIUM SERPL-MCNC: 4.1 MMOL/L — SIGNIFICANT CHANGE UP (ref 3.5–5.3)
POTASSIUM SERPL-MCNC: 5 MMOL/L — SIGNIFICANT CHANGE UP (ref 3.5–5.3)
POTASSIUM SERPL-SCNC: 4.1 MMOL/L — SIGNIFICANT CHANGE UP (ref 3.5–5.3)
POTASSIUM SERPL-SCNC: 5 MMOL/L — SIGNIFICANT CHANGE UP (ref 3.5–5.3)
RBC # BLD: 2.75 M/UL — LOW (ref 3.8–5.2)
RBC # FLD: 14.7 % — HIGH (ref 10.3–14.5)
SODIUM SERPL-SCNC: 125 MMOL/L — LOW (ref 135–145)
SODIUM SERPL-SCNC: 129 MMOL/L — LOW (ref 135–145)
SPECIMEN SOURCE: SIGNIFICANT CHANGE UP
TSH SERPL-MCNC: 0.36 UIU/ML — SIGNIFICANT CHANGE UP (ref 0.27–4.2)
URATE SERPL-MCNC: 5.2 MG/DL — SIGNIFICANT CHANGE UP (ref 2.5–7)
WBC # BLD: 15.83 K/UL — HIGH (ref 3.8–10.5)
WBC # FLD AUTO: 15.83 K/UL — HIGH (ref 3.8–10.5)

## 2020-01-23 PROCEDURE — 99233 SBSQ HOSP IP/OBS HIGH 50: CPT | Mod: GC

## 2020-01-23 PROCEDURE — 93010 ELECTROCARDIOGRAM REPORT: CPT

## 2020-01-23 RX ORDER — INFLUENZA VIRUS VACCINE 15; 15; 15; 15 UG/.5ML; UG/.5ML; UG/.5ML; UG/.5ML
0.5 SUSPENSION INTRAMUSCULAR ONCE
Refills: 0 | Status: DISCONTINUED | OUTPATIENT
Start: 2020-01-23 | End: 2020-01-29

## 2020-01-23 RX ORDER — DIPHENHYDRAMINE HYDROCHLORIDE AND LIDOCAINE HYDROCHLORIDE AND ALUMINUM HYDROXIDE AND MAGNESIUM HYDRO
5 KIT
Refills: 0 | Status: DISCONTINUED | OUTPATIENT
Start: 2020-01-23 | End: 2020-01-29

## 2020-01-23 RX ORDER — ACETAMINOPHEN 500 MG
650 TABLET ORAL EVERY 6 HOURS
Refills: 0 | Status: DISCONTINUED | OUTPATIENT
Start: 2020-01-23 | End: 2020-01-23

## 2020-01-23 RX ORDER — FLUCONAZOLE 150 MG/1
100 TABLET ORAL DAILY
Refills: 0 | Status: COMPLETED | OUTPATIENT
Start: 2020-01-24 | End: 2020-01-28

## 2020-01-23 RX ORDER — INSULIN LISPRO 100/ML
VIAL (ML) SUBCUTANEOUS
Refills: 0 | Status: DISCONTINUED | OUTPATIENT
Start: 2020-01-23 | End: 2020-01-24

## 2020-01-23 RX ORDER — METOPROLOL TARTRATE 50 MG
25 TABLET ORAL DAILY
Refills: 0 | Status: DISCONTINUED | OUTPATIENT
Start: 2020-01-23 | End: 2020-01-29

## 2020-01-23 RX ORDER — LANOLIN ALCOHOL/MO/W.PET/CERES
3 CREAM (GRAM) TOPICAL AT BEDTIME
Refills: 0 | Status: DISCONTINUED | OUTPATIENT
Start: 2020-01-23 | End: 2020-01-29

## 2020-01-23 RX ORDER — ACETAMINOPHEN 500 MG
650 TABLET ORAL EVERY 6 HOURS
Refills: 0 | Status: DISCONTINUED | OUTPATIENT
Start: 2020-01-23 | End: 2020-01-29

## 2020-01-23 RX ORDER — METOPROLOL TARTRATE 50 MG
25 TABLET ORAL
Refills: 0 | Status: DISCONTINUED | OUTPATIENT
Start: 2020-01-23 | End: 2020-01-23

## 2020-01-23 RX ORDER — URSODIOL 250 MG/1
300 TABLET, FILM COATED ORAL
Refills: 0 | Status: DISCONTINUED | OUTPATIENT
Start: 2020-01-23 | End: 2020-01-29

## 2020-01-23 RX ADMIN — Medication 40 MILLIGRAM(S): at 06:18

## 2020-01-23 RX ADMIN — Medication 40 MILLIGRAM(S): at 15:20

## 2020-01-23 RX ADMIN — ENOXAPARIN SODIUM 40 MILLIGRAM(S): 100 INJECTION SUBCUTANEOUS at 13:27

## 2020-01-23 RX ADMIN — Medication 250 MILLIGRAM(S): at 16:39

## 2020-01-23 RX ADMIN — Medication 2: at 17:50

## 2020-01-23 RX ADMIN — Medication 3 MILLILITER(S): at 16:00

## 2020-01-23 RX ADMIN — DIPHENHYDRAMINE HYDROCHLORIDE AND LIDOCAINE HYDROCHLORIDE AND ALUMINUM HYDROXIDE AND MAGNESIUM HYDRO 5 MILLILITER(S): KIT at 17:49

## 2020-01-23 RX ADMIN — AZITHROMYCIN 255 MILLIGRAM(S): 500 TABLET, FILM COATED ORAL at 21:32

## 2020-01-23 RX ADMIN — Medication 40 MILLIGRAM(S): at 21:32

## 2020-01-23 RX ADMIN — Medication 650 MILLIGRAM(S): at 04:44

## 2020-01-23 RX ADMIN — Medication 3 MILLILITER(S): at 03:15

## 2020-01-23 RX ADMIN — Medication 3 MILLIGRAM(S): at 21:32

## 2020-01-23 RX ADMIN — SODIUM ZIRCONIUM CYCLOSILICATE 10 GRAM(S): 10 POWDER, FOR SUSPENSION ORAL at 01:46

## 2020-01-23 RX ADMIN — CEFEPIME 100 MILLIGRAM(S): 1 INJECTION, POWDER, FOR SOLUTION INTRAMUSCULAR; INTRAVENOUS at 06:18

## 2020-01-23 RX ADMIN — Medication 3 MILLILITER(S): at 22:36

## 2020-01-23 RX ADMIN — CEFEPIME 100 MILLIGRAM(S): 1 INJECTION, POWDER, FOR SOLUTION INTRAMUSCULAR; INTRAVENOUS at 17:49

## 2020-01-23 RX ADMIN — Medication 81 MILLIGRAM(S): at 13:27

## 2020-01-23 RX ADMIN — URSODIOL 300 MILLIGRAM(S): 250 TABLET, FILM COATED ORAL at 21:32

## 2020-01-23 RX ADMIN — Medication 3 MILLILITER(S): at 10:21

## 2020-01-23 RX ADMIN — Medication 1: at 00:00

## 2020-01-23 RX ADMIN — Medication 650 MILLIGRAM(S): at 04:14

## 2020-01-23 RX ADMIN — FLUCONAZOLE 50 MILLIGRAM(S): 150 TABLET ORAL at 01:43

## 2020-01-23 NOTE — PROGRESS NOTE ADULT - PROBLEM SELECTOR PLAN 2
Pt with severe sepsis, with leukocytosis, fever and tachycardia likely in setting of HAP + enterovirus +/- UTI  - initial lactate 6.9, then < 2 on repeat. May have been 2/2 neb treatments  - broad spectrum abx as above  - f/u blood and urine cultures  - f/u urine legionella  - caution with IVF to avoid overload  - plan for bedside echo to assess fluid status Pt with severe sepsis, with leukocytosis, fever and tachycardia likely in setting of HAP + enterovirus +/- UTI  - initial lactate 6.9, then < 2 on repeat. May have been 2/2 neb treatments  - broad spectrum abx as above  - f/u blood and urine cultures  - caution with IVF to avoid overload  - plan for bedside echo to assess fluid status

## 2020-01-23 NOTE — PROGRESS NOTE ADULT - PROBLEM SELECTOR PLAN 9
Diet: NPO while on BiPAP  DVT ppx: Lovenox  GOC: DNR/DNI as per daughter Diet: Pureed diet per nutrition recs   DVT ppx: Lovenox  GOC: DNR/DNI as per daughter

## 2020-01-23 NOTE — PROGRESS NOTE ADULT - PROBLEM SELECTOR PLAN 1
Due to entero/rhino virus with possible superimposed pna. CXR showing no focal consolidation, however pt recently hospitalized for RSV and at risk for HAP given severe pulm fibrosis. Currently saturating well on BiPAP, remains tachypneic to 30s.   - will keep on BiPAP overnight for WOB and attempt to de-escalate to high flow nasal cannula in AM  - empiric abx for HAP: vanc/cefepime/azithro (1/22-)  - c/w steroids: 40mg solumedrol Q8H  - duonebs Q4H ATC  - De-escalate to HFNC today as tolerated   - f/u urine and blood cultures  - f/u urine legionella.  - f/u pulmonary consult Due to entero/rhino virus with possible superimposed pna. CXR: no focal consolidation. Recently hospitalized for RSV and at risk for HAP given severe pulmonary fibrosis. Currently saturating well on BiPAP, remains tachypneic to 20s.   - empiric abx for HAP: vanc/cefepime/azithro (1/22-)  - c/w steroids: 40mg solumedrol Q8H  - duonebs Q4H ATC  - De-escalate to HFNC today as tolerated   - f/u urine legionella.  - f/u pulmonary consult

## 2020-01-23 NOTE — PROGRESS NOTE ADULT - ATTENDING COMMENTS
Patient seen and examined, chart and labs reviewed. Case discussed with house staff.    89F PMH of severe pulmonary fibrosis on 5L home oxygen, Li's esophagus, CAD s/p stent x3, recent admission for hypoxic resp failure 2/2 RSV PNA admitted now with acute hypoxic respiratory failure requiring BIPAP 2/2 sepsis from entero/rhinovirus with possible superimposed pneumonia and UTI.    #Hypoxic resp failure - requiring BIPAP for hypoxia and WOB. Will attempt to wean as tolerated to high flow nasal cannula if tolerated in AM. Likely in setting of enterovirus +/- HAP. Will c/w duonebs ATC, solumedrol for pulm fibrosis, and broad spectrum antibiotics. Pulm consult in AM, follows w/ Dr. Medina.   #Sepsis - fever, tachycardia, and leukocytosis w/ 9.6% bands. C/w vanc/cefepime and azithro for HAP coverage. Also w/ +UA, will f/u blood and urine cultures, urine legionella. Also w/ +enterovirus, which may be cause of sepsis. Initial lactate 6.9, repeat significantly improved, may be in setting of nebulizers.   #Hyponatremia - unclear etiology, may be in setting of hypovolemic + diuretic use (lasix) vs. SIADH. per daughter, not eating well at home. S/p 500cc bolus in ED. Monitor BMP. Will follow TSH, cortisol, serum and urine osms and urine studies.   #Hyperkalemia - monitor BMP and treat as necessary.  #Elevated trops - likely in setting of demand ischemia 2/2 sepsis and hypoxia. Delta trop negative, no need for further tele monitoring.   #GOC - daughter confirmed DNR/DNI. KAHLIL in chart. Will re-address hospice w/ HCP this admission. Patient seen and examined, chart and labs reviewed. Case discussed with house staff.    89F PMH of severe pulmonary fibrosis on 5L home oxygen, Li's esophagus, CAD s/p stent x3, recent admission for hypoxic resp failure 2/2 RSV PNA admitted now with acute hypoxic respiratory failure requiring BIPAP 2/2 sepsis from entero/rhinovirus with possible superimposed pneumonia and UTI.    #Hypoxic resp failure - required BIPAP for hypoxia and WOB, now doing well on high flow NC. Likely in setting of enterovirus +/- HAP superimposed on severe pulm fibrosis. C/w duonebs ATC, steroids for pulm fibrosis, and broad spectrum antibiotics. Pulm consulted, will follow.   #Sepsis - fever, tachycardia, and leukocytosis w/ 9.6% bands on admission. C/w vanc/cefepime and azithro for HAP coverage. Also w/ +UA, will f/u blood and urine cultures, urine legionella. Also w/ +enterovirus, which may be cause of sepsis.   #Hyponatremia - unclear etiology, may be in setting of hypovolemic + diuretic use (lasix) vs. SIADH. per daughter, not eating well at home. S/p 500cc bolus in ED. Monitor BMP. TSH wnl. Serum cortisol will not be accurate as patient already received steroids but adrenal insufficiency is less likely as patient is not hypotensive. Will follow-up urine osms and urine studies, although patient is incontinent. PO as tolerated    #Hyperkalemia - improved s/p medical management   #Elevated trops - likely in setting of demand ischemia 2/2 sepsis and hypoxia. Delta trop negative, no need for further tele monitoring.   #GOC - daughter confirmed DNR/DNI, MOLST in chart. Addressed hospice with patient's daughter who would like to discuss with her siblings prior to making a decision.

## 2020-01-23 NOTE — CHART NOTE - NSCHARTNOTEFT_GEN_A_CORE
NUTRITION SERVICES     Upon Nutritional Assessment by the Registered Dietitian your patient was determined to meet criteria/ has evidence of the following diagnosis/diagnoses:  [ ] Mild Protein Calorie Malnutrition   [ ] Moderate Protein Calorie Malnutrition   [   X  ] Severe Protein Calorie Malnutrition   [ ] Unspecified Protein Calorie Malnutrition   [ ] Underweight / BMI <19  [ ] Morbid Obesity / BMI >40    Findings as based on:  •  Comprehensive nutritional assessment and consultation    Please refer to Initial Dietitian Evaluation via documents section of [employer] EMR for further recommendations.    Desirae Durham RDN, CDN   pager: [phone]

## 2020-01-23 NOTE — PROGRESS NOTE ADULT - SUBJECTIVE AND OBJECTIVE BOX
PROGRESS NOTE:   Authored by Wesley Page MD, Pager [phone] [facility], [address] [facility]     Patient is a 89y old  Female who presents with a chief complaint of Acute Hypoxic Respiratory Failure (2020 06:54)      SUBJECTIVE / OVERNIGHT EVENTS: Overnight patient had potassium of 5.6. Temporizing measures given. Repeat 5.0. Patient seen and examined at bedside. Daughter at bedside provided translation. Patient slept poorly overnight and often pulled at her BiPAP mask. She is complaining of pain in her "bed sores."    ADDITIONAL REVIEW OF SYSTEMS: Patient denying fevers and chills.    MEDICATIONS  (STANDING):  albuterol/ipratropium for Nebulization. 3 milliLiter(s) Nebulizer every 6 hours  aspirin  chewable 81 milliGRAM(s) Oral daily  azithromycin  IVPB 500 milliGRAM(s) IV Intermittent every 24 hours  cefepime   IVPB 1000 milliGRAM(s) IV Intermittent every 12 hours  dextrose 5%. 1000 milliLiter(s) (50 mL/Hr) IV Continuous <Continuous>  dextrose 50% Injectable 12.5 Gram(s) IV Push once  dextrose 50% Injectable 25 Gram(s) IV Push once  dextrose 50% Injectable 25 Gram(s) IV Push once  enoxaparin Injectable 40 milliGRAM(s) SubCutaneous daily  fluconAZOLE IVPB 100 milliGRAM(s) IV Intermittent every 24 hours  influenza   Vaccine 0.5 milliLiter(s) IntraMuscular once  insulin lispro (HumaLOG) corrective regimen sliding scale   SubCutaneous every 6 hours  methylPREDNISolone sodium succinate Injectable 40 milliGRAM(s) IV Push every 8 hours  vancomycin  IVPB 1000 milliGRAM(s) IV Intermittent every 24 hours    MEDICATIONS  (PRN):  acetaminophen   Tablet .. 650 milliGRAM(s) Oral every 6 hours PRN Temp greater or equal to 38C (100.4F), Mild Pain (1 - 3), Moderate Pain (4 - 6)  dextrose 40% Gel 15 Gram(s) Oral once PRN Blood Glucose LESS THAN 70 milliGRAM(s)/deciliter  glucagon  Injectable 1 milliGRAM(s) IntraMuscular once PRN Glucose LESS THAN 70 milligrams/deciliter      CAPILLARY BLOOD GLUCOSE      POCT Blood Glucose.: 129 mg/dL (2020 06:27)  POCT Blood Glucose.: 200 mg/dL (2020 23:58)  POCT Blood Glucose.: 229 mg/dL (2020 22:33)    I&O's Summary      PHYSICAL EXAM:  Vital Signs Last 24 Hrs  T(C): 36.7 (2020 04:00), Max: 38.4 (2020 14:32)  T(F): 98.1 (2020 04:00), Max: 101.2 (2020 14:32)  HR: 90 (2020 06:43) (80 - 117)  BP: 124/89 (2020 06:15) (107/64 - 146/89)  BP(mean): --  RR: 18 (2020 06:15) (18 - 40)  SpO2: 97% (2020 06:43) (84% - 100%)    GENERAL: Moderate distress, uncomfortable, on BiPAP. Somnolent but arousable.  EYES: EOMi, PERRLA, no scleral icterus or injection  ENMT: Patient wearing BiPAP mask  NECK: Supple  RESPIRATORY: Increased work of breathing and tachypnea. Diffuse coarse biphasic crackles. Good inspiratory effort and air movement, no wheeze.  CARDIOVASCULAR: Normal S1/S2, regular rate and rhythm, no murmurs noted  GASTROINTESTINAL: Abdomen is soft, nontender, nondistended, normoactive bowel sounds, no palpable masses  SKIN: Scattered petechiae. Skin warm.  MUSCULOSKELETAL: No clubbing or cyanosis, no obvious deformity  VASCULAR: Warm, well perfused, trace lower extremity edema  NEUROLOGIC: Grossly normal    LABS:                        8.7    15.83 )-----------( 313      ( 2020 04:00 )             28.0     01-23    125<L>  |  85<L>  |  24<H>  ----------------------------<  102<H>  5.0   |  31  |  0.77    Ca    8.5      2020 04:00  Phos  3.2     -  Mg     2.0     -    TPro  7.1  /  Alb  2.5<L>  /  TBili  < 0.2<L>  /  DBili  x   /  AST  26  /  ALT  16  /  AlkPhos  73  01-22    PT/INR - ( 2020 15:25 )   PT: 15.0 SEC;   INR: 1.34          PTT - ( 2020 04:00 )  PTT:28.4 SEC      Urinalysis Basic - ( 2020 15:25 )    Color: YELLOW / Appearance: TURBID / S.024 / pH: 8.5  Gluc: 50 / Ketone: NEGATIVE  / Bili: NEGATIVE / Urobili: NORMAL   Blood: NEGATIVE / Protein: >600 / Nitrite: NEGATIVE   Leuk Esterase: LARGE / RBC: x / WBC >50   Sq Epi: x / Non Sq Epi: FEW / Bacteria: MANY        Culture - Urine (collected 2020 16:56)  Source: URINE MIDSTREAM  Preliminary Report (2020 08:02):    Insufficient growth, culture re-incubated.        RADIOLOGY & ADDITIONAL TESTS:  Results Reviewed:   Imaging Personally Reviewed:    COORDINATION OF CARE:  Care Discussed with Consultants/Other Providers [Y/N]:  Prior or Outpatient Records Reviewed [Y/N]:

## 2020-01-23 NOTE — DIETITIAN INITIAL EVALUATION ADULT. - ETIOLOGY
In the context of acute & chronic illness given Dx/Hx of pulmonary fibrosis with respiratory failure & stage III pressure injury leading to significantly increased energy/protein needs.

## 2020-01-23 NOTE — PROGRESS NOTE ADULT - PROBLEM SELECTOR PLAN 8
Pt diagnosed with thrush as outpatient, most likely from prolonged antibiotic and inhaled steroid use.  - c/w fluconazole IV q24 100mg (1/22-)

## 2020-01-23 NOTE — DIETITIAN INITIAL EVALUATION ADULT. - DIET TYPE
+ Ensure Enlive 8oz PO 3x daily + NoCarb Prosource 1 packet (30mL) PO 1x daily, if/when medically appropriate to advance diet./pureed

## 2020-01-23 NOTE — PROVIDER CONTACT NOTE (OTHER) - ACTION/TREATMENT ORDERED:
MD made aware. MD states continue monitoring BP. MD made aware. MD states continue monitoring BP. Will continue to monitor

## 2020-01-23 NOTE — DIETITIAN INITIAL EVALUATION ADULT. - PERTINENT LABORATORY DATA
01-23 Na125 mmol/L<L> Glu 102 mg/dL<H> K+ 5.0 mmol/L Cr  0.77 mg/dL BUN 24 mg/dL<H> 01-23 Phos 3.2 mg/dL 01-22 Alb 2.5 g/dL<L> 01-23 [id number] 7.5 %<H>    CAPILLARY BLOOD GLUCOSE      POCT Blood Glucose.: 129 mg/dL (23 Jan 2020 06:27)  POCT Blood Glucose.: 200 mg/dL (22 Jan 2020 23:58)  POCT Blood Glucose.: 229 mg/dL (22 Jan 2020 22:33)

## 2020-01-23 NOTE — PROGRESS NOTE ADULT - PROBLEM SELECTOR PLAN 4
Sodium 125 on admission. Most likely hypovolemic from sepsis and poor PO intake with possible SIADH  - continue to hold home lasix  - f/u bladder scan today for possible retention  - urine studies for intrarenal pathology workup  - cont to monitor BMP Qd

## 2020-01-23 NOTE — DIETITIAN INITIAL EVALUATION ADULT. - PERTINENT MEDS FT
MEDICATIONS  (STANDING):  albuterol/ipratropium for Nebulization. 3 milliLiter(s) Nebulizer every 6 hours  aspirin  chewable 81 milliGRAM(s) Oral daily  azithromycin  IVPB 500 milliGRAM(s) IV Intermittent every 24 hours  cefepime   IVPB 1000 milliGRAM(s) IV Intermittent every 12 hours  dextrose 5%. 1000 milliLiter(s) (50 mL/Hr) IV Continuous <Continuous>  dextrose 50% Injectable 12.5 Gram(s) IV Push once  dextrose 50% Injectable 25 Gram(s) IV Push once  dextrose 50% Injectable 25 Gram(s) IV Push once  enoxaparin Injectable 40 milliGRAM(s) SubCutaneous daily  fluconAZOLE IVPB 100 milliGRAM(s) IV Intermittent every 24 hours  influenza   Vaccine 0.5 milliLiter(s) IntraMuscular once  insulin lispro (HumaLOG) corrective regimen sliding scale   SubCutaneous every 6 hours  methylPREDNISolone sodium succinate Injectable 40 milliGRAM(s) IV Push every 8 hours  vancomycin  IVPB 1000 milliGRAM(s) IV Intermittent every 24 hours    MEDICATIONS  (PRN):  acetaminophen   Tablet .. 650 milliGRAM(s) Oral every 6 hours PRN Temp greater or equal to 38C (100.4F), Mild Pain (1 - 3), Moderate Pain (4 - 6)  dextrose 40% Gel 15 Gram(s) Oral once PRN Blood Glucose LESS THAN 70 milliGRAM(s)/deciliter  glucagon  Injectable 1 milliGRAM(s) IntraMuscular once PRN Glucose LESS THAN 70 milligrams/deciliter

## 2020-01-23 NOTE — PROVIDER CONTACT NOTE (OTHER) - BACKGROUND
Admitted with acute on chronic respiratory failure with hypercapnia Admitted with acute on chronic respiratory failure with hypercapnia. PMH of Barretts esophagus, Idiopathic pulmonary fibrosis, Essential HTN, CAD

## 2020-01-23 NOTE — PROVIDER CONTACT NOTE (OTHER) - BACKGROUND
Admitted with acute chronic respiratory  failure with hypercapnia Admitted with acute chronic respiratory  failure with hypercapnia. PMH of Barretts esophagus, Idiopathic pulmonary fibrosis, Essential HTN, CAD,

## 2020-01-23 NOTE — PROGRESS NOTE ADULT - PROBLEM SELECTOR PLAN 3
Initial troponin 104, now 95. Most likely demand in setting of acute hypoxic event and known severe pulmonary fibrosis. EKG showing RBBB, which is unchanged from prior EKG. Pt not complaining of chest pain.  - monitor off telemetry

## 2020-01-23 NOTE — PROVIDER CONTACT NOTE (OTHER) - ACTION/TREATMENT ORDERED:
MD made aware. MD states continue monitoring BP closely. MD made aware. MD states continue monitoring BP. Will continue to monitor

## 2020-01-23 NOTE — DIETITIAN INITIAL EVALUATION ADULT. - ADD RECOMMEND
1) Add Multivitamin for micronutrient coverage.                                                       2)Consider bowel regimen, if medically appropriate.

## 2020-01-23 NOTE — DIETITIAN INITIAL EVALUATION ADULT. - OTHER INFO
Pt. currently on continuous BiPAP.  Daughter present @ bedside & reports Pt. with poor PO intake for at least 1 week PTA.   Difficulty swallowing attributed to thrush.  Foods are pureed at home.  Pt. also typically drinks Ensure supplement 3x daily... suggested Glucerna instead, given elevated glucose values (likely 2/2 to corticosteroid use).  However, daughter states Pt. tried Glucerna on recent previous hospitalization < 1 month ago and disliked it and would not drink it.  Prosource discussed as well, as Pt. with impaired skin integrity.    Pt. also reported with constipation.  No N/V.  NKFA.    Usual body weight was ~140lbs (correlates with prior chart weight documentation of 139.2lbs  (Oct 2018).  Weight loss x 3-4 months PTA, per daughter.  Believes Pt. weights ~115lbs currently.   Current weight (125.6lbs) is indicative of >10% weight loss.        Spoke with RN.  Will attempt contact with physician re: nutrition recommendations.

## 2020-01-23 NOTE — PROGRESS NOTE ADULT - PROBLEM SELECTOR PLAN 5
Pt with known hx of pulm fibrosis on 5L home oxygen.   - management of acute hypoxic resp failure as above  - Plan is to readdress hospice with family

## 2020-01-24 ENCOUNTER — APPOINTMENT (OUTPATIENT)
Dept: ELECTROPHYSIOLOGY | Facility: CLINIC | Age: 85
End: 2020-01-24

## 2020-01-24 LAB
ALBUMIN SERPL ELPH-MCNC: 2.3 G/DL — LOW (ref 3.3–5)
ALP SERPL-CCNC: 67 U/L — SIGNIFICANT CHANGE UP (ref 40–120)
ALT FLD-CCNC: 17 U/L — SIGNIFICANT CHANGE UP (ref 4–33)
ANION GAP SERPL CALC-SCNC: 9 MMO/L — SIGNIFICANT CHANGE UP (ref 7–14)
AST SERPL-CCNC: 17 U/L — SIGNIFICANT CHANGE UP (ref 4–32)
BASOPHILS # BLD AUTO: 0.03 K/UL — SIGNIFICANT CHANGE UP (ref 0–0.2)
BASOPHILS NFR BLD AUTO: 0.2 % — SIGNIFICANT CHANGE UP (ref 0–2)
BILIRUB SERPL-MCNC: 0.2 MG/DL — SIGNIFICANT CHANGE UP (ref 0.2–1.2)
BUN SERPL-MCNC: 18 MG/DL — SIGNIFICANT CHANGE UP (ref 7–23)
CALCIUM SERPL-MCNC: 8.4 MG/DL — SIGNIFICANT CHANGE UP (ref 8.4–10.5)
CHLORIDE SERPL-SCNC: 85 MMOL/L — LOW (ref 98–107)
CO2 SERPL-SCNC: 35 MMOL/L — HIGH (ref 22–31)
CREAT SERPL-MCNC: 0.64 MG/DL — SIGNIFICANT CHANGE UP (ref 0.5–1.3)
EOSINOPHIL # BLD AUTO: 0 K/UL — SIGNIFICANT CHANGE UP (ref 0–0.5)
EOSINOPHIL NFR BLD AUTO: 0 % — SIGNIFICANT CHANGE UP (ref 0–6)
GLUCOSE BLDC GLUCOMTR-MCNC: 166 MG/DL — HIGH (ref 70–99)
GLUCOSE BLDC GLUCOMTR-MCNC: 242 MG/DL — HIGH (ref 70–99)
GLUCOSE BLDC GLUCOMTR-MCNC: 300 MG/DL — HIGH (ref 70–99)
GLUCOSE SERPL-MCNC: 164 MG/DL — HIGH (ref 70–99)
HCT VFR BLD CALC: 27.1 % — LOW (ref 34.5–45)
HGB BLD-MCNC: 8.3 G/DL — LOW (ref 11.5–15.5)
IMM GRANULOCYTES NFR BLD AUTO: 1.3 % — SIGNIFICANT CHANGE UP (ref 0–1.5)
L PNEUMO AG UR QL: NEGATIVE — SIGNIFICANT CHANGE UP
LYMPHOCYTES # BLD AUTO: 0.44 K/UL — LOW (ref 1–3.3)
LYMPHOCYTES # BLD AUTO: 3.3 % — LOW (ref 13–44)
MAGNESIUM SERPL-MCNC: 2.1 MG/DL — SIGNIFICANT CHANGE UP (ref 1.6–2.6)
MCHC RBC-ENTMCNC: 30.6 % — LOW (ref 32–36)
MCHC RBC-ENTMCNC: 32 PG — SIGNIFICANT CHANGE UP (ref 27–34)
MCV RBC AUTO: 104.6 FL — HIGH (ref 80–100)
MONOCYTES # BLD AUTO: 0.38 K/UL — SIGNIFICANT CHANGE UP (ref 0–0.9)
MONOCYTES NFR BLD AUTO: 2.8 % — SIGNIFICANT CHANGE UP (ref 2–14)
NEUTROPHILS # BLD AUTO: 12.5 K/UL — HIGH (ref 1.8–7.4)
NEUTROPHILS NFR BLD AUTO: 92.4 % — HIGH (ref 43–77)
NRBC # FLD: 0.02 K/UL — SIGNIFICANT CHANGE UP (ref 0–0)
OSMOLALITY UR: 394 MOSMO/KG — SIGNIFICANT CHANGE UP (ref 50–1200)
PHOSPHATE SERPL-MCNC: 2.3 MG/DL — LOW (ref 2.5–4.5)
PLATELET # BLD AUTO: 319 K/UL — SIGNIFICANT CHANGE UP (ref 150–400)
PMV BLD: 9.4 FL — SIGNIFICANT CHANGE UP (ref 7–13)
POTASSIUM SERPL-MCNC: 3.8 MMOL/L — SIGNIFICANT CHANGE UP (ref 3.5–5.3)
POTASSIUM SERPL-SCNC: 3.8 MMOL/L — SIGNIFICANT CHANGE UP (ref 3.5–5.3)
PROT SERPL-MCNC: 6.5 G/DL — SIGNIFICANT CHANGE UP (ref 6–8.3)
RBC # BLD: 2.59 M/UL — LOW (ref 3.8–5.2)
RBC # FLD: 14.9 % — HIGH (ref 10.3–14.5)
SODIUM SERPL-SCNC: 129 MMOL/L — LOW (ref 135–145)
SODIUM UR-SCNC: 20 MMOL/L — SIGNIFICANT CHANGE UP
VANCOMYCIN TROUGH SERPL-MCNC: 10.4 UG/ML — SIGNIFICANT CHANGE UP (ref 10–20)
WBC # BLD: 13.53 K/UL — HIGH (ref 3.8–10.5)
WBC # FLD AUTO: 13.53 K/UL — HIGH (ref 3.8–10.5)

## 2020-01-24 PROCEDURE — 99233 SBSQ HOSP IP/OBS HIGH 50: CPT | Mod: GC

## 2020-01-24 PROCEDURE — 99223 1ST HOSP IP/OBS HIGH 75: CPT

## 2020-01-24 RX ORDER — POLYETHYLENE GLYCOL 3350 17 G/17G
17 POWDER, FOR SOLUTION ORAL DAILY
Refills: 0 | Status: DISCONTINUED | OUTPATIENT
Start: 2020-01-24 | End: 2020-01-27

## 2020-01-24 RX ADMIN — Medication 650 MILLIGRAM(S): at 09:19

## 2020-01-24 RX ADMIN — DIPHENHYDRAMINE HYDROCHLORIDE AND LIDOCAINE HYDROCHLORIDE AND ALUMINUM HYDROXIDE AND MAGNESIUM HYDRO 5 MILLILITER(S): KIT at 17:21

## 2020-01-24 RX ADMIN — DIPHENHYDRAMINE HYDROCHLORIDE AND LIDOCAINE HYDROCHLORIDE AND ALUMINUM HYDROXIDE AND MAGNESIUM HYDRO 5 MILLILITER(S): KIT at 05:16

## 2020-01-24 RX ADMIN — AZITHROMYCIN 255 MILLIGRAM(S): 500 TABLET, FILM COATED ORAL at 21:21

## 2020-01-24 RX ADMIN — Medication 3 MILLILITER(S): at 15:54

## 2020-01-24 RX ADMIN — DIPHENHYDRAMINE HYDROCHLORIDE AND LIDOCAINE HYDROCHLORIDE AND ALUMINUM HYDROXIDE AND MAGNESIUM HYDRO 5 MILLILITER(S): KIT at 14:50

## 2020-01-24 RX ADMIN — Medication 25 MILLIGRAM(S): at 05:16

## 2020-01-24 RX ADMIN — Medication 250 MILLIGRAM(S): at 17:22

## 2020-01-24 RX ADMIN — POLYETHYLENE GLYCOL 3350 17 GRAM(S): 17 POWDER, FOR SOLUTION ORAL at 17:25

## 2020-01-24 RX ADMIN — Medication 40 MILLIGRAM(S): at 09:40

## 2020-01-24 RX ADMIN — URSODIOL 300 MILLIGRAM(S): 250 TABLET, FILM COATED ORAL at 05:16

## 2020-01-24 RX ADMIN — Medication 40 MILLIGRAM(S): at 21:20

## 2020-01-24 RX ADMIN — Medication 2: at 13:00

## 2020-01-24 RX ADMIN — URSODIOL 300 MILLIGRAM(S): 250 TABLET, FILM COATED ORAL at 17:21

## 2020-01-24 RX ADMIN — Medication 650 MILLIGRAM(S): at 17:51

## 2020-01-24 RX ADMIN — Medication 650 MILLIGRAM(S): at 17:21

## 2020-01-24 RX ADMIN — CEFEPIME 100 MILLIGRAM(S): 1 INJECTION, POWDER, FOR SOLUTION INTRAMUSCULAR; INTRAVENOUS at 05:15

## 2020-01-24 RX ADMIN — Medication 3 MILLILITER(S): at 03:34

## 2020-01-24 RX ADMIN — Medication 650 MILLIGRAM(S): at 09:49

## 2020-01-24 RX ADMIN — Medication 63.75 MILLIMOLE(S): at 13:00

## 2020-01-24 RX ADMIN — Medication 3 MILLILITER(S): at 11:51

## 2020-01-24 RX ADMIN — Medication 81 MILLIGRAM(S): at 13:00

## 2020-01-24 RX ADMIN — ENOXAPARIN SODIUM 40 MILLIGRAM(S): 100 INJECTION SUBCUTANEOUS at 13:00

## 2020-01-24 RX ADMIN — FLUCONAZOLE 100 MILLIGRAM(S): 150 TABLET ORAL at 09:19

## 2020-01-24 RX ADMIN — Medication 1 TABLET(S): at 13:00

## 2020-01-24 RX ADMIN — CEFEPIME 100 MILLIGRAM(S): 1 INJECTION, POWDER, FOR SOLUTION INTRAMUSCULAR; INTRAVENOUS at 17:22

## 2020-01-24 RX ADMIN — Medication 3 MILLIGRAM(S): at 21:20

## 2020-01-24 RX ADMIN — Medication 40 MILLIGRAM(S): at 05:16

## 2020-01-24 RX ADMIN — Medication 1: at 09:20

## 2020-01-24 RX ADMIN — Medication 3 MILLILITER(S): at 21:05

## 2020-01-24 NOTE — PROGRESS NOTE ADULT - SUBJECTIVE AND OBJECTIVE BOX
PROGRESS NOTE:   Authored by Wesley Page MD, Pager [phone] [facility], [address] [facility]     Patient is a 89y old  Female who presents with a chief complaint of Acute Hypoxic Respiratory Failure (2020 06:56)      SUBJECTIVE / OVERNIGHT EVENTS: No acute events overnight. Patient seen and examined at bedside. Daughter at bedside provided translation, but patient was quite somnolent and did not fully waken for questions.    ADDITIONAL REVIEW OF SYSTEMS: n/a    MEDICATIONS  (STANDING):  albuterol/ipratropium for Nebulization. 3 milliLiter(s) Nebulizer every 6 hours  aspirin  chewable 81 milliGRAM(s) Oral daily  azithromycin  IVPB 500 milliGRAM(s) IV Intermittent every 24 hours  cefepime   IVPB 1000 milliGRAM(s) IV Intermittent every 12 hours  dextrose 5%. 1000 milliLiter(s) (50 mL/Hr) IV Continuous <Continuous>  dextrose 50% Injectable 12.5 Gram(s) IV Push once  dextrose 50% Injectable 25 Gram(s) IV Push once  dextrose 50% Injectable 25 Gram(s) IV Push once  enoxaparin Injectable 40 milliGRAM(s) SubCutaneous daily  FIRST- Mouthwash  BLM 5 milliLiter(s) Swish and Spit four times a day  fluconAZOLE   Tablet 100 milliGRAM(s) Oral daily  influenza   Vaccine 0.5 milliLiter(s) IntraMuscular once  insulin lispro (HumaLOG) corrective regimen sliding scale   SubCutaneous Before meals and at bedtime  melatonin 3 milliGRAM(s) Oral at bedtime  metoprolol succinate ER 25 milliGRAM(s) Oral daily  multivitamin 1 Tablet(s) Oral daily  predniSONE   Tablet 40 milliGRAM(s) Oral every 8 hours  ursodiol Capsule 300 milliGRAM(s) Oral two times a day  vancomycin  IVPB 1000 milliGRAM(s) IV Intermittent every 24 hours    MEDICATIONS  (PRN):  acetaminophen   Tablet .. 650 milliGRAM(s) Oral every 6 hours PRN Temp greater or equal to 38C (100.4F), Mild Pain (1 - 3), Moderate Pain (4 - 6)  dextrose 40% Gel 15 Gram(s) Oral once PRN Blood Glucose LESS THAN 70 milliGRAM(s)/deciliter  glucagon  Injectable 1 milliGRAM(s) IntraMuscular once PRN Glucose LESS THAN 70 milligrams/deciliter      CAPILLARY BLOOD GLUCOSE      POCT Blood Glucose.: 195 mg/dL (2020 21:53)  POCT Blood Glucose.: 209 mg/dL (2020 17:27)  POCT Blood Glucose.: 138 mg/dL (2020 12:09)    I&O's Summary      PHYSICAL EXAM:  Vital Signs Last 24 Hrs  T(C): 37.3 (2020 05:14), Max: 37.3 (2020 05:14)  T(F): 99.1 (2020 05:14), Max: 99.1 (2020 05:14)  HR: 72 (2020 05:14) (72 - 103)  BP: 142/62 (2020 05:14) (104/69 - 152/75)  BP(mean): --  RR: 18 (2020 05:14) (18 - 25)  SpO2: 98% (2020 05:14) (95% - 100%)    GENERAL: No distress, comfortable, on HFNC. Somnolent but arousable.  EYES: EOMi, PERRLA, no scleral icterus or injection  NECK: Supple  RESPIRATORY: Diffuse coarse biphasic crackles. Good inspiratory effort and air movement, no wheeze. No increased work of breathing.  CARDIOVASCULAR: Normal S1/S2, regular rate and rhythm, no murmurs noted  GASTROINTESTINAL: Abdomen is soft, nontender, nondistended, normoactive bowel sounds, no palpable masses  SKIN: Scattered petechiae. Skin warm.  MUSCULOSKELETAL: No clubbing or cyanosis, no obvious deformity  VASCULAR: Warm, well perfused, trace lower extremity edema  NEUROLOGIC: Grossly normal      LABS:                        8.7    15.83 )-----------( 313      ( 2020 04:00 )             28.0     01-23    129<L>  |  83<L>  |  19  ----------------------------<  151<H>  4.1   |  35<H>  |  0.71    Ca    8.6      2020 16:28  Phos  3.2     01-23  Mg     2.1     -23    TPro  7.1  /  Alb  2.5<L>  /  TBili  < 0.2<L>  /  DBili  x   /  AST  26  /  ALT  16  /  AlkPhos  73  01-22    PT/INR - ( 2020 15:25 )   PT: 15.0 SEC;   INR: 1.34          PTT - ( 2020 04:00 )  PTT:28.4 SEC      Urinalysis Basic - ( 2020 15:25 )    Color: YELLOW / Appearance: TURBID / S.024 / pH: 8.5  Gluc: 50 / Ketone: NEGATIVE  / Bili: NEGATIVE / Urobili: NORMAL   Blood: NEGATIVE / Protein: >600 / Nitrite: NEGATIVE   Leuk Esterase: LARGE / RBC: x / WBC >50   Sq Epi: x / Non Sq Epi: FEW / Bacteria: MANY        Culture - Urine (collected 2020 16:56)  Source: URINE MIDSTREAM  Preliminary Report (2020 08:02):    Insufficient growth, culture re-incubated.    Culture - Blood (collected 2020 16:37)  Source: BLOOD VENOUS  Preliminary Report (2020 16:37):    NO ORGANISMS ISOLATED    NO ORGANISMS ISOLATED AT 24 HOURS    Culture - Blood (collected 2020 16:37)  Source: BLOOD PERIPHERAL  Preliminary Report (2020 16:37):    NO ORGANISMS ISOLATED    NO ORGANISMS ISOLATED AT 24 HOURS        RADIOLOGY & ADDITIONAL TESTS:  Results Reviewed: Y  Imaging Personally Reviewed: Y    COORDINATION OF CARE:  Care Discussed with Consultants/Other Providers [Y/N]: Y  Prior or Outpatient Records Reviewed [Y/N]: Y

## 2020-01-24 NOTE — PROGRESS NOTE ADULT - PROBLEM SELECTOR PLAN 5
Pt with known hx of pulm fibrosis on 5L home oxygen.   - management of acute hypoxic resp failure as above  - Family refusing hospice eval

## 2020-01-24 NOTE — PROGRESS NOTE ADULT - PROBLEM SELECTOR PLAN 10
Transitions of Care Status:   1.  Name of PCP: Dr. Angel Otoole  2.  PCP Contacted on Admission: [ ] Y    [ ] N    3.  PCP contacted at Discharge: [ ] Y    [ ] N    [ ] N/A  4.  Post-Discharge Appointment Date and Location:  5.  Summary of Handoff given to PCP: Transitions of Care Status:   1.  Name of PCP: Dr. Angel Otoole  2.  PCP Contacted on Admission: [X] Y    [ ] N  Dr. Angel Otoole  3.  PCP contacted at Discharge: [ ] Y    [ ] N    [ ] N/A  4.  Post-Discharge Appointment Date and Location:  5.  Summary of Handoff given to PCP:

## 2020-01-24 NOTE — PROGRESS NOTE ADULT - ATTENDING COMMENTS
Patient seen and examined, chart and labs reviewed. Case discussed with house staff.    89F PMH of severe pulmonary fibrosis on 5L home oxygen, Li's esophagus, CAD s/p stent x3, recent admission for hypoxic resp failure 2/2 RSV PNA admitted now with acute hypoxic respiratory failure requiring BIPAP 2/2 sepsis from entero/rhinovirus with possible superimposed pneumonia and UTI.    #Hypoxic resp failure - required BIPAP for hypoxia and WOB initially, now doing well on high flow NC. Will attempt to transition to nasal cannula 5L. Likely in setting of enterovirus +/- HAP superimposed on severe pulm fibrosis. C/w duonebs ATC, steroids for pulm fibrosis, and broad spectrum antibiotics. Pulm consulted, will follow.   #Sepsis - fever, tachycardia, and leukocytosis w/ 9.6% bands on admission. C/w vanc/cefepime and azithro for HAP coverage. Ucx w/ proteus, will follow sensitivies. Blood cultures NGTD. F/u urine legionella. Also w/ +enterovirus, which may be cause of sepsis.   #Hyponatremia - likely multifactorial in setting of hypovolemia + diuretic use (lasix) +/- SIADH. Improved from 125 to 129. Monitor BMP. TSH wnl. Serum cortisol will not be accurate as patient already received steroids but adrenal insufficiency is less likely as patient is not hypotensive.   #GOC - daughter confirmed DNR/DNI, MOLST in chart. Addressed hospice with patient's daughter who would like to discuss further w/ [facility], referral made.

## 2020-01-24 NOTE — PROGRESS NOTE ADULT - PROBLEM SELECTOR PLAN 8
Pt diagnosed with thrush as outpatient, most likely from prolonged antibiotic and inhaled steroid use.  - Took fluconazole for 7 days prior to admission  - c/w fluconazole IV q24 100mg (1/22-) for 14 days total

## 2020-01-24 NOTE — PROGRESS NOTE ADULT - PROBLEM SELECTOR PLAN 9
Diet: Pureed diet per nutrition recs   DVT ppx: Lovenox  GOC: DNR/DNI as per daughter, not hospice Diet: Pureed diet per nutrition recs   DVT ppx: Lovenox  GOC: DNR/DNI as per daughter, not hospice    Baseline Mobility: 6-7  6-Click Admission Score: 6-7  Dispo: Likely home with home care

## 2020-01-24 NOTE — PROGRESS NOTE ADULT - PROBLEM SELECTOR PLAN 1
Due to entero/rhino virus with possible superimposed pna. CXR: no focal consolidation. Recently hospitalized for RSV and at risk for HAP given severe pulmonary fibrosis.   - empiric abx for HAP: vanc/cefepime/azithro (1/22-)  - c/w steroids: 40mg solumedrol Q12H  - duonebs Q4H ATC  - Trial on NC today  - f/u urine legionella.  - f/u pulmonary

## 2020-01-24 NOTE — PROGRESS NOTE ADULT - SUBJECTIVE AND OBJECTIVE BOX
PULMONARY PROGRESS NOTE    FELISA YANG  MRN-[id number]    Patient is a 89y old  Female who presents with a chief complaint of Acute Hypoxic Respiratory Failure (24 Jan 2020 06:56)      HPI:  -recent admit for rsv, now with enterovirus, on high flow 35%, resting, states she feels a little better today.      ROS:   -neg    ACTIVE MEDICATION LIST:  MEDICATIONS  (STANDING):  albuterol/ipratropium for Nebulization. 3 milliLiter(s) Nebulizer every 6 hours  aspirin  chewable 81 milliGRAM(s) Oral daily  azithromycin  IVPB 500 milliGRAM(s) IV Intermittent every 24 hours  cefepime   IVPB 1000 milliGRAM(s) IV Intermittent every 12 hours  dextrose 5%. 1000 milliLiter(s) (50 mL/Hr) IV Continuous <Continuous>  dextrose 50% Injectable 12.5 Gram(s) IV Push once  dextrose 50% Injectable 25 Gram(s) IV Push once  dextrose 50% Injectable 25 Gram(s) IV Push once  enoxaparin Injectable 40 milliGRAM(s) SubCutaneous daily  FIRST- Mouthwash  BLM 5 milliLiter(s) Swish and Spit four times a day  fluconAZOLE   Tablet 100 milliGRAM(s) Oral daily  influenza   Vaccine 0.5 milliLiter(s) IntraMuscular once  insulin lispro (HumaLOG) corrective regimen sliding scale   SubCutaneous Before meals and at bedtime  melatonin 3 milliGRAM(s) Oral at bedtime  methylPREDNISolone sodium succinate Injectable 40 milliGRAM(s) IV Push two times a day  metoprolol succinate ER 25 milliGRAM(s) Oral daily  multivitamin 1 Tablet(s) Oral daily  sodium phosphate IVPB 15 milliMole(s) IV Intermittent once  ursodiol Capsule 300 milliGRAM(s) Oral two times a day  vancomycin  IVPB 1000 milliGRAM(s) IV Intermittent every 24 hours    MEDICATIONS  (PRN):  acetaminophen   Tablet .. 650 milliGRAM(s) Oral every 6 hours PRN Temp greater or equal to 38C (100.4F), Mild Pain (1 - 3), Moderate Pain (4 - 6)  dextrose 40% Gel 15 Gram(s) Oral once PRN Blood Glucose LESS THAN 70 milliGRAM(s)/deciliter  glucagon  Injectable 1 milliGRAM(s) IntraMuscular once PRN Glucose LESS THAN 70 milligrams/deciliter      EXAM:  Vital Signs Last 24 Hrs  T(C): 37.3 (24 Jan 2020 05:14), Max: 37.3 (24 Jan 2020 05:14)  T(F): 99.1 (24 Jan 2020 05:14), Max: 99.1 (24 Jan 2020 05:14)  HR: 83 (24 Jan 2020 08:35) (72 - 103)  BP: 142/62 (24 Jan 2020 05:14) (104/69 - 142/62)  BP(mean): --  RR: 20 (24 Jan 2020 08:35) (18 - 24)  SpO2: 100% (24 Jan 2020 08:35) (95% - 100%)    GENERAL: The patient is awake and alert in no apparent distress.     LUNGS: bilat crackles    HEART: S1/S2    LABS/IMAGING: reviewed                        8.3    13.53 )-----------( 319      ( 24 Jan 2020 06:12 )             27.1   01-24    129<L>  |  85<L>  |  18  ----------------------------<  164<H>  3.8   |  35<H>  |  0.64    Ca    8.4      24 Jan 2020 06:12  Phos  2.3     01-24  Mg     2.1     01-24    TPro  6.5  /  Alb  2.3<L>  /  TBili  0.2  /  DBili  x   /  AST  17  /  ALT  17  /  AlkPhos  67  01-24    < from: Xray Chest 1 View-PORTABLE IMMEDIATE (01.22.20 @ 14:58) >      IMPRESSION:  Interstitial lung disease without focal consolidation.    < end of copied text >        PROBLEM LIST:  89y Female with HEALTH ISSUES - PROBLEM Dx:  Acute on chronic respiratory failure with hypoxia: Acute on chronic respiratory failure with hypoxia  Discharge planning issues: Discharge planning issues  Need for prophylactic measure: Need for prophylactic measure  Thrush: Thrush  HTN (Hypertension): HTN (Hypertension)  CAD (coronary artery disease): CAD (coronary artery disease)  Idiopathic pulmonary fibrosis: Idiopathic pulmonary fibrosis  Hyponatremia: Hyponatremia  Troponin level elevated: Troponin level elevated  Severe sepsis: Severe sepsis  Acute on chronic respiratory failure with hypercapnia: Acute on chronic respiratory failure with hypercapnia    RECS:  -supportive care  -supplemental O2  -cont solumedrol  -cont abx given recent hospitalization  -DNR, agree with hospice phylicia Antonio MD   [phone]

## 2020-01-24 NOTE — PROGRESS NOTE ADULT - PROBLEM SELECTOR PLAN 4
Sodium 125 on admission. Most likely hypovolemic from sepsis and poor PO intake with possible SIADH  - continue to hold home lasix  - f/u bladder scan for retention  - urine studies for intrarenal pathology workup inconclusive given recent diuretic use, but likely representative of volume depletion  - cont to monitor BMP qD

## 2020-01-25 LAB
-  AMIKACIN: SIGNIFICANT CHANGE UP
-  AMPICILLIN/SULBACTAM: SIGNIFICANT CHANGE UP
-  AMPICILLIN: SIGNIFICANT CHANGE UP
-  AZTREONAM: SIGNIFICANT CHANGE UP
-  CEFAZOLIN: SIGNIFICANT CHANGE UP
-  CEFEPIME: SIGNIFICANT CHANGE UP
-  CEFOXITIN: SIGNIFICANT CHANGE UP
-  CEFTAZIDIME: SIGNIFICANT CHANGE UP
-  CEFTRIAXONE: SIGNIFICANT CHANGE UP
-  ERTAPENEM: SIGNIFICANT CHANGE UP
-  GENTAMICIN: SIGNIFICANT CHANGE UP
-  LEVOFLOXACIN: SIGNIFICANT CHANGE UP
-  MEROPENEM: SIGNIFICANT CHANGE UP
-  NITROFURANTOIN: SIGNIFICANT CHANGE UP
-  PIPERACILLIN/TAZOBACTAM: SIGNIFICANT CHANGE UP
-  TOBRAMYCIN: SIGNIFICANT CHANGE UP
-  TRIMETHOPRIM/SULFAMETHOXAZOLE: SIGNIFICANT CHANGE UP
ANION GAP SERPL CALC-SCNC: 11 MMO/L — SIGNIFICANT CHANGE UP (ref 7–14)
BACTERIA UR CULT: SIGNIFICANT CHANGE UP
BUN SERPL-MCNC: 29 MG/DL — HIGH (ref 7–23)
CALCIUM SERPL-MCNC: 9 MG/DL — SIGNIFICANT CHANGE UP (ref 8.4–10.5)
CHLORIDE SERPL-SCNC: 85 MMOL/L — LOW (ref 98–107)
CO2 SERPL-SCNC: 35 MMOL/L — HIGH (ref 22–31)
CREAT SERPL-MCNC: 0.77 MG/DL — SIGNIFICANT CHANGE UP (ref 0.5–1.3)
GLUCOSE BLDC GLUCOMTR-MCNC: 166 MG/DL — HIGH (ref 70–99)
GLUCOSE BLDC GLUCOMTR-MCNC: 186 MG/DL — HIGH (ref 70–99)
GLUCOSE BLDC GLUCOMTR-MCNC: 188 MG/DL — HIGH (ref 70–99)
GLUCOSE BLDC GLUCOMTR-MCNC: 245 MG/DL — HIGH (ref 70–99)
GLUCOSE SERPL-MCNC: 190 MG/DL — HIGH (ref 70–99)
HCT VFR BLD CALC: 29.3 % — LOW (ref 34.5–45)
HGB BLD-MCNC: 9.1 G/DL — LOW (ref 11.5–15.5)
MAGNESIUM SERPL-MCNC: 2 MG/DL — SIGNIFICANT CHANGE UP (ref 1.6–2.6)
MCHC RBC-ENTMCNC: 31.1 % — LOW (ref 32–36)
MCHC RBC-ENTMCNC: 31.6 PG — SIGNIFICANT CHANGE UP (ref 27–34)
MCV RBC AUTO: 101.7 FL — HIGH (ref 80–100)
METHOD TYPE: SIGNIFICANT CHANGE UP
NRBC # FLD: 0.03 K/UL — SIGNIFICANT CHANGE UP (ref 0–0)
ORGANISM # SPEC MICROSCOPIC CNT: SIGNIFICANT CHANGE UP
ORGANISM # SPEC MICROSCOPIC CNT: SIGNIFICANT CHANGE UP
PHOSPHATE SERPL-MCNC: 2 MG/DL — LOW (ref 2.5–4.5)
PLATELET # BLD AUTO: 342 K/UL — SIGNIFICANT CHANGE UP (ref 150–400)
PMV BLD: 8.8 FL — SIGNIFICANT CHANGE UP (ref 7–13)
POTASSIUM SERPL-MCNC: 4.3 MMOL/L — SIGNIFICANT CHANGE UP (ref 3.5–5.3)
POTASSIUM SERPL-SCNC: 4.3 MMOL/L — SIGNIFICANT CHANGE UP (ref 3.5–5.3)
RBC # BLD: 2.88 M/UL — LOW (ref 3.8–5.2)
RBC # FLD: 14.6 % — HIGH (ref 10.3–14.5)
SODIUM SERPL-SCNC: 131 MMOL/L — LOW (ref 135–145)
WBC # BLD: 15.94 K/UL — HIGH (ref 3.8–10.5)
WBC # FLD AUTO: 15.94 K/UL — HIGH (ref 3.8–10.5)

## 2020-01-25 PROCEDURE — 99232 SBSQ HOSP IP/OBS MODERATE 35: CPT

## 2020-01-25 PROCEDURE — 99233 SBSQ HOSP IP/OBS HIGH 50: CPT

## 2020-01-25 RX ORDER — INSULIN LISPRO 100/ML
VIAL (ML) SUBCUTANEOUS AT BEDTIME
Refills: 0 | Status: DISCONTINUED | OUTPATIENT
Start: 2020-01-25 | End: 2020-01-29

## 2020-01-25 RX ORDER — INSULIN GLARGINE 100 [IU]/ML
5 INJECTION, SOLUTION SUBCUTANEOUS AT BEDTIME
Refills: 0 | Status: DISCONTINUED | OUTPATIENT
Start: 2020-01-25 | End: 2020-01-29

## 2020-01-25 RX ORDER — INSULIN LISPRO 100/ML
1 VIAL (ML) SUBCUTANEOUS ONCE
Refills: 0 | Status: COMPLETED | OUTPATIENT
Start: 2020-01-25 | End: 2020-01-25

## 2020-01-25 RX ORDER — BENZOCAINE AND MENTHOL 5; 1 G/100ML; G/100ML
1 LIQUID ORAL
Refills: 0 | Status: DISCONTINUED | OUTPATIENT
Start: 2020-01-25 | End: 2020-01-29

## 2020-01-25 RX ORDER — SODIUM CHLORIDE 9 MG/ML
1000 INJECTION, SOLUTION INTRAVENOUS
Refills: 0 | Status: DISCONTINUED | OUTPATIENT
Start: 2020-01-25 | End: 2020-01-29

## 2020-01-25 RX ORDER — GLUCAGON INJECTION, SOLUTION 0.5 MG/.1ML
1 INJECTION, SOLUTION SUBCUTANEOUS ONCE
Refills: 0 | Status: DISCONTINUED | OUTPATIENT
Start: 2020-01-25 | End: 2020-01-29

## 2020-01-25 RX ORDER — DEXTROSE 50 % IN WATER 50 %
25 SYRINGE (ML) INTRAVENOUS ONCE
Refills: 0 | Status: DISCONTINUED | OUTPATIENT
Start: 2020-01-25 | End: 2020-01-29

## 2020-01-25 RX ORDER — DEXTROSE 50 % IN WATER 50 %
12.5 SYRINGE (ML) INTRAVENOUS ONCE
Refills: 0 | Status: DISCONTINUED | OUTPATIENT
Start: 2020-01-25 | End: 2020-01-29

## 2020-01-25 RX ORDER — INSULIN LISPRO 100/ML
VIAL (ML) SUBCUTANEOUS
Refills: 0 | Status: DISCONTINUED | OUTPATIENT
Start: 2020-01-25 | End: 2020-01-29

## 2020-01-25 RX ORDER — DEXTROSE 50 % IN WATER 50 %
15 SYRINGE (ML) INTRAVENOUS ONCE
Refills: 0 | Status: DISCONTINUED | OUTPATIENT
Start: 2020-01-25 | End: 2020-01-29

## 2020-01-25 RX ORDER — SODIUM,POTASSIUM PHOSPHATES 278-250MG
1 POWDER IN PACKET (EA) ORAL THREE TIMES A DAY
Refills: 0 | Status: COMPLETED | OUTPATIENT
Start: 2020-01-25 | End: 2020-01-26

## 2020-01-25 RX ADMIN — Medication 81 MILLIGRAM(S): at 12:53

## 2020-01-25 RX ADMIN — DIPHENHYDRAMINE HYDROCHLORIDE AND LIDOCAINE HYDROCHLORIDE AND ALUMINUM HYDROXIDE AND MAGNESIUM HYDRO 5 MILLILITER(S): KIT at 12:53

## 2020-01-25 RX ADMIN — INSULIN GLARGINE 5 UNIT(S): 100 INJECTION, SOLUTION SUBCUTANEOUS at 21:39

## 2020-01-25 RX ADMIN — Medication 250 MILLIGRAM(S): at 15:56

## 2020-01-25 RX ADMIN — Medication 1 TABLET(S): at 12:53

## 2020-01-25 RX ADMIN — Medication 40 MILLIGRAM(S): at 06:03

## 2020-01-25 RX ADMIN — URSODIOL 300 MILLIGRAM(S): 250 TABLET, FILM COATED ORAL at 06:03

## 2020-01-25 RX ADMIN — POLYETHYLENE GLYCOL 3350 17 GRAM(S): 17 POWDER, FOR SOLUTION ORAL at 12:53

## 2020-01-25 RX ADMIN — BENZOCAINE AND MENTHOL 1 LOZENGE: 5; 1 LIQUID ORAL at 12:53

## 2020-01-25 RX ADMIN — DIPHENHYDRAMINE HYDROCHLORIDE AND LIDOCAINE HYDROCHLORIDE AND ALUMINUM HYDROXIDE AND MAGNESIUM HYDRO 5 MILLILITER(S): KIT at 06:04

## 2020-01-25 RX ADMIN — Medication 3 MILLILITER(S): at 21:15

## 2020-01-25 RX ADMIN — FLUCONAZOLE 100 MILLIGRAM(S): 150 TABLET ORAL at 09:03

## 2020-01-25 RX ADMIN — Medication 3 MILLILITER(S): at 10:06

## 2020-01-25 RX ADMIN — Medication 200 MILLIGRAM(S): at 17:50

## 2020-01-25 RX ADMIN — CEFEPIME 100 MILLIGRAM(S): 1 INJECTION, POWDER, FOR SOLUTION INTRAMUSCULAR; INTRAVENOUS at 17:51

## 2020-01-25 RX ADMIN — Medication 25 MILLIGRAM(S): at 06:03

## 2020-01-25 RX ADMIN — URSODIOL 300 MILLIGRAM(S): 250 TABLET, FILM COATED ORAL at 17:50

## 2020-01-25 RX ADMIN — CEFEPIME 100 MILLIGRAM(S): 1 INJECTION, POWDER, FOR SOLUTION INTRAMUSCULAR; INTRAVENOUS at 06:03

## 2020-01-25 RX ADMIN — DIPHENHYDRAMINE HYDROCHLORIDE AND LIDOCAINE HYDROCHLORIDE AND ALUMINUM HYDROXIDE AND MAGNESIUM HYDRO 5 MILLILITER(S): KIT at 23:10

## 2020-01-25 RX ADMIN — Medication 2: at 17:51

## 2020-01-25 RX ADMIN — Medication 1 PACKET(S): at 21:39

## 2020-01-25 RX ADMIN — Medication 650 MILLIGRAM(S): at 22:08

## 2020-01-25 RX ADMIN — Medication 3 MILLILITER(S): at 17:06

## 2020-01-25 RX ADMIN — Medication 1 UNIT(S): at 09:03

## 2020-01-25 RX ADMIN — ENOXAPARIN SODIUM 40 MILLIGRAM(S): 100 INJECTION SUBCUTANEOUS at 12:53

## 2020-01-25 RX ADMIN — Medication 1: at 12:52

## 2020-01-25 RX ADMIN — FLUCONAZOLE 100 MILLIGRAM(S): 150 TABLET ORAL at 23:10

## 2020-01-25 RX ADMIN — Medication 650 MILLIGRAM(S): at 21:38

## 2020-01-25 RX ADMIN — Medication 3 MILLIGRAM(S): at 21:38

## 2020-01-25 RX ADMIN — Medication 1 PACKET(S): at 15:56

## 2020-01-25 RX ADMIN — Medication 3 MILLILITER(S): at 04:25

## 2020-01-25 NOTE — PROGRESS NOTE ADULT - PROBLEM SELECTOR PLAN 9
Diet: Pureed diet per nutrition recs   DVT ppx: Lovenox  GOC: DNR/DNI as per daughter, not hospice    Baseline Mobility: 6-7  6-Click Admission Score: 6-7  Dispo: Likely home with home care, hospice referral sent

## 2020-01-25 NOTE — PROGRESS NOTE ADULT - PROBLEM SELECTOR PLAN 2
Pt with severe sepsis, with leukocytosis, fever and tachycardia likely in setting of HAP + enterovirus +/- UTI  - initial lactate 6.9, then < 2 on repeat  - broad spectrum abx as above  - f/u blood and urine cultures  - caution with IVF to avoid overload

## 2020-01-25 NOTE — PROVIDER CONTACT NOTE (OTHER) - ASSESSMENT
V/S: T 97.5 P 95 R 25 /60, oxygen sat with supplemental oxygen 97%, complains of SOB, alert and responsive, verbalized that she is anxious, not in acute distress

## 2020-01-25 NOTE — PROGRESS NOTE ADULT - SUBJECTIVE AND OBJECTIVE BOX
PULMONARY PROGRESS NOTE    FELISA YANG  MRN-[id number]    Patient is a 89y old  Female who presents with a chief complaint of Acute Hypoxic Respiratory Failure (24 Jan 2020 06:56)      HPI:  on nasal canula today feeling better, has sore throat    ROS:   -neg    MEDICATIONS  (STANDING):  albuterol/ipratropium for Nebulization. 3 milliLiter(s) Nebulizer every 6 hours  aspirin  chewable 81 milliGRAM(s) Oral daily  cefepime   IVPB 1000 milliGRAM(s) IV Intermittent every 12 hours  dextrose 5%. 1000 milliLiter(s) (50 mL/Hr) IV Continuous <Continuous>  dextrose 50% Injectable 12.5 Gram(s) IV Push once  dextrose 50% Injectable 25 Gram(s) IV Push once  dextrose 50% Injectable 25 Gram(s) IV Push once  enoxaparin Injectable 40 milliGRAM(s) SubCutaneous daily  FIRST- Mouthwash  BLM 5 milliLiter(s) Swish and Spit four times a day  fluconAZOLE   Tablet 100 milliGRAM(s) Oral daily  influenza   Vaccine 0.5 milliLiter(s) IntraMuscular once  insulin glargine Injectable (LANTUS) 5 Unit(s) SubCutaneous at bedtime  insulin lispro (HumaLOG) corrective regimen sliding scale   SubCutaneous three times a day before meals  insulin lispro (HumaLOG) corrective regimen sliding scale   SubCutaneous at bedtime  melatonin 3 milliGRAM(s) Oral at bedtime  metoprolol succinate ER 25 milliGRAM(s) Oral daily  multivitamin 1 Tablet(s) Oral daily  potassium phosphate / sodium phosphate powder 1 Packet(s) Oral three times a day  predniSONE   Tablet 40 milliGRAM(s) Oral daily  ursodiol Capsule 300 milliGRAM(s) Oral two times a day  vancomycin  IVPB 1000 milliGRAM(s) IV Intermittent every 24 hours    MEDICATIONS  (PRN):  acetaminophen   Tablet .. 650 milliGRAM(s) Oral every 6 hours PRN Temp greater or equal to 38C (100.4F), Mild Pain (1 - 3), Moderate Pain (4 - 6)  benzocaine 15 mG/menthol 3.6 mG (Sugar-Free) Lozenge 1 Lozenge Oral two times a day PRN Sore Throat  dextrose 40% Gel 15 Gram(s) Oral once PRN Blood Glucose LESS THAN 70 milliGRAM(s)/deciliter  glucagon  Injectable 1 milliGRAM(s) IntraMuscular once PRN Glucose LESS THAN 70 milligrams/deciliter  polyethylene glycol 3350 17 Gram(s) Oral daily PRN Constipation      EXAM:  Vital Signs Last 24 Hrs  T(C): 36.7 (24 Jan 2020 22:12), Max: 36.7 (24 Jan 2020 22:12)  T(F): 98 (24 Jan 2020 22:12), Max: 98 (24 Jan 2020 22:12)  HR: 95 (25 Jan 2020 10:07) (86 - 98)  BP: 124/62 (24 Jan 2020 22:12) (124/62 - 124/62)  BP(mean): --  RR: 19 (24 Jan 2020 22:12) (19 - 19)  SpO2: 99% (25 Jan 2020 10:07) (95% - 99%)  GENERAL: The patient is awake and alert in no apparent distress.     LUNGS: bilat crackles    HEART: S1/S2    LABS/IMAGING: reviewed                                   9.1    15.94 )-----------( 342      ( 25 Jan 2020 07:00 )             29.3   01-25    131<L>  |  85<L>  |  29<H>  ----------------------------<  190<H>  4.3   |  35<H>  |  0.77    Ca    9.0      25 Jan 2020 07:00  Phos  2.0     01-25  Mg     2.0     01-25    TPro  6.5  /  Alb  2.3<L>  /  TBili  0.2  /  DBili  x   /  AST  17  /  ALT  17  /  AlkPhos  67  01-24      < from: Xray Chest 1 View-PORTABLE IMMEDIATE (01.22.20 @ 14:58) >      IMPRESSION:  Interstitial lung disease without focal consolidation.    < end of copied text >        PROBLEM LIST:  89y Female with HEALTH ISSUES - PROBLEM Dx:  Acute on chronic respiratory failure with hypoxia: Acute on chronic respiratory failure with hypoxia  Discharge planning issues: Discharge planning issues  Need for prophylactic measure: Need for prophylactic measure  Thrush: Thrush  HTN (Hypertension): HTN (Hypertension)  CAD (coronary artery disease): CAD (coronary artery disease)  Idiopathic pulmonary fibrosis: Idiopathic pulmonary fibrosis  Hyponatremia: Hyponatremia  Troponin level elevated: Troponin level elevated  Severe sepsis: Severe sepsis  Acute on chronic respiratory failure with hypercapnia: Acute on chronic respiratory failure with hypercapnia    RECS:  -supportive care  -supplemental O2  -cont prednisone  -cont abx given recent hospitalization  -DNR, agree with hospice phylicia Antonio MD   [phone]

## 2020-01-25 NOTE — CHART NOTE - NSCHARTNOTEFT_GEN_A_CORE
Called by RN b/c pt experiencing SOB. Saturating at 95%, which is patient's baseline per daughter due to pulmonary fibrosis. Pt resting comfortably in bed on exam. No accessory muscle use. Bibasilar crackles appreciated on exam. Spoke with daughter who stated pt is adverse to a mask, which causes excessive anxiety. Will institute HFNC for now. Communicated with RN. Called RT to institute it.

## 2020-01-25 NOTE — PROGRESS NOTE ADULT - PROBLEM SELECTOR PLAN 10
Transitions of Care Status:   1.  Name of PCP: Dr. Angel Otoole  2.  PCP Contacted on Admission: [X] Y    [ ] N  Dr. Angel Otoole  3.  PCP contacted at Discharge: [ ] Y    [ ] N    [ ] N/A  4.  Post-Discharge Appointment Date and Location:  5.  Summary of Handoff given to PCP:

## 2020-01-25 NOTE — PROGRESS NOTE ADULT - SUBJECTIVE AND OBJECTIVE BOX
Purnima Neal M.D.   PGY-3 | Internal Medicine   [phone] | [id number]        Patient is a 89y old  Female who presents with a chief complaint of Acute Hypoxic Respiratory Failure (24 Jan 2020 11:11)        SUBJECTIVE / OVERNIGHT EVENTS: Patient had no acute events overnight. Patient seen and examined at bedside this morning.     ROS: [ - ] Fever [ - ] Chills [ - ] Nausea/Vomiting [ - ] Chest Pain [ - ] Shortness of breath     MEDICATIONS  (STANDING):  albuterol/ipratropium for Nebulization. 3 milliLiter(s) Nebulizer every 6 hours  aspirin  chewable 81 milliGRAM(s) Oral daily  cefepime   IVPB 1000 milliGRAM(s) IV Intermittent every 12 hours  dextrose 5%. 1000 milliLiter(s) (50 mL/Hr) IV Continuous <Continuous>  dextrose 50% Injectable 12.5 Gram(s) IV Push once  dextrose 50% Injectable 25 Gram(s) IV Push once  dextrose 50% Injectable 25 Gram(s) IV Push once  enoxaparin Injectable 40 milliGRAM(s) SubCutaneous daily  FIRST- Mouthwash  BLM 5 milliLiter(s) Swish and Spit four times a day  fluconAZOLE   Tablet 100 milliGRAM(s) Oral daily  influenza   Vaccine 0.5 milliLiter(s) IntraMuscular once  insulin lispro (HumaLOG) corrective regimen sliding scale   SubCutaneous three times a day before meals  insulin lispro (HumaLOG) corrective regimen sliding scale   SubCutaneous at bedtime  melatonin 3 milliGRAM(s) Oral at bedtime  metoprolol succinate ER 25 milliGRAM(s) Oral daily  multivitamin 1 Tablet(s) Oral daily  predniSONE   Tablet 40 milliGRAM(s) Oral daily  ursodiol Capsule 300 milliGRAM(s) Oral two times a day  vancomycin  IVPB 1000 milliGRAM(s) IV Intermittent every 24 hours    MEDICATIONS  (PRN):  acetaminophen   Tablet .. 650 milliGRAM(s) Oral every 6 hours PRN Temp greater or equal to 38C (100.4F), Mild Pain (1 - 3), Moderate Pain (4 - 6)  benzocaine 15 mG/menthol 3.6 mG (Sugar-Free) Lozenge 1 Lozenge Oral two times a day PRN Sore Throat  dextrose 40% Gel 15 Gram(s) Oral once PRN Blood Glucose LESS THAN 70 milliGRAM(s)/deciliter  glucagon  Injectable 1 milliGRAM(s) IntraMuscular once PRN Glucose LESS THAN 70 milligrams/deciliter  polyethylene glycol 3350 17 Gram(s) Oral daily PRN Constipation      Vital Signs Last 24 Hrs  T(C): 36.7 (24 Jan 2020 22:12), Max: 36.7 (24 Jan 2020 22:12)  T(F): 98 (24 Jan 2020 22:12), Max: 98 (24 Jan 2020 22:12)  HR: 86 (25 Jan 2020 04:25) (80 - 98)  BP: 124/62 (24 Jan 2020 22:12) (123/63 - 125/66)  BP(mean): --  RR: 19 (24 Jan 2020 22:12) (17 - 21)  SpO2: 98% (25 Jan 2020 04:25) (95% - 100%)  CAPILLARY BLOOD GLUCOSE      POCT Blood Glucose.: 300 mg/dL (24 Jan 2020 23:18)  POCT Blood Glucose.: 242 mg/dL (24 Jan 2020 12:16)  POCT Blood Glucose.: 166 mg/dL (24 Jan 2020 08:34)    I&O's Summary      PHYSICAL EXAM  GENERAL: NAD, lying comfortably in bed   HEAD:  Atraumatic, Normocephalic  EYES: EOMI, PERRLA, conjunctiva and sclera clear  NECK: Supple, No JVD  CHEST/LUNG: Clear to auscultation bilaterally; No wheeze  HEART: Regular rate and rhythm; No murmurs, rubs, or gallops  ABDOMEN: Soft, Nontender, Nondistended; Bowel sounds present  EXTREMITIES:  2+ Peripheral Pulses, No clubbing, cyanosis, or edema  NEURO: AAOx3, non-focal  SKIN: No rashes or lesions      LABS:                        8.3    13.53 )-----------( 319      ( 24 Jan 2020 06:12 )             27.1     01-24    129<L>  |  85<L>  |  18  ----------------------------<  164<H>  3.8   |  35<H>  |  0.64    Ca    8.4      24 Jan 2020 06:12  Phos  2.3     01-24  Mg     2.1     01-24    TPro  6.5  /  Alb  2.3<L>  /  TBili  0.2  /  DBili  x   /  AST  17  /  ALT  17  /  AlkPhos  67  01-24                RADIOLOGY & ADDITIONAL TESTS:    Imaging Personally Reviewed:  Consultant(s) Notes Reviewed: Purnima Neal M.D.   PGY-3 | Internal Medicine   [phone] | [id number]        Patient is a 89y old  Female who presents with a chief complaint of Acute Hypoxic Respiratory Failure (24 Jan 2020 11:11)        SUBJECTIVE / OVERNIGHT EVENTS: Patient had no acute events overnight. Patient seen and examined at bedside this morning. Patient denies SOB, is currently sating well on 5L NC. Complaining of sore throat.     ROS: [ - ] Fever [ - ] Chills [ - ] Nausea/Vomiting [ - ] Chest Pain [ - ] Shortness of breath     MEDICATIONS  (STANDING):  albuterol/ipratropium for Nebulization. 3 milliLiter(s) Nebulizer every 6 hours  aspirin  chewable 81 milliGRAM(s) Oral daily  cefepime   IVPB 1000 milliGRAM(s) IV Intermittent every 12 hours  dextrose 5%. 1000 milliLiter(s) (50 mL/Hr) IV Continuous <Continuous>  dextrose 50% Injectable 12.5 Gram(s) IV Push once  dextrose 50% Injectable 25 Gram(s) IV Push once  dextrose 50% Injectable 25 Gram(s) IV Push once  enoxaparin Injectable 40 milliGRAM(s) SubCutaneous daily  FIRST- Mouthwash  BLM 5 milliLiter(s) Swish and Spit four times a day  fluconAZOLE   Tablet 100 milliGRAM(s) Oral daily  influenza   Vaccine 0.5 milliLiter(s) IntraMuscular once  insulin lispro (HumaLOG) corrective regimen sliding scale   SubCutaneous three times a day before meals  insulin lispro (HumaLOG) corrective regimen sliding scale   SubCutaneous at bedtime  melatonin 3 milliGRAM(s) Oral at bedtime  metoprolol succinate ER 25 milliGRAM(s) Oral daily  multivitamin 1 Tablet(s) Oral daily  predniSONE   Tablet 40 milliGRAM(s) Oral daily  ursodiol Capsule 300 milliGRAM(s) Oral two times a day  vancomycin  IVPB 1000 milliGRAM(s) IV Intermittent every 24 hours    MEDICATIONS  (PRN):  acetaminophen   Tablet .. 650 milliGRAM(s) Oral every 6 hours PRN Temp greater or equal to 38C (100.4F), Mild Pain (1 - 3), Moderate Pain (4 - 6)  benzocaine 15 mG/menthol 3.6 mG (Sugar-Free) Lozenge 1 Lozenge Oral two times a day PRN Sore Throat  dextrose 40% Gel 15 Gram(s) Oral once PRN Blood Glucose LESS THAN 70 milliGRAM(s)/deciliter  glucagon  Injectable 1 milliGRAM(s) IntraMuscular once PRN Glucose LESS THAN 70 milligrams/deciliter  polyethylene glycol 3350 17 Gram(s) Oral daily PRN Constipation      Vital Signs Last 24 Hrs  T(C): 36.7 (24 Jan 2020 22:12), Max: 36.7 (24 Jan 2020 22:12)  T(F): 98 (24 Jan 2020 22:12), Max: 98 (24 Jan 2020 22:12)  HR: 86 (25 Jan 2020 04:25) (80 - 98)  BP: 124/62 (24 Jan 2020 22:12) (123/63 - 125/66)  BP(mean): --  RR: 19 (24 Jan 2020 22:12) (17 - 21)  SpO2: 98% (25 Jan 2020 04:25) (95% - 100%)  CAPILLARY BLOOD GLUCOSE      POCT Blood Glucose.: 300 mg/dL (24 Jan 2020 23:18)  POCT Blood Glucose.: 242 mg/dL (24 Jan 2020 12:16)  POCT Blood Glucose.: 166 mg/dL (24 Jan 2020 08:34)    I&O's Summary      PHYSICAL EXAM  GENERAL: NAD, lying comfortably in bed   HEAD:  Atraumatic, Normocephalic  EYES: EOMI, PERRLA, conjunctiva and sclera clear  NECK: Supple, No JVD  CHEST/LUNG: Comfortable on 5L NC. Bibasilar crackles, no wheezing   HEART: Regular rate and rhythm; No murmurs, rubs, or gallops  ABDOMEN: Soft, Nontender, Nondistended; Bowel sounds present  EXTREMITIES:  2+ Peripheral Pulses, trace peripheral edema   NEURO: AAOx3, non-focal  SKIN: No rashes or lesions      LABS:                        8.3    13.53 )-----------( 319      ( 24 Jan 2020 06:12 )             27.1     01-24    129<L>  |  85<L>  |  18  ----------------------------<  164<H>  3.8   |  35<H>  |  0.64    Ca    8.4      24 Jan 2020 06:12  Phos  2.3     01-24  Mg     2.1     01-24    TPro  6.5  /  Alb  2.3<L>  /  TBili  0.2  /  DBili  x   /  AST  17  /  ALT  17  /  AlkPhos  67  01-24

## 2020-01-25 NOTE — PROGRESS NOTE ADULT - PROBLEM SELECTOR PLAN 4
Sodium 125 on admission, improved to 129. Most likely hypovolemic from sepsis and poor PO intake with possible SIADH  - continue to hold home lasix  - urine studies for intrarenal pathology workup inconclusive given recent diuretic use, but likely representative of volume depletion  - cont to monitor BMP qD

## 2020-01-25 NOTE — PROGRESS NOTE ADULT - ATTENDING COMMENTS
Pt seen and examined. Complaining of sore throat    Acute and chronic resp failure due to pulm fibrosis and PNA: c/w Abx and steroids, plan for hospice, awaiting hospice recs    Sore throat; will give cepacol prn

## 2020-01-25 NOTE — PROGRESS NOTE ADULT - PROBLEM SELECTOR PLAN 3
Initial troponin 104, repeat 95. Most likely demand in setting of acute hypoxic event and known severe pulmonary fibrosis. EKG showing RBBB, which is unchanged from prior EKG. Pt not complaining of chest pain.  - monitor off telemetry

## 2020-01-25 NOTE — PROVIDER CONTACT NOTE (OTHER) - BACKGROUND
89 y/o female admitted for acute hypoxic respiratory failure with hx pulmonary fibrosis presents with weakness and SOB, hx of CAD, HTN

## 2020-01-26 LAB
ANION GAP SERPL CALC-SCNC: 7 MMO/L — SIGNIFICANT CHANGE UP (ref 7–14)
BASOPHILS # BLD AUTO: 0.08 K/UL — SIGNIFICANT CHANGE UP (ref 0–0.2)
BASOPHILS NFR BLD AUTO: 0.4 % — SIGNIFICANT CHANGE UP (ref 0–2)
BUN SERPL-MCNC: 29 MG/DL — HIGH (ref 7–23)
CALCIUM SERPL-MCNC: 8.9 MG/DL — SIGNIFICANT CHANGE UP (ref 8.4–10.5)
CHLORIDE SERPL-SCNC: 85 MMOL/L — LOW (ref 98–107)
CO2 SERPL-SCNC: 40 MMOL/L — HIGH (ref 22–31)
CREAT SERPL-MCNC: 0.65 MG/DL — SIGNIFICANT CHANGE UP (ref 0.5–1.3)
EOSINOPHIL # BLD AUTO: 0 K/UL — SIGNIFICANT CHANGE UP (ref 0–0.5)
EOSINOPHIL NFR BLD AUTO: 0 % — SIGNIFICANT CHANGE UP (ref 0–6)
GLUCOSE BLDC GLUCOMTR-MCNC: 111 MG/DL — HIGH (ref 70–99)
GLUCOSE BLDC GLUCOMTR-MCNC: 116 MG/DL — HIGH (ref 70–99)
GLUCOSE BLDC GLUCOMTR-MCNC: 204 MG/DL — HIGH (ref 70–99)
GLUCOSE BLDC GLUCOMTR-MCNC: 230 MG/DL — HIGH (ref 70–99)
GLUCOSE SERPL-MCNC: 113 MG/DL — HIGH (ref 70–99)
HCT VFR BLD CALC: 31.1 % — LOW (ref 34.5–45)
HGB BLD-MCNC: 9.5 G/DL — LOW (ref 11.5–15.5)
IMM GRANULOCYTES NFR BLD AUTO: 4.9 % — HIGH (ref 0–1.5)
LYMPHOCYTES # BLD AUTO: 0.93 K/UL — LOW (ref 1–3.3)
LYMPHOCYTES # BLD AUTO: 4.8 % — LOW (ref 13–44)
MAGNESIUM SERPL-MCNC: 2 MG/DL — SIGNIFICANT CHANGE UP (ref 1.6–2.6)
MCHC RBC-ENTMCNC: 30.5 % — LOW (ref 32–36)
MCHC RBC-ENTMCNC: 31.7 PG — SIGNIFICANT CHANGE UP (ref 27–34)
MCV RBC AUTO: 103.7 FL — HIGH (ref 80–100)
MONOCYTES # BLD AUTO: 0.82 K/UL — SIGNIFICANT CHANGE UP (ref 0–0.9)
MONOCYTES NFR BLD AUTO: 4.3 % — SIGNIFICANT CHANGE UP (ref 2–14)
NEUTROPHILS # BLD AUTO: 16.52 K/UL — HIGH (ref 1.8–7.4)
NEUTROPHILS NFR BLD AUTO: 85.6 % — HIGH (ref 43–77)
NRBC # FLD: 0.04 K/UL — SIGNIFICANT CHANGE UP (ref 0–0)
PHOSPHATE SERPL-MCNC: 2.9 MG/DL — SIGNIFICANT CHANGE UP (ref 2.5–4.5)
PLATELET # BLD AUTO: 332 K/UL — SIGNIFICANT CHANGE UP (ref 150–400)
PMV BLD: 8.8 FL — SIGNIFICANT CHANGE UP (ref 7–13)
POTASSIUM SERPL-MCNC: 4.5 MMOL/L — SIGNIFICANT CHANGE UP (ref 3.5–5.3)
POTASSIUM SERPL-SCNC: 4.5 MMOL/L — SIGNIFICANT CHANGE UP (ref 3.5–5.3)
RBC # BLD: 3 M/UL — LOW (ref 3.8–5.2)
RBC # FLD: 14.9 % — HIGH (ref 10.3–14.5)
SODIUM SERPL-SCNC: 132 MMOL/L — LOW (ref 135–145)
WBC # BLD: 19.29 K/UL — HIGH (ref 3.8–10.5)
WBC # FLD AUTO: 19.29 K/UL — HIGH (ref 3.8–10.5)

## 2020-01-26 PROCEDURE — 99232 SBSQ HOSP IP/OBS MODERATE 35: CPT

## 2020-01-26 PROCEDURE — 99233 SBSQ HOSP IP/OBS HIGH 50: CPT

## 2020-01-26 RX ADMIN — Medication 1 PACKET(S): at 05:48

## 2020-01-26 RX ADMIN — Medication 0.25 MILLIGRAM(S): at 21:46

## 2020-01-26 RX ADMIN — Medication 1 TABLET(S): at 12:38

## 2020-01-26 RX ADMIN — Medication 2: at 17:32

## 2020-01-26 RX ADMIN — URSODIOL 300 MILLIGRAM(S): 250 TABLET, FILM COATED ORAL at 17:32

## 2020-01-26 RX ADMIN — Medication 250 MILLIGRAM(S): at 16:07

## 2020-01-26 RX ADMIN — Medication 2: at 12:38

## 2020-01-26 RX ADMIN — Medication 3 MILLIGRAM(S): at 21:46

## 2020-01-26 RX ADMIN — DIPHENHYDRAMINE HYDROCHLORIDE AND LIDOCAINE HYDROCHLORIDE AND ALUMINUM HYDROXIDE AND MAGNESIUM HYDRO 5 MILLILITER(S): KIT at 17:32

## 2020-01-26 RX ADMIN — Medication 40 MILLIGRAM(S): at 05:48

## 2020-01-26 RX ADMIN — BENZOCAINE AND MENTHOL 1 LOZENGE: 5; 1 LIQUID ORAL at 00:45

## 2020-01-26 RX ADMIN — Medication 3 MILLILITER(S): at 10:54

## 2020-01-26 RX ADMIN — FLUCONAZOLE 100 MILLIGRAM(S): 150 TABLET ORAL at 23:59

## 2020-01-26 RX ADMIN — INSULIN GLARGINE 5 UNIT(S): 100 INJECTION, SOLUTION SUBCUTANEOUS at 21:46

## 2020-01-26 RX ADMIN — URSODIOL 300 MILLIGRAM(S): 250 TABLET, FILM COATED ORAL at 05:47

## 2020-01-26 RX ADMIN — Medication 25 MILLIGRAM(S): at 05:47

## 2020-01-26 RX ADMIN — DIPHENHYDRAMINE HYDROCHLORIDE AND LIDOCAINE HYDROCHLORIDE AND ALUMINUM HYDROXIDE AND MAGNESIUM HYDRO 5 MILLILITER(S): KIT at 23:59

## 2020-01-26 RX ADMIN — DIPHENHYDRAMINE HYDROCHLORIDE AND LIDOCAINE HYDROCHLORIDE AND ALUMINUM HYDROXIDE AND MAGNESIUM HYDRO 5 MILLILITER(S): KIT at 12:39

## 2020-01-26 RX ADMIN — ENOXAPARIN SODIUM 40 MILLIGRAM(S): 100 INJECTION SUBCUTANEOUS at 12:38

## 2020-01-26 RX ADMIN — Medication 3 MILLILITER(S): at 21:21

## 2020-01-26 RX ADMIN — Medication 3 MILLILITER(S): at 04:25

## 2020-01-26 RX ADMIN — Medication 81 MILLIGRAM(S): at 12:38

## 2020-01-26 RX ADMIN — BENZOCAINE AND MENTHOL 1 LOZENGE: 5; 1 LIQUID ORAL at 13:51

## 2020-01-26 RX ADMIN — CEFEPIME 100 MILLIGRAM(S): 1 INJECTION, POWDER, FOR SOLUTION INTRAMUSCULAR; INTRAVENOUS at 17:32

## 2020-01-26 RX ADMIN — DIPHENHYDRAMINE HYDROCHLORIDE AND LIDOCAINE HYDROCHLORIDE AND ALUMINUM HYDROXIDE AND MAGNESIUM HYDRO 5 MILLILITER(S): KIT at 05:47

## 2020-01-26 RX ADMIN — Medication 3 MILLILITER(S): at 17:15

## 2020-01-26 RX ADMIN — CEFEPIME 100 MILLIGRAM(S): 1 INJECTION, POWDER, FOR SOLUTION INTRAMUSCULAR; INTRAVENOUS at 05:47

## 2020-01-26 RX ADMIN — POLYETHYLENE GLYCOL 3350 17 GRAM(S): 17 POWDER, FOR SOLUTION ORAL at 13:51

## 2020-01-26 NOTE — PROGRESS NOTE ADULT - SUBJECTIVE AND OBJECTIVE BOX
PROGRESS NOTE:   Authored by Wesley Page MD, Pager [phone] [facility], [address] [facility]     Patient is a 89y old  Female who presents with a chief complaint of Acute Hypoxic Respiratory Failure (26 Jan 2020 07:04)      SUBJECTIVE / OVERNIGHT EVENTS: Overnight patient was complaining of shortness of breath. She was placed back on HFNC. Patient seen and examined at bedside. Daughter at bedside provided translation. She said she did not sleep at all due to the noise of the machine, and she is also complaining of a sore throat.     ADDITIONAL REVIEW OF SYSTEMS: No fevers, chills, chest pain, rhinorrhea.    MEDICATIONS  (STANDING):  albuterol/ipratropium for Nebulization. 3 milliLiter(s) Nebulizer every 6 hours  aspirin  chewable 81 milliGRAM(s) Oral daily  cefepime   IVPB 1000 milliGRAM(s) IV Intermittent every 12 hours  dextrose 5%. 1000 milliLiter(s) (50 mL/Hr) IV Continuous <Continuous>  dextrose 50% Injectable 12.5 Gram(s) IV Push once  dextrose 50% Injectable 25 Gram(s) IV Push once  dextrose 50% Injectable 25 Gram(s) IV Push once  enoxaparin Injectable 40 milliGRAM(s) SubCutaneous daily  FIRST- Mouthwash  BLM 5 milliLiter(s) Swish and Spit four times a day  fluconAZOLE   Tablet 100 milliGRAM(s) Oral daily  influenza   Vaccine 0.5 milliLiter(s) IntraMuscular once  insulin glargine Injectable (LANTUS) 5 Unit(s) SubCutaneous at bedtime  insulin lispro (HumaLOG) corrective regimen sliding scale   SubCutaneous three times a day before meals  insulin lispro (HumaLOG) corrective regimen sliding scale   SubCutaneous at bedtime  melatonin 3 milliGRAM(s) Oral at bedtime  metoprolol succinate ER 25 milliGRAM(s) Oral daily  multivitamin 1 Tablet(s) Oral daily  predniSONE   Tablet 40 milliGRAM(s) Oral daily  ursodiol Capsule 300 milliGRAM(s) Oral two times a day  vancomycin  IVPB 1000 milliGRAM(s) IV Intermittent every 24 hours    MEDICATIONS  (PRN):  acetaminophen   Tablet .. 650 milliGRAM(s) Oral every 6 hours PRN Temp greater or equal to 38C (100.4F), Mild Pain (1 - 3), Moderate Pain (4 - 6)  benzocaine 15 mG/menthol 3.6 mG (Sugar-Free) Lozenge 1 Lozenge Oral two times a day PRN Sore Throat  dextrose 40% Gel 15 Gram(s) Oral once PRN Blood Glucose LESS THAN 70 milliGRAM(s)/deciliter  glucagon  Injectable 1 milliGRAM(s) IntraMuscular once PRN Glucose LESS THAN 70 milligrams/deciliter  guaiFENesin   Syrup  (Sugar-Free) 200 milliGRAM(s) Oral every 6 hours PRN Cough  polyethylene glycol 3350 17 Gram(s) Oral daily PRN Constipation      CAPILLARY BLOOD GLUCOSE      POCT Blood Glucose.: 166 mg/dL (25 Jan 2020 21:27)  POCT Blood Glucose.: 245 mg/dL (25 Jan 2020 17:39)  POCT Blood Glucose.: 188 mg/dL (25 Jan 2020 12:23)  POCT Blood Glucose.: 186 mg/dL (25 Jan 2020 08:29)    I&O's Summary      PHYSICAL EXAM:  Vital Signs Last 24 Hrs  T(C): 36.6 (26 Jan 2020 05:46), Max: 36.6 (26 Jan 2020 05:46)  T(F): 97.8 (26 Jan 2020 05:46), Max: 97.8 (26 Jan 2020 05:46)  HR: 86 (26 Jan 2020 05:46) (83 - 98)  BP: 142/86 (26 Jan 2020 05:46) (136/60 - 152/76)  BP(mean): --  RR: 19 (26 Jan 2020 05:46) (19 - 25)  SpO2: 100% (26 Jan 2020 05:46) (97% - 100%)    GENERAL: No distress, comfortable, on HFNC. Awake and alert.  EYES: EOMi, PERRLA, no scleral icterus or injection  HEENT: Oropharynx without thrush, erythema. Moist mucous membranes. Posterior oropharynx without exudates.  NECK: Supple, full ROM  RESPIRATORY: Diffuse coarse biphasic crackles. Good inspiratory effort and air movement, no wheeze. No increased work of breathing.  CARDIOVASCULAR: Normal S1/S2, regular rate and rhythm, no murmurs noted  GASTROINTESTINAL: Abdomen is soft, nontender, nondistended, normoactive bowel sounds, no palpable masses  SKIN: Scattered petechiae. Skin warm.  MUSCULOSKELETAL: No clubbing or cyanosis, no obvious deformity  VASCULAR: Warm, well perfused, trace lower extremity edema  NEUROLOGIC: Grossly normal    LABS:                        9.1    15.94 )-----------( 342      ( 25 Jan 2020 07:00 )             29.3     01-25    131<L>  |  85<L>  |  29<H>  ----------------------------<  190<H>  4.3   |  35<H>  |  0.77    Ca    9.0      25 Jan 2020 07:00  Phos  2.0     01-25  Mg     2.0     01-25    RADIOLOGY & ADDITIONAL TESTS:  Results Reviewed: Y  Imaging Personally Reviewed: Y    COORDINATION OF CARE:  Care Discussed with Consultants/Other Providers [Y/N]: Y  Prior or Outpatient Records Reviewed [Y/N]: Y

## 2020-01-26 NOTE — PROGRESS NOTE ADULT - ATTENDING COMMENTS
Pt seen and examined. Improved sore throat, per signout, pt was anxious and complaining SOB despite stable Sat overnight    Acute and chronic resp failure due to pulm fibrosis and PNA: c/w Abx and steroids, oxygen via NC, will avoid using high flow. Palliative consult in the AM for symptoms management. If repeat episode of anxiety, can give Ativan 0.5mg x 1. plan for hospice, awaiting hospice recs    Sore throat; c/w cepacol prn .

## 2020-01-26 NOTE — PROGRESS NOTE ADULT - PROBLEM SELECTOR PLAN 1
Due to entero/rhino virus with possible superimposed pna. CXR: no focal consolidation. Recently hospitalized for RSV and at risk for HAP given severe pulmonary fibrosis.   - c/w empiric abx for HAP: s/p azithro (1/22-1/24) vanc/cefepime (1/22-)  - c/w steroids: 40mg prednisone qD  - duonebs Q4H ATC  - pulmonology following

## 2020-01-26 NOTE — PROGRESS NOTE ADULT - SUBJECTIVE AND OBJECTIVE BOX
PULMONARY PROGRESS NOTE    FELISA YANG  MRN-[id number]    Patient is a 89y old  Female who presents with a chief complaint of Acute Hypoxic Respiratory Failure (24 Jan 2020 06:56)      HPI:  on nasal canula today feeling better, still has sore throat    ROS:   -neg    MEDICATIONS  (STANDING):  albuterol/ipratropium for Nebulization. 3 milliLiter(s) Nebulizer every 6 hours  aspirin  chewable 81 milliGRAM(s) Oral daily  cefepime   IVPB 1000 milliGRAM(s) IV Intermittent every 12 hours  dextrose 5%. 1000 milliLiter(s) (50 mL/Hr) IV Continuous <Continuous>  dextrose 50% Injectable 12.5 Gram(s) IV Push once  dextrose 50% Injectable 25 Gram(s) IV Push once  dextrose 50% Injectable 25 Gram(s) IV Push once  enoxaparin Injectable 40 milliGRAM(s) SubCutaneous daily  FIRST- Mouthwash  BLM 5 milliLiter(s) Swish and Spit four times a day  fluconAZOLE   Tablet 100 milliGRAM(s) Oral daily  influenza   Vaccine 0.5 milliLiter(s) IntraMuscular once  insulin glargine Injectable (LANTUS) 5 Unit(s) SubCutaneous at bedtime  insulin lispro (HumaLOG) corrective regimen sliding scale   SubCutaneous three times a day before meals  insulin lispro (HumaLOG) corrective regimen sliding scale   SubCutaneous at bedtime  melatonin 3 milliGRAM(s) Oral at bedtime  metoprolol succinate ER 25 milliGRAM(s) Oral daily  multivitamin 1 Tablet(s) Oral daily  predniSONE   Tablet 40 milliGRAM(s) Oral daily  ursodiol Capsule 300 milliGRAM(s) Oral two times a day  vancomycin  IVPB 1000 milliGRAM(s) IV Intermittent every 24 hours    MEDICATIONS  (PRN):  acetaminophen   Tablet .. 650 milliGRAM(s) Oral every 6 hours PRN Temp greater or equal to 38C (100.4F), Mild Pain (1 - 3), Moderate Pain (4 - 6)  benzocaine 15 mG/menthol 3.6 mG (Sugar-Free) Lozenge 1 Lozenge Oral two times a day PRN Sore Throat  dextrose 40% Gel 15 Gram(s) Oral once PRN Blood Glucose LESS THAN 70 milliGRAM(s)/deciliter  glucagon  Injectable 1 milliGRAM(s) IntraMuscular once PRN Glucose LESS THAN 70 milligrams/deciliter  guaiFENesin   Syrup  (Sugar-Free) 200 milliGRAM(s) Oral every 6 hours PRN Cough  polyethylene glycol 3350 17 Gram(s) Oral daily PRN Constipation          EXAM:  Vital Signs Last 24 Hrs  T(C): 36.6 (26 Jan 2020 05:46), Max: 36.6 (26 Jan 2020 05:46)  T(F): 97.8 (26 Jan 2020 05:46), Max: 97.8 (26 Jan 2020 05:46)  HR: 105 (26 Jan 2020 10:54) (83 - 106)  BP: 142/86 (26 Jan 2020 05:46) (136/60 - 152/76)  BP(mean): --  RR: 24 (26 Jan 2020 07:51) (19 - 25)  SpO2: 97% (26 Jan 2020 10:54) (97% - 100%)  GENERAL: The patient is awake and alert in no apparent distress.     LUNGS: bilat crackles    HEART: S1/S2    LABS/IMAGING: reviewed                                              9.5    19.29 )-----------( 332      ( 26 Jan 2020 07:02 )             31.1   01-26    132<L>  |  85<L>  |  29<H>  ----------------------------<  113<H>  4.5   |  40<H>  |  0.65    Ca    8.9      26 Jan 2020 07:02  Phos  2.9     01-26  Mg     2.0     01-26          < from: Xray Chest 1 View-PORTABLE IMMEDIATE (01.22.20 @ 14:58) >      IMPRESSION:  Interstitial lung disease without focal consolidation.    < end of copied text >        PROBLEM LIST:  89y Female with HEALTH ISSUES - PROBLEM Dx:  Acute on chronic respiratory failure with hypoxia: Acute on chronic respiratory failure with hypoxia  Discharge planning issues: Discharge planning issues  Need for prophylactic measure: Need for prophylactic measure  Thrush: Thrush  HTN (Hypertension): HTN (Hypertension)  CAD (coronary artery disease): CAD (coronary artery disease)  Idiopathic pulmonary fibrosis: Idiopathic pulmonary fibrosis  Hyponatremia: Hyponatremia  Troponin level elevated: Troponin level elevated  Severe sepsis: Severe sepsis  Acute on chronic respiratory failure with hypercapnia: Acute on chronic respiratory failure with hypercapnia    RECS:  -supportive care  -supplemental O2  -cont prednisone x 5 days total  -cont abx given recent hospitalization  -DNR, family refused hospice      Jenny Antonio MD   [phone]

## 2020-01-26 NOTE — PROGRESS NOTE ADULT - PROBLEM SELECTOR PLAN 2
Pt with severe sepsis, with leukocytosis, fever and tachycardia likely in setting of HAP + enterovirus +/- UTI  - urine cultures with pan-sensitive proteus (covered by empiric HAP treatment)  - broad spectrum abx as above for HAP  - caution with IVF to avoid overload

## 2020-01-27 LAB
ANION GAP SERPL CALC-SCNC: 16 MMO/L — HIGH (ref 7–14)
BACTERIA BLD CULT: SIGNIFICANT CHANGE UP
BACTERIA BLD CULT: SIGNIFICANT CHANGE UP
BUN SERPL-MCNC: 26 MG/DL — HIGH (ref 7–23)
CALCIUM SERPL-MCNC: 8.8 MG/DL — SIGNIFICANT CHANGE UP (ref 8.4–10.5)
CHLORIDE SERPL-SCNC: 84 MMOL/L — LOW (ref 98–107)
CO2 SERPL-SCNC: 32 MMOL/L — HIGH (ref 22–31)
CREAT SERPL-MCNC: 0.54 MG/DL — SIGNIFICANT CHANGE UP (ref 0.5–1.3)
GLUCOSE BLDC GLUCOMTR-MCNC: 106 MG/DL — HIGH (ref 70–99)
GLUCOSE BLDC GLUCOMTR-MCNC: 184 MG/DL — HIGH (ref 70–99)
GLUCOSE BLDC GLUCOMTR-MCNC: 204 MG/DL — HIGH (ref 70–99)
GLUCOSE BLDC GLUCOMTR-MCNC: 264 MG/DL — HIGH (ref 70–99)
GLUCOSE SERPL-MCNC: 56 MG/DL — LOW (ref 70–99)
MAGNESIUM SERPL-MCNC: 2.1 MG/DL — SIGNIFICANT CHANGE UP (ref 1.6–2.6)
PHOSPHATE SERPL-MCNC: 2.9 MG/DL — SIGNIFICANT CHANGE UP (ref 2.5–4.5)
POTASSIUM SERPL-MCNC: 4.6 MMOL/L — SIGNIFICANT CHANGE UP (ref 3.5–5.3)
POTASSIUM SERPL-SCNC: 4.6 MMOL/L — SIGNIFICANT CHANGE UP (ref 3.5–5.3)
SODIUM SERPL-SCNC: 132 MMOL/L — LOW (ref 135–145)
VANCOMYCIN TROUGH SERPL-MCNC: 11.4 UG/ML — SIGNIFICANT CHANGE UP (ref 10–20)

## 2020-01-27 PROCEDURE — 99233 SBSQ HOSP IP/OBS HIGH 50: CPT | Mod: GC

## 2020-01-27 PROCEDURE — 99232 SBSQ HOSP IP/OBS MODERATE 35: CPT

## 2020-01-27 RX ORDER — POLYETHYLENE GLYCOL 3350 17 G/17G
17 POWDER, FOR SOLUTION ORAL DAILY
Refills: 0 | Status: DISCONTINUED | OUTPATIENT
Start: 2020-01-27 | End: 2020-01-29

## 2020-01-27 RX ADMIN — Medication 3 MILLILITER(S): at 10:30

## 2020-01-27 RX ADMIN — FLUCONAZOLE 100 MILLIGRAM(S): 150 TABLET ORAL at 22:52

## 2020-01-27 RX ADMIN — Medication 1 TABLET(S): at 12:15

## 2020-01-27 RX ADMIN — POLYETHYLENE GLYCOL 3350 17 GRAM(S): 17 POWDER, FOR SOLUTION ORAL at 12:15

## 2020-01-27 RX ADMIN — Medication 3 MILLIGRAM(S): at 22:52

## 2020-01-27 RX ADMIN — DIPHENHYDRAMINE HYDROCHLORIDE AND LIDOCAINE HYDROCHLORIDE AND ALUMINUM HYDROXIDE AND MAGNESIUM HYDRO 5 MILLILITER(S): KIT at 17:46

## 2020-01-27 RX ADMIN — Medication 25 MILLIGRAM(S): at 05:30

## 2020-01-27 RX ADMIN — Medication 1: at 17:46

## 2020-01-27 RX ADMIN — Medication 3 MILLILITER(S): at 03:55

## 2020-01-27 RX ADMIN — URSODIOL 300 MILLIGRAM(S): 250 TABLET, FILM COATED ORAL at 17:46

## 2020-01-27 RX ADMIN — INSULIN GLARGINE 5 UNIT(S): 100 INJECTION, SOLUTION SUBCUTANEOUS at 22:51

## 2020-01-27 RX ADMIN — Medication 3 MILLILITER(S): at 21:43

## 2020-01-27 RX ADMIN — DIPHENHYDRAMINE HYDROCHLORIDE AND LIDOCAINE HYDROCHLORIDE AND ALUMINUM HYDROXIDE AND MAGNESIUM HYDRO 5 MILLILITER(S): KIT at 12:23

## 2020-01-27 RX ADMIN — Medication 3 MILLILITER(S): at 15:30

## 2020-01-27 RX ADMIN — Medication 81 MILLIGRAM(S): at 12:15

## 2020-01-27 RX ADMIN — DIPHENHYDRAMINE HYDROCHLORIDE AND LIDOCAINE HYDROCHLORIDE AND ALUMINUM HYDROXIDE AND MAGNESIUM HYDRO 5 MILLILITER(S): KIT at 05:30

## 2020-01-27 RX ADMIN — CEFEPIME 100 MILLIGRAM(S): 1 INJECTION, POWDER, FOR SOLUTION INTRAMUSCULAR; INTRAVENOUS at 05:29

## 2020-01-27 RX ADMIN — DIPHENHYDRAMINE HYDROCHLORIDE AND LIDOCAINE HYDROCHLORIDE AND ALUMINUM HYDROXIDE AND MAGNESIUM HYDRO 5 MILLILITER(S): KIT at 22:53

## 2020-01-27 RX ADMIN — Medication 40 MILLIGRAM(S): at 05:30

## 2020-01-27 RX ADMIN — URSODIOL 300 MILLIGRAM(S): 250 TABLET, FILM COATED ORAL at 05:30

## 2020-01-27 RX ADMIN — Medication 250 MILLIGRAM(S): at 17:46

## 2020-01-27 RX ADMIN — CEFEPIME 100 MILLIGRAM(S): 1 INJECTION, POWDER, FOR SOLUTION INTRAMUSCULAR; INTRAVENOUS at 17:51

## 2020-01-27 RX ADMIN — ENOXAPARIN SODIUM 40 MILLIGRAM(S): 100 INJECTION SUBCUTANEOUS at 12:15

## 2020-01-27 RX ADMIN — Medication 3: at 12:48

## 2020-01-27 NOTE — PROGRESS NOTE ADULT - PROBLEM SELECTOR PLAN 1
Due to entero/rhino virus with possible superimposed pna in the setting of pulmonary fibrosis.   - c/w empiric abx for HAP, 7 day total: s/p azithro (1/22-1/24) vanc/cefepime (1/22-1/28)  - c/w steroids: 30mg prednisone today and down to home dose on 1/28  - duonebs Q4H  - Ativan 0.25mg PRN for anxiety  - pulmonology following

## 2020-01-27 NOTE — PHYSICAL THERAPY INITIAL EVALUATION ADULT - PERTINENT HX OF CURRENT PROBLEM, REHAB EVAL
patient presents with SOB. PMH includes HTN, severe pulmonary fibrosis on 5L home oxygen, Li's esophagus, CAD s/p stent x3, hx acute cholecystitis with percutaneous cholecystostomy in October 2018

## 2020-01-27 NOTE — PROGRESS NOTE ADULT - SUBJECTIVE AND OBJECTIVE BOX
PULMONARY PROGRESS NOTE    FELISA YANG  MRN-[id number]    Patient is a 89y old  Female who presents with a chief complaint of Acute Hypoxic Respiratory Failure (24 Jan 2020 06:56)      HPI:  on nasal canula, no complaints    ROS:   -neg    MEDICATIONS  (STANDING):  albuterol/ipratropium for Nebulization. 3 milliLiter(s) Nebulizer every 6 hours  aspirin  chewable 81 milliGRAM(s) Oral daily  cefepime   IVPB 1000 milliGRAM(s) IV Intermittent every 12 hours  dextrose 5%. 1000 milliLiter(s) (50 mL/Hr) IV Continuous <Continuous>  dextrose 50% Injectable 12.5 Gram(s) IV Push once  dextrose 50% Injectable 25 Gram(s) IV Push once  dextrose 50% Injectable 25 Gram(s) IV Push once  enoxaparin Injectable 40 milliGRAM(s) SubCutaneous daily  FIRST- Mouthwash  BLM 5 milliLiter(s) Swish and Spit four times a day  fluconAZOLE   Tablet 100 milliGRAM(s) Oral daily  influenza   Vaccine 0.5 milliLiter(s) IntraMuscular once  insulin glargine Injectable (LANTUS) 5 Unit(s) SubCutaneous at bedtime  insulin lispro (HumaLOG) corrective regimen sliding scale   SubCutaneous three times a day before meals  insulin lispro (HumaLOG) corrective regimen sliding scale   SubCutaneous at bedtime  melatonin 3 milliGRAM(s) Oral at bedtime  metoprolol succinate ER 25 milliGRAM(s) Oral daily  multivitamin 1 Tablet(s) Oral daily  polyethylene glycol 3350 17 Gram(s) Oral daily  predniSONE   Tablet 30 milliGRAM(s) Oral daily  ursodiol Capsule 300 milliGRAM(s) Oral two times a day  vancomycin  IVPB 1000 milliGRAM(s) IV Intermittent every 24 hours    MEDICATIONS  (PRN):  acetaminophen   Tablet .. 650 milliGRAM(s) Oral every 6 hours PRN Temp greater or equal to 38C (100.4F), Mild Pain (1 - 3), Moderate Pain (4 - 6)  benzocaine 15 mG/menthol 3.6 mG (Sugar-Free) Lozenge 1 Lozenge Oral two times a day PRN Sore Throat  dextrose 40% Gel 15 Gram(s) Oral once PRN Blood Glucose LESS THAN 70 milliGRAM(s)/deciliter  glucagon  Injectable 1 milliGRAM(s) IntraMuscular once PRN Glucose LESS THAN 70 milligrams/deciliter  guaiFENesin   Syrup  (Sugar-Free) 200 milliGRAM(s) Oral every 6 hours PRN Cough            EXAM:  Vital Signs Last 24 Hrs  T(C): 36.9 (27 Jan 2020 13:45), Max: 37 (27 Jan 2020 04:52)  T(F): 98.4 (27 Jan 2020 13:45), Max: 98.6 (27 Jan 2020 04:52)  HR: 95 (27 Jan 2020 13:45) (79 - 95)  BP: 130/64 (27 Jan 2020 13:45) (113/58 - 130/64)  BP(mean): --  RR: 19 (27 Jan 2020 13:45) (19 - 20)  SpO2: 92% (27 Jan 2020 13:45) (92% - 99%)  GENERAL: The patient is awake and alert in no apparent distress.     LUNGS: bilat crackles    HEART: S1/S2    LABS/IMAGING: reviewed                                                         9.5    19.29 )-----------( 332      ( 26 Jan 2020 07:02 )             31.1   01-27    132<L>  |  84<L>  |  26<H>  ----------------------------<  56<L>  4.6   |  32<H>  |  0.54    Ca    8.8      27 Jan 2020 05:07  Phos  2.9     01-27  Mg     2.1     01-27          < from: Xray Chest 1 View-PORTABLE IMMEDIATE (01.22.20 @ 14:58) >      IMPRESSION:  Interstitial lung disease without focal consolidation.    < end of copied text >        PROBLEM LIST:  89y Female with HEALTH ISSUES - PROBLEM Dx:  Acute on chronic respiratory failure with hypoxia: Acute on chronic respiratory failure with hypoxia  Discharge planning issues: Discharge planning issues  Need for prophylactic measure: Need for prophylactic measure  Thrush: Thrush  HTN (Hypertension): HTN (Hypertension)  CAD (coronary artery disease): CAD (coronary artery disease)  Idiopathic pulmonary fibrosis: Idiopathic pulmonary fibrosis  Hyponatremia: Hyponatremia  Troponin level elevated: Troponin level elevated  Severe sepsis: Severe sepsis  Acute on chronic respiratory failure with hypercapnia: Acute on chronic respiratory failure with hypercapnia    RECS:  -supportive care  -supplemental O2  -cont prednisone x 5 days total  -cont abx given recent hospitalization  -DNR, family refused hospice      Jenny Antonio MD   [phone]

## 2020-01-27 NOTE — PROGRESS NOTE ADULT - PROBLEM SELECTOR PLAN 5
Pt with known hx of pulm fibrosis on 5L home oxygen.   - management of acute hypoxic resp failure as above  - Hospice eval pending

## 2020-01-27 NOTE — PROGRESS NOTE ADULT - PROBLEM SELECTOR PLAN 9
Diet: Pureed diet per nutrition recs   DVT ppx: Lovenox  GOC: DNR/DNI as per daughter, not hospice    Baseline Mobility: 6-7  6-Click Admission Score: 6-7  Dispo: Likely home with home care, hospice discussion pending Diet: Pureed diet per nutrition recs   DVT ppx: Lovenox  GOC: DNR/DNI as per daughter, hospice eval     Baseline Mobility: 6-7  6-Click Admission Score: 6-7  Dispo: Likely home with home care, hospice discussion pending

## 2020-01-27 NOTE — GOALS OF CARE CONVERSATION - ADVANCED CARE PLANNING - CONVERSATION DETAILS
[facility] - Met with pt's dtr. Hospice services explained. Dtr states she needs to discuss Hospice with her siblings and will let this RN know their decision tomorrow.

## 2020-01-27 NOTE — PROGRESS NOTE ADULT - PROBLEM SELECTOR PLAN 4
Sodium 125 on admission, improved but still low. Most likely hypovolemic from sepsis and poor PO intake with possible SIADH. Likely volume depletion.  - continue to hold home lasix  - cont to monitor BMP qD

## 2020-01-27 NOTE — PROGRESS NOTE ADULT - ATTENDING COMMENTS
Patient seen and examined, chart and labs reviewed. Case discussed with house staff.    89F PMH of severe pulmonary fibrosis on 5L home oxygen, Li's esophagus, CAD s/p stent x3, recent admission for hypoxic resp failure 2/2 RSV PNA admitted now with acute hypoxic respiratory failure requiring BIPAP 2/2 sepsis from entero/rhinovirus with possible superimposed pneumonia and UTI.    Today is day 6/7 of antibiotics. Discussed with daughter who is open to hospice, but states that she does not need all the services hospice provides, what she needs is someone to assist patient in bathing at home, physical therapy and home visits (RN and MD). Will discuss hospice w/ her siblings and make decision.

## 2020-01-27 NOTE — PROGRESS NOTE ADULT - PROBLEM SELECTOR PLAN 2
Pt with severe sepsis, with leukocytosis, fever and tachycardia likely in setting of HAP + enterovirus +/- UTI  - urine cultures with pan-sensitive proteus (covered by empiric HAP treatment)  - broad spectrum abx as above for HAP, 7 day total course

## 2020-01-27 NOTE — PROGRESS NOTE ADULT - SUBJECTIVE AND OBJECTIVE BOX
PROGRESS NOTE:   Authored by Wesley Page MD, Pager [phone] [facility], [id number] [facility]     Patient is a 89y old  Female who presents with a chief complaint of Acute Hypoxic Respiratory Failure (27 Jan 2020 08:46)      SUBJECTIVE / OVERNIGHT EVENTS: No acute events overnight. Patient seen and examined at bedside. Daughter provided translation at bedside. Patient received melatonin and ativan 0.25 in evening and slept the whole night. This morning patient has persistent cough and SOB is unchanged.     ADDITIONAL REVIEW OF SYSTEMS: Denies fevers or chills.    MEDICATIONS  (STANDING):  albuterol/ipratropium for Nebulization. 3 milliLiter(s) Nebulizer every 6 hours  aspirin  chewable 81 milliGRAM(s) Oral daily  cefepime   IVPB 1000 milliGRAM(s) IV Intermittent every 12 hours  dextrose 5%. 1000 milliLiter(s) (50 mL/Hr) IV Continuous <Continuous>  dextrose 50% Injectable 12.5 Gram(s) IV Push once  dextrose 50% Injectable 25 Gram(s) IV Push once  dextrose 50% Injectable 25 Gram(s) IV Push once  enoxaparin Injectable 40 milliGRAM(s) SubCutaneous daily  FIRST- Mouthwash  BLM 5 milliLiter(s) Swish and Spit four times a day  fluconAZOLE   Tablet 100 milliGRAM(s) Oral daily  influenza   Vaccine 0.5 milliLiter(s) IntraMuscular once  insulin glargine Injectable (LANTUS) 5 Unit(s) SubCutaneous at bedtime  insulin lispro (HumaLOG) corrective regimen sliding scale   SubCutaneous three times a day before meals  insulin lispro (HumaLOG) corrective regimen sliding scale   SubCutaneous at bedtime  melatonin 3 milliGRAM(s) Oral at bedtime  metoprolol succinate ER 25 milliGRAM(s) Oral daily  multivitamin 1 Tablet(s) Oral daily  predniSONE   Tablet 40 milliGRAM(s) Oral daily  ursodiol Capsule 300 milliGRAM(s) Oral two times a day  vancomycin  IVPB 1000 milliGRAM(s) IV Intermittent every 24 hours    MEDICATIONS  (PRN):  acetaminophen   Tablet .. 650 milliGRAM(s) Oral every 6 hours PRN Temp greater or equal to 38C (100.4F), Mild Pain (1 - 3), Moderate Pain (4 - 6)  benzocaine 15 mG/menthol 3.6 mG (Sugar-Free) Lozenge 1 Lozenge Oral two times a day PRN Sore Throat  dextrose 40% Gel 15 Gram(s) Oral once PRN Blood Glucose LESS THAN 70 milliGRAM(s)/deciliter  glucagon  Injectable 1 milliGRAM(s) IntraMuscular once PRN Glucose LESS THAN 70 milligrams/deciliter  guaiFENesin   Syrup  (Sugar-Free) 200 milliGRAM(s) Oral every 6 hours PRN Cough  polyethylene glycol 3350 17 Gram(s) Oral daily PRN Constipation      CAPILLARY BLOOD GLUCOSE      POCT Blood Glucose.: 106 mg/dL (27 Jan 2020 08:33)  POCT Blood Glucose.: 111 mg/dL (26 Jan 2020 21:33)  POCT Blood Glucose.: 230 mg/dL (26 Jan 2020 17:26)  POCT Blood Glucose.: 204 mg/dL (26 Jan 2020 12:31)    I&O's Summary      PHYSICAL EXAM:  Vital Signs Last 24 Hrs  T(C): 37 (27 Jan 2020 04:52), Max: 37 (27 Jan 2020 04:52)  T(F): 98.6 (27 Jan 2020 04:52), Max: 98.6 (27 Jan 2020 04:52)  HR: 90 (27 Jan 2020 04:52) (79 - 106)  BP: 113/58 (27 Jan 2020 04:52) (113/58 - 137/89)  BP(mean): --  RR: 20 (27 Jan 2020 04:52) (19 - 20)  SpO2: 99% (27 Jan 2020 04:52) (96% - 100%) on 5L NC    GENERAL: No distress, comfortable, on NC 5L. Awake and alert.  EYES: EOMi, PERRLA, no scleral icterus or injection  NECK: Supple, full ROM  RESPIRATORY: Diffuse coarse biphasic crackles. Good inspiratory effort and air movement, no wheeze. No increased work of breathing.  CARDIOVASCULAR: Normal S1/S2, regular rate and rhythm, no murmurs noted  GASTROINTESTINAL: Abdomen is soft, nontender, nondistended, normoactive bowel sounds, no palpable masses  SKIN: Scattered petechiae. Skin warm.  MUSCULOSKELETAL: No clubbing or cyanosis, no obvious deformity  VASCULAR: Warm, well perfused, trace lower extremity edema  NEUROLOGIC: Grossly normal    LABS:                        9.5    19.29 )-----------( 332      ( 26 Jan 2020 07:02 )             31.1     01-27    132<L>  |  84<L>  |  26<H>  ----------------------------<  56<L>  4.6   |  32<H>  |  0.54    Ca    8.8      27 Jan 2020 05:07  Phos  2.9     01-27  Mg     2.1     01-27                  RADIOLOGY & ADDITIONAL TESTS:  Results Reviewed: Y  Imaging Personally Reviewed: Y    COORDINATION OF CARE:  Care Discussed with Consultants/Other Providers [Y/N]: Y  Prior or Outpatient Records Reviewed [Y/N]: Y

## 2020-01-27 NOTE — PHYSICAL THERAPY INITIAL EVALUATION ADULT - ADDITIONAL COMMENTS
as per EMR, patient is dependent on daughter for all ADLs. patient stated that she walks inside home, but did not state if she requires device or assistance to ambulate

## 2020-01-28 DIAGNOSIS — Z71.89 OTHER SPECIFIED COUNSELING: ICD-10-CM

## 2020-01-28 LAB
ANION GAP SERPL CALC-SCNC: 11 MMO/L — SIGNIFICANT CHANGE UP (ref 7–14)
BUN SERPL-MCNC: 28 MG/DL — HIGH (ref 7–23)
CALCIUM SERPL-MCNC: 9.1 MG/DL — SIGNIFICANT CHANGE UP (ref 8.4–10.5)
CHLORIDE SERPL-SCNC: 85 MMOL/L — LOW (ref 98–107)
CO2 SERPL-SCNC: 36 MMOL/L — HIGH (ref 22–31)
CREAT SERPL-MCNC: 0.55 MG/DL — SIGNIFICANT CHANGE UP (ref 0.5–1.3)
GLUCOSE BLDC GLUCOMTR-MCNC: 159 MG/DL — HIGH (ref 70–99)
GLUCOSE BLDC GLUCOMTR-MCNC: 183 MG/DL — HIGH (ref 70–99)
GLUCOSE BLDC GLUCOMTR-MCNC: 216 MG/DL — HIGH (ref 70–99)
GLUCOSE BLDC GLUCOMTR-MCNC: 98 MG/DL — SIGNIFICANT CHANGE UP (ref 70–99)
GLUCOSE SERPL-MCNC: 86 MG/DL — SIGNIFICANT CHANGE UP (ref 70–99)
HCT VFR BLD CALC: 34.3 % — LOW (ref 34.5–45)
HGB BLD-MCNC: 10.5 G/DL — LOW (ref 11.5–15.5)
MAGNESIUM SERPL-MCNC: 2.1 MG/DL — SIGNIFICANT CHANGE UP (ref 1.6–2.6)
MCHC RBC-ENTMCNC: 30.6 % — LOW (ref 32–36)
MCHC RBC-ENTMCNC: 32.6 PG — SIGNIFICANT CHANGE UP (ref 27–34)
MCV RBC AUTO: 106.5 FL — HIGH (ref 80–100)
NRBC # FLD: 0.06 K/UL — SIGNIFICANT CHANGE UP (ref 0–0)
PHOSPHATE SERPL-MCNC: 2.7 MG/DL — SIGNIFICANT CHANGE UP (ref 2.5–4.5)
PLATELET # BLD AUTO: 319 K/UL — SIGNIFICANT CHANGE UP (ref 150–400)
PMV BLD: 9.3 FL — SIGNIFICANT CHANGE UP (ref 7–13)
POTASSIUM SERPL-MCNC: 4.1 MMOL/L — SIGNIFICANT CHANGE UP (ref 3.5–5.3)
POTASSIUM SERPL-SCNC: 4.1 MMOL/L — SIGNIFICANT CHANGE UP (ref 3.5–5.3)
RBC # BLD: 3.22 M/UL — LOW (ref 3.8–5.2)
RBC # FLD: 15.5 % — HIGH (ref 10.3–14.5)
SODIUM SERPL-SCNC: 132 MMOL/L — LOW (ref 135–145)
WBC # BLD: 20.05 K/UL — HIGH (ref 3.8–10.5)
WBC # FLD AUTO: 20.05 K/UL — HIGH (ref 3.8–10.5)

## 2020-01-28 PROCEDURE — 99233 SBSQ HOSP IP/OBS HIGH 50: CPT | Mod: GC

## 2020-01-28 RX ORDER — LACTULOSE 10 G/15ML
30 SOLUTION ORAL ONCE
Refills: 0 | Status: COMPLETED | OUTPATIENT
Start: 2020-01-28 | End: 2020-01-28

## 2020-01-28 RX ADMIN — FLUCONAZOLE 100 MILLIGRAM(S): 150 TABLET ORAL at 23:47

## 2020-01-28 RX ADMIN — Medication 650 MILLIGRAM(S): at 17:17

## 2020-01-28 RX ADMIN — Medication 650 MILLIGRAM(S): at 18:01

## 2020-01-28 RX ADMIN — Medication 2: at 12:51

## 2020-01-28 RX ADMIN — INSULIN GLARGINE 5 UNIT(S): 100 INJECTION, SOLUTION SUBCUTANEOUS at 21:51

## 2020-01-28 RX ADMIN — URSODIOL 300 MILLIGRAM(S): 250 TABLET, FILM COATED ORAL at 17:17

## 2020-01-28 RX ADMIN — DIPHENHYDRAMINE HYDROCHLORIDE AND LIDOCAINE HYDROCHLORIDE AND ALUMINUM HYDROXIDE AND MAGNESIUM HYDRO 5 MILLILITER(S): KIT at 17:17

## 2020-01-28 RX ADMIN — Medication 30 MILLIGRAM(S): at 06:56

## 2020-01-28 RX ADMIN — Medication 81 MILLIGRAM(S): at 12:51

## 2020-01-28 RX ADMIN — DIPHENHYDRAMINE HYDROCHLORIDE AND LIDOCAINE HYDROCHLORIDE AND ALUMINUM HYDROXIDE AND MAGNESIUM HYDRO 5 MILLILITER(S): KIT at 12:51

## 2020-01-28 RX ADMIN — DIPHENHYDRAMINE HYDROCHLORIDE AND LIDOCAINE HYDROCHLORIDE AND ALUMINUM HYDROXIDE AND MAGNESIUM HYDRO 5 MILLILITER(S): KIT at 23:48

## 2020-01-28 RX ADMIN — Medication 3 MILLIGRAM(S): at 21:51

## 2020-01-28 RX ADMIN — Medication 3 MILLILITER(S): at 04:23

## 2020-01-28 RX ADMIN — Medication 1 TABLET(S): at 12:51

## 2020-01-28 RX ADMIN — Medication 0.25 MILLIGRAM(S): at 23:52

## 2020-01-28 RX ADMIN — Medication 1: at 18:07

## 2020-01-28 RX ADMIN — CEFEPIME 100 MILLIGRAM(S): 1 INJECTION, POWDER, FOR SOLUTION INTRAMUSCULAR; INTRAVENOUS at 18:07

## 2020-01-28 RX ADMIN — CEFEPIME 100 MILLIGRAM(S): 1 INJECTION, POWDER, FOR SOLUTION INTRAMUSCULAR; INTRAVENOUS at 06:56

## 2020-01-28 RX ADMIN — Medication 3 MILLILITER(S): at 10:25

## 2020-01-28 RX ADMIN — DIPHENHYDRAMINE HYDROCHLORIDE AND LIDOCAINE HYDROCHLORIDE AND ALUMINUM HYDROXIDE AND MAGNESIUM HYDRO 5 MILLILITER(S): KIT at 06:57

## 2020-01-28 RX ADMIN — Medication 5 MILLIGRAM(S): at 09:29

## 2020-01-28 RX ADMIN — POLYETHYLENE GLYCOL 3350 17 GRAM(S): 17 POWDER, FOR SOLUTION ORAL at 12:52

## 2020-01-28 RX ADMIN — URSODIOL 300 MILLIGRAM(S): 250 TABLET, FILM COATED ORAL at 06:56

## 2020-01-28 RX ADMIN — Medication 3 MILLILITER(S): at 21:56

## 2020-01-28 RX ADMIN — Medication 3 MILLILITER(S): at 15:21

## 2020-01-28 RX ADMIN — Medication 25 MILLIGRAM(S): at 06:56

## 2020-01-28 RX ADMIN — Medication 250 MILLIGRAM(S): at 17:17

## 2020-01-28 RX ADMIN — LACTULOSE 30 GRAM(S): 10 SOLUTION ORAL at 12:51

## 2020-01-28 RX ADMIN — ENOXAPARIN SODIUM 40 MILLIGRAM(S): 100 INJECTION SUBCUTANEOUS at 12:51

## 2020-01-28 NOTE — PROGRESS NOTE ADULT - PROBLEM SELECTOR PLAN 7
- c/w ASA 81 qD Pt diagnosed with thrush as outpatient, most likely from prolonged antibiotic and inhaled steroid use.  - Took fluconazole for 7 days prior to admission  - c/w fluconazole IV q24 100mg (1/22-1/28) for 14 days total

## 2020-01-28 NOTE — PROGRESS NOTE ADULT - PROBLEM SELECTOR PLAN 3
Pt with severe sepsis, with leukocytosis, fever and tachycardia likely in setting of HAP + enterovirus +/- UTI  - urine cultures with pan-sensitive proteus (covered by empiric HAP treatment)  - broad spectrum abx as above for HAP, 7 day total course Pt with severe sepsis, with leukocytosis, fever and tachycardia likely in setting of HAP + enterovirus +/- UTI  - urine cultures with pan-sensitive proteus (covered by empiric HAP treatment)  - broad spectrum abx as above for HAP, 7 day total course to complete today

## 2020-01-28 NOTE — PROGRESS NOTE ADULT - ATTENDING COMMENTS
Patient seen and examined, chart and labs reviewed. Case discussed with house staff.    89F PMH of severe pulmonary fibrosis on 5L home oxygen, Li's esophagus, CAD s/p stent x3, recent admission for hypoxic resp failure 2/2 RSV PNA admitted now with acute hypoxic respiratory failure requiring BIPAP 2/2 sepsis from entero/rhinovirus with possible superimposed pneumonia and UTI.    Today is last day of antibiotics for HAP. [facility]iscussed with [facility], patient's daughter declined hospice services. Daughter wants home w/ home care, which is being planned for tomorrow, discussed w/ CM. Patient constipated, will give lactulose and enema if needed.

## 2020-01-28 NOTE — PROGRESS NOTE ADULT - PROBLEM SELECTOR PLAN 6
Pt with known hx of pulm fibrosis on 5L home oxygen.   - management of acute hypoxic resp failure as above  - Hospice eval pending - c/w ASA 81 qD

## 2020-01-28 NOTE — PROGRESS NOTE ADULT - PROBLEM SELECTOR PLAN 9
Diet: Pureed diet per nutrition recs   DVT ppx: Lovenox  GOC: DNR/DNI as per daughter, pending hospice eval   Constipation: Dulcolax and lactulose today    Baseline Mobility: 6-7  6-Click Admission Score: 6-7  Dispo: Likely home with home care, hospice discussion pending Transitions of Care Status:   1.  Name of PCP: Dr. Angel Otoole  2.  PCP Contacted on Admission: [X] Y    [ ] N  Dr. Angel Otoole  3.  PCP contacted at Discharge: [ ] Y    [ ] N    [ ] N/A  4.  Post-Discharge Appointment Date and Location:  5.  Summary of Handoff given to PCP:

## 2020-01-28 NOTE — ADVANCED PRACTICE NURSE CONSULT - RECOMMEDATIONS
Recommend Case management to set up home care services for wound care.     Topical Recommendations:     Sacrum extending to bilateral buttocks- Cleanse with skin cleanser, pat dry. Apply TRIAD paste twice a day and PRN with incontinent episodes.     Continue low air loss bed therapy, continue or initiate seat cushion, heel elevation, turn & reposition q2h, continue moisture management with barrier creams & single breathable pad, continue measures to decrease friction/shear.   Plan discussed with patients daughter. Educated on importance of frequent turning and positioning as well as offloading using offloading devices. Education provided on diaper use and risks for skin impairment, reviewed that if diapers utilized would have to be checked Q2hrs and changed once patient becomes soiled.  Daughter educated on topical wound therapy and on dietary recommendations (high protein, low sugar diet) to optimize wound healing. Questions answered.     Please contact Wound Care Service Line if we can be of further assistance (ext [phone]).

## 2020-01-28 NOTE — PROGRESS NOTE ADULT - PROBLEM SELECTOR PLAN 2
Due to entero/rhino virus with possible superimposed pna in the setting of pulmonary fibrosis.   - c/w empiric abx for HAP, 7 day total: s/p azithro (1/22-1/24) vanc/cefepime (1/22-1/28)  - c/w steroids: 30mg prednisone today and down to home dose 10mg on 1/30  - duonebs Q4H  - Ativan 0.25mg PRN for anxiety  - pulmonology following

## 2020-01-28 NOTE — ADVANCED PRACTICE NURSE CONSULT - ASSESSMENT
A&Ox3-4, bedbound, female external urinary  in place for urine, incontinent of stool (Perineal care provided for small amount of fecal incontinence). Supplemental oxygen via nasal cannula. Skin warm, dry, poor skin turgor, scattered areas of ecchymosis on bilateral upper extremities. Scab noted to right upper thigh. Blanchable erythema on bilateral heels. Hyperpigmentation to right side of face.     Sacrum extending to bilateral buttocks with an Unstageable pressure injury complicated by Incontinence/Moisture associated dermatitis entire area measuring [id number]. Irregular borders. Measurements taken while patient lying on right side, unable to be fully seen in natural anatomical position. Three open areas noted. Sacral/gluteal fold measuring 4.5ahb0coh9.2cm and Right buttock measuring 0.8zck9kjx1.2cm exposing 100% pink, moist dermis with small serous drainage, no odor.   Left buttock measuring 4.5cmx3.5cmx0.2cm exposing 70% pink, moist dermis and 30% tan moist firmly attached slough (area of slough at 3oclock measuring [id number]). True anatomical depth unable to be determined due to necrotic tissue. Small serous drainage, no odor. Periwound skin with blanching erythema from sacral/gluteal fold extending to bilateral buttocks and perineal area. No induration, no erythema , no increased warmth. No active signs of infection noted. Goals of care: Autolytic debridement, protect from fecal incontinence/moisture, Continue to offload.

## 2020-01-28 NOTE — ADVANCED PRACTICE NURSE CONSULT - REASON FOR CONSULT
Patient seen on skin care rounds after wound care referral received for assessment of skin impairment and recommendations of topical management. Chart reviewed: DNR status, WBC 20.05, Hemoglobin/Hematocrit 10.5/34.3, HgA1C 7.5, BMI 24.5, Serum albumin 2.3, Juan 12, patient and daughter interviewed at bedside. Lives at home with daughters as her primary caregivers. Daughter stating mother feels the urge to go but is unable to control it. Wears diapers at home, bedbound. Daughter stating mother was complaining of pain on her bottom but they thought it was just from the diaper. Patient H/O of HTN, severe pulmonary fibrosis on 5L home oxygen, Li's esophagus, CAD s/p stent x3, hx acute cholecystitis with percutaneous cholecystostomy in October 2018 presenting with shortness of breath. Pt recently hospitalized earlier this month with acute hypoxic respiratory failure from RSV with superimposed pneumonia. Pt discharged home and now presents with similar symptoms, shortness of breath and fever. Patient seen by Hospice care services but refusing hospice services at this time. Patient being followed by Pulmonology for hypoxic respiratory failure.

## 2020-01-28 NOTE — PROGRESS NOTE ADULT - PROBLEM SELECTOR PLAN 5
Sodium 125 on admission, improved but still holding low. Most likely hypovolemic from sepsis and poor PO intake with possible SIADH.   - continue holding home lasix  - continue to monitor BMP qD Pt with known hx of pulm fibrosis on 5L home oxygen.   - management of acute hypoxic resp failure as above  - pulm recs appreciated   - c/w prednisone taper

## 2020-01-28 NOTE — PROGRESS NOTE ADULT - PROBLEM SELECTOR PLAN 1
Hospice discussion with daughter on 1/27. Services explained.  -DNR/DNI  -Dispo depending on whether or not family accepts hospice services

## 2020-01-28 NOTE — PROGRESS NOTE ADULT - PROBLEM SELECTOR PLAN 8
Pt diagnosed with thrush as outpatient, most likely from prolonged antibiotic and inhaled steroid use.  - Took fluconazole for 7 days prior to admission  - c/w fluconazole IV q24 100mg (1/22-1/28) for 14 days total Diet: Pureed diet per nutrition recs   DVT ppx: Lovenox  GOC: DNR/DNI as per daughter, declined hospice  Constipation: Dulcolax and lactulose today    Baseline Mobility: 6-7  6-Click Admission Score: 6-7  Dispo: home with home care tomorrow

## 2020-01-29 ENCOUNTER — TRANSCRIPTION ENCOUNTER (OUTPATIENT)
Age: 85
End: 2020-01-29

## 2020-01-29 VITALS — OXYGEN SATURATION: 97 %

## 2020-01-29 LAB
ANION GAP SERPL CALC-SCNC: 17 MMO/L — HIGH (ref 7–14)
BUN SERPL-MCNC: 46 MG/DL — HIGH (ref 7–23)
CALCIUM SERPL-MCNC: 8.8 MG/DL — SIGNIFICANT CHANGE UP (ref 8.4–10.5)
CHLORIDE SERPL-SCNC: 81 MMOL/L — LOW (ref 98–107)
CO2 SERPL-SCNC: 31 MMOL/L — SIGNIFICANT CHANGE UP (ref 22–31)
CREAT SERPL-MCNC: 0.72 MG/DL — SIGNIFICANT CHANGE UP (ref 0.5–1.3)
GLUCOSE BLDC GLUCOMTR-MCNC: 132 MG/DL — HIGH (ref 70–99)
GLUCOSE BLDC GLUCOMTR-MCNC: 193 MG/DL — HIGH (ref 70–99)
GLUCOSE SERPL-MCNC: 128 MG/DL — HIGH (ref 70–99)
HCT VFR BLD CALC: 33 % — LOW (ref 34.5–45)
HGB BLD-MCNC: 10.3 G/DL — LOW (ref 11.5–15.5)
MAGNESIUM SERPL-MCNC: 2 MG/DL — SIGNIFICANT CHANGE UP (ref 1.6–2.6)
MCHC RBC-ENTMCNC: 31.2 % — LOW (ref 32–36)
MCHC RBC-ENTMCNC: 32.9 PG — SIGNIFICANT CHANGE UP (ref 27–34)
MCV RBC AUTO: 105.4 FL — HIGH (ref 80–100)
NRBC # FLD: 0.02 K/UL — SIGNIFICANT CHANGE UP (ref 0–0)
PHOSPHATE SERPL-MCNC: 3.1 MG/DL — SIGNIFICANT CHANGE UP (ref 2.5–4.5)
PLATELET # BLD AUTO: 278 K/UL — SIGNIFICANT CHANGE UP (ref 150–400)
PMV BLD: 9.7 FL — SIGNIFICANT CHANGE UP (ref 7–13)
POTASSIUM SERPL-MCNC: 4.4 MMOL/L — SIGNIFICANT CHANGE UP (ref 3.5–5.3)
POTASSIUM SERPL-SCNC: 4.4 MMOL/L — SIGNIFICANT CHANGE UP (ref 3.5–5.3)
RBC # BLD: 3.13 M/UL — LOW (ref 3.8–5.2)
RBC # FLD: 15.8 % — HIGH (ref 10.3–14.5)
SODIUM SERPL-SCNC: 129 MMOL/L — LOW (ref 135–145)
WBC # BLD: 20.71 K/UL — HIGH (ref 3.8–10.5)
WBC # FLD AUTO: 20.71 K/UL — HIGH (ref 3.8–10.5)

## 2020-01-29 PROCEDURE — 99232 SBSQ HOSP IP/OBS MODERATE 35: CPT

## 2020-01-29 PROCEDURE — 99239 HOSP IP/OBS DSCHRG MGMT >30: CPT

## 2020-01-29 RX ORDER — FLUCONAZOLE 150 MG/1
1 TABLET ORAL
Qty: 0 | Refills: 0 | DISCHARGE

## 2020-01-29 RX ORDER — POLYETHYLENE GLYCOL 3350 17 G/17G
17 POWDER, FOR SOLUTION ORAL
Qty: 1 | Refills: 0
Start: 2020-01-29 | End: 2020-02-27

## 2020-01-29 RX ADMIN — URSODIOL 300 MILLIGRAM(S): 250 TABLET, FILM COATED ORAL at 06:41

## 2020-01-29 RX ADMIN — Medication 650 MILLIGRAM(S): at 12:24

## 2020-01-29 RX ADMIN — DIPHENHYDRAMINE HYDROCHLORIDE AND LIDOCAINE HYDROCHLORIDE AND ALUMINUM HYDROXIDE AND MAGNESIUM HYDRO 5 MILLILITER(S): KIT at 06:42

## 2020-01-29 RX ADMIN — Medication 81 MILLIGRAM(S): at 12:24

## 2020-01-29 RX ADMIN — ENOXAPARIN SODIUM 40 MILLIGRAM(S): 100 INJECTION SUBCUTANEOUS at 12:24

## 2020-01-29 RX ADMIN — Medication 25 MILLIGRAM(S): at 06:42

## 2020-01-29 RX ADMIN — Medication 1: at 13:10

## 2020-01-29 RX ADMIN — Medication 3 MILLILITER(S): at 10:52

## 2020-01-29 RX ADMIN — Medication 30 MILLIGRAM(S): at 06:41

## 2020-01-29 RX ADMIN — Medication 650 MILLIGRAM(S): at 12:54

## 2020-01-29 RX ADMIN — Medication 3 MILLILITER(S): at 16:10

## 2020-01-29 RX ADMIN — Medication 1 TABLET(S): at 12:24

## 2020-01-29 NOTE — PROGRESS NOTE ADULT - SUBJECTIVE AND OBJECTIVE BOX
PROGRESS NOTE:   Authored by Wesley Page MD, Pager [phone] [facility], [id number] [facility]     Patient is a 89y old  Female who presents with a chief complaint of Acute Hypoxic Respiratory Failure (29 Jan 2020 06:58)      SUBJECTIVE / OVERNIGHT EVENTS: Patient had large bowel movement overnight. Patient seen and examined at bedside. Daughter at bedside provided translation. Patient slept well without cough or shortness of breath. No anxiety attacks or chest pain.    ADDITIONAL REVIEW OF SYSTEMS: Denies fevers, chills.    MEDICATIONS  (STANDING):  albuterol/ipratropium for Nebulization. 3 milliLiter(s) Nebulizer every 6 hours  aspirin  chewable 81 milliGRAM(s) Oral daily  dextrose 5%. 1000 milliLiter(s) (50 mL/Hr) IV Continuous <Continuous>  dextrose 50% Injectable 12.5 Gram(s) IV Push once  dextrose 50% Injectable 25 Gram(s) IV Push once  dextrose 50% Injectable 25 Gram(s) IV Push once  enoxaparin Injectable 40 milliGRAM(s) SubCutaneous daily  FIRST- Mouthwash  BLM 5 milliLiter(s) Swish and Spit four times a day  influenza   Vaccine 0.5 milliLiter(s) IntraMuscular once  insulin glargine Injectable (LANTUS) 5 Unit(s) SubCutaneous at bedtime  insulin lispro (HumaLOG) corrective regimen sliding scale   SubCutaneous three times a day before meals  insulin lispro (HumaLOG) corrective regimen sliding scale   SubCutaneous at bedtime  melatonin 3 milliGRAM(s) Oral at bedtime  metoprolol succinate ER 25 milliGRAM(s) Oral daily  multivitamin 1 Tablet(s) Oral daily  polyethylene glycol 3350 17 Gram(s) Oral daily  predniSONE   Tablet 30 milliGRAM(s) Oral daily  ursodiol Capsule 300 milliGRAM(s) Oral two times a day  vancomycin  IVPB 1000 milliGRAM(s) IV Intermittent every 24 hours    MEDICATIONS  (PRN):  acetaminophen   Tablet .. 650 milliGRAM(s) Oral every 6 hours PRN Temp greater or equal to 38C (100.4F), Mild Pain (1 - 3), Moderate Pain (4 - 6)  benzocaine 15 mG/menthol 3.6 mG (Sugar-Free) Lozenge 1 Lozenge Oral two times a day PRN Sore Throat  bisacodyl 5 milliGRAM(s) Oral every 12 hours PRN Constipation  dextrose 40% Gel 15 Gram(s) Oral once PRN Blood Glucose LESS THAN 70 milliGRAM(s)/deciliter  glucagon  Injectable 1 milliGRAM(s) IntraMuscular once PRN Glucose LESS THAN 70 milligrams/deciliter  guaiFENesin   Syrup  (Sugar-Free) 200 milliGRAM(s) Oral every 6 hours PRN Cough  LORazepam     Tablet 0.25 milliGRAM(s) Oral at bedtime PRN Anxiety      CAPILLARY BLOOD GLUCOSE      POCT Blood Glucose.: 183 mg/dL (28 Jan 2020 21:34)  POCT Blood Glucose.: 159 mg/dL (28 Jan 2020 17:22)  POCT Blood Glucose.: 216 mg/dL (28 Jan 2020 12:27)  POCT Blood Glucose.: 98 mg/dL (28 Jan 2020 08:42)    I&O's Summary      PHYSICAL EXAM:  Vital Signs Last 24 Hrs  T(C): 36.5 (29 Jan 2020 06:27), Max: 36.5 (29 Jan 2020 06:27)  T(F): 97.7 (29 Jan 2020 06:27), Max: 97.7 (29 Jan 2020 06:27)  HR: 95 (29 Jan 2020 06:27) (78 - 95)  BP: 130/62 (29 Jan 2020 06:27) (115/74 - 130/62)  BP(mean): --  RR: 20 (29 Jan 2020 06:27) (20 - 22)  SpO2: 96% (29 Jan 2020 06:27) (94% - 98%)    GENERAL: No acute distress, on NC 5L. Awake and alert.  HEENT: Oropharynx dry without lesions or posterior erythema.  EYES: EOMi, PERRLA, no scleral icterus or injection  NECK: Supple, full ROM  RESPIRATORY: Diffuse coarse biphasic crackles. Regular rate of breathing. Good inspiratory effort and air movement, no wheeze.   CARDIOVASCULAR: Normal S1/S2, regular rate and rhythm, no murmurs noted  GASTROINTESTINAL: Abdomen is soft, nontender, nondistended, normoactive bowel sounds, no palpable masses  SKIN: Scattered petechiae. Skin warm.  MUSCULOSKELETAL: No clubbing or cyanosis, no obvious deformity    LABS:                        10.3   20.71 )-----------( 278      ( 29 Jan 2020 06:12 )             33.0     01-28    132<L>  |  85<L>  |  28<H>  ----------------------------<  86  4.1   |  36<H>  |  0.55    Ca    9.1      28 Jan 2020 06:15  Phos  2.7     01-28  Mg     2.1     01-28      RADIOLOGY & ADDITIONAL TESTS:  Results Reviewed: Y  Imaging Personally Reviewed: Y    COORDINATION OF CARE:  Care Discussed with Consultants/Other Providers [Y/N]: Y  Prior or Outpatient Records Reviewed [Y/N]: Y PROGRESS NOTE:   Authored by Wesley Page MD, Pager [phone] [facility], [id number] [facility]     Patient is a 89y old  Female who presents with a chief complaint of Acute Hypoxic Respiratory Failure (29 Jan 2020 06:58)      SUBJECTIVE / OVERNIGHT EVENTS: Patient had large bowel movement overnight. Patient seen and examined at bedside. Daughter at bedside provided translation. Patient slept well without cough or shortness of breath. No anxiety attacks or chest pain.    ADDITIONAL REVIEW OF SYSTEMS: Denies fevers, chills.    MEDICATIONS  (STANDING):  albuterol/ipratropium for Nebulization. 3 milliLiter(s) Nebulizer every 6 hours  aspirin  chewable 81 milliGRAM(s) Oral daily  dextrose 5%. 1000 milliLiter(s) (50 mL/Hr) IV Continuous <Continuous>  dextrose 50% Injectable 12.5 Gram(s) IV Push once  dextrose 50% Injectable 25 Gram(s) IV Push once  dextrose 50% Injectable 25 Gram(s) IV Push once  enoxaparin Injectable 40 milliGRAM(s) SubCutaneous daily  FIRST- Mouthwash  BLM 5 milliLiter(s) Swish and Spit four times a day  influenza   Vaccine 0.5 milliLiter(s) IntraMuscular once  insulin glargine Injectable (LANTUS) 5 Unit(s) SubCutaneous at bedtime  insulin lispro (HumaLOG) corrective regimen sliding scale   SubCutaneous three times a day before meals  insulin lispro (HumaLOG) corrective regimen sliding scale   SubCutaneous at bedtime  melatonin 3 milliGRAM(s) Oral at bedtime  metoprolol succinate ER 25 milliGRAM(s) Oral daily  multivitamin 1 Tablet(s) Oral daily  polyethylene glycol 3350 17 Gram(s) Oral daily  predniSONE   Tablet 30 milliGRAM(s) Oral daily  ursodiol Capsule 300 milliGRAM(s) Oral two times a day  vancomycin  IVPB 1000 milliGRAM(s) IV Intermittent every 24 hours    MEDICATIONS  (PRN):  acetaminophen   Tablet .. 650 milliGRAM(s) Oral every 6 hours PRN Temp greater or equal to 38C (100.4F), Mild Pain (1 - 3), Moderate Pain (4 - 6)  benzocaine 15 mG/menthol 3.6 mG (Sugar-Free) Lozenge 1 Lozenge Oral two times a day PRN Sore Throat  bisacodyl 5 milliGRAM(s) Oral every 12 hours PRN Constipation  dextrose 40% Gel 15 Gram(s) Oral once PRN Blood Glucose LESS THAN 70 milliGRAM(s)/deciliter  glucagon  Injectable 1 milliGRAM(s) IntraMuscular once PRN Glucose LESS THAN 70 milligrams/deciliter  guaiFENesin   Syrup  (Sugar-Free) 200 milliGRAM(s) Oral every 6 hours PRN Cough  LORazepam     Tablet 0.25 milliGRAM(s) Oral at bedtime PRN Anxiety      CAPILLARY BLOOD GLUCOSE      POCT Blood Glucose.: 183 mg/dL (28 Jan 2020 21:34)  POCT Blood Glucose.: 159 mg/dL (28 Jan 2020 17:22)  POCT Blood Glucose.: 216 mg/dL (28 Jan 2020 12:27)  POCT Blood Glucose.: 98 mg/dL (28 Jan 2020 08:42)    I&O's Summary      PHYSICAL EXAM:  Vital Signs Last 24 Hrs  T(C): 36.5 (29 Jan 2020 06:27), Max: 36.5 (29 Jan 2020 06:27)  T(F): 97.7 (29 Jan 2020 06:27), Max: 97.7 (29 Jan 2020 06:27)  HR: 95 (29 Jan 2020 06:27) (78 - 95)  BP: 130/62 (29 Jan 2020 06:27) (115/74 - 130/62)  BP(mean): --  RR: 20 (29 Jan 2020 06:27) (20 - 22)  SpO2: 96% (29 Jan 2020 06:27) (94% - 98%)    GENERAL: No acute distress, on NC 5L. Awake and alert.  HEENT: Oropharynx dry without lesions or posterior erythema.  EYES: EOMi, PERRLA, no scleral icterus or injection  NECK: Supple, full ROM  RESPIRATORY: Diffuse coarse biphasic crackles. Regular rate of breathing. Good inspiratory effort and air movement, no wheeze.   CARDIOVASCULAR: Normal S1/S2, regular rate and rhythm, no murmurs noted  GASTROINTESTINAL: Abdomen is soft, nontender, nondistended, normoactive bowel sounds, no palpable masses  SKIN: Skin warm.  MUSCULOSKELETAL: No clubbing or cyanosis, no obvious deformity    LABS:                        10.3   20.71 )-----------( 278      ( 29 Jan 2020 06:12 )             33.0     01-28    132<L>  |  85<L>  |  28<H>  ----------------------------<  86  4.1   |  36<H>  |  0.55    Ca    9.1      28 Jan 2020 06:15  Phos  2.7     01-28  Mg     2.1     01-28      RADIOLOGY & ADDITIONAL TESTS:  Results Reviewed: Y  Imaging Personally Reviewed: Y    COORDINATION OF CARE:  Care Discussed with Consultants/Other Providers [Y/N]: Y  Prior or Outpatient Records Reviewed [Y/N]: Y

## 2020-01-29 NOTE — DISCHARGE NOTE NURSING/CASE MANAGEMENT/SOCIAL WORK - PATIENT PORTAL LINK FT
You can access the FollowMyHealth Patient Portal offered by [facility] by registering at the following website: [email or web address]. By joining [employer]’s FollowMyHealth portal, you will also be able to view your health information using other applications (apps) compatible with our system.

## 2020-01-29 NOTE — DISCHARGE NOTE PROVIDER - NSDCMRMEDTOKEN_GEN_ALL_CORE_FT
benzonatate 100 mg oral capsule: 1 cap(s) orally 3 times a day  clotrimazole-betamethasone dipropionate 1%-0.05% topical cream: Apply topically to affected area 2 times a day, As Needed  Combivent Respimat 20 mcg-100 mcg/inh inhalation aerosol: 2 puff(s) inhaled 2 times a day   docusate sodium 100 mg oral capsule: 1 cap(s) orally 2 times a day, As Needed  doxycycline hyclate 100 mg oral capsule: 1 cap(s) orally 2 times a day, last dose 1/2 PM dose   Ecotrin Adult Low Strength 81 mg oral delayed release tablet: 1 tab(s) orally once a day  famotidine 20 mg oral tablet: 1 tab(s) orally once a day  fluticasone 50 mcg/inh nasal spray: 1 spray(s) in each nostril once a day  guaiFENesin 100 mg/5 mL oral liquid: 5 milliliter(s) orally every 6 hours  HYDROcodone-homatropine 5 mg-1.5 mg/5 mL oral syrup: 5 milliliter(s) orally every 8 hours, As Needed  melatonin 3 mg oral tablet: 1 tab(s) orally once a day (at bedtime)  metoprolol succinate 25 mg oral tablet, extended release: 1 tab(s) orally once a day  Multiple Vitamins oral tablet: 1 tab(s) orally once a day  predniSONE 10 mg oral tablet: 1 tab(s) orally once a day  Pulmicort Flexhaler 180 mcg/inh inhalation powder: 1 puff(s) inhaled 2 times a day  ursodiol 300 mg oral capsule: 1 cap(s) orally 2 times a day  Vitamin B Complex oral tablet: 1 tab(s) orally once a day  Vitamin D3: 1 tab(s) orally once a day benzonatate 100 mg oral capsule: 1 cap(s) orally 3 times a day  clotrimazole-betamethasone dipropionate 1%-0.05% topical cream: Apply topically to affected area 2 times a day, As Needed  Combivent Respimat 20 mcg-100 mcg/inh inhalation aerosol: 2 puff(s) inhaled 2 times a day   docusate sodium 100 mg oral capsule: 1 cap(s) orally 2 times a day, As Needed  doxycycline hyclate 100 mg oral capsule: 1 cap(s) orally 2 times a day, last dose 1/2 PM dose   Ecotrin Adult Low Strength 81 mg oral delayed release tablet: 1 tab(s) orally once a day  famotidine 20 mg oral tablet: 1 tab(s) orally once a day  fluconazole 100 mg oral tablet: 1 tab(s) orally once a day  fluticasone 50 mcg/inh nasal spray: 1 spray(s) in each nostril once a day  guaiFENesin 100 mg/5 mL oral liquid: 5 milliliter(s) orally every 6 hours  HYDROcodone-homatropine 5 mg-1.5 mg/5 mL oral syrup: 5 milliliter(s) orally every 8 hours, As Needed  melatonin 3 mg oral tablet: 1 tab(s) orally once a day (at bedtime)  metoprolol succinate 25 mg oral tablet, extended release: 1 tab(s) orally once a day  Multiple Vitamins oral tablet: 1 tab(s) orally once a day  predniSONE 10 mg oral tablet: 1 tab(s) orally once a day  Pulmicort Flexhaler 180 mcg/inh inhalation powder: 1 puff(s) inhaled 2 times a day  ursodiol 300 mg oral capsule: 1 cap(s) orally 2 times a day  Vitamin B Complex oral tablet: 1 tab(s) orally once a day  Vitamin D3: 1 tab(s) orally once a day benzonatate 100 mg oral capsule: 1 cap(s) orally 3 times a day  clotrimazole-betamethasone dipropionate 1%-0.05% topical cream: Apply topically to affected area 2 times a day, As Needed  Combivent Respimat 20 mcg-100 mcg/inh inhalation aerosol: 2 puff(s) inhaled 2 times a day   docusate sodium 100 mg oral capsule: 1 cap(s) orally 2 times a day, As Needed  Ecotrin Adult Low Strength 81 mg oral delayed release tablet: 1 tab(s) orally once a day  famotidine 20 mg oral tablet: 1 tab(s) orally once a day  fluticasone 50 mcg/inh nasal spray: 1 spray(s) in each nostril once a day  guaiFENesin 100 mg/5 mL oral liquid: 5 milliliter(s) orally every 6 hours  HYDROcodone-homatropine 5 mg-1.5 mg/5 mL oral syrup: 5 milliliter(s) orally every 8 hours, As Needed  melatonin 3 mg oral tablet: 1 tab(s) orally once a day (at bedtime)  metoprolol succinate 25 mg oral tablet, extended release: 1 tab(s) orally once a day  Multiple Vitamins oral tablet: 1 tab(s) orally once a day  polyethylene glycol 3350 oral powder for reconstitution: 17 gram(s) orally once a day for constipation, as needed  predniSONE: 2 tabs orally on January 30, 2020  1 tabs orally once a day every day after that  Pulmicort Flexhaler 180 mcg/inh inhalation powder: 1 puff(s) inhaled 2 times a day  ursodiol 300 mg oral capsule: 1 cap(s) orally 2 times a day  Vitamin B Complex oral tablet: 1 tab(s) orally once a day  Vitamin D3: 1 tab(s) orally once a day

## 2020-01-29 NOTE — PROGRESS NOTE ADULT - PROBLEM SELECTOR PROBLEM 1
Acute on chronic respiratory failure with hypoxia
Goals of care, counseling/discussion
Goals of care, counseling/discussion
Acute on chronic respiratory failure with hypoxia

## 2020-01-29 NOTE — PROGRESS NOTE ADULT - PROBLEM SELECTOR PLAN 5
Pt with known hx of pulm fibrosis on 5L home oxygen.   - management of acute hypoxic resp failure as above  - pulm recs appreciated   - c/w prednisone taper

## 2020-01-29 NOTE — PROGRESS NOTE ADULT - PROBLEM SELECTOR PLAN 3
Pt with severe sepsis, with leukocytosis, fever and tachycardia likely in setting of HAP + enterovirus +/- UTI  - urine cultures with pan-sensitive proteus (covered by empiric HAP treatment)  - broad spectrum abx as above for HAP, 7 day total course to complete today Pt with severe sepsis, with leukocytosis, fever and tachycardia on admission likely in setting of HAP + enterovirus +/- UTI  - urine cultures with pan-sensitive proteus (covered by empiric HAP treatment)  - broad spectrum abx as above for HAP, 7 day total course to complete today

## 2020-01-29 NOTE — PROGRESS NOTE ADULT - SUBJECTIVE AND OBJECTIVE BOX
PULMONARY PROGRESS NOTE    FELISA YANG  MRN-[id number]    Patient is a 89y old  Female who presents with a chief complaint of Acute Hypoxic Respiratory Failure (24 Jan 2020 06:56)      HPI:  on nasal canula, no complaints    ROS:   -neg    MEDICATIONS  (STANDING):  albuterol/ipratropium for Nebulization. 3 milliLiter(s) Nebulizer every 6 hours  aspirin  chewable 81 milliGRAM(s) Oral daily  dextrose 5%. 1000 milliLiter(s) (50 mL/Hr) IV Continuous <Continuous>  dextrose 50% Injectable 12.5 Gram(s) IV Push once  dextrose 50% Injectable 25 Gram(s) IV Push once  dextrose 50% Injectable 25 Gram(s) IV Push once  enoxaparin Injectable 40 milliGRAM(s) SubCutaneous daily  FIRST- Mouthwash  BLM 5 milliLiter(s) Swish and Spit four times a day  influenza   Vaccine 0.5 milliLiter(s) IntraMuscular once  insulin glargine Injectable (LANTUS) 5 Unit(s) SubCutaneous at bedtime  insulin lispro (HumaLOG) corrective regimen sliding scale   SubCutaneous three times a day before meals  insulin lispro (HumaLOG) corrective regimen sliding scale   SubCutaneous at bedtime  melatonin 3 milliGRAM(s) Oral at bedtime  metoprolol succinate ER 25 milliGRAM(s) Oral daily  multivitamin 1 Tablet(s) Oral daily  polyethylene glycol 3350 17 Gram(s) Oral daily  ursodiol Capsule 300 milliGRAM(s) Oral two times a day    MEDICATIONS  (PRN):  acetaminophen   Tablet .. 650 milliGRAM(s) Oral every 6 hours PRN Temp greater or equal to 38C (100.4F), Mild Pain (1 - 3), Moderate Pain (4 - 6)  benzocaine 15 mG/menthol 3.6 mG (Sugar-Free) Lozenge 1 Lozenge Oral two times a day PRN Sore Throat  bisacodyl 5 milliGRAM(s) Oral every 12 hours PRN Constipation  dextrose 40% Gel 15 Gram(s) Oral once PRN Blood Glucose LESS THAN 70 milliGRAM(s)/deciliter  glucagon  Injectable 1 milliGRAM(s) IntraMuscular once PRN Glucose LESS THAN 70 milligrams/deciliter  guaiFENesin   Syrup  (Sugar-Free) 200 milliGRAM(s) Oral every 6 hours PRN Cough  LORazepam     Tablet 0.25 milliGRAM(s) Oral at bedtime PRN Anxiety        EXAM:  Vital Signs Last 24 Hrs  T(C): 36.3 (29 Jan 2020 13:05), Max: 36.5 (29 Jan 2020 06:27)  T(F): 97.4 (29 Jan 2020 13:05), Max: 97.7 (29 Jan 2020 06:27)  HR: 98 (29 Jan 2020 13:05) (67 - 98)  BP: 112/61 (29 Jan 2020 13:05) (112/61 - 130/62)  BP(mean): --  RR: 21 (29 Jan 2020 13:05) (20 - 22)  SpO2: 100% (29 Jan 2020 13:05) (94% - 100%)  GENERAL: The patient is awake and alert in no apparent distress.     LUNGS: bilat crackles    HEART: S1/S2    LABS/IMAGING: reviewed                                                                    10.3   20.71 )-----------( 278      ( 29 Jan 2020 06:12 )             33.0   01-29    129<L>  |  81<L>  |  46<H>  ----------------------------<  128<H>  4.4   |  31  |  0.72    Ca    8.8      29 Jan 2020 06:12  Phos  3.1     01-29  Mg     2.0     01-29            < from: Xray Chest 1 View-PORTABLE IMMEDIATE (01.22.20 @ 14:58) >      IMPRESSION:  Interstitial lung disease without focal consolidation.    < end of copied text >        PROBLEM LIST:  89y Female with HEALTH ISSUES - PROBLEM Dx:  Acute on chronic respiratory failure with hypoxia: Acute on chronic respiratory failure with hypoxia  Discharge planning issues: Discharge planning issues  Need for prophylactic measure: Need for prophylactic measure  Thrush: Thrush  HTN (Hypertension): HTN (Hypertension)  CAD (coronary artery disease): CAD (coronary artery disease)  Idiopathic pulmonary fibrosis: Idiopathic pulmonary fibrosis  Hyponatremia: Hyponatremia  Troponin level elevated: Troponin level elevated  Severe sepsis: Severe sepsis  Acute on chronic respiratory failure with hypercapnia: Acute on chronic respiratory failure with hypercapnia    RECS:  -supportive care  -supplemental O2  -s/p abx/steroids  -DNR, family refused hospice      Jenny Antonio MD   [phone]

## 2020-01-29 NOTE — PROGRESS NOTE ADULT - PROBLEM SELECTOR PLAN 4
Sodium 125 on admission, improved but still holding low. Most likely hypovolemic from sepsis and poor PO intake with possible SIADH.   - continue holding home lasix  - continue to monitor BMP qD Sodium 125 on admission, improved but still holding low. Most likely hypovolemic from sepsis and poor PO intake with possible SIADH.   - continue to monitor BMP qD

## 2020-01-29 NOTE — PROGRESS NOTE ADULT - NSHPATTENDINGPLANDISCUSS_GEN_ALL_CORE
Pt
Pt
patient & daughter; HS Dr. Page

## 2020-01-29 NOTE — PROGRESS NOTE ADULT - REASON FOR ADMISSION
Acute Hypoxic Respiratory Failure

## 2020-01-29 NOTE — PROGRESS NOTE ADULT - PROBLEM SELECTOR PLAN 1
Hospice discussion with daughter on 1/27. Services explained.  -DNR/DNI  -Family refusing hospice care  -Dispo home with home PT, wound care services Hospice discussion with daughter on 1/27. Services explained.  -DNR/DNI  -Family not interested in hospice care at this time  -Dispo home with home PT, wound care services

## 2020-01-29 NOTE — DISCHARGE NOTE PROVIDER - NSDCCPCAREPLAN_GEN_ALL_CORE_FT
PRINCIPAL DISCHARGE DIAGNOSIS  Diagnosis: Acute on chronic respiratory failure with hypoxia  Assessment and Plan of Treatment: You developed a decreased ability to get oxygen into your blood through your lungs. This is because you were infected with a virus called enterovirus, and your lungs are already heavily damaged by pulmonary fibrosis. You were given additional oxygen and had your breathing assisted with devices for a while. You were treated for a possible bacterial infectino with antibiotics as well. You are now discharged on your home oxygen levels on the nasal cannula. In addition to this, you were given increased doses of steroids from your usual dose. Please take 20mg of prednisone on January 30th, and return to your usual dose of 10mg after discharge. Please follow up with your pulmonologist and PCP within two weeks of discharge.      SECONDARY DISCHARGE DIAGNOSES  Diagnosis: Thrush  Assessment and Plan of Treatment: You developed an infection the throat due to a fungus called Candida albicans. You were taking a medication called fluconazole prior to admission. You were given an additional one week of treatment in the hospital and completed treatment for this. PRINCIPAL DISCHARGE DIAGNOSIS  Diagnosis: Enterovirus infection  Assessment and Plan of Treatment: You developed a decreased ability to get oxygen into your blood through your lungs. This is because you were infected with a virus called enterovirus, and your lungs are already heavily damaged by pulmonary fibrosis. You were given additional oxygen and had your breathing assisted with devices. You were treated for a possible bacterial infectino with antibiotics as well. You are now discharged on your home oxygen levels on the nasal cannula. In addition to this, you were given increased doses of steroids from your usual dose.   Please take 20mg of prednisone on January 30th, and return to your usual dose of 10mg the day after and every day after that.   Please follow up with your PCP within two weeks of discharge.      SECONDARY DISCHARGE DIAGNOSES  Diagnosis: Thrush  Assessment and Plan of Treatment: You developed an infection the throat due to a fungus called Candida albicans. You were taking a medication called fluconazole prior to admission. You were given an additional one week of treatment in the hospital and completed treatment for this.

## 2020-01-29 NOTE — DISCHARGE NOTE PROVIDER - NSDCFUADDINST_GEN_ALL_CORE_FT
Please take 20mg of prednisone on January 30th, and return to your usual dose of 10mg the day after and every day after that.

## 2020-01-29 NOTE — PROGRESS NOTE ADULT - ATTENDING COMMENTS
Patient seen and examined, chart and labs reviewed. Case discussed with house staff.    89F PMH of severe pulmonary fibrosis on 5L home oxygen, Li's esophagus, CAD s/p stent x3, recent admission for hypoxic resp failure 2/2 RSV PNA admitted now with acute hypoxic respiratory failure requiring BIPAP 2/2 sepsis from entero/rhinovirus with possible superimposed pneumonia and UTI.    Patient completed 7 days of antibiotics for HAP. Family declined hospice and want to take patient home w/ home care. Steroids being tapered to home dose. D/c on bowel regimen. Patient is stable for discharge home today with home care.     35 minutes spent on discharge planning.

## 2020-01-29 NOTE — PROGRESS NOTE ADULT - PROBLEM SELECTOR PROBLEM 2
Severe sepsis
Acute on chronic respiratory failure with hypoxia
Acute on chronic respiratory failure with hypoxia
Severe sepsis

## 2020-01-29 NOTE — PROGRESS NOTE ADULT - PROBLEM SELECTOR PLAN 8
Diet: Pureed diet per nutrition recs   DVT ppx: Lovenox  GOC: DNR/DNI as per daughter, declined hospice    Baseline Mobility: 6-7  6-Click Admission Score: 6-7  Dispo: Home with home care

## 2020-01-29 NOTE — PROGRESS NOTE ADULT - PROBLEM SELECTOR PLAN 7
Pt diagnosed with thrush as outpatient, most likely from prolonged antibiotic and inhaled steroid use.  - Took fluconazole for 7 days prior to admission  - s/p fluconazole IV q24 100mg (1/22-1/28) for 14 days total  - still complaining of dysphagia but no sign of thrush on exam

## 2020-01-29 NOTE — DISCHARGE NOTE PROVIDER - HOSPITAL COURSE
89F PMH of HTN, severe pulmonary fibrosis on 5L home oxygen, Li's esophagus, CAD s/p stent x3, hx acute cholecystitis with percutaneous cholecystostomy in October 2018 presenting with shortness of breath. Pt recently hospitalized earlier this month with acute hypoxic respiratory failure from RSV with superimposed pneumonia. Pt discharged home and now presents with similar symptoms, shortness of breath and fever. Pt is currently responsive to voice on BiPAP, but cannot provide meaningful history. Daughter at bedside provided history. Pt initially was feeling better upon discharge from [facility], however over past week has been not eating or drinking anything, coughing, and ripping off her oxygen at night.  Daughter denies hx of known fevers, chest pain, abdominal pain, LE edema, diarrhea, or constipation. Daughter denies sick contacts, recent travel.         Daughter notes that patient re-established her DNR/DNI intentions and MOLST in ED.         In ED, initial VS were T95  /68 RR40 SaO2 84% on 5L NC. Labs notable for lactate of 6.9, trop of 104, hyponatremia to 125 and WBC of 21.71. UA grossly positive, RVP +entero/rhinovirus, CXR without focal consolidation. She was given 500cc bolus, vanc/cefepime/cipro, duonebs, and admitted to the general medical floor.        Patient was started on BiPAP, then de-escalated to high flow nasal cannula and then to nasal cannula on 5L, where she remained for the duration of her stay. She completed a 1 week course of vancomycin and cefepime, as well as a three day course of azithromycin. Fluconazole was given for 1 week to continue the 1 week taken prior to admission for a total of 2 weeks of therapy. The hyponatremia improved gradually over the course of the admission but never fully resolved. Patient had period anxiety attacks that were managed with 0.25mg ativan PO. Physical therapy evaluated the patient but she refused to work with them due to fatigue. 89F PMH of HTN, severe pulmonary fibrosis on 5L home oxygen, Li's esophagus, CAD s/p stent x3, hx acute cholecystitis with percutaneous cholecystostomy in October 2018 presenting with shortness of breath. Pt recently hospitalized earlier this month with acute hypoxic respiratory failure from RSV with superimposed pneumonia. Pt discharged home and now presents with similar symptoms, shortness of breath and fever. Pt is currently responsive to voice on BiPAP, but cannot provide meaningful history. Daughter at bedside provided history. Pt initially was feeling better upon discharge from [facility], however over past week has been not eating or drinking anything, coughing, and ripping off her oxygen at night.  Daughter denies hx of known fevers, chest pain, abdominal pain, LE edema, diarrhea, or constipation. Daughter denies sick contacts, recent travel.         Daughter notes that patient re-established her DNR/DNI intentions and MOLST in ED.         In ED, initial VS were T95  /68 RR40 SaO2 84% on 5L NC. Labs notable for lactate of 6.9, trop of 104, hyponatremia to 125 and WBC of 21.71. UA grossly positive, RVP +entero/rhinovirus, CXR without focal consolidation. She was given 500cc bolus, vanc/cefepime/cipro, duonebs, and admitted to the general medical floor.        Patient was started on BiPAP, then de-escalated to high flow nasal cannula and then to nasal cannula on 5L, where she remained for the duration of her stay. She completed a 1 week course of vancomycin and cefepime, as well as a three day course of azithromycin. Fluconazole was given for 1 week to continue the 1 week taken prior to admission for a total of 2 weeks of therapy. The hyponatremia improved gradually over the course of the admission but never fully resolved. Patient had period anxiety attacks that were managed with 0.25mg ativan PO. Physical therapy evaluated the patient but she refused to work with them due to fatigue. Patient is discharged home with wound care, physical therapy, and nursing services. 89F PMH of HTN, severe pulmonary fibrosis on 5L home oxygen, Li's esophagus, CAD s/p stent x3, hx acute cholecystitis with percutaneous cholecystostomy in October 2018 presenting with shortness of breath. Pt recently hospitalized earlier this month with acute hypoxic respiratory failure from RSV with superimposed pneumonia. Pt discharged home and now presents with similar symptoms, shortness of breath and fever. Pt is currently responsive to voice on BiPAP, but cannot provide meaningful history. Daughter at bedside provided history. Pt initially was feeling better upon discharge from [facility], however over past week has been not eating or drinking anything, coughing, and ripping off her oxygen at night.  Daughter denies hx of known fevers, chest pain, abdominal pain, LE edema, diarrhea, or constipation. Daughter denies sick contacts, recent travel.         Daughter notes that patient re-established her DNR/DNI intentions and MOLST in ED.         In ED, initial VS were T95  /68 RR40 SaO2 84% on 5L NC. Labs notable for lactate of 6.9, trop of 104, hyponatremia to 125 and WBC of 21.71. UA grossly positive, RVP +entero/rhinovirus, CXR without focal consolidation. She was given 500cc bolus, vanc/cefepime/cipro, duonebs, and admitted to the general medical floor.        Patient was started on BiPAP, then de-escalated to high flow nasal cannula and then to nasal cannula on 5L, where she remained for the duration of her stay. She completed a 1 week course of vancomycin and cefepime for HAP as well as a three day course of azithromycin (urine legionella negative). Fluconazole was given for 1 week to continue the 1 week taken prior to admission for a total of 2 weeks of therapy. The hyponatremia improved gradually over the course of the admission but never fully resolved. Patient had period anxiety attacks that were managed with 0.25mg ativan PO. Physical therapy evaluated the patient but she refused to work with them due to fatigue. Hospice was consulted and after much discussion with patient's family, home hospice was declined and family opted for home w/ home care. Patient is discharged home with wound care, physical therapy, and nursing services.

## 2020-01-29 NOTE — PROGRESS NOTE ADULT - PROBLEM SELECTOR PROBLEM 3
Troponin level elevated
Severe sepsis
Severe sepsis
Troponin level elevated

## 2020-01-29 NOTE — PROGRESS NOTE ADULT - PROBLEM SELECTOR PLAN 2
Due to entero/rhino virus with possible superimposed pna in the setting of pulmonary fibrosis.   - s/p 7 day total HAP: s/p azithro (1/22-1/24) vanc/cefepime (1/22-1/28)  - c/w steroids: 20mg prednisone today and down to home dose 10mg  - duonebs Q4H  - Ativan 0.25mg PRN for anxiety  - pulmonology following

## 2020-01-29 NOTE — DISCHARGE NOTE PROVIDER - CARE PROVIDER_API CALL
Angel Otoole (MD)  Gastroenterology; Internal Medicine  [address], [address]  [place] [address]  Phone: [phone]  Fax: [phone]  Established Patient  Follow Up Time: 2 weeks

## 2020-02-03 RX ORDER — BENZONATATE 100 MG/1
100 CAPSULE ORAL
Qty: 60 | Refills: 0 | Status: ACTIVE | COMMUNITY
Start: 2020-02-03 | End: 1900-01-01

## 2020-02-04 RX ORDER — LORAZEPAM 0.5 MG/1
0.5 TABLET ORAL
Qty: 30 | Refills: 0 | Status: ACTIVE | COMMUNITY
Start: 2020-02-04 | End: 1900-01-01

## 2021-06-25 NOTE — PATIENT PROFILE ADULT. - AS SC BRADEN MOBILITY
Status post mechanical fall  She denies pain at the site currently  Reviewed imaging studies  Continue Tylenol 975 mg p o  T i d   Consider tapering down tramadol due to decreased renal function, and high risk of confusion in elderly  I would recommend starting low-dose oxycodone, may use it as p r n   As well (4) no limitation

## 2021-07-29 NOTE — PROGRESS NOTE ADULT - PROVIDER SPECIALTY LIST ADULT
Gastroenterology
Infectious Disease
Internal Medicine
Intervent Radiology
Pulmonology
SICU
Surgery
Internal Medicine
SICU
Yes

## 2021-09-07 NOTE — PHYSICAL THERAPY INITIAL EVALUATION ADULT - FOLLOWS COMMANDS/ANSWERS QUESTIONS, REHAB EVAL
100% accuracy for items such as "snaps [on a curtain]," "handle" and "cord"/intact
unable to follow multi-step instructions/100% of the time

## 2022-01-01 NOTE — PHYSICAL THERAPY INITIAL EVALUATION ADULT - STANDING BALANCE: STATIC
1st Trimester Sonogram/20 Week Level II Sonogram/Fetal Non-Stress Test (NST)/Ultra Screen at 12 Weeks
fair balance

## 2022-03-26 NOTE — H&P ADULT - DOES THIS PATIENT HAVE A HISTORY OF OR HAS BEEN DX WITH HEART FAILURE?
General and Vascular Surgery                                                           Daily Progress Note                                                             Magdaleno Siemens, PA-C     Pt Name: Kelin Latham  Medical Record Number: [id number]  Date of Birth 1966   Today's Date: 3/26/2022    ASSESSMENT/PLAN  Sigmoid colon cancer              POD 2 from diverting colostomy              Stable              Ostomy pink, +stool and gas production              Tolerating liquid diet. Will advance diet to regular as tolerated              OOB and ambulate as tolerated     EDUCATION  Patient educated about their illness/diagnosis, stated above, and all questions answered. We discussed the importance of nutrition, medications they are taking, and healthy lifestyle. SUBJECTIVE  Tami has improved from yesterday. Pain is well controlled. She has no nausea and no vomiting. She has passed flatus and has had a bowel movement. She is tolerating full liquids. Current activity is ad rowena    OBJECTIVE  VITALS:  height is 5' 3\" (1.6 m) and weight is 188 lb 11.4 oz (85.6 kg). Her temperature is 97.4 °F (36.3 °C). Her blood pressure is 172/102 (abnormal) and her pulse is 99. Her respiration is 24 and oxygen saturation is 85% (abnormal). VITALS:  BP (!) 176/93   Pulse 89   Temp 97.5 °F (36.4 °C) (Oral)   Resp 22   Ht 5' 3\" (1.6 m)   Wt 188 lb 11.4 oz (85.6 kg)   SpO2 98%   BMI 33.43 kg/m²   GENERAL: alert, no distress  ABDOMEN: non-distended and tenderness present- mild,  without rebound and guarding  I/O last 3 completed shifts: In: 650.7 [I.V.:150.7]  Out: 1540 [Stool:40]  No intake/output data recorded.     LABS  Recent Labs     03/25/22  1048 03/26/22  0657 03/26/22  0705   WBC  --  9.3  --    HGB  --  9.7*  --    HCT  --  30.0*  --    PLT  --  97*  --    NA  --  133*  --    K  --  4.2  --    CL  --  98*  --    CO2  --  22  --    BUN  --  19  -- CREATININE  --  3.7*  --    CALCIUM  --  7.1*  --    INR   < >  --  2.30*   AST  --  14*  --    ALT  --  7*  --    BILITOT  --  0.3  --     < > = values in this interval not displayed. CBC:   Lab Results   Component Value Date    WBC 9.3 03/26/2022    RBC 3.13 03/26/2022    HGB 9.7 03/26/2022    HCT 30.0 03/26/2022    MCV 95.8 03/26/2022    MCH 31.0 03/26/2022    MCHC 32.4 03/26/2022    RDW 16.4 03/26/2022    PLT 97 03/26/2022    MPV 10.7 03/26/2022     CMP:    Lab Results   Component Value Date     03/26/2022    K 4.2 03/26/2022    CL 98 03/26/2022    CO2 22 03/26/2022    BUN 19 03/26/2022    CREATININE 3.7 03/26/2022    GFRAA 15 03/26/2022    AGRATIO 1.1 03/26/2022    LABGLOM 13 03/26/2022    GLUCOSE 185 03/26/2022    PROT 4.5 03/26/2022    LABALBU 2.4 03/26/2022    CALCIUM 7.1 03/26/2022    BILITOT 0.3 03/26/2022    ALKPHOS 174 03/26/2022    AST 14 03/26/2022    ALT 7 03/26/2022         Dima Cleaning PA-C  Electronically signed 3/26/2022 at 11:41 AM        Surgery Staff  I have examined this patient and read and agree with the note by Will Quintana PA-C from today. Stoma producing.   Advance diet as tolerated      Electronically signed by Yassine Alaniz MD on 3/26/2022 at 12:46 PM no

## 2022-05-20 NOTE — PROVIDER CONTACT NOTE (OTHER) - SITUATION
Patient states she was taken off Calcitriol when sent home from the hospital. She is starting to get numbness in hands, arms, and face. She decided to go back on it this morning. She just wanted to let you know.   /48

## 2022-09-10 NOTE — DISCHARGE NOTE NURSING/CASE MANAGEMENT/SOCIAL WORK - NSDCPNPNATDISSUGG_GEN_ALL_CORE
[facility] with Dr. De Leon and Dr. Acevedo, [facility], Emergency Dept  [address] [address] [phone]    If symptoms worsen    FINAL IMPRESSION   1. Pelvic pain       Melanie POOLE (Scribe), am scribing for, and in the presence of, Chika Ren M.D..    Electronically signed by: Melanie Andres (Scribe), 9/10/2022    Chika POOLE No

## 2023-06-09 NOTE — PROGRESS NOTE ADULT - PROBLEM SELECTOR PLAN 4
Initial troponin 104, repeat 95. Most likely demand in setting of acute hypoxic event and known severe pulmonary fibrosis. EKG showing RBBB, which is unchanged from prior EKG. Pt not complaining of chest pain.  - monitor off telemetry Sodium 125 on admission, improved but still holding low. Most likely hypovolemic from sepsis and poor PO intake with possible SIADH.   - continue holding home lasix  - continue to monitor BMP qD Clear

## 2023-10-05 NOTE — ED PROVIDER NOTE - CRITICAL CARE INDICATION, MLM
patient was critically ill... Patient was critically ill with a high probability of imminent or life threatening deterioration. No

## 2024-04-03 NOTE — DISCHARGE NOTE PROVIDER - NSDCHC_MEDRECSTATUS_GEN_ALL_CORE
PAST MEDICAL HISTORY:  Asthma      Admission Reconciliation is Completed  Discharge Reconciliation is Not Complete Admission Reconciliation is Completed  Discharge Reconciliation is Completed

## 2024-06-14 NOTE — DISCHARGE NOTE NURSING/CASE MANAGEMENT/SOCIAL WORK - NSDPACMPNY_GEN_ALL_CORE
Monitor hemoglobin  
Neurosurgery following  C-collar being maintained until cervical x-rays have been obtained.  
PT and OT evaluations  
Patient with the following injuries (per trauma):  Bilateral SAH  IVH  C1-C2 ligamentous strain  Right rib fractures (1, 7, 8)  Occult right pneumothorax  Left open femur fracture  
Repeat CT of the brain has been stable.  Patient will need Keppra for 7 days.    SLP evaluation of cognition  
S/p IM nailing of the left femur shaft fracture with I&D of the left knee joint and complex multilayer repair of the left lower extremity wounds on 6/13 by Dr. Driss Salinas.    Orthopedics recommends 50% weightbearing to the LLE with 0 to 90 degree range of motion to the left knee.  
Family

## 2024-10-21 NOTE — PATIENT PROFILE ADULT - NSPROEXTENSIONSOFSELF_GEN_A_NUR
wheel chair/dentures/walker
Physical Exam    Vital Signs: I have reviewed the initial vital signs.  Constitutional: well-nourished, appears stated age, no acute distress  Eyes: Conjunctiva pink, Sclera clear  Cardiovascular: regular rate, regular rhythm, well-perfused extremities, radial pulses equal and 2+ b/l.   Respiratory: unlabored respiratory effort, clear to auscultation bilaterally no wheezing, rales and rhonchi. pt is speaking full sentences. no accessory muscle use.   Gastrointestinal: soft, non-tender, nondistended abdomen, no pulsatile mass, no rebound, no guarding  Musculoskeletal: FROM of b/l upper and lower extremities, no lower extremity edema, no calf tenderness  Integumentary: warm, dry, no rash  Neurologic: awake, alert, steady gait.   Psychiatric: appropriate mood, appropriate affect
